# Patient Record
Sex: MALE | Race: WHITE | NOT HISPANIC OR LATINO | Employment: OTHER | URBAN - METROPOLITAN AREA
[De-identification: names, ages, dates, MRNs, and addresses within clinical notes are randomized per-mention and may not be internally consistent; named-entity substitution may affect disease eponyms.]

---

## 2017-02-07 ENCOUNTER — OFFICE VISIT (OUTPATIENT)
Dept: INTERNAL MEDICINE | Facility: CLINIC | Age: 82
End: 2017-02-07
Payer: MEDICARE

## 2017-02-07 VITALS
OXYGEN SATURATION: 99 % | BODY MASS INDEX: 20.94 KG/M2 | WEIGHT: 158 LBS | SYSTOLIC BLOOD PRESSURE: 126 MMHG | DIASTOLIC BLOOD PRESSURE: 78 MMHG | HEART RATE: 73 BPM | TEMPERATURE: 98 F | HEIGHT: 73 IN

## 2017-02-07 DIAGNOSIS — R07.89 CHEST TIGHTNESS: Primary | ICD-10-CM

## 2017-02-07 DIAGNOSIS — R10.13 EPIGASTRIC PAIN: ICD-10-CM

## 2017-02-07 DIAGNOSIS — I10 ESSENTIAL HYPERTENSION WITH GOAL BLOOD PRESSURE LESS THAN 140/90: ICD-10-CM

## 2017-02-07 DIAGNOSIS — I25.10 CORONARY ARTERY DISEASE INVOLVING NATIVE HEART, ANGINA PRESENCE UNSPECIFIED, UNSPECIFIED VESSEL OR LESION TYPE: ICD-10-CM

## 2017-02-07 PROCEDURE — 99214 OFFICE O/P EST MOD 30 MIN: CPT | Performed by: INTERNAL MEDICINE

## 2017-02-07 PROCEDURE — 93000 ELECTROCARDIOGRAM COMPLETE: CPT | Performed by: INTERNAL MEDICINE

## 2017-02-07 NOTE — PROGRESS NOTES
SUBJECTIVE:                                                    Deshawn Burrows is a 83 year old male who presents to clinic today for the following health issues:     Pt is a 83 year old male who is seen here to day with c/o upper abd pain since 2 days. Pt says pain initially started in  lt chest and then moved to upper abdomen. No radiation of pain to lt arm ,no diaphoresis, no nausea. Pt does have belching,no heartburn . No palpitations,no SOB. Pt does walk 2 miles 3 x wk and no chest or Sob.  Pt has h/o CAD and s/p 2 cardiac stents in 2004. Pt has not seen cardiologist sine 2004 and is not taking aspirin.      Hypertension Follow-up      Outpatient blood pressures are not being checked.    Low Salt Diet: low salt         Patient Active Problem List   Diagnosis     Vasculitis (H)     Essential hypertension with goal blood pressure less than 140/90     Hyperlipidemia LDL goal <100     Atherosclerosis of native coronary artery of native heart without angina pectoris     Past Surgical History   Procedure Laterality Date     Hernia repair  2007     right inguinal hernia repair     Stent, coronary, steve  2004     2 stents       Social History   Substance Use Topics     Smoking status: Former Smoker     Smokeless tobacco: Never Used      Comment: Quit 10 years ago     Alcohol Use: 0.0 oz/week     0 Standard drinks or equivalent per week      Comment: Moderate     Family History   Problem Relation Age of Onset     Family history unknown: Yes         Current Outpatient Prescriptions   Medication Sig Dispense Refill     multivitamin, therapeutic with minerals (THERA-VIT-M) TABS Take 1 tablet by mouth daily       METOPROLOL SUCCINATE ER PO Take 50 mg by mouth daily       ATORVASTATIN CALCIUM PO Take 40 mg by mouth At Bedtime          ROS:  C: NEGATIVE for fever, chills, change in weight  E/M: NEGATIVE for ear, mouth and throat problems  R: NEGATIVE for significant cough or SOB  CV: lt chest pain, no  palpitations or  "peripheral edema  GI: upper abdominal pain, no heartburn, or change in bowel habits  MUSCULOSKELETAL: NEGATIVE for significant arthralgias or myalgia    OBJECTIVE:                                                    /78 mmHg  Pulse 73  Temp(Src) 98  F (36.7  C) (Oral)  Ht 6' 1\" (1.854 m)  Wt 158 lb (71.668 kg)  BMI 20.85 kg/m2  SpO2 99%  Body mass index is 20.85 kg/(m^2).   GENERAL: healthy, alert, well nourished, well hydrated, no distress  EYES: Eyes grossly normal to inspection, extraocular movements - intact, and PERRL  NECK: no tenderness, no adenopathy, no asymmetry, no masses, no stiffness; thyroid- normal to palpation  RESP: lungs clear to auscultation - no rales, no rhonchi, no wheezes  CV: regular rates and rhythm, normal S1 S2, no S3 or S4 and no murmur, no click or rub -  ABDOMEN: soft, no tenderness, no  hepatosplenomegaly, no masses, normal bowel sounds  MS: extremities- no gross deformities noted, no edema  NEURO: strength and tone- normal, sensory exam- grossly normal, mentation- intact, speech- normal, reflexes- symmetric         ASSESSMENT/PLAN:                                                          (R07.89) Chest tightness    Comment: h/o CAD with 2 cardaic stents,  Plan: EKG  done- no acute changes compared to previous , will get Exercise Stress Echocardiogram.pt was told I will contact him after results and proceed accordingly.  Pt was to start aspirin.          (R10.13) Epigastric pain  Plan: ? cardiac related, advised to try OTC zantac as directed. Discussed avoiding chocolate, coffee, peppermint, fruit juices,NSAID's, tomatoes, greasy and spicy foods , will get stress test.      (I10) Essential hypertension with goal blood pressure less than 140/90  Plan: BP controlled, continue Metoprolol 50 mg daily.      (I25.10) Coronary artery disease involving native heart, angina presence unspecified, unspecified vessel or lesion type  Plan: has not seen cardiologist since 2004 . on " metoprolol and Lipitor, pt is not taking any aspirin. Not had lipids since 01/15, will check fasting lipids, Pt was advised start aspirin daily.       Tess Leigh MD  [facility]

## 2017-02-07 NOTE — NURSING NOTE
"Chief Complaint   Patient presents with     Pain     New Pt,  C/O stomach pain since yesterday       Initial /78 mmHg  Pulse 73  Temp(Src) 98  F (36.7  C) (Oral)  Ht 6' 1\" (1.854 m)  Wt 158 lb (71.668 kg)  BMI 20.85 kg/m2  SpO2 99% Estimated body mass index is 20.85 kg/(m^2) as calculated from the following:    Height as of this encounter: 6' 1\" (1.854 m).    Weight as of this encounter: 158 lb (71.668 kg).  Medication Reconciliation: complete   Kari Pastrana MA      "

## 2017-02-13 ENCOUNTER — TELEPHONE (OUTPATIENT)
Dept: INTERNAL MEDICINE | Facility: CLINIC | Age: 82
End: 2017-02-13

## 2017-02-13 ENCOUNTER — HOSPITAL ENCOUNTER (OUTPATIENT)
Dept: CARDIOLOGY | Facility: CLINIC | Age: 82
Discharge: HOME | End: 2017-02-13
Attending: INTERNAL MEDICINE | Admitting: INTERNAL MEDICINE
Payer: MEDICARE

## 2017-02-13 DIAGNOSIS — R07.89 CHEST TIGHTNESS: ICD-10-CM

## 2017-02-13 DIAGNOSIS — R10.13 EPIGASTRIC PAIN: ICD-10-CM

## 2017-02-13 PROCEDURE — 93016 CV STRESS TEST SUPVJ ONLY: CPT | Performed by: INTERNAL MEDICINE

## 2017-02-13 PROCEDURE — 93325 DOPPLER ECHO COLOR FLOW MAPG: CPT | Mod: TC

## 2017-02-13 PROCEDURE — 93350 STRESS TTE ONLY: CPT | Mod: 26 | Performed by: INTERNAL MEDICINE

## 2017-02-13 PROCEDURE — 93018 CV STRESS TEST I&R ONLY: CPT | Performed by: INTERNAL MEDICINE

## 2017-02-13 PROCEDURE — 93321 DOPPLER ECHO F-UP/LMTD STD: CPT | Mod: 26 | Performed by: INTERNAL MEDICINE

## 2017-02-13 PROCEDURE — 93325 DOPPLER ECHO COLOR FLOW MAPG: CPT | Mod: 26 | Performed by: INTERNAL MEDICINE

## 2017-02-13 NOTE — TELEPHONE ENCOUNTER
Patient calling as he is scheduled for stress test today and he has developed a big blister on his toe over night.  He is worried this may be heart related and also worried he may be able to walk so good for this test.

## 2017-02-13 NOTE — TELEPHONE ENCOUNTER
Pt calls reporting starting on Saturday night, he noticed a quarter sized blister on the top of his left foot. He rubbed it in bed and it popped watery discharge. It is now raw, red, and red and red on the left of the side. Foot is not swollen, not numb or cold.     He also mentioned he had some Numbness, discoloration of his  first finger of left hand Thursday and Saturday. This is resolved.     He was hospitalized in May for left purple toe, and blister on the toe, for possible vasculitis or underlying infection.      He is asking if should have stress test. He thinks he can walk fine.    Please advise.

## 2017-02-13 NOTE — TELEPHONE ENCOUNTER
Call to pt and advised. He agrees, scheduled for tomorrow. Advised to call back if sx worsen, swelling, redness, fevers. He agrees.

## 2017-02-13 NOTE — TELEPHONE ENCOUNTER
If he can walk on treadmill he can have the stress test, but needs to be evaluated for lt foot blister- if any cellulitis.

## 2017-02-14 ENCOUNTER — OFFICE VISIT (OUTPATIENT)
Dept: INTERNAL MEDICINE | Facility: CLINIC | Age: 82
End: 2017-02-14
Payer: MEDICARE

## 2017-02-14 VITALS
DIASTOLIC BLOOD PRESSURE: 74 MMHG | WEIGHT: 158.4 LBS | SYSTOLIC BLOOD PRESSURE: 126 MMHG | HEART RATE: 85 BPM | TEMPERATURE: 98.3 F | BODY MASS INDEX: 20.9 KG/M2 | OXYGEN SATURATION: 100 %

## 2017-02-14 DIAGNOSIS — L03.90 CELLULITIS, UNSPECIFIED CELLULITIS SITE: Primary | ICD-10-CM

## 2017-02-14 DIAGNOSIS — E78.5 HYPERLIPIDEMIA LDL GOAL <100: ICD-10-CM

## 2017-02-14 PROCEDURE — 36415 COLL VENOUS BLD VENIPUNCTURE: CPT | Performed by: INTERNAL MEDICINE

## 2017-02-14 PROCEDURE — 80076 HEPATIC FUNCTION PANEL: CPT | Performed by: INTERNAL MEDICINE

## 2017-02-14 PROCEDURE — 99214 OFFICE O/P EST MOD 30 MIN: CPT | Performed by: INTERNAL MEDICINE

## 2017-02-14 PROCEDURE — 80061 LIPID PANEL: CPT | Performed by: INTERNAL MEDICINE

## 2017-02-14 RX ORDER — CEPHALEXIN 500 MG/1
500 CAPSULE ORAL 2 TIMES DAILY
Qty: 14 CAPSULE | Refills: 0 | Status: SHIPPED | OUTPATIENT
Start: 2017-02-14 | End: 2017-02-22

## 2017-02-14 NOTE — MR AVS SNAPSHOT
"              After Visit Summary   2017    Deshawn Burrows    MRN: [id number]           Patient Information     Date Of Birth          3/8/1933        Visit Information        Provider Department      2017 1:00 PM Tess Leigh MD [facility]        Today's Diagnoses     Cellulitis, unspecified cellulitis site    -  1    Hyperlipidemia LDL goal <100           Follow-ups after your visit        Who to contact     If you have questions or need follow up information about today's clinic visit or your schedule please contact [facility] directly at [phone].  Normal or non-critical lab and imaging results will be communicated to you by OfferIQhart, letter or phone within 4 business days after the clinic has received the results. If you do not hear from us within 7 days, please contact the clinic through OfferIQhart or phone. If you have a critical or abnormal lab result, we will notify you by phone as soon as possible.  Submit refill requests through [employer] or call your pharmacy and they will forward the refill request to us. Please allow 3 business days for your refill to be completed.          Additional Information About Your Visit        MyChart Information     [employer] lets you send messages to your doctor, view your test results, renew your prescriptions, schedule appointments and more. To sign up, go to [email or web address] . Click on \"Log in\" on the left side of the screen, which will take you to the Welcome page. Then click on \"Sign up Now\" on the right side of the page.     You will be asked to enter the access code listed below, as well as some personal information. Please follow the directions to create your username and password.     Your access code is: [email or web address]  Expires: 2017  8:36 PM     Your access code will  in 90 days. If you need help or a new code, please call your [facility] or [phone].        Care EveryWhere ID     " This is your Care EveryWhere ID. This could be used by other organizations to access your [place] medical records  [id number]        Your Vitals Were     Pulse Temperature Pulse Oximetry BMI (Body Mass Index)          85 98.3  F (36.8  C) (Oral) 100% 20.9 kg/m2         Blood Pressure from Last 3 Encounters:   02/14/17 126/74   02/07/17 126/78   08/10/16 130/70    Weight from Last 3 Encounters:   02/14/17 158 lb 6.4 oz (71.8 kg)   02/07/17 158 lb (71.7 kg)   08/10/16 155 lb 8 oz (70.5 kg)              We Performed the Following     Hepatic panel     Lipid panel reflex to direct LDL          Today's Medication Changes          These changes are accurate as of: 2/14/17 11:59 PM.  If you have any questions, ask your nurse or doctor.               Start taking these medicines.        Dose/Directions    cephALEXin 500 MG capsule   Commonly known as:  KEFLEX   Used for:  Cellulitis, unspecified cellulitis site   Started by:  Tess Leigh MD        Dose:  500 mg   Take 1 capsule (500 mg) by mouth 2 times daily   Quantity:  14 capsule   Refills:  0            Where to get your medicines      These medications were sent to [id number] - [place], [place] - [address] [address]     Phone:  [phone]     cephALEXin 500 MG capsule                Primary Care Provider Office Phone # Fax #    Tess Leigh [phone]       [facility] [address] [place] [address]        Thank you!     Thank you for choosing [facility]  for your care. Our goal is always to provide you with excellent care. Hearing back from our patients is one way we can continue to improve our services. Please take a few minutes to complete the written survey that you may receive in the mail after your visit with us. Thank you!             Your Updated Medication List - Protect others around you: Learn how to safely use,  store and throw away your medicines at www.disposemymeds.org.          This list is accurate as of: 2/14/17 11:59 PM.  Always use your most recent med list.                   Brand Name Dispense Instructions for use    ATORVASTATIN CALCIUM PO      Take 40 mg by mouth At Bedtime       cephALEXin 500 MG capsule    KEFLEX    14 capsule    Take 1 capsule (500 mg) by mouth 2 times daily       METOPROLOL SUCCINATE ER PO      Take 50 mg by mouth daily       multivitamin, therapeutic with minerals Tabs tablet      Take 1 tablet by mouth daily

## 2017-02-14 NOTE — PROGRESS NOTES
SUBJECTIVE:                                                    Deshawn Burrows is a 83 year old male who presents to clinic today for the following health issues:    Pt is a 83 year old male who is seen here to day with c/o noting a blister on lt dorsal foot and pt broke the blister accidentally while asleep 3 days ago. Pt has noticed redness surrounding the blister and since yesterday, redness has increased and spreading to side of the foot, no pain, no drainage, no fever/chills.   Pt had similar blister on lt toe in 05/16 and had vasculitis w/u and was negative.he was also evaluated by vascular medicine.       Patient Active Problem List   Diagnosis     Vasculitis (H)     Essential hypertension with goal blood pressure less than 140/90     Hyperlipidemia LDL goal <100     Atherosclerosis of native coronary artery of native heart without angina pectoris     Myocardial infarction (H)     CAD (coronary artery disease)     Coronary artery disease involving native heart, angina presence unspecified, unspecified vessel or lesion type     Past Surgical History   Procedure Laterality Date     Hernia repair  2007     right inguinal hernia repair     Stent, coronary, steve  2004     2 stents       Social History   Substance Use Topics     Smoking status: Former Smoker     Smokeless tobacco: Never Used      Comment: Quit 10 years ago     Alcohol use 0.0 oz/week     0 Standard drinks or equivalent per week      Comment: Moderate     Family History   Problem Relation Age of Onset     Family history unknown: Yes         Current Outpatient Prescriptions   Medication Sig Dispense Refill     cephALEXin (KEFLEX) 500 MG capsule Take 1 capsule (500 mg) by mouth 2 times daily 14 capsule 0     multivitamin, therapeutic with minerals (THERA-VIT-M) TABS Take 1 tablet by mouth daily       METOPROLOL SUCCINATE ER PO Take 50 mg by mouth daily       ATORVASTATIN CALCIUM PO Take 40 mg by mouth At Bedtime          ROS:  C: NEGATIVE for  fever, chills, change in weight  R: NEGATIVE for significant cough or SOB  CV: NEGATIVE for chest pain, palpitations or peripheral edema  MUSCULOSKELETAL: NEGATIVE for significant arthralgias or myalgia  NEURO: NEGATIVE for weakness, dizziness or paresthesias  SKIN; Blister on top of lt foot.     OBJECTIVE:                                                    /74  Pulse 85  Temp 98.3  F (36.8  C) (Oral)  Wt 158 lb 6.4 oz (71.8 kg)  SpO2 100%  BMI 20.9 kg/m2  Body mass index is 20.9 kg/(m^2).   GENERAL: healthy, alert, well nourished, well hydrated, no distress  NECK: no tenderness, no adenopathy, no asymmetry, no masses, no stiffness; thyroid- normal to palpation  RESP: lungs clear to auscultation - no rales, no rhonchi, no wheezes  CV: regular rates and rhythm, normal S1 S2, no S3 or S4 and no murmur, no click or rub -  MS:   no LE edema, no calf tenderness,  SKIN:   Open blister noted on Lt dorsal foot, surrounding erythema noted along with erythema spreading to lt lateral foot. No drainage. Peripheral pulses felt.      ASSESSMENT/PLAN:                                                      1. Cellulitis, unspecified cellulitis site  --explained about the condition, blister probably sec to wearign tight shoe.   - started on cephALEXin (KEFLEX) 500 MG capsule; Take 1 capsule (500 mg) by mouth 2 times daily as directed.explained clearly about the medication,insructions and side effects. Call or return to clinic prn if these symtoms worsen, fail to improve as anticipated, or if new symptoms develop.      2. Hyperlipidemia LDL goal <100  - Lipid panel reflex to direct LDL  - Hepatic panel      Tess Leigh MD  [facility]

## 2017-02-14 NOTE — NURSING NOTE
"Chief Complaint   Patient presents with     Derm Problem     Left foot blister noticed Saturday- open blister       Initial /74  Pulse 85  Temp 98.3  F (36.8  C) (Oral)  Wt 158 lb 6.4 oz (71.8 kg)  SpO2 100%  BMI 20.9 kg/m2 Estimated body mass index is 20.9 kg/(m^2) as calculated from the following:    Height as of 2/7/17: 6' 1\" (1.854 m).    Weight as of this encounter: 158 lb 6.4 oz (71.8 kg).  Medication Reconciliation: complete      Tom Gallegos, TUNG      "

## 2017-02-15 LAB
ALBUMIN SERPL-MCNC: 4.3 G/DL (ref 3.4–5)
ALP SERPL-CCNC: 72 U/L (ref 40–150)
ALT SERPL W P-5'-P-CCNC: 24 U/L (ref 0–70)
AST SERPL W P-5'-P-CCNC: 22 U/L (ref 0–45)
BILIRUB DIRECT SERPL-MCNC: 0.1 MG/DL (ref 0–0.2)
BILIRUB SERPL-MCNC: 0.5 MG/DL (ref 0.2–1.3)
CHOLEST SERPL-MCNC: 104 MG/DL
HDLC SERPL-MCNC: 35 MG/DL
LDLC SERPL CALC-MCNC: 9 MG/DL
NONHDLC SERPL-MCNC: 69 MG/DL
PROT SERPL-MCNC: 7.5 G/DL (ref 6.8–8.8)
TRIGL SERPL-MCNC: 298 MG/DL

## 2017-02-22 ENCOUNTER — TELEPHONE (OUTPATIENT)
Dept: INTERNAL MEDICINE | Facility: CLINIC | Age: 82
End: 2017-02-22

## 2017-02-22 DIAGNOSIS — L03.90 CELLULITIS, UNSPECIFIED CELLULITIS SITE: ICD-10-CM

## 2017-02-22 RX ORDER — CEPHALEXIN 500 MG/1
500 CAPSULE ORAL 2 TIMES DAILY
Qty: 14 CAPSULE | Refills: 0 | Status: SHIPPED | OUTPATIENT
Start: 2017-02-22 | End: 2017-09-19

## 2017-03-14 DIAGNOSIS — I25.10 CORONARY ARTERY DISEASE INVOLVING NATIVE HEART, ANGINA PRESENCE UNSPECIFIED, UNSPECIFIED VESSEL OR LESION TYPE: Primary | ICD-10-CM

## 2017-03-14 RX ORDER — METOPROLOL SUCCINATE 50 MG/1
50 TABLET, EXTENDED RELEASE ORAL DAILY
Qty: 90 TABLET | Refills: 1 | Status: SHIPPED | OUTPATIENT
Start: 2017-03-14 | End: 2017-09-30

## 2017-03-14 RX ORDER — METOPROLOL SUCCINATE 25 MG/1
50 TABLET, EXTENDED RELEASE ORAL DAILY
Qty: 30 TABLET | Status: CANCELLED | OUTPATIENT
Start: 2017-03-14

## 2017-03-14 NOTE — TELEPHONE ENCOUNTER
Metoprolol 24 hour 50MG      Last Written Prescription Date: 02/04/17   Pharmacy Reported  Last Fill Quantity: 30, # refills: 0    Last Office Visit with G, P or [facility] prescribing provider:  02/04/17   Future Office Visit:        BP Readings from Last 3 Encounters:   02/14/17 126/74   02/07/17 126/78   08/10/16 130/70

## 2017-03-14 NOTE — TELEPHONE ENCOUNTER
Routing refill request to provider for review/approval because:  Medication is reported/historical    Please advise, thanks.

## 2017-04-03 DIAGNOSIS — I25.10 CORONARY ARTERY DISEASE INVOLVING NATIVE HEART, ANGINA PRESENCE UNSPECIFIED, UNSPECIFIED VESSEL OR LESION TYPE: Primary | ICD-10-CM

## 2017-04-03 RX ORDER — ATORVASTATIN CALCIUM 40 MG/1
20 TABLET, FILM COATED ORAL AT BEDTIME
Qty: 45 TABLET | Refills: 3 | Status: SHIPPED | OUTPATIENT
Start: 2017-04-03 | End: 2018-04-01

## 2017-04-03 NOTE — TELEPHONE ENCOUNTER
"Per requests lipitor 40 mg by mouth at bedtime.     Per 02/14 result note: \"all tests normal except slightly high triglycerides, total cholesterol and LDL with a good, advised patient to decrease Lipitor 40 mg to 1/2 tab daily( 20 mg)\"    Routing refill request to provider for review/approval because:  Medication is reported/historical    Pended rx with instructions per 02/14 result note.    Please advise, thanks.        "

## 2017-04-03 NOTE — TELEPHONE ENCOUNTER
Lipitor 40MG  Pharmacy Reported     Last Written Prescription Date: 02/12/17  Last Fill Quantity: 30, # refills: 0  [facility] with [facility] or [facility] prescribing provider: 02/14/17       Lab Results   Component Value Date    CHOL 104 02/14/2017     Lab Results   Component Value Date    HDL 35 02/14/2017     Lab Results   Component Value Date    LDL 9 02/14/2017     Lab Results   Component Value Date    TRIG 298 02/14/2017     No results found for: CHOLHDLRATIO    Labs showing if normal/abnormal  Lab Results   Component Value Date    CHOL 104 02/14/2017    TRIG 298 (H) 02/14/2017    HDL 35 (L) 02/14/2017    LDL 9 02/14/2017

## 2017-09-19 ENCOUNTER — RADIANT APPOINTMENT (OUTPATIENT)
Dept: GENERAL RADIOLOGY | Facility: CLINIC | Age: 82
End: 2017-09-19
Attending: NURSE PRACTITIONER
Payer: MEDICARE

## 2017-09-19 ENCOUNTER — OFFICE VISIT (OUTPATIENT)
Dept: INTERNAL MEDICINE | Facility: CLINIC | Age: 82
End: 2017-09-19
Payer: MEDICARE

## 2017-09-19 ENCOUNTER — TELEPHONE (OUTPATIENT)
Dept: INTERNAL MEDICINE | Facility: CLINIC | Age: 82
End: 2017-09-19

## 2017-09-19 VITALS
HEIGHT: 73 IN | TEMPERATURE: 98 F | BODY MASS INDEX: 20.85 KG/M2 | RESPIRATION RATE: 16 BRPM | DIASTOLIC BLOOD PRESSURE: 84 MMHG | WEIGHT: 157.3 LBS | HEART RATE: 74 BPM | OXYGEN SATURATION: 98 % | SYSTOLIC BLOOD PRESSURE: 132 MMHG

## 2017-09-19 DIAGNOSIS — M25.552 HIP PAIN, LEFT: Primary | ICD-10-CM

## 2017-09-19 DIAGNOSIS — M25.552 HIP PAIN, LEFT: ICD-10-CM

## 2017-09-19 DIAGNOSIS — L60.0 INGROWN NAIL: ICD-10-CM

## 2017-09-19 PROCEDURE — 72170 X-RAY EXAM OF PELVIS: CPT

## 2017-09-19 PROCEDURE — 99214 OFFICE O/P EST MOD 30 MIN: CPT | Performed by: NURSE PRACTITIONER

## 2017-09-19 NOTE — NURSING NOTE
"Chief Complaint   Patient presents with     Musculoskeletal Problem     bilateral hip pain for approx 1 month, patient did fall around the same time, landed on left knee.       Initial /84 (BP Location: Right arm, Patient Position: Sitting, Cuff Size: Adult Large)  Pulse 74  Temp 98  F (36.7  C) (Oral)  Resp 16  Ht 6' 1\" (1.854 m)  Wt 157 lb 4.8 oz (71.4 kg)  SpO2 98%  BMI 20.75 kg/m2 Estimated body mass index is 20.75 kg/(m^2) as calculated from the following:    Height as of this encounter: 6' 1\" (1.854 m).    Weight as of this encounter: 157 lb 4.8 oz (71.4 kg).  Medication Reconciliation: complete    "

## 2017-09-19 NOTE — TELEPHONE ENCOUNTER
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  NSAIDs prn, remain as active as tolerated, would recommend ortho consult if becomes to much discomfort.  Lluvia Blanca CNP

## 2017-09-19 NOTE — MR AVS SNAPSHOT
After Visit Summary   9/19/2017    Deshawn Burrows    MRN: [id number]           Patient Information     Date Of Birth          3/8/1933        Visit Information        Provider Department      9/19/2017 8:00 AM Lluvia Blanca NP [facility]        Today's Diagnoses     Hip pain, left    -  1    Ingrown nail           Follow-ups after your visit        Additional Services     ORTHO  REFERRAL       [facility] is referring you to the Orthopedic  Services at [facility].       The  Representative will assist you in the coordination of your Orthopedic and Musculoskeletal Care as prescribed by your physician.    The  Representative will call you within 1 business day to help schedule your appointment, or you may contact the  Representative at:    All areas ~ [phone]     Type of Referral : [facility]       Timeframe requested: Within 2 weeks    Coverage of these services is subject to the terms and limitations of your health insurance plan.  Please call member services at your health plan with any benefit or coverage questions.      If X-rays, CT or MRI's have been performed, please contact the facility where they were done to arrange for , prior to your scheduled appointment.  Please bring this referral request to your appointment and present it to your specialist.                  Future tests that were ordered for you today     Open Future Orders        Priority Expected Expires Ordered    XR Pelvis 1/2 Views Routine 9/19/2017 9/19/2018 9/19/2017            Who to contact     If you have questions or need follow up information about today's clinic visit or your schedule please contact [facility] directly at [phone].  Normal or non-critical lab and imaging results will be communicated to you by MyChart, letter or phone within 4  "business days after the clinic has received the results. If you do not hear from us within 7 days, please contact the clinic through [employer] or phone. If you have a critical or abnormal lab result, we will notify you by phone as soon as possible.  Submit refill requests through [employer] or call your pharmacy and they will forward the refill request to us. Please allow 3 business days for your refill to be completed.          Additional Information About Your Visit        [employer] Information     [employer] lets you send messages to your doctor, view your test results, renew your prescriptions, schedule appointments and more. To sign up, go to [email or web address] . Click on \"Log in\" on the left side of the screen, which will take you to the Welcome page. Then click on \"Sign up Now\" on the right side of the page.     You will be asked to enter the access code listed below, as well as some personal information. Please follow the directions to create your username and password.     Your access code is: [email or web address]  Expires: 2017  8:45 AM     Your access code will  in 90 days. If you need help or a new code, please call your [facility] clinic or [phone].        Care EveryWhere ID     This is your Care EveryWhere ID. This could be used by other organizations to access your [facility] medical records  [id number]        Your Vitals Were     Pulse Temperature Respirations Height Pulse Oximetry BMI (Body Mass Index)    74 98  F (36.7  C) (Oral) 16 6' 1\" (1.854 m) 98% 20.75 kg/m2       Blood Pressure from Last 3 Encounters:   17 132/84   17 126/74   17 126/78    Weight from Last 3 Encounters:   17 157 lb 4.8 oz (71.4 kg)   17 158 lb 6.4 oz (71.8 kg)   17 158 lb (71.7 kg)              We Performed the Following     ORTHO  REFERRAL        Primary Care Provider Office Phone # Fax #    Tess Leigh [phone]       [address] " [facility] [address]        Equal Access to Services     [facility] Center[facility]ON : Hadii juan mccloud syd Sonick, waaxda luqadaha, qaybta kajayromaria del carmen goins, denia watsonmaxineifeoma dotson. So [facility] [phone].    ATENCIÓN: Si habla español, tiene a christina disposición servicios gratuitos de asistencia lingüística. Auraame al [phone].    We comply with applicable federal civil rights laws and [place] laws. We do not discriminate on the basis of race, color, national origin, age, disability sex, sexual orientation or gender identity.            Thank you!     Thank you for choosing [facility]  for your care. Our goal is always to provide you with excellent care. Hearing back from our patients is one way we can continue to improve our services. Please take a few minutes to complete the written survey that you may receive in the mail after your visit with us. Thank you!             Your Updated Medication List - Protect others around you: Learn how to safely use, store and throw away your medicines at www.disposemymeds.org.          This list is accurate as of: 9/19/17  8:45 AM.  Always use your most recent med list.                   Brand Name Dispense Instructions for use Diagnosis    atorvastatin 40 MG tablet    LIPITOR    45 tablet    Take 0.5 tablets (20 mg) by mouth At Bedtime    Coronary artery disease involving native heart, angina presence unspecified, unspecified vessel or lesion type       * METOPROLOL SUCCINATE ER PO      Take 50 mg by mouth daily        * metoprolol 50 MG 24 hr tablet    TOPROL-XL    90 tablet    Take 1 tablet (50 mg) by mouth daily    Coronary artery disease involving native heart, angina presence unspecified, unspecified vessel or lesion type       multivitamin, therapeutic with minerals Tabs tablet      Take 1 tablet by mouth daily        * Notice:  This list has 2 medication(s) that are the same as other medications prescribed for you. Read the directions  carefully, and ask your doctor or other care provider to review them with you.

## 2017-09-19 NOTE — PROGRESS NOTES
SUBJECTIVE:   Deshawn Burrows is a 84 year old male who presents to clinic today for the following health issues:      Musculoskeletal problem/pain      Duration: 2 months    Description  Location: L hip/groin pain after fall on L knee    Intensity:  mild    Accompanying signs and symptoms: radiation of pain to L groin    History  Previous similar problem: no   Previous evaluation:  none    Precipitating or alleviating factors:  Trauma or overuse: no   Aggravating factors include: walking    Therapies tried and outcome: nothing        Musculoskeletal problem/pain      Duration: 1+ year    Description  Location: L great toe ingrown nail    Intensity:  mild    Accompanying signs and symptoms: none    History  Previous similar problem: no   Previous evaluation:  none    Precipitating or alleviating factors:  Trauma or overuse: no   Aggravating factors include: walking    Therapies tried and outcome: nothing        Patient Active Problem List   Diagnosis     Vasculitis (H)     Essential hypertension with goal blood pressure less than 140/90     Hyperlipidemia LDL goal <100     Atherosclerosis of native coronary artery of native heart without angina pectoris     Myocardial infarction (H)     CAD (coronary artery disease)     Coronary artery disease involving native heart, angina presence unspecified, unspecified vessel or lesion type     Past Surgical History:   Procedure Laterality Date     HERNIA REPAIR  2007    right inguinal hernia repair     STENT, CORONARY, ANTIONE  2004    2 stents       Social History   Substance Use Topics     Smoking status: Former Smoker     Smokeless tobacco: Never Used      Comment: Quit 10 years ago     Alcohol use 0.0 oz/week     0 Standard drinks or equivalent per week      Comment: Moderate     Family History   Problem Relation Age of Onset     Family history unknown: Yes         Current Outpatient Prescriptions   Medication Sig Dispense Refill     atorvastatin (LIPITOR) 40 MG tablet  "Take 0.5 tablets (20 mg) by mouth At Bedtime 45 tablet 3     metoprolol (TOPROL-XL) 50 MG 24 hr tablet Take 1 tablet (50 mg) by mouth daily 90 tablet 1     multivitamin, therapeutic with minerals (THERA-VIT-M) TABS Take 1 tablet by mouth daily       METOPROLOL SUCCINATE ER PO Take 50 mg by mouth daily       BP Readings from Last 3 Encounters:   09/19/17 132/84   02/14/17 126/74   02/07/17 126/78    Wt Readings from Last 3 Encounters:   09/19/17 157 lb 4.8 oz (71.4 kg)   02/14/17 158 lb 6.4 oz (71.8 kg)   02/07/17 158 lb (71.7 kg)                        Reviewed and updated as needed this visit by clinical staffTobacco  Allergies  Meds  Med Hx  Surg Hx  Fam Hx  Soc Hx      Reviewed and updated as needed this visit by Provider         ROS:  Constitutional, HEENT, cardiovascular, pulmonary, gi and gu systems are negative, except as otherwise noted.      OBJECTIVE:   /84 (BP Location: Right arm, Patient Position: Sitting, Cuff Size: Adult Large)  Pulse 74  Temp 98  F (36.7  C) (Oral)  Resp 16  Ht 6' 1\" (1.854 m)  Wt 157 lb 4.8 oz (71.4 kg)  SpO2 98%  BMI 20.75 kg/m2  Body mass index is 20.75 kg/(m^2).  GENERAL: healthy, alert and no distress  MS: L hip decreased ROM due to pain, L great toe ingrown nail w/o infection        ASSESSMENT/PLAN:               ICD-10-CM    1. Hip pain, left M25.552 XR Pelvis 1/2 Views   2. Ingrown nail L60.0 ORTHO  REFERRAL       F/u pending results, PT or ortho    Lluvia Blanca, ROBERT  [facility]    "

## 2017-09-26 ENCOUNTER — OFFICE VISIT (OUTPATIENT)
Dept: PODIATRY | Facility: CLINIC | Age: 82
End: 2017-09-26
Payer: MEDICARE

## 2017-09-26 VITALS
WEIGHT: 157 LBS | HEIGHT: 73 IN | SYSTOLIC BLOOD PRESSURE: 130 MMHG | DIASTOLIC BLOOD PRESSURE: 80 MMHG | BODY MASS INDEX: 20.81 KG/M2

## 2017-09-26 DIAGNOSIS — L60.3 DYSTROPHIC NAIL: Primary | ICD-10-CM

## 2017-09-26 DIAGNOSIS — L60.0 ONYCHOCRYPTOSIS: ICD-10-CM

## 2017-09-26 PROCEDURE — 11719 TRIM NAIL(S) ANY NUMBER: CPT | Performed by: PODIATRIST

## 2017-09-26 PROCEDURE — 99203 OFFICE O/P NEW LOW 30 MIN: CPT | Mod: 25 | Performed by: PODIATRIST

## 2017-09-26 NOTE — NURSING NOTE
"Chief Complaint   Patient presents with     Ingrown Toenail     L hallux medial edge x years       Initial /80  Ht 6' 1\" (1.854 m)  Wt 157 lb (71.2 kg)  BMI 20.71 kg/m2 Estimated body mass index is 20.71 kg/(m^2) as calculated from the following:    Height as of this encounter: 6' 1\" (1.854 m).    Weight as of this encounter: 157 lb (71.2 kg).  Medication Reconciliation: complete  "

## 2017-09-26 NOTE — PATIENT INSTRUCTIONS
DR. JON'S CLINIC LOCATIONS:   MONDAY - [place] TUESDAY - [place]   [address] [facility]  [address]   [place] [address] [place] [address]   [phone]       THURSDAY AM - [place] [facility]   [address]   [place] [address] [place], [place] [id number] [phone]       FRIDAY AM - [place] SET UP SURGERY: [phone] [address] APPOINTMENTS: [phone]   [place] [address] BILLING QUESTIONS: [phone] FAX NUMBER: [phone]     Follow Up: as needed    FOOT CARE NURSES  If you are interested in having a foot care nurse come out to your   home, please call one of these contacts for more information:  [facility]  [phone] [facility]  [phone]   [facility]  [phone]  [facility] [phone]  [place]   [facility]  [phone]  At [place] [facility] [phone]           Body Mass Index (BMI)  Many things can cause foot and ankle problems. Foot structure, activity level, foot mechanics and injuries are common causes of pain. One very important issue that often goes unmentioned, is body weight. Extra weight can cause increased stress on muscles, ligaments, bones and tendons. Sometimes just a few extra pounds is all it takes to put one over her/his threshold. Without reducing that stress, it can be difficult to alleviate pain. Some people are uncomfortable addressing this issue, but we feel it is important for you to think about it. As Foot &  Ankle specialists, our job is addressing the lower extremity problem and possible causes. Regarding extra body weight, we encourage patients to discuss diet and weight management plans with their primary care doctors. It is this team approach that gives you the best opportunity for pain relief and getting you back on your feet.

## 2017-09-26 NOTE — PROGRESS NOTES
"Foot & Ankle Surgery  September 26, 2017    CC: ingrown nail medial L hallux    I was asked to see Deshawn Burrows regarding the chief complaint by:  JSAON Blanca    HPI:  Pt is a 84 year old male who presents with above complaint.  Ingrown nail medial L hallux for \"years\".  No previous procedures.  Painful with direct pressure.    ROS:   Pos for CC.  The patient denies current nausea, vomiting, chills, fevers, belly pain, calf pain, chest pain or SOB.  Complete remainder of ROS is otherwise neg.    VITALS:    Vitals:    09/26/17 1306   BP: 130/80   Weight: 71.2 kg (157 lb)   Height: 1.854 m (6' 1\")       PMH:    Past Medical History:   Diagnosis Date     CAD (coronary artery disease) 2004    2 cardiac stents        SXHX:    Past Surgical History:   Procedure Laterality Date     HERNIA REPAIR  2007    right inguinal hernia repair     STENT, CORONARY, ANTIONE  2004    2 stents        MEDS:    Current Outpatient Prescriptions   Medication     atorvastatin (LIPITOR) 40 MG tablet     metoprolol (TOPROL-XL) 50 MG 24 hr tablet     multivitamin, therapeutic with minerals (THERA-VIT-M) TABS     METOPROLOL SUCCINATE ER PO     No current facility-administered medications for this visit.        ALL:     Allergies   Allergen Reactions     Penicillins        FMH:    Family History   Problem Relation Age of Onset     Family history unknown: Yes       SocHx:    Social History     Social History     Marital status:      Spouse name: N/A     Number of children: N/A     Years of education: N/A     Occupational History     Not on file.     Social History Main Topics     Smoking status: Former Smoker     Smokeless tobacco: Never Used      Comment: Quit 10 years ago     Alcohol use 0.0 oz/week     0 Standard drinks or equivalent per week      Comment: Moderate     Drug use: No     Sexual activity: Not Currently     Other Topics Concern     Not on file     Social History Narrative           EXAMINATION:  Gen:   No apparent " distress  Neuro:   A&Ox3, no deficits  Psych:    Answering questions appropriately for age and situation with normal affect  Head:    NCAT  Eye:    Visual scanning without deficit  Ear:    Response to auditory stimuli wnl  Lung:    Non-labored breathing on RA noted  Abd:    NTND per patient report  Lymph:    Neg for pitting/non-pitting edema BLE  Vasc:    Pulses palpable, CFT minimally delayed  Neuro:    Light touch sensation intact to all sensory nerve distributions without paresthesias  Derm:    Onychocryptosis medial L hallux without wound/paronychia.  The nail is dystrophick, thickened with subungual debris.  MSK:    ROM, strength wnl without limitation, no pain on palpation noted.  Calf:    Neg for redness, swelling or tenderness      Assessment:  84 year old male with onychocryptosis medial L hallux      Plan:  Discussed etiologies and options  1.  Onychocryptosis medial L hallux  -discussed procedure options, from slantback to partial temp and permanent avulsions.  -pain is at leading edge without paronychia  -slantback performed, leading edge removed with improvement in symptoms  -nail care handout dispensed for routine debridement of nails and medial edge  -consider avulsion should slantback provide insufficient relief    Follow up:  prn or sooner with acute issues      Patient's medical history was reviewed today    Body mass index is 20.71 kg/(m^2).            Fredrick Peralta DPM   Podiatric Foot & Ankle Surgeon  [facility]  [phone]

## 2017-09-26 NOTE — PROGRESS NOTES
"Foot & Ankle Surgery  September 26, 2017    CC: ***    I was asked to see Deshawn Burrows regarding the chief complaint by:  ***    HPI:  Pt is a 84 year old male who presents with above complaint.  ***    ROS:   Pos for CC.  The patient denies current nausea, vomiting, chills, fevers, belly pain, calf pain, chest pain or SOB.  Complete remainder of ROS is otherwise neg.    VITALS:    Vitals:    09/26/17 1306   BP: 130/80   Weight: 157 lb (71.2 kg)   Height: 6' 1\" (1.854 m)       PMH:    Past Medical History:   Diagnosis Date     CAD (coronary artery disease) 2004    2 cardiac stents        SXHX:    Past Surgical History:   Procedure Laterality Date     HERNIA REPAIR  2007    right inguinal hernia repair     STENT, CORONARY, ANTIONE  2004    2 stents        MEDS:    Current Outpatient Prescriptions   Medication     atorvastatin (LIPITOR) 40 MG tablet     metoprolol (TOPROL-XL) 50 MG 24 hr tablet     multivitamin, therapeutic with minerals (THERA-VIT-M) TABS     METOPROLOL SUCCINATE ER PO     No current facility-administered medications for this visit.        ALL:     Allergies   Allergen Reactions     Penicillins        FMH:    Family History   Problem Relation Age of Onset     Family history unknown: Yes       SocHx:    Social History     Social History     Marital status:      Spouse name: N/A     Number of children: N/A     Years of education: N/A     Occupational History     Not on file.     Social History Main Topics     Smoking status: Former Smoker     Smokeless tobacco: Never Used      Comment: Quit 10 years ago     Alcohol use 0.0 oz/week     0 Standard drinks or equivalent per week      Comment: Moderate     Drug use: No     Sexual activity: Not Currently     Other Topics Concern     Not on file     Social History Narrative           EXAMINATION:  Gen:   No apparent distress  Neuro:   A&Ox3, no deficits  Psych:    Answering questions appropriately for age and situation with normal affect  Head:    " NCAT  Eye:    Visual scanning without deficit  Ear:    Response to auditory stimuli wnl  Lung:    Non-labored breathing on RA noted  Abd:    NTND per patient report  Lymph:    Neg for pitting/non-pitting edema BLE  Vasc:    Pulses palpable, CFT minimally delayed  Neuro:    Light touch sensation intact to all sensory nerve distributions without paresthesias  Derm:    Neg for nodules, lesions or ulcerations  MSK:    ROM, strength wnl without limitation, no pain on palpation noted.  Calf:    Neg for redness, swelling or tenderness  ***    Imaging:  ***  Labs:  ***  Cultures:  ***    Assessment:  84 year old male with ***      Plan:  Discussed etiologies and options  1.  ***  -***    Follow up:  *** or sooner with acute issues      Patient's medical history was reviewed today    Body mass index is 20.71 kg/(m^2).  Weight management plan Patient was referred to their PCP to discuss a diet and exercise plan.        Fredrick Peralta DPM   Podiatric Foot & Ankle Surgeon  [facility]  [phone]

## 2017-09-26 NOTE — MR AVS SNAPSHOT
After Visit Summary   9/26/2017    Deshawn Burrows    MRN: [id number]           Patient Information     Date Of Birth          3/8/1933        Visit Information        Provider Department      9/26/2017 1:00 PM Fredrick Jon DPM [facility]        Care Instructions      DR. JON'S CLINIC LOCATIONS:   MONDAY - [place] TUESDAY - [place]   [address]   [place] [address] [place] [address]   [phone]       THURSDAY AM - [place] THURSDAY PM - [facility]   [address]   [place] [address] [place], [place] [address] [phone]       FRIDAY AM - [place] SET UP SURGERY: [phone] [address] APPOINTMENTS: [phone]   [place] [address] BILLING QUESTIONS: [phone] FAX NUMBER: [phone]     Follow Up: as needed    FOOT CARE NURSES  If you are interested in having a foot care nurse come out to your   home, please call one of these contacts for more information:  [facility]  [phone] [facility]  [phone]   [facility]  [phone]  [facility] [phone]  [place]   [facility]  [phone]  At [place] [facility] [phone]           Body Mass Index (BMI)  Many things can cause foot and ankle problems. Foot structure, activity level, foot mechanics and injuries are common causes of pain. One very important issue that often goes unmentioned, is body weight. Extra weight can cause increased stress on muscles, ligaments, bones and tendons. Sometimes just a few extra pounds is all it takes to put one over her/his threshold. Without reducing that stress, it can be difficult to alleviate pain. Some people are uncomfortable addressing this issue, but we feel it is important for you to think about it. As Foot &  Ankle specialists, our job is addressing the lower  "extremity problem and possible causes. Regarding extra body weight, we encourage patients to discuss diet and weight management plans with their primary care doctors. It is this team approach that gives you the best opportunity for pain relief and getting you back on your feet.            Follow-ups after your visit        Who to contact     If you have questions or need follow up information about today's clinic visit or your schedule please contact [facility] directly at [phone].  Normal or non-critical lab and imaging results will be communicated to you by [employer] Sentinel[employer], letter or phone within 4 business days after the clinic has received the results. If you do not hear from us within 7 days, please contact the clinic through Tri[employer] or phone. If you have a critical or abnormal lab result, we will notify you by phone as soon as possible.  Submit refill requests through [employer] or call your pharmacy and they will forward the refill request to us. Please allow 3 business days for your refill to be completed.          Additional Information About Your Visit        Ex[employer]ox Information     [employer]ox lets you send messages to your doctor, view your test results, renew your prescriptions, schedule appointments and more. To sign up, go to [email or web address] . Click on \"Log in\" on the left side of the screen, which will take you to the Welcome page. Then click on \"Sign up Now\" on the right side of the page.     You will be asked to enter the access code listed below, as well as some personal information. Please follow the directions to create your username and password.     Your access code is: [email or web address]  Expires: 2017  8:45 AM     Your access code will  in 90 days. If you need help or a new code, please call your [place] clinic or [phone].        Care EveryWhere ID     This is your Care EveryWhere ID. This could be used by other organizations to access your [place] medical " "records  [id number]        Your Vitals Were     Height BMI (Body Mass Index)                6' 1\" (1.854 m) 20.71 kg/m2           Blood Pressure from Last 3 Encounters:   09/26/17 130/80   09/19/17 132/84   02/14/17 126/74    Weight from Last 3 Encounters:   09/26/17 157 lb (71.2 kg)   09/19/17 157 lb 4.8 oz (71.4 kg)   02/14/17 158 lb 6.4 oz (71.8 kg)              Today, you had the following     No orders found for display       Primary Care Provider Office Phone # Fax #    Tess BERNARD MD [place] [phone]       [address]  [facility] [address]        Equal Access to Services     JOJO MENDEZ : Hadii aad ku hadasho Soomaali, waaxda luqadaha, qaybta kaalmada adeegyada, denia rosales . So [facility] [phone].    ATENCIÓN: Si habla español, tiene a christina disposición servicios gratuitos de asistencia lingüística. Llame al [phone].    We comply with applicable federal civil rights laws and [place] laws. We do not discriminate on the basis of race, color, national origin, age, disability sex, sexual orientation or gender identity.            Thank you!     Thank you for choosing [facility]  for your care. Our goal is always to provide you with excellent care. Hearing back from our patients is one way we can continue to improve our services. Please take a few minutes to complete the written survey that you may receive in the mail after your visit with us. Thank you!             Your Updated Medication List - Protect others around you: Learn how to safely use, store and throw away your medicines at www.disposemymeds.org.          This list is accurate as of: 9/26/17  1:07 PM.  Always use your most recent med list.                   Brand Name Dispense Instructions for use Diagnosis    atorvastatin 40 MG tablet    LIPITOR    45 tablet    Take 0.5 tablets (20 mg) by mouth At Bedtime    Coronary artery disease involving native heart, angina presence " unspecified, unspecified vessel or lesion type       * METOPROLOL SUCCINATE ER PO      Take 50 mg by mouth daily        * metoprolol 50 MG 24 hr tablet    TOPROL-XL    90 tablet    Take 1 tablet (50 mg) by mouth daily    Coronary artery disease involving native heart, angina presence unspecified, unspecified vessel or lesion type       multivitamin, therapeutic with minerals Tabs tablet      Take 1 tablet by mouth daily        * Notice:  This list has 2 medication(s) that are the same as other medications prescribed for you. Read the directions carefully, and ask your doctor or other care provider to review them with you.

## 2017-09-30 DIAGNOSIS — I25.10 CORONARY ARTERY DISEASE INVOLVING NATIVE HEART, ANGINA PRESENCE UNSPECIFIED, UNSPECIFIED VESSEL OR LESION TYPE: ICD-10-CM

## 2017-10-03 RX ORDER — METOPROLOL SUCCINATE 50 MG/1
TABLET, EXTENDED RELEASE ORAL
Qty: 90 TABLET | Refills: 1 | Status: SHIPPED | OUTPATIENT
Start: 2017-10-03 | End: 2018-04-07

## 2017-10-03 NOTE — TELEPHONE ENCOUNTER
Metoprolol       Last Written Prescription Date: 3/14/17  Last Fill Quantity: 90, # refills: 1    Last Office Visit with G, P or [facility] prescribing provider:  9/19/17   Future Office Visit:        BP Readings from Last 3 Encounters:   09/26/17 130/80   09/19/17 132/84   02/14/17 126/74

## 2018-04-01 DIAGNOSIS — I25.10 CORONARY ARTERY DISEASE INVOLVING NATIVE HEART, ANGINA PRESENCE UNSPECIFIED, UNSPECIFIED VESSEL OR LESION TYPE: ICD-10-CM

## 2018-04-01 NOTE — LETTER
[facility]  [address], [address]  [place], [place]  [address]                                            TEL:[phone]  FAX:[phone]      Deshawn Burrows  [address] [place] [address]      April 3, 2018      Dear Deshawn,    We have received a refill request from your pharmacy for Lipitor, however, we were only able to provide a one time fill because you are due for fasting cholesterol check.   Please call [phone] to schedule a LAB ONLY appointment before you are due for your next refill.      Sincerely,      Cuauhtemoc Leigh M.D.

## 2018-04-03 RX ORDER — ATORVASTATIN CALCIUM 40 MG/1
TABLET, FILM COATED ORAL
Qty: 15 TABLET | Refills: 2 | Status: SHIPPED | OUTPATIENT
Start: 2018-04-03 | End: 2018-07-14

## 2018-04-03 NOTE — TELEPHONE ENCOUNTER
"Requested Prescriptions   Pending Prescriptions Disp Refills     atorvastatin (LIPITOR) 40 MG tablet [Pharmacy Med Name: Atorvastatin Calcium Oral Tablet 40 MG] 45 tablet 2     Sig: TAKE 1/2 TABLET BY MOUTH AT BEDTIME    Statins Protocol Failed    4/1/2018  5:51 PM       Failed - LDL on file in past 12 months    Recent Labs   Lab Test  02/14/17   1316   LDL  9            Passed - No abnormal creatine kinase in past 12 months    No lab results found.            Passed - Recent (12 mo) or future (30 days) visit within the authorizing provider's specialty    Patient had office visit in the last 12 months or has a visit in the next 30 days with authorizing provider or within the authorizing provider's specialty.  See \"Patient Info\" tab in inbasket, or \"Choose Columns\" in Meds & Orders section of the refill encounter.           Passed - Patient is age 18 or older      Medication is being filled for 1 time refill only due to:  Patient needs labs LDL.   Mail reminder letter to patient's home.  Orders already in epic.        "

## 2018-04-07 ENCOUNTER — TELEPHONE (OUTPATIENT)
Dept: INTERNAL MEDICINE | Facility: CLINIC | Age: 83
End: 2018-04-07

## 2018-04-07 DIAGNOSIS — I25.10 CORONARY ARTERY DISEASE INVOLVING NATIVE HEART, ANGINA PRESENCE UNSPECIFIED, UNSPECIFIED VESSEL OR LESION TYPE: ICD-10-CM

## 2018-04-10 RX ORDER — METOPROLOL SUCCINATE 50 MG/1
TABLET, EXTENDED RELEASE ORAL
Qty: 30 TABLET | Refills: 0 | Status: SHIPPED | OUTPATIENT
Start: 2018-04-10 | End: 2018-06-07

## 2018-04-25 ENCOUNTER — DOCUMENTATION ONLY (OUTPATIENT)
Dept: LAB | Facility: CLINIC | Age: 83
End: 2018-04-25

## 2018-04-25 DIAGNOSIS — I25.10 ATHEROSCLEROSIS OF NATIVE CORONARY ARTERY OF NATIVE HEART WITHOUT ANGINA PECTORIS: Primary | ICD-10-CM

## 2018-04-26 NOTE — TELEPHONE ENCOUNTER
Pt called back gave message.  Pt scheduled Fasting lab appt for 4/27/18 and Med Check appt for May 1.

## 2018-04-27 DIAGNOSIS — I25.10 ATHEROSCLEROSIS OF NATIVE CORONARY ARTERY OF NATIVE HEART WITHOUT ANGINA PECTORIS: ICD-10-CM

## 2018-04-27 PROCEDURE — 36415 COLL VENOUS BLD VENIPUNCTURE: CPT | Performed by: INTERNAL MEDICINE

## 2018-04-27 PROCEDURE — 80053 COMPREHEN METABOLIC PANEL: CPT | Performed by: INTERNAL MEDICINE

## 2018-04-27 PROCEDURE — 80061 LIPID PANEL: CPT | Performed by: INTERNAL MEDICINE

## 2018-04-28 LAB
ALBUMIN SERPL-MCNC: 4.3 G/DL (ref 3.4–5)
ALP SERPL-CCNC: 71 U/L (ref 40–150)
ALT SERPL W P-5'-P-CCNC: 19 U/L (ref 0–70)
ANION GAP SERPL CALCULATED.3IONS-SCNC: 4 MMOL/L (ref 3–14)
AST SERPL W P-5'-P-CCNC: 18 U/L (ref 0–45)
BILIRUB SERPL-MCNC: 0.7 MG/DL (ref 0.2–1.3)
BUN SERPL-MCNC: 20 MG/DL (ref 7–30)
CALCIUM SERPL-MCNC: 9.7 MG/DL (ref 8.5–10.1)
CHLORIDE SERPL-SCNC: 108 MMOL/L (ref 94–109)
CHOLEST SERPL-MCNC: 97 MG/DL
CO2 SERPL-SCNC: 30 MMOL/L (ref 20–32)
CREAT SERPL-MCNC: 0.96 MG/DL (ref 0.66–1.25)
GFR SERPL CREATININE-BSD FRML MDRD: 74 ML/MIN/1.7M2
GLUCOSE SERPL-MCNC: 97 MG/DL (ref 70–99)
HDLC SERPL-MCNC: 39 MG/DL
LDLC SERPL CALC-MCNC: 40 MG/DL
NONHDLC SERPL-MCNC: 58 MG/DL
POTASSIUM SERPL-SCNC: 5.1 MMOL/L (ref 3.4–5.3)
PROT SERPL-MCNC: 7.8 G/DL (ref 6.8–8.8)
SODIUM SERPL-SCNC: 142 MMOL/L (ref 133–144)
TRIGL SERPL-MCNC: 89 MG/DL

## 2018-05-15 ENCOUNTER — OFFICE VISIT (OUTPATIENT)
Dept: INTERNAL MEDICINE | Facility: CLINIC | Age: 83
End: 2018-05-15
Payer: MEDICARE

## 2018-05-15 ENCOUNTER — TELEPHONE (OUTPATIENT)
Dept: INTERNAL MEDICINE | Facility: CLINIC | Age: 83
End: 2018-05-15

## 2018-05-15 VITALS
TEMPERATURE: 98.1 F | OXYGEN SATURATION: 87 % | BODY MASS INDEX: 20.28 KG/M2 | DIASTOLIC BLOOD PRESSURE: 80 MMHG | WEIGHT: 153 LBS | HEART RATE: 87 BPM | RESPIRATION RATE: 16 BRPM | HEIGHT: 73 IN | SYSTOLIC BLOOD PRESSURE: 162 MMHG

## 2018-05-15 DIAGNOSIS — I25.10 CORONARY ARTERY DISEASE INVOLVING NATIVE HEART, ANGINA PRESENCE UNSPECIFIED, UNSPECIFIED VESSEL OR LESION TYPE: ICD-10-CM

## 2018-05-15 DIAGNOSIS — R42 DIZZINESS: Primary | ICD-10-CM

## 2018-05-15 DIAGNOSIS — E78.5 HYPERLIPIDEMIA LDL GOAL <100: ICD-10-CM

## 2018-05-15 DIAGNOSIS — I10 ESSENTIAL HYPERTENSION WITH GOAL BLOOD PRESSURE LESS THAN 140/90: ICD-10-CM

## 2018-05-15 LAB
ERYTHROCYTE [DISTWIDTH] IN BLOOD BY AUTOMATED COUNT: 13.7 % (ref 10–15)
HCT VFR BLD AUTO: 42.2 % (ref 40–53)
HGB BLD-MCNC: 13.4 G/DL (ref 13.3–17.7)
MCH RBC QN AUTO: 31.2 PG (ref 26.5–33)
MCHC RBC AUTO-ENTMCNC: 31.8 G/DL (ref 31.5–36.5)
MCV RBC AUTO: 98 FL (ref 78–100)
PLATELET # BLD AUTO: 236 10E9/L (ref 150–450)
RBC # BLD AUTO: 4.29 10E12/L (ref 4.4–5.9)
WBC # BLD AUTO: 7.6 10E9/L (ref 4–11)

## 2018-05-15 PROCEDURE — 84443 ASSAY THYROID STIM HORMONE: CPT | Performed by: INTERNAL MEDICINE

## 2018-05-15 PROCEDURE — 85027 COMPLETE CBC AUTOMATED: CPT | Performed by: INTERNAL MEDICINE

## 2018-05-15 PROCEDURE — 99214 OFFICE O/P EST MOD 30 MIN: CPT | Performed by: INTERNAL MEDICINE

## 2018-05-15 PROCEDURE — 93000 ELECTROCARDIOGRAM COMPLETE: CPT | Performed by: INTERNAL MEDICINE

## 2018-05-15 PROCEDURE — 36415 COLL VENOUS BLD VENIPUNCTURE: CPT | Performed by: INTERNAL MEDICINE

## 2018-05-15 PROCEDURE — 84484 ASSAY OF TROPONIN QUANT: CPT | Performed by: INTERNAL MEDICINE

## 2018-05-15 NOTE — TELEPHONE ENCOUNTER
Patient complains of feeling lightheaded last evening and again this morning.  Tends to occur with position changes.  BP also elevated this morning 146/90, had not yet taken his Metoprolol.  Denies any recent URI or ear symptoms but has had a runny nose.  Denies HA, vision changes, numbness, tingling weakness of any limbs.  Patient is coming in at 3:20 p.m. To see Dr. Avalos.  Will go to ER if new or worsening symptoms.  MARTINA Franz R.N.

## 2018-05-15 NOTE — MR AVS SNAPSHOT
"              After Visit Summary   5/15/2018    Deshawn Burrows    MRN: [id number]           Patient Information     Date Of Birth          3/8/1933        Visit Information        Provider Department      5/15/2018 3:20 PM Eugene Avalos MD [facility]        Today's Diagnoses     Dizziness    -  1    Essential hypertension with goal blood pressure less than 140/90          Care Instructions    Check blood pressure daily for the next three days and if over 140/ 90 call clinic.           Follow-ups after your visit        Follow-up notes from your care team     Return in about 3 months (around 8/15/2018) for BP Recheck.      Who to contact     If you have questions or need follow up information about today's clinic visit or your schedule please contact [facility] directly at [phone].  Normal or non-critical lab and imaging results will be communicated to you by MyChart, letter or phone within 4 business days after the clinic has received the results. If you do not hear from us within 7 days, please contact the clinic through MyChart or phone. If you have a critical or abnormal lab result, we will notify you by phone as soon as possible.  Submit refill requests through [employer] or call your pharmacy and they will forward the refill request to us. Please allow 3 business days for your refill to be completed.          Additional Information About Your Visit        MyChart Information     [employer] lets you send messages to your doctor, view your test results, renew your prescriptions, schedule appointments and more. To sign up, go to [email or web address] . Click on \"Log in\" on the left side of the screen, which will take you to the Welcome page. Then click on \"Sign up Now\" on the right side of the page.     You will be asked to enter the access code listed below, as well as some personal information. Please follow the directions to create your username and password.   " "  Your access code is: [email or web address]  Expires: 2018  3:47 PM     Your access code will  in 90 days. If you need help or a new code, please call your [facility] clinic or [phone].        Care EveryWhere ID     This is your Care EveryWhere ID. This could be used by other organizations to access your [facility] medical records  [id number]        Your Vitals Were     Pulse Temperature Respirations Height Pulse Oximetry BMI (Body Mass Index)    87 98.1  F (36.7  C) (Oral) 16 6' 1\" (1.854 m) 87% 20.19 kg/m2       Blood Pressure from Last 3 Encounters:   05/15/18 162/80   17 130/80   17 132/84    Weight from Last 3 Encounters:   05/15/18 153 lb (69.4 kg)   17 157 lb (71.2 kg)   17 157 lb 4.8 oz (71.4 kg)              We Performed the Following     CBC with platelets     EKG 12-lead complete w/read - Clinics     Troponin I     TSH with free T4 reflex        Primary Care Provider Office Phone # Fax #    Krmaria rnakumari G MD [facility] [phone]       [address] [facility] [address]        Equal Access to Services     JOJO MENDEZ : Hadii aad ku hadasho Soomaali, waaxda luqadaha, qaybta kaalmada adeegyada, waxay idiin haythuyn carol rosales . So [facility] [phone].    ATENCIÓN: Si habla español, tiene a christina disposición servicios gratuitos de asistencia lingüística. ame al [phone].    We comply with applicable federal civil rights laws and [place] laws. We do not discriminate on the basis of race, color, national origin, age, disability, sex, sexual orientation, or gender identity.            Thank you!     Thank you for choosing [facility]  for your care. Our goal is always to provide you with excellent care. Hearing back from our patients is one way we can continue to improve our services. Please take a few minutes to complete the written survey that you may receive in the mail after your visit with us. Thank you!             Your Updated " Medication List - Protect others around you: Learn how to safely use, store and throw away your medicines at www.disposemymeds.org.          This list is accurate as of 5/15/18  3:47 PM.  Always use your most recent med list.                   Brand Name Dispense Instructions for use Diagnosis    atorvastatin 40 MG tablet    LIPITOR    15 tablet    TAKE 1/2 TABLET BY MOUTH AT BEDTIME    Coronary artery disease involving native heart, angina presence unspecified, unspecified vessel or lesion type       * METOPROLOL SUCCINATE ER PO      Take 50 mg by mouth daily        * metoprolol succinate 50 MG 24 hr tablet    TOPROL-XL    30 tablet    TAKE ONE TABLET BY MOUTH ONE TIME DAILY    Coronary artery disease involving native heart, angina presence unspecified, unspecified vessel or lesion type       multivitamin, therapeutic with minerals Tabs tablet      Take 1 tablet by mouth daily        * Notice:  This list has 2 medication(s) that are the same as other medications prescribed for you. Read the directions carefully, and ask your doctor or other care provider to review them with you.

## 2018-05-15 NOTE — PROGRESS NOTES
SUBJECTIVE:   Deshawn Burrows is a 85 year old male who presents to clinic today for the following health issues:      Patient is seen for a follow up visit.  Has h/o ischemic heart disease, symptomatic regarding chest pains,no SOB,palpitations. Has good compliance with treatment, diet and exercise.    Reports dizziness with standing, mild.   Hyperlipidemia Follow-Up  Has H/O hyperlipidemia. On medical treatment and diet. No side effects. No muscle weakness, myalgias or upset stomach.       Rate your low fat/cholesterol diet?: good    Taking statin?  Yes, no muscle aches from statin    Other lipid medications/supplements?:  none    Hypertension Follow-up  Has h/o HTN. on medical treatment. BP has been controlled. No side effects from medications. No CP, HA, dizziness. good compliance with medications and low salt diet.      Outpatient blood pressures are being checked at home.  Results are 140-142/79-92.    Low Salt Diet: not monitoring salt      Amount of exercise or physical activity: walks 2 miles in less than 30 minutes, 3 days per week    Problems taking medications regularly: No    Medication side effects: none    Diet: regular (no restrictions)          Problem list and histories reviewed & adjusted, as indicated.  Additional history: as documented    Patient Active Problem List   Diagnosis     Vasculitis (H)     Essential hypertension with goal blood pressure less than 140/90     Hyperlipidemia LDL goal <100     Atherosclerosis of native coronary artery of native heart without angina pectoris     Myocardial infarction     CAD (coronary artery disease)     Coronary artery disease involving native heart, angina presence unspecified, unspecified vessel or lesion type     Past Surgical History:   Procedure Laterality Date     HERNIA REPAIR  2007    right inguinal hernia repair     STENT, CORONARY, ANTIONE  2004    2 stents       Social History   Substance Use Topics     Smoking status: Former Smoker      "Smokeless tobacco: Never Used      Comment: Quit 10 years ago     Alcohol use 0.0 oz/week     0 Standard drinks or equivalent per week      Comment: Moderate     Family History   Problem Relation Age of Onset     Family history unknown: Yes         Current Outpatient Prescriptions   Medication Sig Dispense Refill     atorvastatin (LIPITOR) 40 MG tablet TAKE 1/2 TABLET BY MOUTH AT BEDTIME 15 tablet 2     metoprolol succinate (TOPROL-XL) 50 MG 24 hr tablet TAKE ONE TABLET BY MOUTH ONE TIME DAILY  30 tablet 0     METOPROLOL SUCCINATE ER PO Take 50 mg by mouth daily       multivitamin, therapeutic with minerals (THERA-VIT-M) TABS Take 1 tablet by mouth daily         Reviewed and updated as needed this visit by clinical staff  Tobacco  Allergies  Meds  Med Hx  Surg Hx  Fam Hx  Soc Hx      Reviewed and updated as needed this visit by Provider         ROS:  Constitutional, HEENT, cardiovascular, pulmonary, GI, , musculoskeletal, neuro, skin, endocrine and psych systems are negative, except as otherwise noted.    OBJECTIVE:     /80 (BP Location: Left arm, Patient Position: Sitting, Cuff Size: Adult Regular)  Pulse 87  Temp 98.1  F (36.7  C) (Oral)  Resp 16  Ht 6' 1\" (1.854 m)  Wt 153 lb (69.4 kg)  SpO2 (!) 87%  BMI 20.19 kg/m2  Body mass index is 20.19 kg/(m^2).   GENERAL: healthy, alert and no distress  EYES: Eyes grossly normal to inspection, PERRL and conjunctivae and sclerae normal  HENT: ear canals and TM's normal, nose and mouth without ulcers or lesions  NECK: no adenopathy, no asymmetry, masses, or scars and thyroid normal to palpation  RESP: lungs clear to auscultation - no rales, rhonchi or wheezes  CV: regular rate and rhythm, normal S1 S2, no S3 or S4, no murmur, click or rub, no peripheral edema and peripheral pulses strong  ABDOMEN: soft, nontender, no hepatosplenomegaly, no masses and bowel sounds normal  MS: no gross musculoskeletal defects noted, no edema    Diagnostic Test " Results:  Results for orders placed or performed in visit on 05/15/18   CBC with platelets   Result Value Ref Range    WBC 7.6 4.0 - 11.0 10e9/L    RBC Count 4.29 (L) 4.4 - 5.9 10e12/L    Hemoglobin 13.4 13.3 - 17.7 g/dL    Hematocrit 42.2 40.0 - 53.0 %    MCV 98 78 - 100 fl    MCH 31.2 26.5 - 33.0 pg    MCHC 31.8 31.5 - 36.5 g/dL    RDW 13.7 10.0 - 15.0 %    Platelet Count 236 150 - 450 10e9/L   TSH with free T4 reflex   Result Value Ref Range    TSH 2.67 0.40 - 4.00 mU/L   Troponin I   Result Value Ref Range    Troponin I ES 0.036 0.000 - 0.045 ug/L       ASSESSMENT/PLAN:     Problem List Items Addressed This Visit     Essential hypertension with goal blood pressure less than 140/90    Relevant Orders    CBC with platelets (Completed)    TSH with free T4 reflex (Completed)    Troponin I (Completed)    EKG 12-lead complete w/read - Clinics (Completed)    Hyperlipidemia LDL goal <100    CAD (coronary artery disease)      Other Visit Diagnoses     Dizziness    -  Primary    Relevant Orders    CBC with platelets (Completed)    TSH with free T4 reflex (Completed)    Troponin I (Completed)    EKG 12-lead complete w/read - Clinics (Completed)           Assess lab work.   Cont treatment   Consider stress test, cardiology follow up      Follow-Up:with results     Eugene Avalos MD  [facility]

## 2018-05-17 LAB
TROPONIN I SERPL-MCNC: 0.04 UG/L (ref 0–0.04)
TSH SERPL DL<=0.005 MIU/L-ACNC: 2.67 MU/L (ref 0.4–4)

## 2018-05-30 ENCOUNTER — TRANSFERRED RECORDS (OUTPATIENT)
Dept: HEALTH INFORMATION MANAGEMENT | Facility: CLINIC | Age: 83
End: 2018-05-30

## 2018-06-07 DIAGNOSIS — I25.10 CORONARY ARTERY DISEASE INVOLVING NATIVE HEART, ANGINA PRESENCE UNSPECIFIED, UNSPECIFIED VESSEL OR LESION TYPE: ICD-10-CM

## 2018-06-12 ENCOUNTER — TELEPHONE (OUTPATIENT)
Dept: INTERNAL MEDICINE | Facility: CLINIC | Age: 83
End: 2018-06-12

## 2018-06-12 RX ORDER — METOPROLOL SUCCINATE 50 MG/1
50 TABLET, EXTENDED RELEASE ORAL DAILY
Qty: 15 TABLET | Refills: 0 | Status: SHIPPED | OUTPATIENT
Start: 2018-06-12 | End: 2018-07-20

## 2018-06-12 NOTE — TELEPHONE ENCOUNTER
Med faxed for 2 wks. Patient's blood pressure was high and his last office visit with Dr. Adan . Please advise appointment to see me for medication adjustment

## 2018-06-12 NOTE — TELEPHONE ENCOUNTER
"Requested Prescriptions   Pending Prescriptions Disp Refills     metoprolol succinate (TOPROL-XL) 50 MG 24 hr tablet [Pharmacy Med Name: Metoprolol Succinate ER Oral Tablet Extended Release 24 Hour 50 MG] 30 tablet 0     Sig: TAKE ONE TABLET BY MOUTH ONE TIME DAILY. past due for appt/labs. need to call doctors office to schedule appointment.    Beta-Blockers Protocol Failed    6/7/2018  3:39 PM       Failed - Blood pressure under 140/90 in past 12 months    BP Readings from Last 3 Encounters:   05/15/18 162/80   09/26/17 130/80   09/19/17 132/84                Passed - Patient is age 6 or older       Passed - Recent (12 mo) or future (30 days) visit within the authorizing provider's specialty    Patient had office visit in the last 12 months or has a visit in the next 30 days with authorizing provider or within the authorizing provider's specialty.  See \"Patient Info\" tab in inbasket, or \"Choose Columns\" in Meds & Orders section of the refill encounter.            Last office visit 5/15/18.   Routing refill request to provider for review/approval because:  BP out of range.  Joseline Sneed RN          "

## 2018-06-12 NOTE — TELEPHONE ENCOUNTER
Panel Management Review      Patient has the following on his problem list:     Hypertension   Last three blood pressure readings:  BP Readings from Last 3 Encounters:   05/15/18 162/80   09/26/17 130/80   09/19/17 132/84     Blood pressure: MONITOR    HTN Guidelines:  Age 18-59 BP range:  Less than 140/90  Age 60-85 with Diabetes:  Less than 140/90  Age 60-85 without Diabetes:  less than 150/90      Composite cancer screening  Chart review shows that this patient is due/due soon for the following None  Summary:    Patient is due/failing the following:   BP CHECK    Action needed:   No action needed, Pt seen by MD on 05/18/18    Type of outreach:    None    Questions for provider review:    None                                                                                                                                    Kari Pastrana MA       Chart routed to none .

## 2018-07-14 DIAGNOSIS — I25.10 CORONARY ARTERY DISEASE INVOLVING NATIVE HEART, ANGINA PRESENCE UNSPECIFIED, UNSPECIFIED VESSEL OR LESION TYPE: ICD-10-CM

## 2018-07-16 NOTE — TELEPHONE ENCOUNTER
"Requested Prescriptions   Pending Prescriptions Disp Refills     atorvastatin (LIPITOR) 40 MG tablet [Pharmacy Med Name: Atorvastatin Calcium Oral Tablet 40 MG] 15 tablet 1    Last Written Prescription Date:  04/03/2018  Last Fill Quantity: 15 tablet,  # refills: 2   Last office visit: 5/15/2018 with prescribing provider:     Future Office Visit:   Next 5 appointments (look out 90 days)     Jul 17, 2018  3:00 PM CDT   [facility]    [address]  [facility] [address]   [phone]                Sig: TAKE 1/2 TABLET BY MOUTH AT BEDTIME    Statins Protocol Passed    7/14/2018  6:33 PM       Passed - LDL on file in past 12 months    Recent Labs   Lab Test  04/27/18   0931   LDL  40            Passed - No abnormal creatine kinase in past 12 months    No lab results found.            Passed - Recent (12 mo) or future (30 days) visit within the authorizing provider's specialty    Patient had office visit in the last 12 months or has a visit in the next 30 days with authorizing provider or within the authorizing provider's specialty.  See \"Patient Info\" tab in inbasket, or \"Choose Columns\" in Meds & Orders section of the refill encounter.           Passed - Patient is age 18 or older        "

## 2018-07-17 RX ORDER — ATORVASTATIN CALCIUM 40 MG/1
TABLET, FILM COATED ORAL
Qty: 45 TABLET | Refills: 1 | Status: SHIPPED | OUTPATIENT
Start: 2018-07-17 | End: 2019-01-15

## 2018-07-20 ENCOUNTER — OFFICE VISIT (OUTPATIENT)
Dept: INTERNAL MEDICINE | Facility: CLINIC | Age: 83
End: 2018-07-20
Payer: MEDICARE

## 2018-07-20 VITALS
SYSTOLIC BLOOD PRESSURE: 132 MMHG | OXYGEN SATURATION: 97 % | WEIGHT: 154.2 LBS | TEMPERATURE: 98.1 F | DIASTOLIC BLOOD PRESSURE: 84 MMHG | RESPIRATION RATE: 12 BRPM | HEART RATE: 77 BPM | BODY MASS INDEX: 20.34 KG/M2

## 2018-07-20 DIAGNOSIS — I10 ESSENTIAL HYPERTENSION WITH GOAL BLOOD PRESSURE LESS THAN 140/90: Primary | ICD-10-CM

## 2018-07-20 DIAGNOSIS — I25.10 CORONARY ARTERY DISEASE INVOLVING NATIVE HEART, ANGINA PRESENCE UNSPECIFIED, UNSPECIFIED VESSEL OR LESION TYPE: ICD-10-CM

## 2018-07-20 PROCEDURE — 99213 OFFICE O/P EST LOW 20 MIN: CPT | Performed by: INTERNAL MEDICINE

## 2018-07-20 RX ORDER — METOPROLOL SUCCINATE 50 MG/1
50 TABLET, EXTENDED RELEASE ORAL DAILY
Qty: 90 TABLET | Refills: 3 | Status: SHIPPED | OUTPATIENT
Start: 2018-07-20 | End: 2019-07-17

## 2018-07-20 NOTE — NURSING NOTE
/84  Pulse 77  Temp 98.1  F (36.7  C) (Oral)  Resp 12  Wt 154 lb 3.2 oz (69.9 kg)  SpO2 97%  BMI 20.34 kg/m2  Patient in for follow up HTN and lipid.  Mari Valdez, CMA

## 2018-07-20 NOTE — PROGRESS NOTES
SUBJECTIVE:   Deshawn Burrows is a 85 year old male who presents to clinic today for the following health issues:     Hypertension Follow-up      Outpatient blood pressures are not being checked. requesting refills on metoprolol.    Low Salt Diet: no added salt    Vascular Disease Follow-up:  Coronary Artery Disease (CAD)      Chest pain or pressure, left side neck or arm pain: No    Shortness of breath/increased sweats/nausea with exertion: No    Pain in calves walking 1-2 blocks: No    Worsened or new symptoms since last visit: No    Nitroglycerin use: no    Daily aspirin use: pt not taking aspirin,        Amount of exercise or physical activity: 2-3 days/week for an average of 30-45 minutes    Problems taking medications regularly: No    Medication side effects: none    Diet: low salt     Patient Active Problem List   Diagnosis     Vasculitis (H)     Essential hypertension with goal blood pressure less than 140/90     Hyperlipidemia LDL goal <100     Atherosclerosis of native coronary artery of native heart without angina pectoris     Myocardial infarction     CAD (coronary artery disease)     Coronary artery disease involving native heart, angina presence unspecified, unspecified vessel or lesion type     Past Surgical History:   Procedure Laterality Date     HERNIA REPAIR  2007    right inguinal hernia repair     STENT, CORONARY, ANTIONE  2004    2 stents       Social History   Substance Use Topics     Smoking status: Former Smoker     Smokeless tobacco: Never Used      Comment: Quit 10 years ago     Alcohol use 0.0 oz/week     0 Standard drinks or equivalent per week      Comment: Moderate     Family History   Problem Relation Age of Onset     Family history unknown: Yes         Current Outpatient Prescriptions   Medication Sig Dispense Refill     atorvastatin (LIPITOR) 40 MG tablet TAKE 1/2 TABLET BY MOUTH AT BEDTIME 45 tablet 1     metoprolol succinate (TOPROL-XL) 50 MG 24 hr tablet Take 1 tablet (50 mg) by  mouth daily 90 tablet 3     multivitamin, therapeutic with minerals (THERA-VIT-M) TABS Take 1 tablet by mouth daily         Reviewed and updated as needed this visit by clinical staff  Tobacco  Allergies  Meds  Med Hx  Surg Hx  Fam Hx  Soc Hx      Reviewed and updated as needed this visit by Provider         ROS:  CONSTITUTIONAL: NEGATIVE for fever, chills, change in weight  RESP: NEGATIVE for significant cough or SOB  CV: NEGATIVE for chest pain, palpitations or peripheral edema  MUSCULOSKELETAL: NEGATIVE for significant arthralgias or myalgia  NEURO: NEGATIVE for weakness, dizziness or paresthesias    OBJECTIVE:                                                    /84  Pulse 77  Temp 98.1  F (36.7  C) (Oral)  Resp 12  Wt 154 lb 3.2 oz (69.9 kg)  SpO2 97%  BMI 20.34 kg/m2  Body mass index is 20.34 kg/(m^2).   GENERAL: healthy, alert, well nourished, well hydrated, no distress  EYES: Eyes grossly normal to inspection, extraocular movements - intact, and PERRL  NECK: no tenderness, no adenopathy, no asymmetry, no masses, no stiffness; thyroid- normal to palpation  RESP: lungs clear to auscultation - no rales, no rhonchi, no wheezes  CV: regular rates and rhythm,   -  MS; no edema , no calf tenderness       ASSESSMENT/PLAN:                                                      (I10) Essential hypertension with goal blood pressure less than 140/90  (primary encounter diagnosis)  Comment: BP well controlled   Plan: metoprolol succinate (TOPROL-XL) 50 MG 24 hr tablet refilled.explained clearly about the medication,insructions and side effects.Advised to follow low salt diet and exercise and f/u in 6 mths.            (I25.10) Coronary artery disease involving native heart, angina presence unspecified, unspecified vessel or lesion type  Plan: s/p cardiac stents, pt was advised to use aspirin daily, refilled metoprolol succinate (TOPROL-XL) 50 MG 24 hr         Tablet.explained clearly about the  medication,insructions and side effects.               Tess Leigh MD  [facility]

## 2019-01-15 DIAGNOSIS — I25.10 CORONARY ARTERY DISEASE INVOLVING NATIVE HEART, ANGINA PRESENCE UNSPECIFIED, UNSPECIFIED VESSEL OR LESION TYPE: ICD-10-CM

## 2019-01-15 NOTE — TELEPHONE ENCOUNTER
"Requested Prescriptions   Pending Prescriptions Disp Refills     atorvastatin (LIPITOR) 40 MG tablet [Pharmacy Med Name: Atorvastatin Calcium Oral Tablet 40 MG]  Last Written Prescription Date:  7/17/2018  Last Fill Quantity: 45,  # refills: 1   Last office visit: 7/20/2018 with prescribing provider:     Future Office Visit:   45 tablet 0     Sig: TAKE 1/2 TABLET BY MOUTH AT BEDTIME    Statins Protocol Passed - 1/15/2019  7:01 AM       Passed - LDL on file in past 12 months    Recent Labs   Lab Test 04/27/18  0931   LDL 40            Passed - No abnormal creatine kinase in past 12 months    No lab results found.            Passed - Recent (12 mo) or future (30 days) visit within the authorizing provider's specialty    Patient had office visit in the last 12 months or has a visit in the next 30 days with authorizing provider or within the authorizing provider's specialty.  See \"Patient Info\" tab in inbasket, or \"Choose Columns\" in Meds & Orders section of the refill encounter.             Passed - Medication is active on med list       Passed - Patient is age 18 or older        "

## 2019-01-17 RX ORDER — ATORVASTATIN CALCIUM 40 MG/1
TABLET, FILM COATED ORAL
Qty: 45 TABLET | Refills: 0 | Status: SHIPPED | OUTPATIENT
Start: 2019-01-17 | End: 2019-03-27

## 2019-01-17 NOTE — TELEPHONE ENCOUNTER
Prescription approved per [facility] [place] [place] Refill Protocol.  Joseline Sneed RN

## 2019-01-22 DIAGNOSIS — I25.10 CORONARY ARTERY DISEASE INVOLVING NATIVE HEART, ANGINA PRESENCE UNSPECIFIED, UNSPECIFIED VESSEL OR LESION TYPE: ICD-10-CM

## 2019-01-22 NOTE — TELEPHONE ENCOUNTER
"Requested Prescriptions   Pending Prescriptions Disp Refills     atorvastatin (LIPITOR) 40 MG tablet [Pharmacy Med Name: Atorvastatin Calcium Oral Tablet 40 MG]  Last Written Prescription Date:  1/17/19  Last Fill Quantity: 45,  # refills: 0   Last office visit: 7/20/2018 with prescribing provider:  Lu   Future Office Visit:     45 tablet 0     Sig: TAKE 1/2 TABLET BY MOUTH AT BEDTIME    Statins Protocol Passed - 1/22/2019 10:32 AM       Passed - LDL on file in past 12 months    Recent Labs   Lab Test 04/27/18  0931   LDL 40            Passed - No abnormal creatine kinase in past 12 months    No lab results found.            Passed - Recent (12 mo) or future (30 days) visit within the authorizing provider's specialty    Patient had office visit in the last 12 months or has a visit in the next 30 days with authorizing provider or within the authorizing provider's specialty.  See \"Patient Info\" tab in inbasket, or \"Choose Columns\" in Meds & Orders section of the refill encounter.             Passed - Medication is active on med list       Passed - Patient is age 18 or older          "

## 2019-01-23 RX ORDER — ATORVASTATIN CALCIUM 40 MG/1
TABLET, FILM COATED ORAL
Qty: 45 TABLET | Refills: 0 | OUTPATIENT
Start: 2019-01-23

## 2019-03-27 DIAGNOSIS — I25.10 CORONARY ARTERY DISEASE INVOLVING NATIVE HEART, ANGINA PRESENCE UNSPECIFIED, UNSPECIFIED VESSEL OR LESION TYPE: ICD-10-CM

## 2019-03-28 RX ORDER — ATORVASTATIN CALCIUM 40 MG/1
TABLET, FILM COATED ORAL
Qty: 15 TABLET | Refills: 0 | Status: SHIPPED | OUTPATIENT
Start: 2019-03-28 | End: 2019-04-26

## 2019-03-28 NOTE — TELEPHONE ENCOUNTER
Medication is being filled for 1 time refill only due to:  Patient needs to be seen because due for f/u appt.     Per office visit not dated 7/20/19 patient is to follow-up in 6 months

## 2019-06-06 ENCOUNTER — NURSE TRIAGE (OUTPATIENT)
Dept: INTERNAL MEDICINE | Facility: CLINIC | Age: 84
End: 2019-06-06

## 2019-07-10 DIAGNOSIS — I25.10 CORONARY ARTERY DISEASE INVOLVING NATIVE HEART, ANGINA PRESENCE UNSPECIFIED, UNSPECIFIED VESSEL OR LESION TYPE: ICD-10-CM

## 2019-07-10 NOTE — TELEPHONE ENCOUNTER
"Requested Prescriptions   Pending Prescriptions Disp Refills     atorvastatin (LIPITOR) 40 MG tablet 7 tablet 0   Last Written Prescription Date:  04/29/2019  Last Fill Quantity: 7 tablet ,  # refills: 0   Last office visit: 7/20/2018 with prescribing provider:     Future Office Visit:   Next 5 appointments (look out 90 days)    Jul 17, 2019  1:40 PM CDT  [facility] [address]  [facility] [address]  [phone]           Statins Protocol Failed - 7/10/2019 11:16 AM        Failed - LDL on file in past 12 months     Recent Labs   Lab Test 04/27/18  0931   LDL 40             Passed - No abnormal creatine kinase in past 12 months     No lab results found.             Passed - Recent (12 mo) or future (30 days) visit within the authorizing provider's specialty     Patient had office visit in the last 12 months or has a visit in the next 30 days with authorizing provider or within the authorizing provider's specialty.  See \"Patient Info\" tab in inbasket, or \"Choose Columns\" in Meds & Orders section of the refill encounter.              Passed - Medication is active on med list        Passed - Patient is age 18 or older        "

## 2019-07-11 RX ORDER — ATORVASTATIN CALCIUM 40 MG/1
TABLET, FILM COATED ORAL
Qty: 7 TABLET | Refills: 0 | Status: SHIPPED | OUTPATIENT
Start: 2019-07-11 | End: 2019-07-25

## 2019-07-11 NOTE — TELEPHONE ENCOUNTER
Pt has appt next week.     Prescription approved per [facility] [place] [place] Refill Protocol.

## 2019-07-17 ENCOUNTER — OFFICE VISIT (OUTPATIENT)
Dept: INTERNAL MEDICINE | Facility: CLINIC | Age: 84
End: 2019-07-17
Payer: MEDICARE

## 2019-07-17 VITALS
TEMPERATURE: 98.5 F | DIASTOLIC BLOOD PRESSURE: 82 MMHG | WEIGHT: 158.7 LBS | HEIGHT: 73 IN | HEART RATE: 75 BPM | BODY MASS INDEX: 21.03 KG/M2 | SYSTOLIC BLOOD PRESSURE: 128 MMHG | RESPIRATION RATE: 17 BRPM | OXYGEN SATURATION: 97 %

## 2019-07-17 DIAGNOSIS — E78.5 HYPERLIPIDEMIA LDL GOAL <100: Primary | ICD-10-CM

## 2019-07-17 DIAGNOSIS — I25.10 CORONARY ARTERY DISEASE INVOLVING NATIVE HEART, ANGINA PRESENCE UNSPECIFIED, UNSPECIFIED VESSEL OR LESION TYPE: ICD-10-CM

## 2019-07-17 DIAGNOSIS — I10 ESSENTIAL HYPERTENSION WITH GOAL BLOOD PRESSURE LESS THAN 140/90: ICD-10-CM

## 2019-07-17 PROCEDURE — 99214 OFFICE O/P EST MOD 30 MIN: CPT | Performed by: INTERNAL MEDICINE

## 2019-07-17 PROCEDURE — 80061 LIPID PANEL: CPT | Performed by: INTERNAL MEDICINE

## 2019-07-17 PROCEDURE — 80053 COMPREHEN METABOLIC PANEL: CPT | Performed by: INTERNAL MEDICINE

## 2019-07-17 PROCEDURE — 36415 COLL VENOUS BLD VENIPUNCTURE: CPT | Performed by: INTERNAL MEDICINE

## 2019-07-17 RX ORDER — ASPIRIN 81 MG/1
81 TABLET ORAL DAILY
COMMUNITY

## 2019-07-17 RX ORDER — ATORVASTATIN CALCIUM 40 MG/1
TABLET, FILM COATED ORAL
Qty: 7 TABLET | Refills: 0 | Status: CANCELLED | OUTPATIENT
Start: 2019-07-17

## 2019-07-17 RX ORDER — METOPROLOL SUCCINATE 50 MG/1
50 TABLET, EXTENDED RELEASE ORAL DAILY
Qty: 90 TABLET | Refills: 3 | Status: SHIPPED | OUTPATIENT
Start: 2019-07-17 | End: 2020-01-13

## 2019-07-17 ASSESSMENT — MIFFLIN-ST. JEOR: SCORE: 1453.74

## 2019-07-17 NOTE — PROGRESS NOTES
Subjective     Deshawn Burrows is a 86 year old male who presents to clinic today for the following health issues:    HPI     Hyperlipidemia Follow-Up      Are you having any of the following symptoms? (Select all that apply)  No complaints of shortness of breath, chest pain or pressure.  No increased sweating or nausea with activity.  No left-sided neck or arm pain.  No complaints of pain in calves when walking 1-2 blocks.    Are you regularly taking any medication or supplement to lower your cholesterol?   Yes- statin    Are you having muscle aches or other side effects that you think could be caused by your cholesterol lowering medication?  No      Vascular Disease Follow-up      Are you having any of the following symptoms? (Select all that apply) No complaints of shortness of breath, chest pain or pressure.  No increased sweating or nausea with activity.  No left-sided neck or arm pain.  No complaints of pain in calves when walking 1-2 blocks.    How often do you take nitroglycerin? Never    Do you take an aspirin every day? Yes      Hypertension Follow-up      Do you check your blood pressure regularly outside of the clinic? No     Are you following a low salt diet? Yes    Are your blood pressures ever more than 140 on the top number (systolic) OR more   than 90 on the bottom number (diastolic), for example 140/90? No         Amount of exercise or physical activity: 4-5 days/week for an average of 30-45 minutes    Problems taking medications regularly: No    Medication side effects: none    Diet: low salt        Patient Active Problem List   Diagnosis     Vasculitis (H)     Essential hypertension with goal blood pressure less than 140/90     Hyperlipidemia LDL goal <100     Atherosclerosis of native coronary artery of native heart without angina pectoris     Myocardial infarction (H)     CAD (coronary artery disease)     Coronary artery disease involving native heart, angina presence unspecified,  "unspecified vessel or lesion type     Past Surgical History:   Procedure Laterality Date     HERNIA REPAIR  2007    right inguinal hernia repair     STENT, CORONARY, ANTIONE  2004    2 stents       Social History     Tobacco Use     Smoking status: Former Smoker     Smokeless tobacco: Never Used     Tobacco comment: Quit 10 years ago   Substance Use Topics     Alcohol use: Yes     Alcohol/week: 0.0 oz     Comment: Moderate     Family History   Family history unknown: Yes         Current Outpatient Medications   Medication Sig Dispense Refill     atorvastatin (LIPITOR) 40 MG tablet TAKE 1/2 TABLET BY MOUTH AT BEDTIME 7 tablet 0     metoprolol succinate (TOPROL-XL) 50 MG 24 hr tablet Take 1 tablet (50 mg) by mouth daily 90 tablet 3     multivitamin, therapeutic with minerals (THERA-VIT-M) TABS Take 1 tablet by mouth daily           Reviewed and updated as needed this visit by Provider         Review of Systems   ROS COMP:   CONSTITUTIONAL: NEGATIVE for fever, chills, change in weight  EYES: NEGATIVE for vision changes or irritation  ENT/MOUTH: NEGATIVE for ear, mouth and throat problems  RESP: NEGATIVE for significant cough or SOB  CV: NEGATIVE for chest pain, palpitations or peripheral edema  MUSCULOSKELETAL: NEGATIVE for significant arthralgias or myalgia  NEURO: NEGATIVE for weakness, dizziness or paresthesias  ROS otherwise negative      Objective    /82   Pulse 75   Temp 98.5  F (36.9  C) (Oral)   Resp 17   Ht 1.854 m (6' 1\")   Wt 72 kg (158 lb 11.2 oz)   SpO2 97%   BMI 20.94 kg/m    Body mass index is 20.94 kg/m .  Physical Exam   GENERAL: healthy, alert and no distress  EYES: Eyes grossly normal to inspection, PERRL and conjunctivae and sclerae normal  HENT: ear canals and TM's normal, nose and mouth without ulcers or lesions  NECK: no adenopathy, no asymmetry, masses, or scars and thyroid normal to palpation  RESP: lungs clear to auscultation - no rales, rhonchi or wheezes  CV: regular rate and " rhythm, normal S1 S2, no S3 or S4, no murmur, click or rub, no peripheral edema and peripheral pulses strong  MS: no gross musculoskeletal defects noted, no edema             Assessment & Plan     (E78.5) Hyperlipidemia LDL goal <100    Plan: on lipitor 40 mg - 1/2 tab daily ( 20 mg )  Check Lipid panel reflex to direct LDL Non-fasting.pt was told I will contact her after results and proceed accordingly.         (I10) Essential hypertension with goal blood pressure less than 140/90  Comment: BP well ocntrolled  Plan: metoprolol succinate ER (TOPROL-XL) 50 MG 24 hr        tablet, refilled.explained clearly about the medication,insructions and side effects. Check Comprehensive metabolic panel         (I25.10) Coronary artery disease involving native heart, angina presence unspecified, unspecified vessel or lesion type  Comment:  stable  Plan: continue metoprolol succinate ER (TOPROL-XL) 50 MG 24 hr        Tablet, and lipitor and aspirin , check  Comprehensive metabolic panel, Lipid         panel reflex to direct LDL Non-fasting                  Tess Leigh MD  [facility]

## 2019-07-17 NOTE — LETTER
[facility]  [address], [address]  [place] [address]  [phone]        August 8, 2019    Deshawn Burrows  [address]   [place] [address]            Dear Mr. Deshawn Burrows:      The results of your recent tests normal except slightly high Triglycerides, continue Lipitor, follow a low fat diet and regular exercise.  Repeat lipids in 1 year.  If you have any further questions or problems, please contact our office.    Sincerely,        Cuauhtemoc Leigh M.D.    Results for orders placed or performed in visit on 07/17/19   Comprehensive metabolic panel   Result Value Ref Range    Sodium 141 133 - 144 mmol/L    Potassium 4.6 3.4 - 5.3 mmol/L    Chloride 108 94 - 109 mmol/L    Carbon Dioxide 29 20 - 32 mmol/L    Anion Gap 4 3 - 14 mmol/L    Glucose 93 70 - 99 mg/dL    Urea Nitrogen 18 7 - 30 mg/dL    Creatinine 0.94 0.66 - 1.25 mg/dL    GFR Estimate 73 >60 mL/min/[1.73_m2]    GFR Estimate If Black 85 >60 mL/min/[1.73_m2]    Calcium 8.8 8.5 - 10.1 mg/dL    Bilirubin Total 0.4 0.2 - 1.3 mg/dL    Albumin 4.0 3.4 - 5.0 g/dL    Protein Total 7.1 6.8 - 8.8 g/dL    Alkaline Phosphatase 65 40 - 150 U/L    ALT 20 0 - 70 U/L    AST 18 0 - 45 U/L   Lipid panel reflex to direct LDL Non-fasting   Result Value Ref Range    Cholesterol 109 <200 mg/dL    Triglycerides 179 (H) <150 mg/dL    HDL Cholesterol 30 (L) >39 mg/dL    LDL Cholesterol Calculated 43 <100 mg/dL    Non HDL Cholesterol 79 <130 mg/dL

## 2019-07-17 NOTE — NURSING NOTE
"/82   Pulse 75   Temp 98.5  F (36.9  C) (Oral)   Resp 17   Ht 1.854 m (6' 1\")   Wt 72 kg (158 lb 11.2 oz)   SpO2 97%   BMI 20.94 kg/m    Patient in for follow up on HTN and lipids.  Mari Valdez CMA    "

## 2019-07-18 LAB
ALBUMIN SERPL-MCNC: 4 G/DL (ref 3.4–5)
ALP SERPL-CCNC: 65 U/L (ref 40–150)
ALT SERPL W P-5'-P-CCNC: 20 U/L (ref 0–70)
ANION GAP SERPL CALCULATED.3IONS-SCNC: 4 MMOL/L (ref 3–14)
AST SERPL W P-5'-P-CCNC: 18 U/L (ref 0–45)
BILIRUB SERPL-MCNC: 0.4 MG/DL (ref 0.2–1.3)
BUN SERPL-MCNC: 18 MG/DL (ref 7–30)
CALCIUM SERPL-MCNC: 8.8 MG/DL (ref 8.5–10.1)
CHLORIDE SERPL-SCNC: 108 MMOL/L (ref 94–109)
CHOLEST SERPL-MCNC: 109 MG/DL
CO2 SERPL-SCNC: 29 MMOL/L (ref 20–32)
CREAT SERPL-MCNC: 0.94 MG/DL (ref 0.66–1.25)
GFR SERPL CREATININE-BSD FRML MDRD: 73 ML/MIN/{1.73_M2}
GLUCOSE SERPL-MCNC: 93 MG/DL (ref 70–99)
HDLC SERPL-MCNC: 30 MG/DL
LDLC SERPL CALC-MCNC: 43 MG/DL
NONHDLC SERPL-MCNC: 79 MG/DL
POTASSIUM SERPL-SCNC: 4.6 MMOL/L (ref 3.4–5.3)
PROT SERPL-MCNC: 7.1 G/DL (ref 6.8–8.8)
SODIUM SERPL-SCNC: 141 MMOL/L (ref 133–144)
TRIGL SERPL-MCNC: 179 MG/DL

## 2019-11-04 ENCOUNTER — HEALTH MAINTENANCE LETTER (OUTPATIENT)
Age: 84
End: 2019-11-04

## 2020-01-13 ENCOUNTER — OFFICE VISIT (OUTPATIENT)
Dept: INTERNAL MEDICINE | Facility: CLINIC | Age: 85
End: 2020-01-13
Payer: MEDICARE

## 2020-01-13 VITALS
SYSTOLIC BLOOD PRESSURE: 132 MMHG | RESPIRATION RATE: 19 BRPM | BODY MASS INDEX: 19.69 KG/M2 | OXYGEN SATURATION: 95 % | WEIGHT: 148.6 LBS | HEIGHT: 73 IN | TEMPERATURE: 97.8 F | HEART RATE: 83 BPM | DIASTOLIC BLOOD PRESSURE: 80 MMHG

## 2020-01-13 DIAGNOSIS — I25.10 CORONARY ARTERY DISEASE INVOLVING NATIVE HEART, ANGINA PRESENCE UNSPECIFIED, UNSPECIFIED VESSEL OR LESION TYPE: Primary | ICD-10-CM

## 2020-01-13 DIAGNOSIS — I10 ESSENTIAL HYPERTENSION WITH GOAL BLOOD PRESSURE LESS THAN 140/90: ICD-10-CM

## 2020-01-13 DIAGNOSIS — E78.5 HYPERLIPIDEMIA LDL GOAL <100: ICD-10-CM

## 2020-01-13 DIAGNOSIS — I44.7 LBBB (LEFT BUNDLE BRANCH BLOCK): ICD-10-CM

## 2020-01-13 DIAGNOSIS — R07.89 OTHER CHEST PAIN: ICD-10-CM

## 2020-01-13 PROCEDURE — 93000 ELECTROCARDIOGRAM COMPLETE: CPT | Performed by: INTERNAL MEDICINE

## 2020-01-13 PROCEDURE — 99214 OFFICE O/P EST MOD 30 MIN: CPT | Performed by: INTERNAL MEDICINE

## 2020-01-13 RX ORDER — METOPROLOL SUCCINATE 50 MG/1
50 TABLET, EXTENDED RELEASE ORAL DAILY
Qty: 90 TABLET | Refills: 3 | Status: SHIPPED | OUTPATIENT
Start: 2020-01-13 | End: 2021-02-09

## 2020-01-13 ASSESSMENT — MIFFLIN-ST. JEOR: SCORE: 1407.93

## 2020-01-13 NOTE — NURSING NOTE
"/80   Pulse 83   Temp 97.8  F (36.6  C) (Oral)   Resp 19   Ht 1.854 m (6' 1\")   Wt 67.4 kg (148 lb 9.6 oz)   SpO2 95%   BMI 19.61 kg/m    Patient in for consultation on chest pain.  Mari Valdez CMA    "

## 2020-01-13 NOTE — PROGRESS NOTES
Subjective     Deshawn Burrows is a 86 year old male who presents to clinic today for the following health issues:    HPI     Pt is a 86 year old male who is seen here to day to f/u on chest pain . Pt says 3 days ago he had few guests at home and pt was sitting on chair  listening to his guest talking for 1 hr and pt suddenly felt lightheaded which lasted for about  6 secs and resolved . Pt went to bed and he had sudden onset of sharp chest pain at 2 am which lasted for 5-6 secs and resolved .  No radiation of pain to left arm or jaw , no diaphoresis.  Pt says he has been feeling fine since then and this morning he shovelled the snow in his driveway. No cp or SOB.  Pt has h/o CAD and cardiac stents and has not seen cardiologist for many yrs,  Pt has been off of metoprolol since 1 month , pt does not know why he did not fill the medication. Last filled in 09/19 for 3 months.      Hyperlipidemia Follow-Up      Are you regularly taking any medication or supplement to lower your cholesterol?   Yes- lipitor     Are you having muscle aches or other side effects that you think could be caused by your cholesterol lowering medication?  No    Hypertension Follow-up      Do you check your blood pressure regularly outside of the clinic? No     Are you following a low salt diet? Yes    Are your blood pressures ever more than 140 on the top number (systolic) OR more   than 90 on the bottom number (diastolic), for example 140/90? No       Patient Active Problem List   Diagnosis     Vasculitis (H)     Essential hypertension with goal blood pressure less than 140/90     Hyperlipidemia LDL goal <100     Atherosclerosis of native coronary artery of native heart without angina pectoris     Myocardial infarction (H)     CAD (coronary artery disease)     Coronary artery disease involving native heart, angina presence unspecified, unspecified vessel or lesion type     Past Surgical History:   Procedure Laterality Date     HERNIA REPAIR  " 2007    right inguinal hernia repair     STENT, CORONARY, ANTIONE  2004    2 stents       Social History     Tobacco Use     Smoking status: Former Smoker     Smokeless tobacco: Never Used     Tobacco comment: Quit 10 years ago   Substance Use Topics     Alcohol use: Yes     Alcohol/week: 0.0 standard drinks     Comment: Moderate     Family History   Family history unknown: Yes         Current Outpatient Medications   Medication Sig Dispense Refill     aspirin 81 MG EC tablet Take 81 mg by mouth daily       atorvastatin (LIPITOR) 40 MG tablet TAKE 1/2 (one-half) TABLET BY MOUTH daily AT BEDTIME 45 tablet 3     metoprolol succinate ER (TOPROL-XL) 50 MG 24 hr tablet Take 1 tablet (50 mg) by mouth daily 90 tablet 3     multivitamin, therapeutic with minerals (THERA-VIT-M) TABS Take 1 tablet by mouth daily            Reviewed and updated as needed this visit by Provider         Review of Systems   ROS COMP: CONSTITUTIONAL: NEGATIVE for fever, chills, change in weight  EYES: NEGATIVE for vision changes or irritation  ENT/MOUTH: NEGATIVE for ear, mouth and throat problems  RESP: NEGATIVE for significant cough or SOB  CV:one episode of chest pain 3 days ago  GI: NEGATIVE for nausea, abdominal pain, heartburn, or change in bowel habits  MS; negative  CNS: one episode of dizziness  Heme; no h/o anemia   Psych;negative          Objective    /80   Pulse 83   Temp 97.8  F (36.6  C) (Oral)   Resp 19   Ht 1.854 m (6' 1\")   Wt 67.4 kg (148 lb 9.6 oz)   SpO2 95%   BMI 19.61 kg/m    Body mass index is 19.61 kg/m .  Physical Exam   GENERAL: healthy, alert and no distress  EYES: Eyes grossly normal to inspection, PERRL and conjunctivae and sclerae normal  NECK: no adenopathy, no asymmetry, masses, or scars and thyroid normal to palpation  RESP: lungs clear to auscultation - no rales, rhonchi or wheezes  CV: regular rate and rhythm,    ABDOMEN: soft, nontender,  and bowel sounds normal  MS:  No chest wall tenderness  Ext:  no " edema  NEURO: Normal strength and tone, mentation intact and speech normal       Office EKG: LBBB.        Assessment & Plan     (R07.89) Other chest pain  Comment: with new LBBB,h/o CAD  Plan: will get Echo Stress Test with [facility].pt was told I will contact her after results and proceed accordingly.     (I44.7) LBBB (left bundle branch block  Plan: CARDIOLOGY EVAL ADULT REFERRAL, Echo Stress         Test with [facility]            (I25.10) Coronary artery disease involving native heart, angina presence unspecified, unspecified vessel or lesion type   Plan: CARDIOLOGY EVAL ADULT REFERRAL, Echo Stress Test with [facility], pt was started back on metoprolol succinate ER (TOPROL-XL) 50 MG 24 hr tablet daily as directed.explained clearly about the medication,insructions and side effects.           (I10) Essential hypertension with goal blood pressure less than 140/90  Plan: pt has stopped taking metoprolol, restarted on metoprolol succinate ER (TOPROL-XL) 50 MG 24 hr        tablet             (E78.5) Hyperlipidemia LDL goal <100  Plan: lipids stable, continue lipitor        Tess Leigh MD  [facility]

## 2020-01-21 ENCOUNTER — TELEPHONE (OUTPATIENT)
Dept: INTERNAL MEDICINE | Facility: CLINIC | Age: 85
End: 2020-01-21

## 2020-01-21 NOTE — TELEPHONE ENCOUNTER
Patient had written phone number down incorrectly.  Gave phone number for central scheduling and had him repeat it back to me.  MARTINA Franz R.N.

## 2020-01-21 NOTE — TELEPHONE ENCOUNTER
Patient called and stated he was having a hard time finding the right number to make appt with Cardiology. Patient stated numbers he had we disconnected. I looked up his referral and transferred patient after knowing he was at the right place to book appt. Patient calls back and is not confused where he should go and what number he should call to book this appt. Please advise. Ok to call and rima [phone]

## 2020-02-10 ENCOUNTER — HEALTH MAINTENANCE LETTER (OUTPATIENT)
Age: 85
End: 2020-02-10

## 2020-02-11 ENCOUNTER — TELEPHONE (OUTPATIENT)
Dept: INTERNAL MEDICINE | Facility: CLINIC | Age: 85
End: 2020-02-11

## 2020-02-11 ENCOUNTER — OFFICE VISIT (OUTPATIENT)
Dept: INTERNAL MEDICINE | Facility: CLINIC | Age: 85
End: 2020-02-11
Payer: MEDICARE

## 2020-02-11 VITALS
SYSTOLIC BLOOD PRESSURE: 132 MMHG | RESPIRATION RATE: 16 BRPM | HEIGHT: 73 IN | WEIGHT: 145.9 LBS | HEART RATE: 101 BPM | OXYGEN SATURATION: 99 % | DIASTOLIC BLOOD PRESSURE: 82 MMHG | BODY MASS INDEX: 19.34 KG/M2 | TEMPERATURE: 98.3 F

## 2020-02-11 DIAGNOSIS — I25.10 CORONARY ARTERY DISEASE INVOLVING NATIVE HEART, ANGINA PRESENCE UNSPECIFIED, UNSPECIFIED VESSEL OR LESION TYPE: ICD-10-CM

## 2020-02-11 DIAGNOSIS — R06.02 SOB (SHORTNESS OF BREATH): Primary | ICD-10-CM

## 2020-02-11 DIAGNOSIS — I10 ESSENTIAL HYPERTENSION WITH GOAL BLOOD PRESSURE LESS THAN 140/90: ICD-10-CM

## 2020-02-11 DIAGNOSIS — I44.7 LBBB (LEFT BUNDLE BRANCH BLOCK): Primary | ICD-10-CM

## 2020-02-11 DIAGNOSIS — R06.02 SOB (SHORTNESS OF BREATH): ICD-10-CM

## 2020-02-11 PROCEDURE — 99214 OFFICE O/P EST MOD 30 MIN: CPT | Performed by: INTERNAL MEDICINE

## 2020-02-11 ASSESSMENT — MIFFLIN-ST. JEOR: SCORE: 1395.68

## 2020-02-11 NOTE — TELEPHONE ENCOUNTER
[facility] cardioplumonary calling because they need an order for lexiscan nuclear stress test. They cant do the original test ordered because of his EKG results.Any questions call them at [phone].

## 2020-02-11 NOTE — NURSING NOTE
"/82   Pulse 101   Temp 98.3  F (36.8  C) (Oral)   Resp 16   Ht 1.854 m (6' 1\")   Wt 66.2 kg (145 lb 14.4 oz)   SpO2 99%   BMI 19.25 kg/m    Patient in for consultation on SOB, difficulty sleeping, \"uneasy feeling in stomach/butterflies\" x's 3 weeks.  Mari Valdez, CMA    "

## 2020-02-11 NOTE — PROGRESS NOTES
Subjective     Deshawn Burrows is a 86 year old male who presents to clinic today for the following health issues:    HPI      Pt is a 86 year old male who is seen here to day the complaints of having shortness of breath on and off for 3 weeks., Shortness of breath is mainly with activity.  no cough or wheezing, no orthopnea or PND.  No fever or chills.  No chest pain or pressure, feels anxious.  No abdominal pain, nausea or vomiting, no diaphoresis, no dizziness.  Patient was seen in clinic about 3 weeks ago and had a EKG which showed new onset of left bundle branch block and a stress test was ordered and also cardiology referral given.  Patient did not get a stress test done.  Patient was also started on metoprolol at last office visit which he had stopped taking it before.  Tolerating well.  Patient does have history of CAD and has not seen any cardiologist for a long time.        Patient Active Problem List   Diagnosis     Vasculitis (H)     Essential hypertension with goal blood pressure less than 140/90     Hyperlipidemia LDL goal <100     Atherosclerosis of native coronary artery of native heart without angina pectoris     Myocardial infarction (H)     CAD (coronary artery disease)     Coronary artery disease involving native heart, angina presence unspecified, unspecified vessel or lesion type     Past Surgical History:   Procedure Laterality Date     HERNIA REPAIR  2007    right inguinal hernia repair     STENT, CORONARY, ANTIONE  2004    2 stents       Social History     Tobacco Use     Smoking status: Former Smoker     Smokeless tobacco: Never Used     Tobacco comment: Quit 10 years ago   Substance Use Topics     Alcohol use: Yes     Alcohol/week: 0.0 standard drinks     Comment: Moderate     Family History   Family history unknown: Yes         Current Outpatient Medications   Medication Sig Dispense Refill     aspirin 81 MG EC tablet Take 81 mg by mouth daily       atorvastatin (LIPITOR) 40 MG tablet TAKE  "1/2 (one-half) TABLET BY MOUTH daily AT BEDTIME 45 tablet 3     metoprolol succinate ER (TOPROL-XL) 50 MG 24 hr tablet Take 1 tablet (50 mg) by mouth daily 90 tablet 3     multivitamin, therapeutic with minerals (THERA-VIT-M) TABS Take 1 tablet by mouth daily          Reviewed and updated as needed this visit by Provider         Review of Systems   ROS COMP: CONSTITUTIONAL: NEGATIVE for fever, chills, change in weight  EYES: NEGATIVE for vision changes or irritation  ENT/MOUTH: NEGATIVE for ear, mouth and throat problems  RESP:  SOB  CV: NEGATIVE for chest pain, palpitations or peripheral edema  MUSCULOSKELETAL: NEGATIVE for significant arthralgias or myalgia  NEURO: NEGATIVE for weakness, dizziness or paresthesias          Objective    /82   Pulse 101   Temp 98.3  F (36.8  C) (Oral)   Resp 16   Ht 1.854 m (6' 1\")   Wt 66.2 kg (145 lb 14.4 oz)   SpO2 99%   BMI 19.25 kg/m    Body mass index is 19.25 kg/m .  Physical Exam   GENERAL: healthy, alert and no distress  EYES: Eyes grossly normal to inspection, PERRL and conjunctivae and sclerae normal  NECK: no adenopathy, no asymmetry, masses, or scars and thyroid normal to palpation  RESP: lungs clear to auscultation - no rales, rhonchi or wheezes  CV: regular rate and rhythm,   ABDOMEN: soft, nontender, no hepatosplenomegaly, no masses and bowel sounds normal  MS: no gross musculoskeletal defects noted, no edema  NEURO: Normal strength and tone, mentation intact and speech normal             Assessment & Plan     (R06.02) SOB (shortness of breath)  (primary encounter diagnosis)  Plan: With a history of CAD, will obtain cardiac stress test, cardiopulmonary lab was called and appointment set up for patient have a stress test on 2/14/2020 and also patient has cardiology appointment.  Patient was advised to get evaluated in ER if symptoms worsen . call or return to clinic prn if these symtoms worsen, fail to improve as anticipated, or if new symptoms develop.   "     (I10) Essential hypertension with goal blood pressure less than 140/90  Plan: Blood pressure well controlled at this time on metoprolol 50 mg daily      (I25.10) Coronary artery disease involving native heart, angina presence unspecified, unspecified vessel or lesion type  Plan: On Lipitor, aspirin and beta-blocker.  Cardiology referral has been given and patient has appointment to see cardiologist      Tess Leigh MD  [facility]

## 2020-02-18 ENCOUNTER — HOSPITAL ENCOUNTER (OUTPATIENT)
Dept: CARDIOLOGY | Facility: CLINIC | Age: 85
Discharge: HOME | End: 2020-02-18
Attending: INTERNAL MEDICINE | Admitting: INTERNAL MEDICINE
Payer: MEDICARE

## 2020-02-18 ENCOUNTER — HOSPITAL ENCOUNTER (OUTPATIENT)
Dept: NUCLEAR MEDICINE | Facility: CLINIC | Age: 85
Setting detail: NUCLEAR MEDICINE
End: 2020-02-18
Attending: INTERNAL MEDICINE
Payer: MEDICARE

## 2020-02-18 DIAGNOSIS — I44.7 LBBB (LEFT BUNDLE BRANCH BLOCK): ICD-10-CM

## 2020-02-18 DIAGNOSIS — I25.10 CORONARY ARTERY DISEASE INVOLVING NATIVE HEART, ANGINA PRESENCE UNSPECIFIED, UNSPECIFIED VESSEL OR LESION TYPE: ICD-10-CM

## 2020-02-18 DIAGNOSIS — R06.02 SOB (SHORTNESS OF BREATH): ICD-10-CM

## 2020-02-18 LAB
NUC STRESS EJECTION FRACTION: 21 %
STRESS ECHO TARGET HR: 134

## 2020-02-18 PROCEDURE — 78452 HT MUSCLE IMAGE SPECT MULT: CPT

## 2020-02-18 PROCEDURE — 93017 CV STRESS TEST TRACING ONLY: CPT

## 2020-02-18 PROCEDURE — 93016 CV STRESS TEST SUPVJ ONLY: CPT | Performed by: INTERNAL MEDICINE

## 2020-02-18 PROCEDURE — 34300033 ZZH RX 343: Performed by: INTERNAL MEDICINE

## 2020-02-18 PROCEDURE — 78452 HT MUSCLE IMAGE SPECT MULT: CPT | Mod: 26 | Performed by: INTERNAL MEDICINE

## 2020-02-18 PROCEDURE — A9502 TC99M TETROFOSMIN: HCPCS | Performed by: INTERNAL MEDICINE

## 2020-02-18 PROCEDURE — 25000128 H RX IP 250 OP 636: Performed by: INTERNAL MEDICINE

## 2020-02-18 PROCEDURE — 93018 CV STRESS TEST I&R ONLY: CPT | Performed by: INTERNAL MEDICINE

## 2020-02-18 RX ORDER — REGADENOSON 0.08 MG/ML
0.4 INJECTION, SOLUTION INTRAVENOUS ONCE
Status: COMPLETED | OUTPATIENT
Start: 2020-02-18 | End: 2020-02-18

## 2020-02-18 RX ADMIN — REGADENOSON 0.4 MG: 0.08 INJECTION, SOLUTION INTRAVENOUS at 13:54

## 2020-02-18 RX ADMIN — TETROFOSMIN 31.5 MCI.: 1.38 INJECTION, POWDER, LYOPHILIZED, FOR SOLUTION INTRAVENOUS at 13:51

## 2020-02-18 RX ADMIN — TETROFOSMIN 11 MCI.: 1.38 INJECTION, POWDER, LYOPHILIZED, FOR SOLUTION INTRAVENOUS at 12:20

## 2020-02-23 ENCOUNTER — TELEPHONE (OUTPATIENT)
Dept: INTERNAL MEDICINE | Facility: CLINIC | Age: 85
End: 2020-02-23

## 2020-02-24 NOTE — TELEPHONE ENCOUNTER
The nuclear stress test is abnormal. Heart function at stress is 21%.     Severe left ventricular enlargement is noted.  Pt has appt with cardiology on 03/20/20 but he needs earliet appt than this. Please call cardiology to get an earliest appt.  Also pl inform pt

## 2020-02-24 NOTE — TELEPHONE ENCOUNTER
Call to pt and advised. He agrees with rescheduling sooner anytime. He would like Dr Leigh to call him directly to discuss the results though, before he sees Cardiology.     Please call, #[phone].     Call to Cardiology, they will squeeze him in on Thursday, 2/27 at 12:45.   Pt agrees.

## 2020-02-25 ENCOUNTER — APPOINTMENT (OUTPATIENT)
Dept: CARDIOLOGY | Facility: CLINIC | Age: 85
DRG: 246 | End: 2020-02-25
Attending: PHYSICIAN ASSISTANT
Payer: MEDICARE

## 2020-02-25 ENCOUNTER — HOSPITAL ENCOUNTER (INPATIENT)
Facility: CLINIC | Age: 85
LOS: 4 days | Discharge: HOME | DRG: 246 | End: 2020-02-29
Attending: EMERGENCY MEDICINE | Admitting: INTERNAL MEDICINE
Payer: MEDICARE

## 2020-02-25 ENCOUNTER — APPOINTMENT (OUTPATIENT)
Dept: GENERAL RADIOLOGY | Facility: CLINIC | Age: 85
DRG: 246 | End: 2020-02-25
Attending: EMERGENCY MEDICINE
Payer: MEDICARE

## 2020-02-25 DIAGNOSIS — R06.00 DYSPNEA, UNSPECIFIED TYPE: ICD-10-CM

## 2020-02-25 DIAGNOSIS — I25.5 ISCHEMIC CARDIOMYOPATHY: ICD-10-CM

## 2020-02-25 DIAGNOSIS — R94.39 ABNORMAL CARDIOVASCULAR STRESS TEST: Primary | ICD-10-CM

## 2020-02-25 DIAGNOSIS — I25.10 CORONARY ARTERY DISEASE INVOLVING NATIVE HEART, ANGINA PRESENCE UNSPECIFIED, UNSPECIFIED VESSEL OR LESION TYPE: ICD-10-CM

## 2020-02-25 DIAGNOSIS — I10 ESSENTIAL HYPERTENSION WITH GOAL BLOOD PRESSURE LESS THAN 140/90: ICD-10-CM

## 2020-02-25 DIAGNOSIS — R06.09 DYSPNEA ON EXERTION: ICD-10-CM

## 2020-02-25 DIAGNOSIS — R94.39 ABNORMAL CARDIOVASCULAR STRESS TEST: ICD-10-CM

## 2020-02-25 DIAGNOSIS — I50.21 ACUTE SYSTOLIC HEART FAILURE (H): ICD-10-CM

## 2020-02-25 DIAGNOSIS — I50.43 ACUTE ON CHRONIC COMBINED SYSTOLIC AND DIASTOLIC CONGESTIVE HEART FAILURE (H): ICD-10-CM

## 2020-02-25 DIAGNOSIS — I25.10 CORONARY ARTERY DISEASE INVOLVING NATIVE CORONARY ARTERY OF NATIVE HEART WITHOUT ANGINA PECTORIS: ICD-10-CM

## 2020-02-25 DIAGNOSIS — J18.9 PNEUMONIA OF RIGHT LUNG DUE TO INFECTIOUS ORGANISM, UNSPECIFIED PART OF LUNG: ICD-10-CM

## 2020-02-25 PROBLEM — I44.7 LBBB (LEFT BUNDLE BRANCH BLOCK): Status: ACTIVE | Noted: 2020-02-25

## 2020-02-25 LAB
ANION GAP SERPL CALCULATED.3IONS-SCNC: 4 MMOL/L (ref 3–14)
BASE EXCESS BLDV CALC-SCNC: 1.6 MMOL/L
BASOPHILS # BLD AUTO: 0 10E9/L (ref 0–0.2)
BASOPHILS NFR BLD AUTO: 0.4 %
BUN SERPL-MCNC: 32 MG/DL (ref 7–30)
CALCIUM SERPL-MCNC: 9.6 MG/DL (ref 8.5–10.1)
CHLORIDE SERPL-SCNC: 110 MMOL/L (ref 94–109)
CO2 SERPL-SCNC: 27 MMOL/L (ref 20–32)
CREAT SERPL-MCNC: 0.97 MG/DL (ref 0.66–1.25)
D DIMER PPP FEU-MCNC: 0.5 UG/ML FEU (ref 0–0.5)
DIFFERENTIAL METHOD BLD: NORMAL
EOSINOPHIL # BLD AUTO: 0.1 10E9/L (ref 0–0.7)
EOSINOPHIL NFR BLD AUTO: 1 %
ERYTHROCYTE [DISTWIDTH] IN BLOOD BY AUTOMATED COUNT: 12.8 % (ref 10–15)
GFR SERPL CREATININE-BSD FRML MDRD: 70 ML/MIN/{1.73_M2}
GLUCOSE SERPL-MCNC: 118 MG/DL (ref 70–99)
HCO3 BLDV-SCNC: 28 MMOL/L (ref 21–28)
HCT VFR BLD AUTO: 46.4 % (ref 40–53)
HGB BLD-MCNC: 14.8 G/DL (ref 13.3–17.7)
IMM GRANULOCYTES # BLD: 0 10E9/L (ref 0–0.4)
IMM GRANULOCYTES NFR BLD: 0.3 %
INR PPP: 1.01 (ref 0.86–1.14)
INTERPRETATION ECG - MUSE: NORMAL
INTERPRETATION ECG - MUSE: NORMAL
LACTATE BLD-SCNC: 1.2 MMOL/L (ref 0.7–2)
LYMPHOCYTES # BLD AUTO: 1.9 10E9/L (ref 0.8–5.3)
LYMPHOCYTES NFR BLD AUTO: 19.5 %
MAGNESIUM SERPL-MCNC: 2.3 MG/DL (ref 1.6–2.3)
MCH RBC QN AUTO: 31.2 PG (ref 26.5–33)
MCHC RBC AUTO-ENTMCNC: 31.9 G/DL (ref 31.5–36.5)
MCV RBC AUTO: 98 FL (ref 78–100)
MONOCYTES # BLD AUTO: 0.7 10E9/L (ref 0–1.3)
MONOCYTES NFR BLD AUTO: 7.7 %
NEUTROPHILS # BLD AUTO: 6.8 10E9/L (ref 1.6–8.3)
NEUTROPHILS NFR BLD AUTO: 71.1 %
NRBC # BLD AUTO: 0 10*3/UL
NRBC BLD AUTO-RTO: 0 /100
NT-PROBNP SERPL-MCNC: 5301 PG/ML (ref 0–1800)
O2/TOTAL GAS SETTING VFR VENT: NORMAL %
PCO2 BLDV: 47 MM HG (ref 40–50)
PH BLDV: 7.38 PH (ref 7.32–7.43)
PLATELET # BLD AUTO: 232 10E9/L (ref 150–450)
PO2 BLDV: 27 MM HG (ref 25–47)
POTASSIUM SERPL-SCNC: 4 MMOL/L (ref 3.4–5.3)
RBC # BLD AUTO: 4.75 10E12/L (ref 4.4–5.9)
SODIUM SERPL-SCNC: 141 MMOL/L (ref 133–144)
TROPONIN I BLD-MCNC: 0.01 UG/L (ref 0–0.08)
WBC # BLD AUTO: 9.6 10E9/L (ref 4–11)

## 2020-02-25 PROCEDURE — 25000132 ZZH RX MED GY IP 250 OP 250 PS 637: Mod: GY | Performed by: PHYSICIAN ASSISTANT

## 2020-02-25 PROCEDURE — 85379 FIBRIN DEGRADATION QUANT: CPT | Performed by: PHYSICIAN ASSISTANT

## 2020-02-25 PROCEDURE — 40000275 ZZH STATISTIC RCP TIME EA 10 MIN

## 2020-02-25 PROCEDURE — 93306 TTE W/DOPPLER COMPLETE: CPT | Mod: 26 | Performed by: INTERNAL MEDICINE

## 2020-02-25 PROCEDURE — 25000128 H RX IP 250 OP 636: Performed by: INTERNAL MEDICINE

## 2020-02-25 PROCEDURE — 83880 ASSAY OF NATRIURETIC PEPTIDE: CPT | Performed by: PHYSICIAN ASSISTANT

## 2020-02-25 PROCEDURE — 36415 COLL VENOUS BLD VENIPUNCTURE: CPT | Performed by: EMERGENCY MEDICINE

## 2020-02-25 PROCEDURE — 25500064 ZZH RX 255 OP 636: Performed by: INTERNAL MEDICINE

## 2020-02-25 PROCEDURE — 25000128 H RX IP 250 OP 636: Performed by: EMERGENCY MEDICINE

## 2020-02-25 PROCEDURE — 94640 AIRWAY INHALATION TREATMENT: CPT

## 2020-02-25 PROCEDURE — 25000125 ZZHC RX 250: Performed by: INTERNAL MEDICINE

## 2020-02-25 PROCEDURE — 85610 PROTHROMBIN TIME: CPT | Performed by: EMERGENCY MEDICINE

## 2020-02-25 PROCEDURE — 99207 ZZC APP CREDIT; MD BILLING SHARED VISIT: CPT | Performed by: PHYSICIAN ASSISTANT

## 2020-02-25 PROCEDURE — 25000128 H RX IP 250 OP 636: Performed by: PHYSICIAN ASSISTANT

## 2020-02-25 PROCEDURE — 12000000 ZZH R&B MED SURG/OB

## 2020-02-25 PROCEDURE — 93005 ELECTROCARDIOGRAM TRACING: CPT | Mod: 76

## 2020-02-25 PROCEDURE — 25800030 ZZH RX IP 258 OP 636: Performed by: INTERNAL MEDICINE

## 2020-02-25 PROCEDURE — 87040 BLOOD CULTURE FOR BACTERIA: CPT | Performed by: EMERGENCY MEDICINE

## 2020-02-25 PROCEDURE — 99223 1ST HOSP IP/OBS HIGH 75: CPT | Mod: AI | Performed by: INTERNAL MEDICINE

## 2020-02-25 PROCEDURE — 71046 X-RAY EXAM CHEST 2 VIEWS: CPT

## 2020-02-25 PROCEDURE — 85025 COMPLETE CBC W/AUTO DIFF WBC: CPT | Performed by: EMERGENCY MEDICINE

## 2020-02-25 PROCEDURE — 83735 ASSAY OF MAGNESIUM: CPT | Performed by: EMERGENCY MEDICINE

## 2020-02-25 PROCEDURE — 96374 THER/PROPH/DIAG INJ IV PUSH: CPT

## 2020-02-25 PROCEDURE — 36415 COLL VENOUS BLD VENIPUNCTURE: CPT | Performed by: PHYSICIAN ASSISTANT

## 2020-02-25 PROCEDURE — 80048 BASIC METABOLIC PNL TOTAL CA: CPT | Performed by: EMERGENCY MEDICINE

## 2020-02-25 PROCEDURE — 93005 ELECTROCARDIOGRAM TRACING: CPT

## 2020-02-25 PROCEDURE — 36415 COLL VENOUS BLD VENIPUNCTURE: CPT

## 2020-02-25 PROCEDURE — 83605 ASSAY OF LACTIC ACID: CPT | Performed by: EMERGENCY MEDICINE

## 2020-02-25 PROCEDURE — 25000132 ZZH RX MED GY IP 250 OP 250 PS 637: Mod: GY | Performed by: EMERGENCY MEDICINE

## 2020-02-25 PROCEDURE — 25000132 ZZH RX MED GY IP 250 OP 250 PS 637: Mod: GY | Performed by: INTERNAL MEDICINE

## 2020-02-25 PROCEDURE — 82803 BLOOD GASES ANY COMBINATION: CPT | Performed by: EMERGENCY MEDICINE

## 2020-02-25 PROCEDURE — 84484 ASSAY OF TROPONIN QUANT: CPT

## 2020-02-25 PROCEDURE — 93306 TTE W/DOPPLER COMPLETE: CPT

## 2020-02-25 PROCEDURE — 99285 EMERGENCY DEPT VISIT HI MDM: CPT | Mod: 25

## 2020-02-25 RX ORDER — ACETAMINOPHEN 325 MG/1
650 TABLET ORAL EVERY 4 HOURS PRN
Status: DISCONTINUED | OUTPATIENT
Start: 2020-02-25 | End: 2020-02-28

## 2020-02-25 RX ORDER — FUROSEMIDE 10 MG/ML
20 INJECTION INTRAMUSCULAR; INTRAVENOUS ONCE
Status: COMPLETED | OUTPATIENT
Start: 2020-02-25 | End: 2020-02-25

## 2020-02-25 RX ORDER — FUROSEMIDE 10 MG/ML
20 INJECTION INTRAMUSCULAR; INTRAVENOUS EVERY 12 HOURS
Status: DISCONTINUED | OUTPATIENT
Start: 2020-02-25 | End: 2020-02-28

## 2020-02-25 RX ORDER — ALBUTEROL SULFATE 0.83 MG/ML
2.5 SOLUTION RESPIRATORY (INHALATION)
Status: DISCONTINUED | OUTPATIENT
Start: 2020-02-25 | End: 2020-02-29 | Stop reason: HOSPADM

## 2020-02-25 RX ORDER — ASPIRIN 81 MG/1
81 TABLET ORAL DAILY
Status: DISCONTINUED | OUTPATIENT
Start: 2020-02-25 | End: 2020-02-28

## 2020-02-25 RX ORDER — ONDANSETRON 2 MG/ML
4 INJECTION INTRAMUSCULAR; INTRAVENOUS EVERY 6 HOURS PRN
Status: DISCONTINUED | OUTPATIENT
Start: 2020-02-25 | End: 2020-02-29 | Stop reason: HOSPADM

## 2020-02-25 RX ORDER — CEFTRIAXONE 2 G/1
2 INJECTION, POWDER, FOR SOLUTION INTRAMUSCULAR; INTRAVENOUS EVERY 24 HOURS
Status: DISCONTINUED | OUTPATIENT
Start: 2020-02-26 | End: 2020-02-27

## 2020-02-25 RX ORDER — CEFTRIAXONE 2 G/1
2 INJECTION, POWDER, FOR SOLUTION INTRAMUSCULAR; INTRAVENOUS ONCE
Status: COMPLETED | OUTPATIENT
Start: 2020-02-25 | End: 2020-02-25

## 2020-02-25 RX ORDER — ONDANSETRON 4 MG/1
4 TABLET, ORALLY DISINTEGRATING ORAL EVERY 6 HOURS PRN
Status: DISCONTINUED | OUTPATIENT
Start: 2020-02-25 | End: 2020-02-29 | Stop reason: HOSPADM

## 2020-02-25 RX ORDER — LISINOPRIL 5 MG/1
5 TABLET ORAL DAILY
Status: DISCONTINUED | OUTPATIENT
Start: 2020-02-25 | End: 2020-02-29 | Stop reason: HOSPADM

## 2020-02-25 RX ORDER — NALOXONE HYDROCHLORIDE 0.4 MG/ML
.1-.4 INJECTION, SOLUTION INTRAMUSCULAR; INTRAVENOUS; SUBCUTANEOUS
Status: DISCONTINUED | OUTPATIENT
Start: 2020-02-25 | End: 2020-02-29

## 2020-02-25 RX ORDER — AMOXICILLIN 250 MG
2 CAPSULE ORAL 2 TIMES DAILY PRN
Status: DISCONTINUED | OUTPATIENT
Start: 2020-02-25 | End: 2020-02-29 | Stop reason: HOSPADM

## 2020-02-25 RX ORDER — AZITHROMYCIN 250 MG/1
250 TABLET, FILM COATED ORAL DAILY
Status: COMPLETED | OUTPATIENT
Start: 2020-02-26 | End: 2020-02-29

## 2020-02-25 RX ORDER — AMOXICILLIN 250 MG
1 CAPSULE ORAL 2 TIMES DAILY PRN
Status: DISCONTINUED | OUTPATIENT
Start: 2020-02-25 | End: 2020-02-29 | Stop reason: HOSPADM

## 2020-02-25 RX ORDER — ATORVASTATIN CALCIUM 20 MG/1
20 TABLET, FILM COATED ORAL AT BEDTIME
Status: DISCONTINUED | OUTPATIENT
Start: 2020-02-25 | End: 2020-02-29 | Stop reason: HOSPADM

## 2020-02-25 RX ORDER — ATORVASTATIN CALCIUM 40 MG/1
20 TABLET, FILM COATED ORAL AT BEDTIME
COMMUNITY
End: 2020-09-23 | Stop reason: DRUGHIGH

## 2020-02-25 RX ORDER — ASPIRIN 81 MG/1
324 TABLET, CHEWABLE ORAL ONCE
Status: COMPLETED | OUTPATIENT
Start: 2020-02-25 | End: 2020-02-25

## 2020-02-25 RX ORDER — LIDOCAINE 40 MG/G
CREAM TOPICAL
Status: DISCONTINUED | OUTPATIENT
Start: 2020-02-25 | End: 2020-02-29 | Stop reason: HOSPADM

## 2020-02-25 RX ORDER — METOPROLOL SUCCINATE 50 MG/1
50 TABLET, EXTENDED RELEASE ORAL DAILY
Status: DISCONTINUED | OUTPATIENT
Start: 2020-02-25 | End: 2020-02-29 | Stop reason: HOSPADM

## 2020-02-25 RX ADMIN — ALBUTEROL SULFATE 2.5 MG: 2.5 SOLUTION RESPIRATORY (INHALATION) at 17:54

## 2020-02-25 RX ADMIN — ASPIRIN 81 MG 324 MG: 81 TABLET ORAL at 07:17

## 2020-02-25 RX ADMIN — HUMAN ALBUMIN MICROSPHERES AND PERFLUTREN 5 ML: 10; .22 INJECTION, SOLUTION INTRAVENOUS at 14:45

## 2020-02-25 RX ADMIN — ASPIRIN 81 MG: 81 TABLET, COATED ORAL at 12:57

## 2020-02-25 RX ADMIN — FUROSEMIDE 20 MG: 10 INJECTION, SOLUTION INTRAMUSCULAR; INTRAVENOUS at 18:32

## 2020-02-25 RX ADMIN — METOPROLOL SUCCINATE 50 MG: 50 TABLET, EXTENDED RELEASE ORAL at 12:57

## 2020-02-25 RX ADMIN — ATORVASTATIN CALCIUM 20 MG: 20 TABLET, FILM COATED ORAL at 22:55

## 2020-02-25 RX ADMIN — FUROSEMIDE 20 MG: 10 INJECTION, SOLUTION INTRAMUSCULAR; INTRAVENOUS at 12:57

## 2020-02-25 RX ADMIN — LISINOPRIL 5 MG: 5 TABLET ORAL at 16:16

## 2020-02-25 RX ADMIN — AZITHROMYCIN MONOHYDRATE 500 MG: 500 INJECTION, POWDER, LYOPHILIZED, FOR SOLUTION INTRAVENOUS at 10:56

## 2020-02-25 RX ADMIN — CEFTRIAXONE 2 G: 2 INJECTION, POWDER, FOR SOLUTION INTRAMUSCULAR; INTRAVENOUS at 09:43

## 2020-02-25 ASSESSMENT — ENCOUNTER SYMPTOMS
DIARRHEA: 0
SHORTNESS OF BREATH: 1
ABDOMINAL PAIN: 0
DIFFICULTY URINATING: 0
SORE THROAT: 0
DYSURIA: 0
CONSTIPATION: 0
BACK PAIN: 0
COUGH: 1
FEVER: 0

## 2020-02-25 ASSESSMENT — MIFFLIN-ST. JEOR
SCORE: 1414.28
SCORE: 1381.16

## 2020-02-25 ASSESSMENT — ACTIVITIES OF DAILY LIVING (ADL)
ADLS_ACUITY_SCORE: 17

## 2020-02-25 NOTE — PHARMACY-ADMISSION MEDICATION HISTORY
Admission medication history interview status for this patient is complete. See [facility] admission navigator for allergy information, prior to admission medications and immunization status.     Medication history interview source(s):Patient  Medication history resources (including written lists, pill bottles, clinic record): [employer] dispense records      Changes made to PTA medication list:  Added: none  Deleted: none  Changed: none    Actions taken by pharmacist (provider contacted, etc):None     Additional medication history information:None    Medication reconciliation/reorder completed by provider prior to medication history?  Yes     Do you take OTC medications (eg tylenol, ibuprofen, fish oil, eye/ear drops, etc)? No     For patients on insulin therapy: No        Prior to Admission medications    Medication Sig Last Dose Taking? Auth Provider   aspirin 81 MG EC tablet Take 81 mg by mouth daily 2/24/2020 at Unknown time Yes Reported, Patient   atorvastatin (LIPITOR) 40 MG tablet Take 20 mg by mouth At Bedtime 2/24/2020 at Unknown time Yes Unknown, Entered By History   metoprolol succinate ER (TOPROL-XL) 50 MG 24 hr tablet Take 1 tablet (50 mg) by mouth daily 2/24/2020 at Unknown time Yes Tess Leigh MD   multivitamin, therapeutic with minerals (THERA-VIT-M) TABS Take 1 tablet by mouth daily 2/24/2020 at Unknown time Yes Unknown, Entered By History

## 2020-02-25 NOTE — PLAN OF CARE
Pt admitted at 1000 today, VSS pt weaned from 2 LPM NC to RA sating at 97%, A/OX4, denies pain, LS dim, SBA, reg diet, SR w/ BBB on tele, lactic-1.2, BNP-5,301, IV lasix given once, echo done today, pt c/o increasing SOB at 1300 today, sating still at 97%, MD notified, discharge possible in 2-3 days.

## 2020-02-25 NOTE — LETTER
Transition Communication Hand-off for Care Transitions to Next Level of Care Provider    Name: Deshawn COBURN Markos  : 3/8/1933  MRN #: [id number]  Primary Care Provider: Tess Leigh  Primary Care MD Name: Dr Leigh  Primary Clinic: [address] [facility] [address]  Primary Care Clinic [facility]: [facility]  Reason for Hospitalization:  Coronary artery disease involving native coronary artery of native heart without angina pectoris [I25.10]  Pneumonia of right lung due to infectious organism, unspecified part of lung [J18.9]  Dyspnea, unspecified type [R06.00]  Admit Date/Time: 2020  6:37 AM  Discharge Date: 2020  Payor Source: Payor: MEDICARE / Plan: MEDICARE / Product Type: Medicare /     Readmission Assessment Measure (JANE) Risk Score/category: Average       Reason for Communication Hand-off Referral: Admission diagnoses: PN    Discharge Plan: Home with follow up.      Concern for non-adherence with plan of care: No     Discharge Needs Assessment:  Needs      Most Recent Value   Equipment Currently Used at Home  none        Follow-up specialty is recommended: Yes. Follow up with Cardiology as scheduled.     Follow-up plan:    Future Appointments   Date Time Provider Department Center   3/6/2020  1:30 PM [facility]   3/6/2020  2:30 PM Matt Alexander NP [facility]     Any outstanding tests or procedures:  No. Recommend a repeat BMP & CBC in 1 week.       Referrals     Future Labs/Procedures    Discharge Order: F/U with Cardiac  [facility]     Discharge Order: F/U with Cardiac  [facility]     Follow-Up with Cardiologist     CARDIAC REHAB REFERRAL     Process Instructions:    Advance to Wellness and Exercise for Life (WEL) Program or to another maintenance exercise program upon completion of phase 2 cardiac rehab.    Weight mgt program is only offered at [facility].    *This therapy referral will be filtered to a centralized scheduling office at [facility]  and the patient will receive a call to schedule an appointment at a [place] location most convenient for them. *        Comments:    Patient may choose their preference of the site for Cardiac Rehab.            Key Recommendations:  Pt was admitted with PNA and BNP 5301. His EF is 25%. He is being treated with IV lasix, antibiotics and cardiac Med management. He is being followed by Cardiology. He is currently deciding on angiogram vs med management. He is a funny, joking janes and not currently taking cardiac conversation seriously. He states he will make his own follow up appt. Anticipate he will discharge back home with his wife. He has a supportive family. He will need continued education on low Na diet and CHF plan of care.    Discharge recommendations: Follow up with Cardiology as recommended.     Dee England RN    Sent by Eva Grier RN, BSN, CPHN, CM    AVS/Discharge Summary is the source of truth; this is a helpful guide for improved communication of patient story

## 2020-02-25 NOTE — ED NOTES
Bed: ED09  Expected date: 2/25/20  Expected time:   Means of arrival: Ambulance  Comments:  86M- SOB

## 2020-02-25 NOTE — PLAN OF CARE
"/81 (BP Location: Left arm)   Pulse 87   Temp 95.6  F (35.3  C) (Oral)   Resp 20   Ht 1.854 m (6' 1\")   Wt 64.7 kg (142 lb 11.2 oz)   SpO2 98%   BMI 18.83 kg/m      -VSS  -Peeing a lot (had lasix at 1pm)  -Pleasant and funny  -Family in room  -Lung sounds diminished  -No edema  -Alert and oriented  "

## 2020-02-25 NOTE — ED PROVIDER NOTES
History     Chief Complaint:  Shortness of Breath    HPI   Deshawn Burrows is a 86 year old male with a history of coronary artery disease s/p two coronary artery stents placed who presents via EMS for evaluation of shortness of breath. One month ago, the patient woke up in the middle of the night experiencing sharp chest pains. At that time, he saw his primary physician ordered a stress test. The patient had this stress test done one week ago and when it resulted, he scheduled an appointment with his cardiologist in two days - but was told to present to the ED if his chest pain or shortness of breath returns and worsens. Over the last 4-5 days, the patient reports gradually worsening dyspnea. About two hours prior to arrival, he reports the shortness of breath worsened and he was unable to fall back asleep, thus prompting the call to EMS. En route, the paramedics found him to have oxygen saturations at 90% on room air with a blood sugar of 166. Currently, he reports the dyspnea, but he denies any changes with position, as well as a cough. He also denies any chest pain, abdominal pain, back pain, lower extremity swelling, or recent fevers/cold symptoms. He denies any recent medication changes and notes compliance with his baby aspirin dosage; he denies any abnormal bleeding.     From 2/18/2020: NM Lexiscan Stress Test:     The nuclear stress test is abnormal.     There is a large area of nontransmural infarction in the inferior and inferoseptal segment(s) of the left ventricle.     There is nontransmural infarction in the entire apical, anterior and anteroseptal segment(s) of the left ventricle associated with a mild degree of agnieszka-infarct ischemia.     Left ventricular function is severely reduced.     The left ventricular ejection fraction at stress is 21%.     Severe left ventricular enlargement is noted.     There is no prior study for comparison.    Allergies:  Penicillins     Medications:    Baby aspirin  "  Atorvastatin   Metoprolol     Past Medical History:    CAD  Vasculitis  Hyperlipidemia  Hypertension  MI     Past Surgical History:    Two coronary stent placement   Right inguinal hernia repair     Family History:    No past pertinent family history.    Social History:  Marital Status:   [2]  Presents via EMS  Former smoker.   Positive for alcohol use. Comment: Moderate.   Negative for drug use.      Review of Systems   Constitutional: Negative for fever.   HENT: Negative for congestion and sore throat.    Respiratory: Positive for cough and shortness of breath.    Cardiovascular: Negative for chest pain and leg swelling.   Gastrointestinal: Negative for abdominal pain, constipation and diarrhea.   Genitourinary: Negative for difficulty urinating and dysuria.   Musculoskeletal: Negative for back pain.   All other systems reviewed and are negative.        Physical Exam     Patient Vitals for the past 24 hrs:   BP Temp Temp src Pulse Heart Rate Resp SpO2 Height Weight   02/25/20 0830 (!) 139/93 -- -- 89 89 -- 99 % -- --   02/25/20 0715 (!) 145/83 -- -- 91 84 -- 97 % -- --   02/25/20 0643 (!) 155/95 97.1  F (36.2  C) Temporal 94 94 22 92 % 1.854 m (6' 1\") 68 kg (150 lb)      Physical Exam  General: The patient is alert, in no respiratory distress.    HENT: Mucous membranes moist.    Cardiovascular: Regular rate and rhythm. Good pulses in all four extremities. Normal capillary refill and skin turgor.     Respiratory: No nasal flaring. No retractions. No wheezing, no crackles. Frequent cough with some rales. Has nasal cannula in place.     Gastrointestinal: Abdomen soft. No guarding, no rebound. No palpable hernias.     Musculoskeletal: No gross deformity.     Skin: No rashes or petechiae. Pallor.     Neurologic: The patient is alert and oriented x3. GCS 15. No testable cranial nerve deficit. Follows commands with clear and appropriate speech. Gives appropriate answers. Good strength in all extremities. No " gross neurologic deficit. Gross sensation intact. Pupils are round and reactive. No meningismus. Generalized weakness.     Lymphatic: No cervical adenopathy. No lower extremity swelling.    Psychiatric: The patient is non-tearful.    Emergency Department Course   ECG:  Indication: Shortness of Breath   Time: 0644  Vent. Rate 93 bpm. NY interval 182. QRS duration 156. QT/QTc 424/527. P-R-T axis 67 38 176.    Sinus rhythm with frequent PVCs.  Left bundle branch block.  Abnormal ECG. Read time: 0652.    Indication: Repeat   Time: 0802  Vent. Rate 86 bpm. NY interval 182. QRS duration 154. QT/QTc 438/524. P-R-T axis 68 29 179.    Sinus rhythm with occasional PVCs  Left bundle branch block.  Abnormal ECG. Read time: 0804.    Imaging:  Radiographic findings were communicated with the patient who voiced understanding of the findings.  XR Chest 2 views:   New patchy consolidation and interstitial prominence  throughout the right lung. This could represent pneumonia versus an  infectious or inflammatory cause. A few calcified granulomas again  noted bilaterally. Trace pleural fluid bilaterally. Mild left base  atelectasis versus airspace disease, as per radiology.     Laboratory:  CBC: WBC: 9.6, HGB: 14.8, PLT: 232  BMP: Glucose 118 (H), Cl: 110 (H), BUN: 32 (H), o/w WNL (Creatinine: 0.97)  Blood gas venous: All WNL   Magnesium: 2.3   INR: 1.01  0705 Troponin POCT: 0.01  0830 Lactic acid: 1.2   Blood cultures x2: Pending     Procedures:  None    Interventions:  0717 Aspirin, 324 mg, PO  0943 Rocephin, 2 g, IV     Infusing on admission:   Azithromycin, 500 mg, IV infusion     Emergency Department Course:  Nursing notes and vitals reviewed.   0646: I performed an exam of the patient as documented above.      IV was inserted and blood was drawn for laboratory testing, results above. Medicine administered as documented above.   The patient was sent for a chest x-ray while in the emergency department, results above.      0817: I  rechecked the patient and discussed the results of his workup thus far.     0832: I consulted with Dr. Palmer, cardiology, regarding the patient's history and presentation here in the emergency department.     0848: I consulted with Carmel Green PA-C of the hospitalist services. She is in agreement to accept the patient for admission on behalf of Dr. Rico.     Findings and plan explained to the Patient who consents to admission. Discussed the patient with Carmel Green PA-C, who will admit the patient to a medical bed with telemetry for further monitoring, evaluation, and treatment.    I personally reviewed the laboratory and imaging results with the Patient and answered all related questions prior to admission.    Impression & Plan      Medical Decision Making:  Deshawn Burrows is a 86 year old male who presents with worsening shortness of breath and hypoxia which improved with oxygen.  It is concerning with the patient's cardiac history and complaints I considered ACS PE pneumothorax CHF amongst other causes.  Patie.  The originally original EKG had showed some fusion beats which did improve there was nothing to indicate a STEMI by scar by size criteria and he was admitted to hospital for treatment of pneumonia on antibiotics and oxygen.  Nt did report a slight cough and on his chest x-ray there is signs of a right-sided pneumonia.  Think this is likely the cause of his symptoms.  He was started on antibiotics for this.  The patient had had a recent episode of chest pain worked up by his primary doctor which led to a nuclear medicine scan that showed multiple areas of nontransmural infarction there was only a small area of ischemia which I discussed with the on-call cardiologist who did not feel he needed any immediate interventions for.  The patient's screening troponin and EKG were reassuring    Diagnosis:    ICD-10-CM    1. Pneumonia of right lung due to infectious organism, unspecified part of lung  J18.9    2. Coronary artery disease involving native coronary artery of native heart without angina pectoris I25.10    3. Dyspnea, unspecified type R06.00        Disposition:  Admitted to [facility] under the care of Dr. Trisha Pinon Disclosure:  I, Gabby Sanchez, am serving as a scribe on 2/25/2020 at 6:46 AM to personally document services performed by Kendall Bates MD based on my observations and the provider's statements to me.      2/25/2020   [facility] EMERGENCY DEPARTMENT       Kendall Bates MD  02/25/20 2261

## 2020-02-25 NOTE — LETTER
Key Recommendations:  Pt was admitted with PNA and BNP 5301. His EF is 25%. He is being treated with IV lasix, antibiotics and cardiac Med management. He is being followed by Cardiology. He is currently deciding on angiogram vs med management. He is a funny, joking janes and not currently taking cardiac conversation seriously. He states he will make his own follow up appt. Anticipate he will discharge back home with his wife. He has a supportive family. He will need continued education on low Na diet and CHF plan of care.    discharge recommendations:    Dee England RN    AVS/Discharge Summary is the source of truth; this is a helpful guide for improved communication of patient story

## 2020-02-25 NOTE — ED NOTES
"[facility]  ED Nurse Handoff Report    Deshawn Burrows is a 86 year old male   ED Chief complaint: Shortness of Breath  . ED Diagnosis:   Final diagnoses:   Pneumonia of right lung due to infectious organism, unspecified part of lung   Coronary artery disease involving native coronary artery of native heart without angina pectoris   Dyspnea, unspecified type     Allergies:   Allergies   Allergen Reactions     Penicillins        Code Status: Not discussed in the ED  Activity level - Baseline/Home:  Independent. Activity Level - Current:   Has not gotten out of bed in ED. Lift room needed: No. Bariatric: No   Needed: No   Isolation: No. Infection: Not Applicable.     Vital Signs:   Vitals:    02/25/20 0643 02/25/20 0715   BP: (!) 155/95 (!) 145/83   Pulse: 94 91   Resp: 22    Temp: 97.1  F (36.2  C)    TempSrc: Temporal    SpO2: 92% 97%   Weight: 68 kg (150 lb)    Height: 1.854 m (6' 1\")        Cardiac Rhythm:  ,   Cardiac  Cardiac Rhythm: Other (Comment)(Old left bundle branch block)  Pain level: 0-10 Pain Scale: 0  Patient confused: No. Patient Falls Risk: Yes.   Elimination Status: Has not voided in the ED   Patient Report - Initial Complaint:Here for sob started 3-4 days ago, worse 2 hours ago. Denies any chest pain. Ems O2 90% on room air. Ems blood sugar 166. ABCs intact.  . Focused Assessment:  Respiratory - Respiratory WDL:  (c/o sob; oxygen saturation at 92% on room air upon arrival to ED) Breath Sounds: All Fields   Head To Toe Assessment - All Lung Fields Breath Sounds: diminished Cardiac - Capillary Refill, General: less than/equal to 3 secs (skin cool to touch)  Review of Systems (Cardiac) - Cardiovascular Symptoms/Conditions:  (denies any chest pain)  Cardiac Monitoring - EKG Monitoring: Yes  Cardiac Regularity: Regular  Cardiac Rhythm: Other (Comment) (Old left bundle branch block)  Tests Performed: lab, xray  . Abnormal Results:   Labs Ordered and Resulted from Time of ED " Arrival Up to the Time of Departure from the ED   BASIC METABOLIC PANEL - Abnormal; Notable for the following components:       Result Value    Chloride 110 (*)     Glucose 118 (*)     Urea Nitrogen 32 (*)     All other components within normal limits   CBC WITH PLATELETS DIFFERENTIAL   INR   BLOOD GAS VENOUS   MAGNESIUM   LACTIC ACID WHOLE BLOOD   PULSE OXIMETRY NURSING   CARDIAC CONTINUOUS MONITORING   PERIPHERAL IV CATHETER   ISTAT TROPONIN NURSING POCT   PATIENT CARE ORDER   TROPONIN POCT   BLOOD CULTURE   BLOOD CULTURE     XR Chest 2 Views   Preliminary Result   IMPRESSION: New patchy consolidation and interstitial prominence   throughout the right lung. This could represent pneumonia versus an   infectious or inflammatory cause. A few calcified granulomas again   noted bilaterally. Trace pleural fluid bilaterally. Mild left base   atelectasis versus airspace disease.        Treatments provided:   Family Comments: Family at bedside  OBS brochure/video discussed/provided to patient:  N/A  ED Medications:   Medications   cefTRIAXone (ROCEPHIN) 2 g vial to attach to  ml bag for ADULTS or NS 50 ml bag for PEDS (has no administration in time range)   azithromycin (ZITHROMAX) 500 mg in sodium chloride 0.9 % 250 mL intermittent infusion (has no administration in time range)   aspirin (ASA) chewable tablet 324 mg (324 mg Oral Given 2/25/20 0717)     Drips infusing:  No  For the majority of the shift, the patient's behavior Green. Interventions performed were NA.    Sepsis treatment initiated: No       ED Nurse Name/Phone Number: Omega Hopkins RN,   8:38 AM    RECEIVING UNIT ED HANDOFF REVIEW    Above ED Nurse Handoff Report was reviewed: Yes  Reviewed by: Jefe Richard RN on February 25, 2020 at 9:19 AM

## 2020-02-25 NOTE — ED TRIAGE NOTES
Here for sob started 3-4 days ago, worse 2 hours ago. Denies any chest pain. Ems O2 90% on room air. Ems blood sugar 166. ABCs intact.

## 2020-02-25 NOTE — Clinical Note
The first balloon was inserted into the left anterior descending and middle left anterior descending.Max pressure = 14 marshall. Total duration = 30 seconds.

## 2020-02-25 NOTE — Clinical Note
Stent deployed in the middle left anterior descending. Max pressure = 18 marshall. Total duration = 30 seconds.

## 2020-02-26 LAB
ANION GAP SERPL CALCULATED.3IONS-SCNC: 4 MMOL/L (ref 3–14)
BASOPHILS # BLD AUTO: 0 10E9/L (ref 0–0.2)
BASOPHILS NFR BLD AUTO: 0.4 %
BUN SERPL-MCNC: 37 MG/DL (ref 7–30)
CALCIUM SERPL-MCNC: 9.2 MG/DL (ref 8.5–10.1)
CHLORIDE SERPL-SCNC: 108 MMOL/L (ref 94–109)
CO2 SERPL-SCNC: 27 MMOL/L (ref 20–32)
CREAT SERPL-MCNC: 1.11 MG/DL (ref 0.66–1.25)
DIFFERENTIAL METHOD BLD: ABNORMAL
EOSINOPHIL # BLD AUTO: 0.1 10E9/L (ref 0–0.7)
EOSINOPHIL NFR BLD AUTO: 0.6 %
ERYTHROCYTE [DISTWIDTH] IN BLOOD BY AUTOMATED COUNT: 12.9 % (ref 10–15)
GFR SERPL CREATININE-BSD FRML MDRD: 59 ML/MIN/{1.73_M2}
GLUCOSE SERPL-MCNC: 93 MG/DL (ref 70–99)
HCT VFR BLD AUTO: 41.7 % (ref 40–53)
HGB BLD-MCNC: 13.5 G/DL (ref 13.3–17.7)
IMM GRANULOCYTES # BLD: 0 10E9/L (ref 0–0.4)
IMM GRANULOCYTES NFR BLD: 0.4 %
LYMPHOCYTES # BLD AUTO: 1.8 10E9/L (ref 0.8–5.3)
LYMPHOCYTES NFR BLD AUTO: 22.4 %
MCH RBC QN AUTO: 31.6 PG (ref 26.5–33)
MCHC RBC AUTO-ENTMCNC: 32.4 G/DL (ref 31.5–36.5)
MCV RBC AUTO: 98 FL (ref 78–100)
MONOCYTES # BLD AUTO: 0.8 10E9/L (ref 0–1.3)
MONOCYTES NFR BLD AUTO: 9.9 %
NEUTROPHILS # BLD AUTO: 5.3 10E9/L (ref 1.6–8.3)
NEUTROPHILS NFR BLD AUTO: 66.3 %
NRBC # BLD AUTO: 0 10*3/UL
NRBC BLD AUTO-RTO: 0 /100
PLATELET # BLD AUTO: 230 10E9/L (ref 150–450)
POTASSIUM SERPL-SCNC: 3.8 MMOL/L (ref 3.4–5.3)
RBC # BLD AUTO: 4.27 10E12/L (ref 4.4–5.9)
SODIUM SERPL-SCNC: 139 MMOL/L (ref 133–144)
WBC # BLD AUTO: 7.9 10E9/L (ref 4–11)

## 2020-02-26 PROCEDURE — 25000132 ZZH RX MED GY IP 250 OP 250 PS 637: Mod: GY | Performed by: PHYSICIAN ASSISTANT

## 2020-02-26 PROCEDURE — 25000132 ZZH RX MED GY IP 250 OP 250 PS 637: Mod: GY | Performed by: INTERNAL MEDICINE

## 2020-02-26 PROCEDURE — 99233 SBSQ HOSP IP/OBS HIGH 50: CPT | Performed by: INTERNAL MEDICINE

## 2020-02-26 PROCEDURE — 80048 BASIC METABOLIC PNL TOTAL CA: CPT | Performed by: PHYSICIAN ASSISTANT

## 2020-02-26 PROCEDURE — 36415 COLL VENOUS BLD VENIPUNCTURE: CPT | Performed by: PHYSICIAN ASSISTANT

## 2020-02-26 PROCEDURE — 25000128 H RX IP 250 OP 636: Performed by: PHYSICIAN ASSISTANT

## 2020-02-26 PROCEDURE — 85025 COMPLETE CBC W/AUTO DIFF WBC: CPT | Performed by: PHYSICIAN ASSISTANT

## 2020-02-26 PROCEDURE — 12000000 ZZH R&B MED SURG/OB

## 2020-02-26 PROCEDURE — 99222 1ST HOSP IP/OBS MODERATE 55: CPT | Performed by: INTERNAL MEDICINE

## 2020-02-26 PROCEDURE — 25000128 H RX IP 250 OP 636: Performed by: INTERNAL MEDICINE

## 2020-02-26 RX ADMIN — FUROSEMIDE 20 MG: 10 INJECTION, SOLUTION INTRAMUSCULAR; INTRAVENOUS at 19:50

## 2020-02-26 RX ADMIN — AZITHROMYCIN MONOHYDRATE 250 MG: 250 TABLET ORAL at 09:07

## 2020-02-26 RX ADMIN — ATORVASTATIN CALCIUM 20 MG: 20 TABLET, FILM COATED ORAL at 21:57

## 2020-02-26 RX ADMIN — LISINOPRIL 5 MG: 5 TABLET ORAL at 09:07

## 2020-02-26 RX ADMIN — ASPIRIN 81 MG: 81 TABLET, COATED ORAL at 09:06

## 2020-02-26 RX ADMIN — METOPROLOL SUCCINATE 50 MG: 50 TABLET, EXTENDED RELEASE ORAL at 09:07

## 2020-02-26 RX ADMIN — FUROSEMIDE 20 MG: 10 INJECTION, SOLUTION INTRAMUSCULAR; INTRAVENOUS at 09:06

## 2020-02-26 RX ADMIN — CEFTRIAXONE 2 G: 2 INJECTION, POWDER, FOR SOLUTION INTRAMUSCULAR; INTRAVENOUS at 09:06

## 2020-02-26 ASSESSMENT — ACTIVITIES OF DAILY LIVING (ADL)
ADLS_ACUITY_SCORE: 17
ADLS_ACUITY_SCORE: 15
ADLS_ACUITY_SCORE: 17

## 2020-02-26 NOTE — CONSULTS
Care Transition Initial Assessment - RN        Met with: Patient and Family.  DATA   Active Problems:    Dyspnea       Cognitive Status: awake, alert and oriented.  Primary Care Clinic Name: [facility]  Primary Care MD Name: Dr eLigh  Contact information and PCP information verified: Yes  Lives With: spouse      Quality of Family Relationships: helpful, involved, supportive      Insurance concerns: No Insurance issues identified  ASSESSMENT  Patient currently receives the following services:  none        Identified issues/concerns regarding health management: compliance with CHF    RN CC following for elevated BNP and discharge plan. Met with pt, wife and daughter at bedside to discuss plan of care. Pt lives at home with his wife. He does not have any home services. He is independent with activity and cares. We briefly discussed cardiac plan of care and possible PNA. Pt denies having PNA and was joking about not needing anything with his heart. He is deciding on angiogram procedure vs med management and has additional questions for the Cardiologist at this time. He does not want additional education at this time. He states he will make his own follow up appts post discharge. He has multiple family members that are supportive to him and his wife.    PLAN  Patient/family is agreeable to the plan?  Yes  Patient anticipates discharging to home with wife .        Patient anticipates needs for home equipment: No  Transportation/person available to transport on day of discharge  is family.    Plan/Disposition: Home   Appointments: pt will make his own follow up appts      Care  ([facility]) will continue to follow as needed. Handoff will be sent to [facility] for PCP on discharge. Will follow for discharge needs.    Dee England RN BSN CM  Inpatient Care Coordination  [facility]  [phone]

## 2020-02-26 NOTE — PROGRESS NOTES
Brief Cardiology Progress Note:   Received a page that the patient had additional questions regarding possible coronary angiography. Spoke with the patient and his family. All questions were answered. He remains unsure if he would like to go through with the procedure. Instructed him to inform the nurse if he comes to a decision and he should be NPO after midnight if he decides that he would like to proceed. BLUE Aldridge, CNP

## 2020-02-26 NOTE — CONSULTS
[facility]    Cardiology Consultation     Date of Admission: 02/25/2020  Service date: 02/26/2020    Summary:   Mr. Deshawn Burrows is a very pleasant 86 year old male with a history of hypertension, coronary artery disease with previous stent placement , left bundle branch block, and dyslipidemia who was admitted on 2/25/2020 for worsening shortness of breath over the past month. I was asked to see the patient for a new cardiomyopathy.    Assessment & Plan   1. New cardiomyopathy, acute heart failure with reduced ejection fraction    LVEF reduced at 25% on echocardiogram yesterday, previously noted at 45-50% on stress echocardiogram in 02/2017. Suspect ischemic etiology.    NT pro BNP elevated at 5,301 upon admission. CXR showed new patchy consolidation and interstitial prominence throughout the right lung, trace pleural fluid bilaterally.     Diuresing with IV lasix 20 mg BID with improvement in his symptoms. Does not appear significantly fluid overloaded on exam.   2. Coronary artery disease    History of 2 stents placed remotely in the 1990s in [place], [place] per the patient. Details regarding the location of the stent placement are unknown.    Recent episode of transient chest discomfort about 1 month ago with a new LBBB noted on an EKG several days later, concerning for a possible MI.    Lexiscan stress test on 2/18/2020 showing inferior/inferolateral nontransmural infarct and apical/anterior/anteroseptal nontransmural infarct with a small area of agnieszka-infarct ischemia.    Continues on aspirin 81 mg daily, atorvastatin 20 mg once daily, metoprolol succinate 50 mg once daily, and lisinopril 5 mg once daily.  3. Hypertension    Well-controlled on current regimen.    Plan:   1. Agree with continued diuresis with low dose IV Lasix 20 mg twice daily.   2. Likely can transition to oral diuretics tomorrow.   3. Discussed the option of coronary angiography and possible intervention with the  patient and his family and he was willing to proceed.   4. Continue aspirin, statin, beta blocker, and ACE inhibitor as noted above.    Thank you for the opportunity to participate in this pleasant patient's care.     Matt Alexander NP  Text Page  (8am - 5pm, M-F)    Code Status    DNR/DNI    Reason for Consult   Reason for consult: I was asked by Dr. Wright to evaluate this patient for a new cardiomyopathy, acute HFrEF.    Primary Care Physician   Dr. Tess Leigh    Chief Complaint   Shortness of breath    History is obtained from the patient    History of Present Illness   Mr. Deshawn Burrows is a very pleasant 86 year old male with a history of hypertension, dyslipidemia, chronic left bundle branch block, and coronary artery disease with 2 prior stents placed remotely in the 1990s in [place].  He presents to the emergency department at [facility] on 2/25/2020 for acute shortness of breath. He tells me that he is typically quite active and walks 2 miles 3 times a week at the [facility]. He noticed that over the past couple of months he has gone slower than his usual times and has been a bit more winded than usual with his typical walks.     He tells me that he had an episode of sharp, central sternal chest discomfort that woke him from sleep about 2 weeks ago in the middle of the night. The episode lasted a few seconds before resolving spontaneously.  He was seen by his primary care provider, Dr. Leigh,in follow-up several days later on 1/13/2020. An EKG was completed at the visit that showed a new left bundle branch block. A stress echocardiogram was recommended, as well as a referral to cardiology as he had not seen a cardiologist for many years.     A Lexiscan nuclear stress test was completed on 2/18/2020 showing a large area of nontransmural infarction in the inferior and  inferoseptal segments of the left ventricle.  A nontransmural infarction was also noted in the  entire apical, anterior, and anteroseptal segments of the LV associated with a mild degree of agnieszka-infarct ischemia.  The LV function was noted to be severely reduced with an ejection fraction of 21% and severe LV enlargement.    Beyond the single episode of sharp chest discomfort in mid January, he otherwise completely denies any episodes of chest pain or exertional chest discomfort with his usual activities. He has not had palpitations, dizziness, presyncope, or syncope. He has not noticed weight gain, and in fact he noted that he has lost around 15 pounds over the past 6 months to a year. He denies orthopnea, PND, or lower extremity edema. He has not had fever, chills, cough, nausea, or vomiting.    Upon presentation, a troponin level was negative. An NT proBNP was elevated at 5,301. CBC and basic metabolic panel were unremarkable. Chest x-ray showed new patchy consolidation and interstitial prominence throughout the right lung with only trace pleural fluid bilaterally. EKG showed a normal sinus rhythm with left bundle branch block, occasional PVCs, and nonspecific T wave changes. An echocardiogram yesterday showed left ventricular dysfunction with an ejection fraction of 24%. Moderate mitral regurgitation and mild tricuspid regurgitation were noted. The right ventricular systolic pressure was elevated consistent with mild to moderate pulmonary pretension. His ejection fraction had previously been noted at 45 to 50% on a stress echocardiogram in 02/2017.    He has diuresed several doses of IV lasix 20 mg with significant improvement in his symptoms. I discussed with the patient and his family that his presentation is most consistent with acute systolic heart failure, likely ischemic nature. We reviewed his echocardiogram findings. We discussed the option of invasive evaluation and possible intervention with coronary angiography versus continued medical management. Risks and benefits of coronary angiogram  discussed today including, bleeding, bruising, infection, allergic reaction, kidney damage (including need for dialysis), stroke, heart attack, vascular damage, emergency open heart surgery, up to and including death. Patient indicates understanding and is agreeable to proceed. He does not have a history of an allergy to contrast dye, any recent issues with bleeding, or any clear contraindication to dual antiplatelet therapy if this would be indicated.    Past Medical History   I have reviewed this patient's medical history and updated it with pertinent information if needed.   Past Medical History:   Diagnosis Date     CAD (coronary artery disease) 2004    2 cardiac stents      LBBB (left bundle branch block) 2/25/2020     Past Surgical History   I have reviewed this patient's surgical history and updated it with pertinent information if needed.  Past Surgical History:   Procedure Laterality Date     HERNIA REPAIR  2007    right inguinal hernia repair     STENT, CORONARY, ANTIONE  2004    2 stents     Prior to Admission Medications   Prior to Admission Medications   Prescriptions Last Dose Informant Patient Reported? Taking?   aspirin 81 MG EC tablet 2/24/2020 at Unknown time  Yes Yes   Sig: Take 81 mg by mouth daily   atorvastatin (LIPITOR) 40 MG tablet 2/24/2020 at Unknown time  Yes Yes   Sig: Take 20 mg by mouth At Bedtime   metoprolol succinate ER (TOPROL-XL) 50 MG 24 hr tablet 2/24/2020 at Unknown time  No Yes   Sig: Take 1 tablet (50 mg) by mouth daily   multivitamin, therapeutic with minerals (THERA-VIT-M) TABS 2/24/2020 at Unknown time Self Yes Yes   Sig: Take 1 tablet by mouth daily      Facility-Administered Medications: None     Allergies   Allergies   Allergen Reactions     Penicillins      Social History   He is . He lives in a home with his wife in [place]. They have 3 adult daughters, two of whom live in the area. He is active and independent. He walks regularly at the [place] [employer] going for  2 miles 3 times a week. He previously smoked tobacco out of a pipe and quit this over 40 years ago. He drinks alcohol rarely. No other drug use.    Family History   I have reviewed this patient's family history and updated it with pertinent information if needed.   Family History   Family history unknown: Yes     Review of Systems   The 10 point Review of Systems is negative other than noted in the HPI or here.     Physical Exam   Temp: 96.8  F (36  C) Temp src: Oral BP: 129/70   Heart Rate: 89 Resp: 20 SpO2: 96 % O2 Device: None (Room air)    Vital Signs with Ranges  Temp:  [95.4  F (35.2  C)-96.8  F (36  C)] 96.8  F (36  C)  Heart Rate:  [75-89] 89  Resp:  [18-20] 20  BP: (109-133)/(62-81) 129/70  SpO2:  [92 %-98 %] 96 %  142 lbs 11.2 oz    Constitutional: Pleasant, elderly gentleman, well nourished, and in no acute distress.  Family is at the bedside.  Eyes: Pupils equal, round. Sclerae anicteric.   HEENT: Normocephalic, atraumatic.   Neck: Supple. Carotid pulses full and equal. No carotid bruit. JVP appears normal.  Respiratory: Breathing non-labored. Lungs mildly diminished in the bases, but clear to auscultation bilaterally with no crackles or wheezes appreciated.  Cardiovascular: Regular rate and rhythm, normal S1 and S2. No murmur, rub, or gallop.  Radial and dorsalis pedis pulses are 2+ bilaterally.  GI: Soft, non-distended, non-tender, bowel sounds present in all four quadrants.  Skin: Warm, dry.   Musculoskeletal/Extremities: Moves all extremities well and symmetrically.  Lower extremity edema bilaterally.  Neurologic: No gross focal deficits. Alert, and oriented to person, place and time.  Psychiatric: Affect appropriate, bright. Mentation normal.    Data   Results for orders placed or performed during the hospital encounter of 02/25/20 (from the past 24 hour(s))   NT proBNP inpatient and ED   Result Value Ref Range    N-Terminal Pro BNP Inpatient 5,301 (H) 0 - 1,800 pg/mL   D dimer quantitative   Result  Value Ref Range    D Dimer 0.5 0.0 - 0.50 ug/ml FEU   Echocardiogram Complete    Narrative    [id number]  [id number]  [id number]  [id number]^CARLOS^JENNIFER^ARTHUR           [facility]  Echocardiography Laboratory  [address] [place] [address]        Name: JAYDE RAMOS  MRN: [id number]  : 1933  Study Date: 2020 01:53 PM  Age: 86 yrs  Gender: Male  Patient Location: [facility]  Reason For Study: Dyspnea  Ordering Physician: JENNIFER GONZALEZ  Referring Physician: UZIEL VALDEZ  Performed By: Wyatt Lindsey RDCS     BSA: 1.9 m2  Height: 73 in  Weight: 142 lb  HR: 94  BP: 137/67 mmHg  _____________________________________________________________________________  __        Procedure  Complete Portable Echo Adult. Optison (NDC #[id number]) given intravenously.  _____________________________________________________________________________  __        Interpretation Summary     LVEF 24% based on biplane 2D tracing.  There is moderate (2+) mitral regurgitation.  There is mild (1+) tricuspid regurgitation.  Right ventricular systolic pressure is elevated, consistent with mild to  moderate pulmonary hypertension.  EF decerased significnatly compared to prior study from 2017. The study was  technically adequate.  _____________________________________________________________________________  __        Left Ventricle  The left ventricle is moderately dilated. There is moderate eccentric left  ventricular hypertrophy. Left ventricular systolic function is severely  reduced. LVEF 24% based on biplane 2D tracing. Grade II or moderate diastolic  dysfunction. Diastolic Doppler findings (E/E' ratio and/or other parameters)  suggest left ventricular filling pressures are indeterminate. There is severe  global hypokinesia of the left ventricle.     Right Ventricle  The right ventricle is normal size. The right ventricular systolic function is  borderline reduced.     Atria  Normal left atrial size. Right  atrial size is normal. Left to right atrial  shunt, small.     Mitral Valve  The mitral valve leaflets are mildly thickened. There is moderate (2+) mitral  regurgitation.        Tricuspid Valve  There is mild (1+) tricuspid regurgitation. The right ventricular systolic  pressure is approximated at 42.8 mmHg plus the right atrial pressure. Right  ventricular systolic pressure is elevated, consistent with mild to moderate  pulmonary hypertension. IVC diameter <2.1 cm collapsing >50% with sniff  suggests a normal RA pressure of 3 mmHg.     Aortic Valve  There is moderate trileaflet aortic sclerosis. There is mild to moderate (1-  2+) aortic regurgitation. No aortic stenosis is present.     Pulmonic Valve  The pulmonic valve is not well visualized. There is trace pulmonic valvular  regurgitation.     Vessels  The aortic root is normal size. The ascending aorta is Borderline dilated.     Pericardium  There is no pericardial effusion. Small left pleural effusion.        Rhythm  The rhythm was sinus with wide QRS.  _____________________________________________________________________________  __  MMode/2D Measurements & Calculations  IVSd: 1.0 cm     LVIDd: 6.3 cm  LVIDs: 5.9 cm  LVPWd: 0.95 cm  FS: 7.5 %  LV mass(C)d: 264.6 grams  LV mass(C)dI: 142.3 grams/m2  Ao root diam: 3.5 cm  LA dimension: 3.6 cm  asc Aorta Diam: 3.8 cm  LA/Ao: 1.0     EF(MOD-bp): 24.4 %  LA Volume (BP): 55.0 ml  LA Volume Index (BP): 29.6 ml/m2  RWT: 0.30           Doppler Measurements & Calculations  MV E max anthony: 85.1 cm/sec  MV A max anthony: 90.1 cm/sec  MV E/A: 0.95  MV dec time: 0.18 sec  Ao V2 max: 93.8 cm/sec  Ao max P.0 mmHg  AI P1/2t: 348.1 msec  MR ERO: 0.07 cm2  MR volume: 11.8 ml  PA acc time: 0.10 sec  TR max anthony: 326.9 cm/sec  TR max P.8 mmHg  E/E' av.3  Lateral E/e': 9.9  Medial E/e': 32.7              _____________________________________________________________________________  __        Report approved by: Reji Wong,  Nam 02/25/2020 03:09 PM      Basic metabolic panel   Result Value Ref Range    Sodium 139 133 - 144 mmol/L    Potassium 3.8 3.4 - 5.3 mmol/L    Chloride 108 94 - 109 mmol/L    Carbon Dioxide 27 20 - 32 mmol/L    Anion Gap 4 3 - 14 mmol/L    Glucose 93 70 - 99 mg/dL    Urea Nitrogen 37 (H) 7 - 30 mg/dL    Creatinine 1.11 0.66 - 1.25 mg/dL    GFR Estimate 59 (L) >60 mL/min/[1.73_m2]    GFR Estimate If Black 69 >60 mL/min/[1.73_m2]    Calcium 9.2 8.5 - 10.1 mg/dL   CBC with platelets differential   Result Value Ref Range    WBC 7.9 4.0 - 11.0 10e9/L    RBC Count 4.27 (L) 4.4 - 5.9 10e12/L    Hemoglobin 13.5 13.3 - 17.7 g/dL    Hematocrit 41.7 40.0 - 53.0 %    MCV 98 78 - 100 fl    MCH 31.6 26.5 - 33.0 pg    MCHC 32.4 31.5 - 36.5 g/dL    RDW 12.9 10.0 - 15.0 %    Platelet Count 230 150 - 450 10e9/L    Diff Method Automated Method     % Neutrophils 66.3 %    % Lymphocytes 22.4 %    % Monocytes 9.9 %    % Eosinophils 0.6 %    % Basophils 0.4 %    % Immature Granulocytes 0.4 %    Nucleated RBCs 0 0 /100    Absolute Neutrophil 5.3 1.6 - 8.3 10e9/L    Absolute Lymphocytes 1.8 0.8 - 5.3 10e9/L    Absolute Monocytes 0.8 0.0 - 1.3 10e9/L    Absolute Eosinophils 0.1 0.0 - 0.7 10e9/L    Absolute Basophils 0.0 0.0 - 0.2 10e9/L    Abs Immature Granulocytes 0.0 0 - 0.4 10e9/L    Absolute Nucleated RBC 0.0      This note was completed in part using Dragon voice recognition software. Although reviewed after completion, some word and grammatical errors may occur.

## 2020-02-26 NOTE — PROGRESS NOTES
[facility]    Medicine Progress Note - Hospitalist Service       Date of Admission:  2/25/2020  Assessment & Plan   Deshawn Burrows is an 86 year old male with past medical history of hypertension, coronary artery disease, with previous stent placement, left bundle branch block, dyslipidemia, who presented to the emergency room with a complaint of worsening shortness of breath, chest x-ray showed patchy consolidation, placed on supplemental O2 in the ED, started on IV antibiotics, given IV Lasix and admitted to inpatient for further evaluation management.  Overnight patient showed significant improvement, respiratory status also improved.    1.  Acute on chronic systolic congestive heart failure  2.  Possible pneumonia.  3.  Coronary artery disease.  5.  Hypertension  -Patient presented with shortness of breath at rest and with slight activity for weeks.  -There was no fever, cough or wheezing.  No leukocytosis.  -Chest x-ray showed some patchy consolidation versus infiltrate.  -Continue IV diuretics Lasix 20 mg twice a day.  -Monitor daily input and output, weight.  -Echocardiogram showed ejection fraction of 24%.  -Patient also had nuclear stress test on 2/18/2020 which is abnormal.  -Cardiology consulted, patient had an appointment as an outpatient with cardiologist tomorrow.  -BNP is elevated continue  -Continue Rocephin and Zithromax, treat for short course of about 5 days total.  -Follow-up cultures  -Encourage incentive spirometry  -Patient has no current chest pain or anginal symptoms.  -Further recommendation will be per cardiologist.  -Continue metoprolol 50 mg daily, aspirin and Lipitor.     4. Cognitive Impairment  -Patient seem to be at baseline, with slight underlying dementia and forgetfulness.  -No agitation, cooperative, asks if he could go home today.    Diet: Regular Diet Adult    DVT Prophylaxis: Pneumatic Compression Devices  Faustin Catheter: not present  Code Status: DNR/DNI       Disposition Plan   Expected discharge: 2 - 3 days, recommended to prior living arrangement once, if continues to improve.  Entered: Rl Wright MD 02/26/2020, 11:56 AM     I discussed with patient, his wife and 2 daughters at bedside all their question concerns addressed.    Rl Wright MD  Hospitalist Service  [facility]  ____________________________________________________________________    Interval History   Patient seen and examined, assumed care today, H&P reviewed, overall patient is feeling better, asked if he could be discharged.     Data reviewed today: I reviewed all medications, new labs and imaging results over the last 24 hours. I personally reviewed.    Physical Exam   Vital Signs: Temp: 96.8  F (36  C) Temp src: Oral BP: 129/70   Heart Rate: 89 Resp: 20 SpO2: 96 % O2 Device: None (Room air)    Weight: 142 lbs 11.2 oz  General Appearance: Alert and oriented, not in distress, sitting on a chair.  Respiratory: Good air entry bilateral, basal decreased breath sound.  Cardiovascular: S1 and S2 regular, no gallop, positive murmur.  GI: Soft, nontender, nondistended, positive bowel sounds.  Skin: No rash, exanthems or petechia.  Psych: Normal mood and affect, keeps eye contact, responds to question appropriately.     Data   Recent Labs   Lab 02/26/20  0625 02/25/20  0705 02/25/20  0654   WBC 7.9  --  9.6   HGB 13.5  --  14.8   MCV 98  --  98     --  232   INR  --   --  1.01     --  141   POTASSIUM 3.8  --  4.0   CHLORIDE 108  --  110*   CO2 27  --  27   BUN 37*  --  32*   CR 1.11  --  0.97   ANIONGAP 4  --  4   FLORES 9.2  --  9.6   GLC 93  --  118*   TROPONIN  --  0.01  --      Recent Results (from the past 24 hour(s))   Echocardiogram Complete    Narrative    [id number]  [id number]  [id number]  [id number]^CARLOS^JENNIFER^ARTHUR           [facility]  Echocardiography Laboratory  [address] [place] [address]        Name: JAYDE RAMOS  M  MRN: [id number]  : 1933  Study Date: 2020 01:53 PM  Age: 86 yrs  Gender: Male  Patient [facility]: [facility]  Reason For Study: Dyspnea  Ordering Physician: JENNIFER GONZALEZ  Referring Physician: UZIEL VALDEZ  Performed By: Wyatt Lindsey RDCS     BSA: 1.9 m2  Height: 73 in  Weight: 142 lb  HR: 94  BP: 137/67 mmHg  _____________________________________________________________________________  __        Procedure  Complete Portable Echo Adult. Optison (NDC #[id number]) given intravenously.  _____________________________________________________________________________  __        Interpretation Summary     LVEF 24% based on biplane 2D tracing.  There is moderate (2+) mitral regurgitation.  There is mild (1+) tricuspid regurgitation.  Right ventricular systolic pressure is elevated, consistent with mild to  moderate pulmonary hypertension.  EF decerased significnatly compared to prior study from 2017. The study was  technically adequate.  _____________________________________________________________________________  __        Left Ventricle  The left ventricle is moderately dilated. There is moderate eccentric left  ventricular hypertrophy. Left ventricular systolic function is severely  reduced. LVEF 24% based on biplane 2D tracing. Grade II or moderate diastolic  dysfunction. Diastolic Doppler findings (E/E' ratio and/or other parameters)  suggest left ventricular filling pressures are indeterminate. There is severe  global hypokinesia of the left ventricle.     Right Ventricle  The right ventricle is normal size. The right ventricular systolic function is  borderline reduced.     Atria  Normal left atrial size. Right atrial size is normal. Left to right atrial  shunt, small.     Mitral Valve  The mitral valve leaflets are mildly thickened. There is moderate (2+) mitral  regurgitation.        Tricuspid Valve  There is mild (1+) tricuspid regurgitation. The right ventricular systolic  pressure is  approximated at 42.8 mmHg plus the right atrial pressure. Right  ventricular systolic pressure is elevated, consistent with mild to moderate  pulmonary hypertension. IVC diameter <2.1 cm collapsing >50% with sniff  suggests a normal RA pressure of 3 mmHg.     Aortic Valve  There is moderate trileaflet aortic sclerosis. There is mild to moderate (1-  2+) aortic regurgitation. No aortic stenosis is present.     Pulmonic Valve  The pulmonic valve is not well visualized. There is trace pulmonic valvular  regurgitation.     Vessels  The aortic root is normal size. The ascending aorta is Borderline dilated.     Pericardium  There is no pericardial effusion. Small left pleural effusion.        Rhythm  The rhythm was sinus with wide QRS.  _____________________________________________________________________________  __  MMode/2D Measurements & Calculations  IVSd: 1.0 cm     LVIDd: 6.3 cm  LVIDs: 5.9 cm  LVPWd: 0.95 cm  FS: 7.5 %  LV mass(C)d: 264.6 grams  LV mass(C)dI: 142.3 grams/m2  Ao root diam: 3.5 cm  LA dimension: 3.6 cm  asc Aorta Diam: 3.8 cm  LA/Ao: 1.0     EF(MOD-bp): 24.4 %  LA Volume (BP): 55.0 ml  LA Volume Index (BP): 29.6 ml/m2  RWT: 0.30           Doppler Measurements & Calculations  MV E max anthony: 85.1 cm/sec  MV A max anthony: 90.1 cm/sec  MV E/A: 0.95  MV dec time: 0.18 sec  Ao V2 max: 93.8 cm/sec  Ao max P.0 mmHg  AI P1/2t: 348.1 msec  MR ERO: 0.07 cm2  MR volume: 11.8 ml  PA acc time: 0.10 sec  TR max anthony: 326.9 cm/sec  TR max P.8 mmHg  E/E' av.3  Lateral E/e': 9.9  Medial E/e': 32.7              _____________________________________________________________________________  __        Report approved by: Nam Cervantes 2020 03:09 PM        Medications       aspirin  81 mg Oral Daily     atorvastatin  20 mg Oral At Bedtime     azithromycin  250 mg Oral Daily     cefTRIAXone  2 g Intravenous Q24H     furosemide  20 mg Intravenous Q12H     lisinopril  5 mg Oral Daily     metoprolol  succinate ER  50 mg Oral Daily     sodium chloride (PF)  3 mL Intracatheter Q8H

## 2020-02-26 NOTE — PLAN OF CARE
[id number]: Pt resting comfortably. VSS. A&O. denies pain. Refuses bed alarms and assistance despite education about falls, safety, calling for help. AUO. Tele reads SR with BBB. Will continue to monitor.

## 2020-02-27 PROBLEM — I50.21 ACUTE SYSTOLIC HEART FAILURE (H): Status: ACTIVE | Noted: 2020-02-25

## 2020-02-27 PROBLEM — R94.39 ABNORMAL CARDIOVASCULAR STRESS TEST: Status: ACTIVE | Noted: 2020-02-25

## 2020-02-27 PROBLEM — R06.09 DYSPNEA ON EXERTION: Status: ACTIVE | Noted: 2020-02-25

## 2020-02-27 PROBLEM — I25.5 ISCHEMIC CARDIOMYOPATHY: Status: ACTIVE | Noted: 2020-02-25

## 2020-02-27 PROBLEM — I25.10 CORONARY ARTERY DISEASE INVOLVING NATIVE CORONARY ARTERY OF NATIVE HEART WITHOUT ANGINA PECTORIS: Status: ACTIVE | Noted: 2020-02-25

## 2020-02-27 LAB
ANION GAP SERPL CALCULATED.3IONS-SCNC: 5 MMOL/L (ref 3–14)
ANION GAP SERPL CALCULATED.3IONS-SCNC: 5 MMOL/L (ref 3–14)
BUN SERPL-MCNC: 43 MG/DL (ref 7–30)
BUN SERPL-MCNC: 44 MG/DL (ref 7–30)
CA-I BLD-MCNC: 4.6 MG/DL (ref 4.4–5.2)
CALCIUM SERPL-MCNC: 9 MG/DL (ref 8.5–10.1)
CALCIUM SERPL-MCNC: 9.3 MG/DL (ref 8.5–10.1)
CHLORIDE SERPL-SCNC: 103 MMOL/L (ref 94–109)
CHLORIDE SERPL-SCNC: 105 MMOL/L (ref 94–109)
CO2 SERPL-SCNC: 28 MMOL/L (ref 20–32)
CO2 SERPL-SCNC: 28 MMOL/L (ref 20–32)
CREAT SERPL-MCNC: 1.16 MG/DL (ref 0.66–1.25)
CREAT SERPL-MCNC: 1.32 MG/DL (ref 0.66–1.25)
GFR SERPL CREATININE-BSD FRML MDRD: 48 ML/MIN/{1.73_M2}
GFR SERPL CREATININE-BSD FRML MDRD: 56 ML/MIN/{1.73_M2}
GLUCOSE SERPL-MCNC: 83 MG/DL (ref 70–99)
GLUCOSE SERPL-MCNC: 98 MG/DL (ref 70–99)
MAGNESIUM SERPL-MCNC: 2.4 MG/DL (ref 1.6–2.3)
POTASSIUM SERPL-SCNC: 3.9 MMOL/L (ref 3.4–5.3)
POTASSIUM SERPL-SCNC: 4 MMOL/L (ref 3.4–5.3)
SODIUM SERPL-SCNC: 136 MMOL/L (ref 133–144)
SODIUM SERPL-SCNC: 138 MMOL/L (ref 133–144)

## 2020-02-27 PROCEDURE — 36415 COLL VENOUS BLD VENIPUNCTURE: CPT | Performed by: INTERNAL MEDICINE

## 2020-02-27 PROCEDURE — 25000125 ZZHC RX 250: Performed by: INTERNAL MEDICINE

## 2020-02-27 PROCEDURE — 80048 BASIC METABOLIC PNL TOTAL CA: CPT | Performed by: INTERNAL MEDICINE

## 2020-02-27 PROCEDURE — 99232 SBSQ HOSP IP/OBS MODERATE 35: CPT | Performed by: INTERNAL MEDICINE

## 2020-02-27 PROCEDURE — 25000132 ZZH RX MED GY IP 250 OP 250 PS 637: Mod: GY | Performed by: PHYSICIAN ASSISTANT

## 2020-02-27 PROCEDURE — 99233 SBSQ HOSP IP/OBS HIGH 50: CPT | Performed by: INTERNAL MEDICINE

## 2020-02-27 PROCEDURE — 83735 ASSAY OF MAGNESIUM: CPT | Performed by: INTERNAL MEDICINE

## 2020-02-27 PROCEDURE — 82330 ASSAY OF CALCIUM: CPT | Performed by: INTERNAL MEDICINE

## 2020-02-27 PROCEDURE — 99207 ZZC CDG-MDM COMPONENT: MEETS MODERATE - UP CODED: CPT | Performed by: INTERNAL MEDICINE

## 2020-02-27 PROCEDURE — 12000000 ZZH R&B MED SURG/OB

## 2020-02-27 PROCEDURE — 25000132 ZZH RX MED GY IP 250 OP 250 PS 637: Mod: GY | Performed by: INTERNAL MEDICINE

## 2020-02-27 PROCEDURE — 25000128 H RX IP 250 OP 636: Performed by: PHYSICIAN ASSISTANT

## 2020-02-27 RX ORDER — LIDOCAINE 40 MG/G
CREAM TOPICAL
Status: DISCONTINUED | OUTPATIENT
Start: 2020-02-27 | End: 2020-02-28 | Stop reason: HOSPADM

## 2020-02-27 RX ORDER — LORAZEPAM 0.5 MG/1
0.5 TABLET ORAL
Status: CANCELLED | OUTPATIENT
Start: 2020-02-27

## 2020-02-27 RX ORDER — LIDOCAINE HYDROCHLORIDE 20 MG/ML
JELLY TOPICAL ONCE
Status: COMPLETED | OUTPATIENT
Start: 2020-02-27 | End: 2020-02-27

## 2020-02-27 RX ORDER — POTASSIUM CHLORIDE 1500 MG/1
20 TABLET, EXTENDED RELEASE ORAL
Status: COMPLETED | OUTPATIENT
Start: 2020-02-27 | End: 2020-02-28

## 2020-02-27 RX ORDER — LORAZEPAM 2 MG/ML
0.5 INJECTION INTRAMUSCULAR
Status: CANCELLED | OUTPATIENT
Start: 2020-02-27

## 2020-02-27 RX ORDER — SODIUM CHLORIDE 9 MG/ML
INJECTION, SOLUTION INTRAVENOUS CONTINUOUS
Status: DISCONTINUED | OUTPATIENT
Start: 2020-02-27 | End: 2020-02-28 | Stop reason: HOSPADM

## 2020-02-27 RX ADMIN — AZITHROMYCIN MONOHYDRATE 250 MG: 250 TABLET ORAL at 09:56

## 2020-02-27 RX ADMIN — METOPROLOL SUCCINATE 50 MG: 50 TABLET, EXTENDED RELEASE ORAL at 09:56

## 2020-02-27 RX ADMIN — CEFTRIAXONE 2 G: 2 INJECTION, POWDER, FOR SOLUTION INTRAMUSCULAR; INTRAVENOUS at 09:55

## 2020-02-27 RX ADMIN — SENNOSIDES AND DOCUSATE SODIUM 1 TABLET: 8.6; 5 TABLET ORAL at 01:14

## 2020-02-27 RX ADMIN — LIDOCAINE HYDROCHLORIDE: 20 JELLY TOPICAL at 03:21

## 2020-02-27 RX ADMIN — LISINOPRIL 5 MG: 5 TABLET ORAL at 09:56

## 2020-02-27 RX ADMIN — LIDOCAINE HYDROCHLORIDE: 20 JELLY TOPICAL at 01:55

## 2020-02-27 RX ADMIN — ASPIRIN 81 MG: 81 TABLET, COATED ORAL at 09:55

## 2020-02-27 RX ADMIN — ATORVASTATIN CALCIUM 20 MG: 20 TABLET, FILM COATED ORAL at 22:05

## 2020-02-27 ASSESSMENT — ACTIVITIES OF DAILY LIVING (ADL)
ADLS_ACUITY_SCORE: 15
ADLS_ACUITY_SCORE: 15
ADLS_ACUITY_SCORE: 14

## 2020-02-27 NOTE — PLAN OF CARE
"/60 (BP Location: Left arm)   Pulse 90   Temp 95.5  F (35.3  C) (Oral)   Resp 18   Ht 1.854 m (6' 1\")   Wt 64.7 kg (142 lb 11.2 oz)   SpO2 96%   BMI 18.83 kg/m    Situation/Status:CHF Exacerbation/poss PNA- on abx and lasix-held lasix today d/t angio and creatine levels. Plan to do angio tomorrow. NPO at 0000.  Neuro: A/O x 4  Pain: Denied  Activity: Indep  Diet: Cardiac diet, NPO at 0000 for angio.  Tele/Cardiac: NSR, BBB  Lungs: [id number]  GI/: No nausea/vomiting, + Flautus, BS x4. Faustin for retention.  Lines/drains: IV-SL, was on IV Lasix- on hold this am, may transition to oral, and Rocephin IV, Zithromax is po.  Plan: Cardiac following. Pt has angio tomorrow. Continue to monitor per POC.  "

## 2020-02-27 NOTE — PROGRESS NOTES
Per primary RN patient was unable to urinate. Pt was complaining of bladder fullness and discomfort. 14 fr Coude catheter inserted after 2 attempts by writer and 1 attempt by primary RN. Lots of resistance with dif watson insertion. Urology consult couldbe helpful.

## 2020-02-27 NOTE — PROGRESS NOTES
[facility]    Medicine Progress Note - Hospitalist Service       Date of Admission:  2/25/2020  Assessment & Plan   Deshawn Burrows is an 86 year old male with past medical history of hypertension, coronary artery disease, with previous stent placement, left bundle branch block, dyslipidemia, who presented to the emergency room with a complaint of worsening shortness of breath, chest x-ray showed patchy consolidation, placed on supplemental O2 in the ED, started on IV antibiotics, given IV Lasix and admitted to inpatient for further evaluation management.  Overnight patient showed significant improvement, respiratory status also improved.    1.  Acute on chronic systolic congestive heart failure  2.  Cardiomyopathy suspected ischemic  3.  Coronary artery disease  4.   Hypertension  5.   Suspected pneumonia  -Patient presented with shortness of breath at rest and with slight activity for weeks.  -There was no fever, cough or wheezing.  No leukocytosis.  -Chest x-ray showed some patchy consolidation versus infiltrate.  -Patient treated with Rocephin and Zithromax.  We will stop his Rocephin, and continue Zithromax to complete 5 days.  -Cultures remain negative  -Continue IV diuretics Lasix 20 mg twice a day.  -Monitor daily input and output, weight.  -Echocardiogram showed ejection fraction of 24%.  -Patient also had nuclear stress test on 2/18/2020 which is abnormal.  -Cardiology consulted, input appreciated, plan is for coronary angiogram today.  -BNP is elevated continue  -Encourage incentive spirometry  -Patient has no current chest pain or anginal symptoms.  -Continue metoprolol 50 mg daily, aspirin and Lipitor, and diuretics.     4. Cognitive Impairment  -Patient seem to be at baseline, with slight underlying dementia and forgetfulness.  -No agitation, cooperative, asks if he could go home today.    Diet: Regular Diet Adult  NPO for Medical/Clinical Reasons Except for: Meds    DVT Prophylaxis:  Pneumatic Compression Devices  Faustin Catheter: in place, indication: Retention  Code Status: DNR/DNI      Disposition Plan   Expected discharge: 2 - 3 days, recommended to prior living arrangement once, if continues to improve.  Entered: Rl Wright MD 02/27/2020, 12:21 PM     I discussed with patient, his wife and his daughter at bedside, all their question concerns addressed.  Patient stated that he agreed with coronary angiogram and awaiting and n.p.o. for the procedure.     Rl Wright MD  Hospitalist Service  [facility]  ____________________________________________________________________    Interval History   Patient seen and examined, no new overnight issues, no chest pain, no palpitation, shortness of breath significantly improved.     Data reviewed today: I reviewed all medications, new labs and imaging results over the last 24 hours. I personally reviewed.    Physical Exam   Vital Signs: Temp: 95.5  F (35.3  C) Temp src: Oral BP: 123/60 Pulse: 90 Heart Rate: 82 Resp: 18 SpO2: 96 % O2 Device: None (Room air)    Weight: 142 lbs 11.2 oz  General Appearance: Alert and oriented, not in distress, sitting on a chair.  Respiratory: Good air entry bilateral, basal decreased breath sound.  Cardiovascular: S1 and S2 regular, no gallop, positive murmur.  GI: Soft, nontender, nondistended, positive bowel sounds.  Skin: No rash, exanthems or petechia.  Psych: Normal mood and affect, keeps eye contact, responds to question appropriately.     Data   Recent Labs   Lab 02/27/20  0711 02/26/20  0625 02/25/20  0705 02/25/20  0654   WBC  --  7.9  --  9.6   HGB  --  13.5  --  14.8   MCV  --  98  --  98   PLT  --  230  --  232   INR  --   --   --  1.01    139  --  141   POTASSIUM 4.0 3.8  --  4.0   CHLORIDE 103 108  --  110*   CO2 28 27  --  27   BUN 43* 37*  --  32*   CR 1.32* 1.11  --  0.97   ANIONGAP 5 4  --  4   FLORES 9.3 9.2  --  9.6   GLC 83 93  --  118*   TROPONIN  --   --  0.01  --       No results found for this or any previous visit (from the past 24 hour(s)).  Medications     - MEDICATION INSTRUCTIONS -       sodium chloride         aspirin  81 mg Oral Daily     atorvastatin  20 mg Oral At Bedtime     azithromycin  250 mg Oral Daily     cefTRIAXone  2 g Intravenous Q24H     [Held by provider] furosemide  20 mg Intravenous Q12H     lisinopril  5 mg Oral Daily     metoprolol succinate ER  50 mg Oral Daily     sodium chloride (PF)  3 mL Intracatheter Q8H     sodium chloride (PF)  3 mL Intracatheter Q8H

## 2020-02-27 NOTE — PROVIDER NOTIFICATION
Provider text page: Pt. on Lasix- unable to urinate- - may I have a straight cath order PRN? thank you!    Awaiting Response. Continue POC.

## 2020-02-27 NOTE — PLAN OF CARE
"Vitals:BP (!) 145/70 (BP Location: Left arm)   Pulse 90   Temp 98.7  F (37.1  C) (Oral)   Resp 20   Ht 1.854 m (6' 1\")   Wt 64.7 kg (142 lb 11.2 oz)   SpO2 99%   BMI 18.83 kg/m    Situation/Status:CHF Exacerbation/poss PNA- on abx and lasix.  Neuro: A/O x 4  Pain: Denied  Activity: Indep  Diet: Cardiac diet, NPO at 0000 for possible angio.  Tele/Cardiac: NSR, BBB  Lungs: PD-RYD-Snavy  GI/: No nausea/vomiting, + Flautus, BS x4. Pt family reported at 1850 that he is freq voiding but no going much. PM nurse will BS.  Lines/drains: IV-SL, on IV Lasix and Rocephin IV, Zithromax is po.  Plan: Cardiac following. Pt deciding if want to do angio tomorrow. Gave pt education and video. Cardiac MD will be back in am [phone] to answer questions. Will be NPO in case would like to proceed. Continue to monitor per POC.    " Nursing

## 2020-02-27 NOTE — PROGRESS NOTES
[facility]    Cardiology Progress Note    Primary Cardiologist: Dr. Palmer    Date of Admission: 02/25/2020  Service date: 02/27/2020    Summary:  Mr. Deshawn Burrows is a very nice 86 year old male with a history of hypertension, coronary artery disease with previous stent placement, left bundle branch block, and dyslipidemia who was admitted on 2/25/2020 for worsening shortness of breath over the past month. I was asked to see the patient for a new cardiomyopathy.    Assessment & Plan   1. New cardiomyopathy, acute heart failure with reduced ejection fraction    LVEF reduced at 25% on echocardiogram yesterday, previously noted at 45-50% on stress echocardiogram in 02/2017. Suspect ischemic etiology.    Symptoms have resolved following diuresis with IV lasix 20 mg BID. Net -1.6 L out since admission. Appears euvolemic on exam.     His creatinine has increased slightly today to 1.32.  2. Coronary artery disease    History of 2 stents placed remotely in the 1990s in [place], [place] per the patient. Details regarding the location of the stent placement are unknown.    Recent episode of transient chest discomfort about 1 month ago with a new LBBB noted on an EKG several days later, concerning for a possible MI.  He has not had any further episodes of chest pain, but continues to have dyspnea on exertion.    Lexiscan stress test on 2/18/2020 showing inferior/inferolateral nontransmural infarct and apical/anterior/anteroseptal nontransmural infarct with a small area of agnieszka-infarct ischemia.    Continues on aspirin 81 mg daily, atorvastatin 20 mg once daily, metoprolol succinate 50 mg once daily, and lisinopril 5 mg once daily.  3. Hypertension    Well-controlled on current regimen    Plan:   1. Hold Lasix today due to rising creatinine and plan for coronary angiography. Likely transition to PO lasix 40 mg once daily tomorrow.   2. Plan for coronary angiography and possible PCI today.  3. Continue  aspirin, statin, beta blocker, and ACE inhibitor as noted above.  4. Likely discharge in 1-2 days pending his coronary angiogram findings.  5. Will arrange follow up with Cardiology in 1-2 weeks post discharge.      Disposition Plan   Expected discharge in 1-2 days to prior living arrangement pending coronary angiography.        Entered: Matt Powermir 02/27/2020, 8:27 AM     Interval History   Patient reports feeling back to his baseline following diuresis with IV Lasix.  He denies any issues with chest pain, shortness of breath, palpitations, dizziness or lightheadedness overnight.  He did apparently have difficulty voiding and was straight cathed early this morning so he did not get much sleep last night.      We again discussed the optionof coronary angiography. He stated that he thought about it more and is agreeable to proceeding. He has been n.p.o. since midnight. We discussed periprocedural code status and he was agreeable to switching to status to a full code during the procedure and switching his back to DNR/DNI after the procedure is completed. Risks and benefits of the procedure were discussed in detail yesterday with the patient and his family. A consent form was signed.    Telemetry:   NSR with LBBB and occasional PVCs.    Thank you for the opportunity to participate in this pleasant patient's care.     Matt Alexander, NP  Text Page  (8am - 5pm, M-F)    Patient Active Problem List   Diagnosis     Vasculitis (H)     Essential hypertension with goal blood pressure less than 140/90     Hyperlipidemia LDL goal <100     Myocardial infarction (H)     Coronary artery disease involving native heart, angina presence unspecified, unspecified vessel or lesion type     Dyspnea     LBBB (left bundle branch block)     Physical Exam   Temp: 95.5  F (35.3  C) Temp src: Oral BP: 123/60 Pulse: 90 Heart Rate: 82 Resp: 18 SpO2: 96 % O2 Device: None (Room air)    Vitals:    02/25/20 0643 02/25/20 1003   Weight: 68 kg  (150 lb) 64.7 kg (142 lb 11.2 oz)     Vital Signs with Ranges  Temp:  [95  F (35  C)-98.7  F (37.1  C)] 95.5  F (35.3  C)  Pulse:  [90] 90  Heart Rate:  [82-91] 82  Resp:  [18-20] 18  BP: (104-156)/(51-70) 123/60  SpO2:  [96 %-100 %] 96 %  I/O last 3 completed shifts:  In: 240 [P.O.:240]  Out: 1450 [Urine:1450]    Constitutional: Pleasant, elderly gentleman, well nourished, and in no acute distress.  Family is at the bedside.  Eyes: Pupils equal, round. Sclerae anicteric.   HEENT: Normocephalic, atraumatic.   Neck: Supple. Carotid pulses full and equal. No carotid bruit. JVP appears normal.  Respiratory: Breathing non-labored. Lungs mildly diminished in the bases, but clear to auscultation bilaterally with no crackles or wheezes appreciated.  Cardiovascular: Regular rate and rhythm, normal S1 and S2. No murmur, rub, or gallop.  Radial and dorsalis pedis pulses are 2+ bilaterally.  GI: Soft, non-distended, non-tender, bowel sounds present in all four quadrants.  Skin: Warm, dry.   Musculoskeletal/Extremities: Moves all extremities well and symmetrically.  Lower extremity edema bilaterally.  Neurologic: No gross focal deficits. Alert, and oriented to person, place and time.  Psychiatric: Affect appropriate, bright. Mentation normal.     Medications       aspirin  81 mg Oral Daily     atorvastatin  20 mg Oral At Bedtime     azithromycin  250 mg Oral Daily     cefTRIAXone  2 g Intravenous Q24H     furosemide  20 mg Intravenous Q12H     lisinopril  5 mg Oral Daily     metoprolol succinate ER  50 mg Oral Daily     sodium chloride (PF)  3 mL Intracatheter Q8H     Data    Last Comprehensive Metabolic Panel:  Sodium   Date Value Ref Range Status   02/27/2020 136 133 - 144 mmol/L Final     Potassium   Date Value Ref Range Status   02/27/2020 4.0 3.4 - 5.3 mmol/L Final     Chloride   Date Value Ref Range Status   02/27/2020 103 94 - 109 mmol/L Final     Carbon Dioxide   Date Value Ref Range Status   02/27/2020 28 20 - 32  mmol/L Final     Anion Gap   Date Value Ref Range Status   02/27/2020 5 3 - 14 mmol/L Final     Glucose   Date Value Ref Range Status   02/27/2020 83 70 - 99 mg/dL Final     Urea Nitrogen   Date Value Ref Range Status   02/27/2020 43 (H) 7 - 30 mg/dL Final     Creatinine   Date Value Ref Range Status   02/27/2020 1.32 (H) 0.66 - 1.25 mg/dL Final     GFR Estimate   Date Value Ref Range Status   02/27/2020 48 (L) >60 mL/min/[1.73_m2] Final     Comment:     Non  GFR Calc  Starting 12/18/2018, serum creatinine based estimated GFR (eGFR) will be   calculated using the Chronic Kidney Disease Epidemiology Collaboration   (CKD-EPI) equation.       Calcium   Date Value Ref Range Status   02/27/2020 9.3 8.5 - 10.1 mg/dL Final       This note was completed in part using Dragon voice recognition software. Although reviewed after completion, some word and grammatical errors may occur.

## 2020-02-27 NOTE — PLAN OF CARE
"VS /51 (BP Location: Left arm)   Pulse 90   Temp 97.3  F (36.3  C) (Oral)   Resp 20   Ht 1.854 m (6' 1\")   Wt 64.7 kg (142 lb 11.2 oz)   SpO2 96%   BMI 18.83 kg/m    Lung sounds Clear  O2 Room Air  Tele SR w/ BBB  Bowel sounds Active and audible in all quadrants- does complain of the feeling of constipation- 1 senna given.  Tolerating NPO diet  IVF Saline Locked  Dressings None  Tests Bladder scan x2 for 500+mL  CMS Intact  Drains Watson catheter patent and draining.  Activity up with SBA d/t new placement of watson catheter  Pain None  Patient/family centered care Support given and questions answered.  Discharge plan   TBD- Patient has agreed to Angiogram today, 02/27/2020. Patient is forgetful otherwise a&ox4. Patient experiencing retention and inability to urinate- Watson catheter placed- urine returned. Patient slept for the remainder of the night. Plan and discharge tbd after angio today. Continue POC.  "

## 2020-02-28 ENCOUNTER — SURGERY (OUTPATIENT)
Age: 85
End: 2020-02-28
Payer: MEDICARE

## 2020-02-28 LAB
CHOLEST SERPL-MCNC: 89 MG/DL
HDLC SERPL-MCNC: 34 MG/DL
KCT BLD-ACNC: 251 SEC (ref 75–150)
LDLC SERPL CALC-MCNC: 48 MG/DL
NONHDLC SERPL-MCNC: 55 MG/DL
TRIGL SERPL-MCNC: 36 MG/DL

## 2020-02-28 PROCEDURE — 25000132 ZZH RX MED GY IP 250 OP 250 PS 637: Mod: GY | Performed by: PHYSICIAN ASSISTANT

## 2020-02-28 PROCEDURE — B2151ZZ FLUOROSCOPY OF LEFT HEART USING LOW OSMOLAR CONTRAST: ICD-10-PCS | Performed by: INTERNAL MEDICINE

## 2020-02-28 PROCEDURE — 99152 MOD SED SAME PHYS/QHP 5/>YRS: CPT | Performed by: INTERNAL MEDICINE

## 2020-02-28 PROCEDURE — 27210794 ZZH OR GENERAL SUPPLY STERILE: Performed by: INTERNAL MEDICINE

## 2020-02-28 PROCEDURE — 25000132 ZZH RX MED GY IP 250 OP 250 PS 637: Mod: GY | Performed by: INTERNAL MEDICINE

## 2020-02-28 PROCEDURE — 99232 SBSQ HOSP IP/OBS MODERATE 35: CPT | Performed by: INTERNAL MEDICINE

## 2020-02-28 PROCEDURE — 93010 ELECTROCARDIOGRAM REPORT: CPT | Performed by: INTERNAL MEDICINE

## 2020-02-28 PROCEDURE — 93458 L HRT ARTERY/VENTRICLE ANGIO: CPT | Performed by: INTERNAL MEDICINE

## 2020-02-28 PROCEDURE — 36415 COLL VENOUS BLD VENIPUNCTURE: CPT | Performed by: INTERNAL MEDICINE

## 2020-02-28 PROCEDURE — 25000125 ZZHC RX 250: Performed by: INTERNAL MEDICINE

## 2020-02-28 PROCEDURE — B2111ZZ FLUOROSCOPY OF MULTIPLE CORONARY ARTERIES USING LOW OSMOLAR CONTRAST: ICD-10-PCS | Performed by: INTERNAL MEDICINE

## 2020-02-28 PROCEDURE — C1769 GUIDE WIRE: HCPCS | Performed by: INTERNAL MEDICINE

## 2020-02-28 PROCEDURE — 25000125 ZZHC RX 250: Performed by: NURSE PRACTITIONER

## 2020-02-28 PROCEDURE — 93458 L HRT ARTERY/VENTRICLE ANGIO: CPT | Mod: 26 | Performed by: INTERNAL MEDICINE

## 2020-02-28 PROCEDURE — 85347 COAGULATION TIME ACTIVATED: CPT

## 2020-02-28 PROCEDURE — 12000000 ZZH R&B MED SURG/OB

## 2020-02-28 PROCEDURE — 25800030 ZZH RX IP 258 OP 636: Performed by: NURSE PRACTITIONER

## 2020-02-28 PROCEDURE — C1894 INTRO/SHEATH, NON-LASER: HCPCS | Performed by: INTERNAL MEDICINE

## 2020-02-28 PROCEDURE — C9600 PERC DRUG-EL COR STENT SING: HCPCS | Performed by: INTERNAL MEDICINE

## 2020-02-28 PROCEDURE — 25000132 ZZH RX MED GY IP 250 OP 250 PS 637: Mod: GY | Performed by: NURSE PRACTITIONER

## 2020-02-28 PROCEDURE — 92928 PRQ TCAT PLMT NTRAC ST 1 LES: CPT | Mod: LD | Performed by: INTERNAL MEDICINE

## 2020-02-28 PROCEDURE — C1887 CATHETER, GUIDING: HCPCS | Performed by: INTERNAL MEDICINE

## 2020-02-28 PROCEDURE — 25000128 H RX IP 250 OP 636: Performed by: INTERNAL MEDICINE

## 2020-02-28 PROCEDURE — 027034Z DILATION OF CORONARY ARTERY, ONE ARTERY WITH DRUG-ELUTING INTRALUMINAL DEVICE, PERCUTANEOUS APPROACH: ICD-10-PCS | Performed by: INTERNAL MEDICINE

## 2020-02-28 PROCEDURE — C1725 CATH, TRANSLUMIN NON-LASER: HCPCS | Performed by: INTERNAL MEDICINE

## 2020-02-28 PROCEDURE — C1874 STENT, COATED/COV W/DEL SYS: HCPCS | Performed by: INTERNAL MEDICINE

## 2020-02-28 PROCEDURE — 80061 LIPID PANEL: CPT | Performed by: INTERNAL MEDICINE

## 2020-02-28 PROCEDURE — 99153 MOD SED SAME PHYS/QHP EA: CPT | Performed by: INTERNAL MEDICINE

## 2020-02-28 DEVICE — STENT RESOLUTE ONYX DE 2.7FR 2.50X15MM RONYX DE25015UX: Type: IMPLANTABLE DEVICE | Status: FUNCTIONAL

## 2020-02-28 RX ORDER — FENTANYL CITRATE 50 UG/ML
25-50 INJECTION, SOLUTION INTRAMUSCULAR; INTRAVENOUS
Status: ACTIVE | OUTPATIENT
Start: 2020-02-28 | End: 2020-02-29

## 2020-02-28 RX ORDER — HEPARIN SODIUM 1000 [USP'U]/ML
INJECTION, SOLUTION INTRAVENOUS; SUBCUTANEOUS
Status: DISCONTINUED | OUTPATIENT
Start: 2020-02-28 | End: 2020-02-28 | Stop reason: HOSPADM

## 2020-02-28 RX ORDER — NALOXONE HYDROCHLORIDE 0.4 MG/ML
.1-.4 INJECTION, SOLUTION INTRAMUSCULAR; INTRAVENOUS; SUBCUTANEOUS
Status: DISCONTINUED | OUTPATIENT
Start: 2020-02-28 | End: 2020-02-29 | Stop reason: HOSPADM

## 2020-02-28 RX ORDER — NITROGLYCERIN 5 MG/ML
VIAL (ML) INTRAVENOUS
Status: DISCONTINUED
Start: 2020-02-28 | End: 2020-02-28 | Stop reason: HOSPADM

## 2020-02-28 RX ORDER — IOPAMIDOL 755 MG/ML
INJECTION, SOLUTION INTRAVASCULAR
Status: DISCONTINUED | OUTPATIENT
Start: 2020-02-28 | End: 2020-02-28 | Stop reason: HOSPADM

## 2020-02-28 RX ORDER — NITROGLYCERIN 0.4 MG/1
0.4 TABLET SUBLINGUAL EVERY 5 MIN PRN
Status: DISCONTINUED | OUTPATIENT
Start: 2020-02-28 | End: 2020-02-29 | Stop reason: HOSPADM

## 2020-02-28 RX ORDER — SPIRONOLACTONE 25 MG
12.5 TABLET ORAL DAILY
Status: DISCONTINUED | OUTPATIENT
Start: 2020-02-28 | End: 2020-02-29 | Stop reason: HOSPADM

## 2020-02-28 RX ORDER — VERAPAMIL HYDROCHLORIDE 2.5 MG/ML
INJECTION, SOLUTION INTRAVENOUS
Status: DISCONTINUED | OUTPATIENT
Start: 2020-02-28 | End: 2020-02-28 | Stop reason: HOSPADM

## 2020-02-28 RX ORDER — CLOPIDOGREL BISULFATE 75 MG/1
TABLET ORAL
Status: DISCONTINUED | OUTPATIENT
Start: 2020-02-28 | End: 2020-02-28 | Stop reason: HOSPADM

## 2020-02-28 RX ORDER — ACETAMINOPHEN 325 MG/1
650 TABLET ORAL EVERY 4 HOURS PRN
Status: DISCONTINUED | OUTPATIENT
Start: 2020-02-28 | End: 2020-02-29 | Stop reason: HOSPADM

## 2020-02-28 RX ORDER — HEPARIN SODIUM 1000 [USP'U]/ML
INJECTION, SOLUTION INTRAVENOUS; SUBCUTANEOUS
Status: DISCONTINUED
Start: 2020-02-28 | End: 2020-02-28 | Stop reason: HOSPADM

## 2020-02-28 RX ORDER — CLOPIDOGREL BISULFATE 75 MG/1
75 TABLET ORAL DAILY
Status: DISCONTINUED | OUTPATIENT
Start: 2020-02-29 | End: 2020-02-29 | Stop reason: HOSPADM

## 2020-02-28 RX ORDER — NITROGLYCERIN 5 MG/ML
VIAL (ML) INTRAVENOUS
Status: DISCONTINUED | OUTPATIENT
Start: 2020-02-28 | End: 2020-02-28 | Stop reason: HOSPADM

## 2020-02-28 RX ORDER — VERAPAMIL HYDROCHLORIDE 2.5 MG/ML
INJECTION, SOLUTION INTRAVENOUS
Status: DISCONTINUED
Start: 2020-02-28 | End: 2020-02-28 | Stop reason: HOSPADM

## 2020-02-28 RX ORDER — NALOXONE HYDROCHLORIDE 0.4 MG/ML
.2-.4 INJECTION, SOLUTION INTRAMUSCULAR; INTRAVENOUS; SUBCUTANEOUS
Status: ACTIVE | OUTPATIENT
Start: 2020-02-28 | End: 2020-02-29

## 2020-02-28 RX ORDER — FLUMAZENIL 0.1 MG/ML
0.2 INJECTION, SOLUTION INTRAVENOUS
Status: ACTIVE | OUTPATIENT
Start: 2020-02-28 | End: 2020-02-29

## 2020-02-28 RX ORDER — CLOPIDOGREL 300 MG/1
TABLET, FILM COATED ORAL
Status: DISCONTINUED
Start: 2020-02-28 | End: 2020-02-28 | Stop reason: HOSPADM

## 2020-02-28 RX ORDER — FENTANYL CITRATE 50 UG/ML
INJECTION, SOLUTION INTRAMUSCULAR; INTRAVENOUS
Status: DISCONTINUED
Start: 2020-02-28 | End: 2020-02-28 | Stop reason: HOSPADM

## 2020-02-28 RX ORDER — ASPIRIN 81 MG/1
81 TABLET ORAL DAILY
Status: DISCONTINUED | OUTPATIENT
Start: 2020-02-29 | End: 2020-02-29 | Stop reason: HOSPADM

## 2020-02-28 RX ORDER — LIDOCAINE HYDROCHLORIDE 10 MG/ML
INJECTION, SOLUTION EPIDURAL; INFILTRATION; INTRACAUDAL; PERINEURAL
Status: DISCONTINUED
Start: 2020-02-28 | End: 2020-02-28 | Stop reason: HOSPADM

## 2020-02-28 RX ORDER — ATROPINE SULFATE 0.1 MG/ML
0.5 INJECTION INTRAVENOUS EVERY 5 MIN PRN
Status: ACTIVE | OUTPATIENT
Start: 2020-02-28 | End: 2020-02-29

## 2020-02-28 RX ORDER — FENTANYL CITRATE 50 UG/ML
INJECTION, SOLUTION INTRAMUSCULAR; INTRAVENOUS
Status: DISCONTINUED | OUTPATIENT
Start: 2020-02-28 | End: 2020-02-28 | Stop reason: HOSPADM

## 2020-02-28 RX ORDER — SODIUM CHLORIDE 9 MG/ML
INJECTION, SOLUTION INTRAVENOUS CONTINUOUS
Status: ACTIVE | OUTPATIENT
Start: 2020-02-28 | End: 2020-02-28

## 2020-02-28 RX ADMIN — NITROGLYCERIN 200 MCG: 5 INJECTION, SOLUTION INTRAVENOUS at 15:28

## 2020-02-28 RX ADMIN — IOPAMIDOL 145 ML: 755 INJECTION, SOLUTION INTRAVENOUS at 15:43

## 2020-02-28 RX ADMIN — MIDAZOLAM 0.5 MG: 1 INJECTION INTRAMUSCULAR; INTRAVENOUS at 15:07

## 2020-02-28 RX ADMIN — VERAPAMIL HYDROCHLORIDE 2.5 MG: 2.5 INJECTION, SOLUTION INTRAVENOUS at 15:08

## 2020-02-28 RX ADMIN — Medication 12.5 MG: at 14:14

## 2020-02-28 RX ADMIN — HEPARIN SODIUM 1000 UNITS: 1000 INJECTION INTRAVENOUS; SUBCUTANEOUS at 15:20

## 2020-02-28 RX ADMIN — LIDOCAINE HYDROCHLORIDE 1.5 ML: 10 INJECTION, SOLUTION INFILTRATION; PERINEURAL at 15:06

## 2020-02-28 RX ADMIN — MIDAZOLAM 0.5 MG: 1 INJECTION INTRAMUSCULAR; INTRAVENOUS at 15:09

## 2020-02-28 RX ADMIN — SODIUM CHLORIDE: 9 INJECTION, SOLUTION INTRAVENOUS at 06:45

## 2020-02-28 RX ADMIN — METOPROLOL SUCCINATE 50 MG: 50 TABLET, EXTENDED RELEASE ORAL at 09:40

## 2020-02-28 RX ADMIN — AZITHROMYCIN MONOHYDRATE 250 MG: 250 TABLET ORAL at 09:40

## 2020-02-28 RX ADMIN — CLOPIDOGREL BISULFATE 600 MG: 75 TABLET ORAL at 15:21

## 2020-02-28 RX ADMIN — ASPIRIN 81 MG: 81 TABLET, COATED ORAL at 09:39

## 2020-02-28 RX ADMIN — POTASSIUM CHLORIDE 20 MEQ: 1500 TABLET, EXTENDED RELEASE ORAL at 14:14

## 2020-02-28 RX ADMIN — NITROGLYCERIN 200 MCG: 5 INJECTION, SOLUTION INTRAVENOUS at 15:08

## 2020-02-28 RX ADMIN — ATORVASTATIN CALCIUM 20 MG: 20 TABLET, FILM COATED ORAL at 21:25

## 2020-02-28 RX ADMIN — FENTANYL CITRATE 25 MCG: 50 INJECTION, SOLUTION INTRAMUSCULAR; INTRAVENOUS at 15:07

## 2020-02-28 RX ADMIN — FENTANYL CITRATE 25 MCG: 50 INJECTION, SOLUTION INTRAMUSCULAR; INTRAVENOUS at 15:11

## 2020-02-28 RX ADMIN — LISINOPRIL 5 MG: 5 TABLET ORAL at 09:39

## 2020-02-28 RX ADMIN — HEPARIN SODIUM 5000 UNITS: 1000 INJECTION INTRAVENOUS; SUBCUTANEOUS at 15:12

## 2020-02-28 RX ADMIN — SODIUM CHLORIDE: 9 INJECTION, SOLUTION INTRAVENOUS at 13:19

## 2020-02-28 ASSESSMENT — ACTIVITIES OF DAILY LIVING (ADL)
ADLS_ACUITY_SCORE: 14
ADLS_ACUITY_SCORE: 14
BATHING: 0-->INDEPENDENT
TRANSFERRING: 0-->INDEPENDENT
WHICH_OF_THE_ABOVE_FUNCTIONAL_RISKS_HAD_A_RECENT_ONSET_OR_CHANGE?: AMBULATION
FALL_HISTORY_WITHIN_LAST_SIX_MONTHS: NO
SWALLOWING: 0-->SWALLOWS FOODS/LIQUIDS WITHOUT DIFFICULTY
ADLS_ACUITY_SCORE: 13
ADLS_ACUITY_SCORE: 14
TOILETING: 0-->INDEPENDENT
DRESS: 0-->INDEPENDENT
COGNITION: 0 - NO COGNITION ISSUES REPORTED
ADLS_ACUITY_SCORE: 14
AMBULATION: 0-->INDEPENDENT
RETIRED_COMMUNICATION: 0-->UNDERSTANDS/COMMUNICATES WITHOUT DIFFICULTY
ADLS_ACUITY_SCORE: 13
RETIRED_EATING: 0-->INDEPENDENT

## 2020-02-28 NOTE — PROGRESS NOTES
X-cover    Called for 6 beat run of vtach.  Chart reviewed-new systolic HF with EF 25 % and abnormal stress test suspicious for ischemic CM. tele reviewed - VTach noted on a few occasions during brief hospitalizations.     Mag 2.3 yesterday am, K 4.0 this am. Also with SARAY, so no diuretics today.  Plan for angio in am.     Check BMP ICa and mag, replace if low.    Labs ok

## 2020-02-28 NOTE — PROGRESS NOTES
Notified MD Dr. Angeles  at 2259 PM regarding changes seen on Tele .      Spoke with: Web paged Dr. Angeles Patient had 6 beats run V-tach noted on tele.Patient was sleeping and is asymptomatic. Vitals sign stabble.     Orders Awaiting for new orders/acknowlegment. .    Keisha Huertas RN-BSN on 2/27/2020 at 11:02 PM      2304: Obtained new orders for Labs  from Dr. Angeles, plan is to update Dr. Angeles with labs result. Beside report giving to on coming RN, informed of the new orders and to up date MD.     Keisha Huertas RN_BSN on 2/27/2020 at 11:30 PM

## 2020-02-28 NOTE — PLAN OF CARE
Problem: Infection (Pneumonia)  Goal: Resolution of Infection Signs/Symptoms  2/27/2020 2217 by Keisha Huertas RN  Outcome: Improving  Pleasant 87 y/o, who was admitted on 2/25. CXR showed pachy consolidation concerning for pneumonia. Patient was treated with Rocephin and Zithromax. Rocephin d/c'd, plan is to continue with Zithromax, recommended 5 day course. Vitals sign stable, afebrile, on room air.        Problem: Adjustment to Illness (Acute Coronary Syndrome)  Goal: Optimal Adaptation to Illness  Outcome: No Change  IV lasix on hold, monitor I&O, daily wt. ECHO showed EF 24%. Plan for angio in the morning.     Problem: Adult Inpatient Plan of Care  Goal: Plan of Care Review  2/27/2020 2217 by Keisha Huertas RN  Outcome: Improving  Family at bedside, plan of care reviewed with patient and family, all questions answered.     Keisha Huertas RN-BSNon 2/27/2020 at 10:27 PM

## 2020-02-28 NOTE — PROGRESS NOTES
[facility]    Cardiology Progress Note    Primary Cardiologist: Dr. Palmer    Date of Admission: 02/25/2020  Service date: 02/28/2020    Summary:  Mr. Deshawn Burrows is a very nice 86 year old male with a history of hypertension, coronary artery disease with previous stent placement, left bundle branch block, and dyslipidemia who was admitted on 2/25/2020 for worsening shortness of breath over the past month. I was asked to see the patient for a new cardiomyopathy.    Assessment & Plan   1. New cardiomyopathy, acute heart failure with reduced ejection fraction    LVEF reduced at 25% on echocardiogram yesterday, previously noted at 45-50% on stress echocardiogram in 02/2017. Suspect ischemic etiology.    Symptoms have resolved following diuresis with IV lasix 20 mg BID. Net -1.8 L out since admission. Now appears euvolemic on exam.     His creatinine bumped slightly to 1.32 yesterday and improved to 1.16 last night after stopping lasix yesterday.  2. Coronary artery disease    History of 2 stents placed remotely in the 1990s in [place], [place] per the patient. Details regarding the location of the stent placement are unknown.    Recent episode of transient chest discomfort about 1 month ago with a new LBBB noted on an EKG several days later, concerning for a possible MI.      Lexiscan stress test on 2/18/2020 showing inferior/inferolateral nontransmural infarct and apical/anterior/anteroseptal nontransmural infarct with a small area of agnieszka-infarct ischemia.    Remains free of chest pain. Continues on aspirin 81 mg daily, atorvastatin 20 mg once daily, metoprolol succinate 50 mg once daily, and lisinopril 5 mg once daily.  3. Hypertension    Well-controlled on current regimen    Plan:   1. Plan for coronary angiography with possible revascularization today.  2. Continue aspirin, statin, beta blocker, and ACE inhibitor as noted above.  3. Likely discharge in tomorrow pending his coronary angiogram  findings.  4. Will arrange follow up with Cardiology in 1-2 weeks post discharge.      Disposition Plan   Expected discharge in tomorrow to prior living arrangement pending coronary angiography.        Entered: Matt Powermir 02/28/2020, 8:08 AM     Interval History   Patient reports feeling back to his baseline following diuresis with IV Lasix. He denies any issues with chest pain, shortness of breath, palpitations, dizziness or lightheadedness overnight. He did have a couple episodes of NSVT overnight which were asymptomatic.  Labs were drawn showing improvement in his kidney function with a creatinine of 1.16 and stable electrolytes. Reviewed the plan for coronary angiography. He is agreeable to proceed.    Telemetry:   NSR with LBBB and occasional PVCs. Several runs of NSVT overnight with the longest lasting 19 beats.     Thank you for the opportunity to participate in this pleasant patient's care.     Matt Alexander NP  Text Page  (8am - 5pm, M-F)    Patient Active Problem List   Diagnosis     Vasculitis (H)     Essential hypertension with goal blood pressure less than 140/90     Hyperlipidemia LDL goal <100     Myocardial infarction (H)     Coronary artery disease involving native heart, angina presence unspecified, unspecified vessel or lesion type     Dyspnea     LBBB (left bundle branch block)     Physical Exam   Temp: 96.6  F (35.9  C) Temp src: Oral BP: 120/66 Pulse: 86 Heart Rate: 66 Resp: 20 SpO2: 95 % O2 Device: None (Room air)    Vitals:    02/25/20 0643 02/25/20 1003   Weight: 68 kg (150 lb) 64.7 kg (142 lb 11.2 oz)     Vital Signs with Ranges  Temp:  [96.2  F (35.7  C)-97  F (36.1  C)] 96.6  F (35.9  C)  Pulse:  [86] 86  Heart Rate:  [66-76] 66  Resp:  [20] 20  BP: (110-120)/(60-66) 120/66  SpO2:  [95 %-96 %] 95 %  I/O last 3 completed shifts:  In: 600 [P.O.:600]  Out: 825 [Urine:825]    Constitutional: Pleasant elderly gentleman, well nourished, resting comfortably.  Eyes: Pupils equal, round.  Sclerae anicteric.   HEENT: Normocephalic, atraumatic.   Neck: Supple. JVP appears normal.  Respiratory: Breathing non-labored. Lungs mildly diminished in the bases, but clear to auscultation bilaterally with no crackles or wheezes appreciated.  Cardiovascular: Regular rate and rhythm, normal S1 and S2. No murmur, rub, or gallop. Radial and dorsalis pedis pulses are 2+ bilaterally.  Skin: Warm, dry.   Extremities: No lower extremity edema bilaterally.  Neurologic: No gross focal deficits. Alert, cooperative, forgetful.  Psychiatric: Affect appropriate, bright. Mentation normal.     Medications     - MEDICATION INSTRUCTIONS -       sodium chloride 150 mL/hr at 02/28/20 0645       aspirin  81 mg Oral Daily     atorvastatin  20 mg Oral At Bedtime     azithromycin  250 mg Oral Daily     [Held by provider] furosemide  20 mg Intravenous Q12H     lisinopril  5 mg Oral Daily     metoprolol succinate ER  50 mg Oral Daily     sodium chloride (PF)  3 mL Intracatheter Q8H     sodium chloride (PF)  3 mL Intracatheter Q8H     Data    Last Comprehensive Metabolic Panel:  Sodium   Date Value Ref Range Status   02/27/2020 138 133 - 144 mmol/L Final     Potassium   Date Value Ref Range Status   02/27/2020 3.9 3.4 - 5.3 mmol/L Final     Chloride   Date Value Ref Range Status   02/27/2020 105 94 - 109 mmol/L Final     Carbon Dioxide   Date Value Ref Range Status   02/27/2020 28 20 - 32 mmol/L Final     Anion Gap   Date Value Ref Range Status   02/27/2020 5 3 - 14 mmol/L Final     Glucose   Date Value Ref Range Status   02/27/2020 98 70 - 99 mg/dL Final     Urea Nitrogen   Date Value Ref Range Status   02/27/2020 44 (H) 7 - 30 mg/dL Final     Creatinine   Date Value Ref Range Status   02/27/2020 1.16 0.66 - 1.25 mg/dL Final     GFR Estimate   Date Value Ref Range Status   02/27/2020 56 (L) >60 mL/min/[1.73_m2] Final     Comment:     Non  GFR Calc  Starting 12/18/2018, serum creatinine based estimated GFR (eGFR) will be    calculated using the Chronic Kidney Disease Epidemiology Collaboration   (CKD-EPI) equation.       Calcium   Date Value Ref Range Status   02/27/2020 9.0 8.5 - 10.1 mg/dL Final       This note was completed in part using Dragon voice recognition software. Although reviewed after completion, some word and grammatical errors may occur.

## 2020-02-28 NOTE — PLAN OF CARE
Presentation/Diagnosis: Worsening shortness of breath; Acute on chronic systolic CHF; probable pneumonia.  History: HTN, CAD with previous stent placement, left bundle branch block, dyslipidemia  Labs/Protocols: K: 3.9 M.4  Vitals: WNL, on RA. Pt denies any pain.  Cardiac: irregular rhythm, no edema noted.  Telemetry: sinus rhythm with inverted Ts  Respiratory: Lung sounds clear, dim.  Neuro: WNL  GI/: bowel sounds active x4. Faustin catheter in place.  Skin: LLE and RLE luis/pale.   LDAs: Faustin catheter draining appropriately.  Diet: NPO prior to angiogram  Activity: SBA, ambulated in [place].  Teaching: Educated on plan of care and coronary angiogram.   Plan: Continue with antibiotic, spironolactone.

## 2020-02-28 NOTE — PROGRESS NOTES
[facility]    Medicine Progress Note - Hospitalist Service       Date of Admission:  2/25/2020  Assessment & Plan   Deshawn Burrows is an 86 year old male with past medical history of hypertension, coronary artery disease, with previous stent placement, left bundle branch block, dyslipidemia, who presented to the emergency room with a complaint of worsening shortness of breath, chest x-ray showed patchy consolidation, placed on supplemental O2 in the ED, started on IV antibiotics, given IV Lasix and admitted to inpatient for further evaluation management.  Overnight patient showed significant improvement, respiratory status also improved.    1.  Acute on chronic systolic congestive heart failure  2.  Cardiomyopathy suspected ischemic  3.  Coronary artery disease  4.   Hypertension  5.   Suspected pneumonia  -Patient presented with shortness of breath at rest and with slight activity for weeks.  -There was no fever, cough or wheezing.  No leukocytosis.  -Chest x-ray showed some patchy consolidation versus infiltrate.  -Patient treated with Rocephin and Zithromax.  Start Rocephin, and continue Zithromax to complete 5 days.  -Cultures remain negative  -Patient has been on diuretics, now on hold for coronary angiogram, will resume when is okay with cardiology.  -Monitor daily input and output, weight.  -Echocardiogram showed ejection fraction of 24%.  -Patient also had nuclear stress test on 2/18/2020 which is abnormal.  -Cardiology consulted, input appreciated, plan is for coronary angiogram today.  Due to elevated creatinine the plan was for yesterday was canceled.  -Encourage incentive spirometry  -Patient has no chest pain or anginal symptoms.  -Continue metoprolol 50 mg daily, aspirin and Lipitor.  -We will resume diuretics likely in 1 to 2 days post injury if creatinine remains stable    4. Cognitive Impairment  -Patient seem to be at baseline, with slight underlying dementia and  forgetfulness.  -No agitation, cooperative, asks if he could go home today.    Diet: Regular Diet Adult  NPO for Medical/Clinical Reasons Except for: Meds    DVT Prophylaxis: Pneumatic Compression Devices  Faustin Catheter: in place, indication: Retention  Code Status: DNR/DNI      Disposition Plan   Expected discharge: 2 - 3 days, recommended to prior living arrangement once, if continues to improve.  Entered: Rl Wright MD 02/28/2020, 12:45 PM     I discussed with patient, his wife and son at bedside all their question concerns addressed.       Rl Wright MD  Hospitalist Service  [facility]  ____________________________________________________________________    Interval History   Patient seen and examined, no new overnight issues, no chest pain, no palpitation, she is more sluggish to respond to questions this morning, denied any shortness of breath, cough or runny nose.     Data reviewed today: I reviewed all medications, new labs and imaging results over the last 24 hours. I personally reviewed.    Physical Exam   Vital Signs: Temp: 96.6  F (35.9  C) Temp src: Oral BP: (P) 118/65 Pulse: 86 Heart Rate: (P) 73 Resp: 20 SpO2: (P) 99 % O2 Device: (P) None (Room air)    Weight: 142 lbs 11.2 oz  General Appearance: Awake and alert, not in distress, slow to respond to questions today, he is in bed.  Respiratory: Good air entry bilateral, basal decreased breath sound.  Cardiovascular: S1 and S2 regular, no gallop, positive murmur.  GI: Soft, nontender, nondistended, positive bowel sounds.  Skin: No rash, exanthems or petechia.  Psych: Normal mood and affect, keeps eye contact, responds to question appropriately.     Data   Recent Labs   Lab 02/27/20  2323 02/27/20  0711 02/26/20  0625 02/25/20  0705 02/25/20  0654   WBC  --   --  7.9  --  9.6   HGB  --   --  13.5  --  14.8   MCV  --   --  98  --  98   PLT  --   --  230  --  232   INR  --   --   --   --  1.01    136 139  --  141    POTASSIUM 3.9 4.0 3.8  --  4.0   CHLORIDE 105 103 108  --  110*   CO2 28 28 27  --  27   BUN 44* 43* 37*  --  32*   CR 1.16 1.32* 1.11  --  0.97   ANIONGAP 5 5 4  --  4   FLORES 9.0 9.3 9.2  --  9.6   GLC 98 83 93  --  118*   TROPONIN  --   --   --  0.01  --      No results found for this or any previous visit (from the past 24 hour(s)).  Medications     - MEDICATION INSTRUCTIONS -       sodium chloride 150 mL/hr at 02/28/20 0645       aspirin  81 mg Oral Daily     atorvastatin  20 mg Oral At Bedtime     azithromycin  250 mg Oral Daily     [Held by provider] furosemide  20 mg Intravenous Q12H     lisinopril  5 mg Oral Daily     metoprolol succinate ER  50 mg Oral Daily     sodium chloride (PF)  3 mL Intracatheter Q8H     sodium chloride (PF)  3 mL Intracatheter Q8H

## 2020-02-28 NOTE — PRE-PROCEDURE
GENERAL PRE-PROCEDURE:   Procedure:  Left heart cath, left ventriculogram, coronary angiogram possible intervention  Date/Time:  2/28/2020 12:13 PM    Written consent obtained?: Yes    Risks and benefits: Risks, benefits and alternatives were discussed    Consent given by:  Patient  Patient states understanding of procedure being performed: Yes    Patient's understanding of procedure matches consent: Yes    Procedure consent matches procedure scheduled: Yes    Expected level of sedation:  Moderate  Appropriately NPO:  Yes  ASA Class:  Class 3- Severe systemic disease, definite functional limitations  Mallampati  :  Grade 1- soft palate, uvula, tonsillar pillars, and posterior pharyngeal wall visible  Lungs:  Lungs clear with good breath sounds bilaterally  Heart:  Normal heart sounds and rate  History & Physical reviewed:  History and physical reviewed and no updates needed  Statement of review:  I have reviewed the lab findings, diagnostic data, medications, and the plan for sedation

## 2020-02-29 ENCOUNTER — APPOINTMENT (OUTPATIENT)
Dept: PHYSICAL THERAPY | Facility: CLINIC | Age: 85
DRG: 246 | End: 2020-02-29
Attending: INTERNAL MEDICINE
Payer: MEDICARE

## 2020-02-29 VITALS
HEART RATE: 76 BPM | WEIGHT: 142.1 LBS | HEIGHT: 73 IN | SYSTOLIC BLOOD PRESSURE: 116 MMHG | OXYGEN SATURATION: 97 % | TEMPERATURE: 97 F | RESPIRATION RATE: 17 BRPM | BODY MASS INDEX: 18.83 KG/M2 | DIASTOLIC BLOOD PRESSURE: 61 MMHG

## 2020-02-29 LAB
ANION GAP SERPL CALCULATED.3IONS-SCNC: 3 MMOL/L (ref 3–14)
BUN SERPL-MCNC: 29 MG/DL (ref 7–30)
CALCIUM SERPL-MCNC: 8.6 MG/DL (ref 8.5–10.1)
CHLORIDE SERPL-SCNC: 111 MMOL/L (ref 94–109)
CO2 SERPL-SCNC: 26 MMOL/L (ref 20–32)
CREAT SERPL-MCNC: 0.91 MG/DL (ref 0.66–1.25)
ERYTHROCYTE [DISTWIDTH] IN BLOOD BY AUTOMATED COUNT: 12.9 % (ref 10–15)
GFR SERPL CREATININE-BSD FRML MDRD: 76 ML/MIN/{1.73_M2}
GLUCOSE SERPL-MCNC: 84 MG/DL (ref 70–99)
HCT VFR BLD AUTO: 37 % (ref 40–53)
HGB BLD-MCNC: 11.9 G/DL (ref 13.3–17.7)
MCH RBC QN AUTO: 31.7 PG (ref 26.5–33)
MCHC RBC AUTO-ENTMCNC: 32.2 G/DL (ref 31.5–36.5)
MCV RBC AUTO: 99 FL (ref 78–100)
PLATELET # BLD AUTO: 190 10E9/L (ref 150–450)
POTASSIUM SERPL-SCNC: 4.3 MMOL/L (ref 3.4–5.3)
RBC # BLD AUTO: 3.75 10E12/L (ref 4.4–5.9)
SODIUM SERPL-SCNC: 140 MMOL/L (ref 133–144)
WBC # BLD AUTO: 8.3 10E9/L (ref 4–11)

## 2020-02-29 PROCEDURE — 99232 SBSQ HOSP IP/OBS MODERATE 35: CPT | Performed by: INTERNAL MEDICINE

## 2020-02-29 PROCEDURE — 99239 HOSP IP/OBS DSCHRG MGMT >30: CPT | Performed by: INTERNAL MEDICINE

## 2020-02-29 PROCEDURE — 25000132 ZZH RX MED GY IP 250 OP 250 PS 637: Mod: GY | Performed by: INTERNAL MEDICINE

## 2020-02-29 PROCEDURE — 97530 THERAPEUTIC ACTIVITIES: CPT | Mod: GP | Performed by: PHYSICAL THERAPIST

## 2020-02-29 PROCEDURE — 97110 THERAPEUTIC EXERCISES: CPT | Mod: GP | Performed by: PHYSICAL THERAPIST

## 2020-02-29 PROCEDURE — 25000132 ZZH RX MED GY IP 250 OP 250 PS 637: Mod: GY | Performed by: PHYSICIAN ASSISTANT

## 2020-02-29 PROCEDURE — 36415 COLL VENOUS BLD VENIPUNCTURE: CPT | Performed by: INTERNAL MEDICINE

## 2020-02-29 PROCEDURE — 80048 BASIC METABOLIC PNL TOTAL CA: CPT | Performed by: INTERNAL MEDICINE

## 2020-02-29 PROCEDURE — 85027 COMPLETE CBC AUTOMATED: CPT | Performed by: INTERNAL MEDICINE

## 2020-02-29 PROCEDURE — 97161 PT EVAL LOW COMPLEX 20 MIN: CPT | Mod: GP | Performed by: PHYSICAL THERAPIST

## 2020-02-29 RX ORDER — CLOPIDOGREL BISULFATE 75 MG/1
75 TABLET ORAL DAILY
Qty: 30 TABLET | Refills: 11 | Status: SHIPPED | OUTPATIENT
Start: 2020-03-01 | End: 2021-03-02

## 2020-02-29 RX ORDER — SPIRONOLACTONE 25 MG/1
12.5 TABLET ORAL DAILY
Qty: 30 TABLET | Refills: 1 | Status: SHIPPED | OUTPATIENT
Start: 2020-03-01 | End: 2020-03-27

## 2020-02-29 RX ORDER — NITROGLYCERIN 0.4 MG/1
0.4 TABLET SUBLINGUAL EVERY 5 MIN PRN
Qty: 30 TABLET | Refills: 0 | Status: SHIPPED | OUTPATIENT
Start: 2020-02-29 | End: 2022-01-01

## 2020-02-29 RX ORDER — LISINOPRIL 5 MG/1
5 TABLET ORAL DAILY
Qty: 30 TABLET | Refills: 1 | Status: SHIPPED | OUTPATIENT
Start: 2020-03-01 | End: 2020-03-27

## 2020-02-29 RX ADMIN — AZITHROMYCIN MONOHYDRATE 250 MG: 250 TABLET ORAL at 08:37

## 2020-02-29 RX ADMIN — CLOPIDOGREL BISULFATE 75 MG: 75 TABLET ORAL at 08:39

## 2020-02-29 RX ADMIN — ASPIRIN 81 MG: 81 TABLET, COATED ORAL at 08:37

## 2020-02-29 RX ADMIN — METOPROLOL SUCCINATE 50 MG: 50 TABLET, EXTENDED RELEASE ORAL at 08:37

## 2020-02-29 RX ADMIN — LISINOPRIL 5 MG: 5 TABLET ORAL at 08:37

## 2020-02-29 RX ADMIN — Medication 12.5 MG: at 08:37

## 2020-02-29 ASSESSMENT — ACTIVITIES OF DAILY LIVING (ADL)
ADLS_ACUITY_SCORE: 13
ADLS_ACUITY_SCORE: 15

## 2020-02-29 ASSESSMENT — MIFFLIN-ST. JEOR: SCORE: 1378.44

## 2020-02-29 NOTE — CONSULTS
"CLINICAL NUTRITION SERVICES  -  ASSESSMENT NOTE    Recommendations Ordered by Registered Dietitian (RD):   Diet per MD  Ordered Boost Plus (chocolate) TID    Malnutrition:   % Weight Loss: not indicated as pt was trying to loose weight intentionally   % Intake:  </= 50% for >/= 1 month (severe malnutrition)  Subcutaneous Fat Loss:  Orbital region moderate depletion, Upper arm region moderate depletion and Thoracic region moderate depletion  Muscle Loss:  Temporal region moderate depletion, Clavicle bone region severe depletion, Acromion bone region moderate-severe depletion, Scapular bone region moderate depletion, Anterior thigh region moderate-severe depletion and Posterior calf region moderate-severe depletion --> suspect some related to aging?   Fluid Retention:  None noted    Malnutrition Diagnosis: Severe malnutrition  In Context of:  Acute illness or injury  Chronic illness or disease      REASON FOR ASSESSMENT  Deshawn Burrows is a 86 year old male seen by Registered Dietitian for Provider Order - Nutrition Education - Dietitian to see for Heart Healthy Diet education    NUTRITION HISTORY  - Information obtained from patient and chart   - Pt admitted for dyspnea   - History of hypertension and coronary artery disease  - Prior to admission pt states that his appetite is \"not the greatest\", never really has much of an appetite at baseline. Typically consumes 3 \"meals\" per day.   - Breakfast: breakfast bar or pastry. Lunch: potato chips and milk. Dinner: fast food  - Pt mentions that his wife has been sick, therefore they have been eating out more frequently lately.   - Does not endorse consuming oral nutritional supplements at home.   - Allergies: NKFA    CURRENT NUTRITION ORDERS  Diet Order:     Regular     Current Intake/Tolerance:  Per the flowsheets, pt is consuming 100% of meals. Ordering 1-2 meals per day per the meal ordering system.     NUTRITION FOCUSED PHYSICAL ASSESSMENT FOR DIAGNOSING " "MALNUTRITION)  Yes              Observed:    Muscle wasting (refer to documentation in Malnutrition section) and Subcutaneous fat loss (refer to documentation in Malnutrition section)    Obtained from Chart/Interdisciplinary Team:  - Last BM not recorded in the I/O     ANTHROPOMETRICS  Height: 6' 1\"  Weight: 64.5 kg ( 142 lbs 1.6 oz)   Body mass index is 18.75 kg/m .  Weight Status:  Normal BMI - low end of normal range   Weight History: weight is down 7.5 kg (17 lbs) in the past ~7 months. This equates to a weight loss of 10%. Pt states that he has been trying to loose weight, previously at 160 lbs and is currently at 141 lbs per his report. To do this the pt is resorting to not eating as much, he has been doing this for about 6 months.   Wt Readings from Last 10 Encounters:   02/29/20 64.5 kg (142 lb 1.6 oz)   02/11/20 66.2 kg (145 lb 14.4 oz)   01/13/20 67.4 kg (148 lb 9.6 oz)   07/17/19 72 kg (158 lb 11.2 oz)   07/20/18 69.9 kg (154 lb 3.2 oz)   05/15/18 69.4 kg (153 lb)   09/26/17 71.2 kg (157 lb)   09/19/17 71.4 kg (157 lb 4.8 oz)   02/14/17 71.8 kg (158 lb 6.4 oz)   02/07/17 71.7 kg (158 lb)     LABS  Labs reviewed      Electrolytes  Potassium (mmol/L)   Date Value   02/29/2020 4.3   02/27/2020 3.9   02/27/2020 4.0    Blood Glucose  Glucose (mg/dL)   Date Value   02/29/2020 84   02/27/2020 98   02/27/2020 83   02/26/2020 93   02/25/2020 118 (H)    Inflammatory Markers  CRP Inflammation (mg/L)   Date Value   05/14/2016 3.5     WBC (10e9/L)   Date Value   02/29/2020 8.3   02/26/2020 7.9   02/25/2020 9.6     Albumin (g/dL)   Date Value   07/17/2019 4.0   04/27/2018 4.3   02/14/2017 4.3      Magnesium (mg/dL)   Date Value   02/27/2020 2.4 (H)   02/25/2020 2.3     Sodium (mmol/L)   Date Value   02/29/2020 140   02/27/2020 138   02/27/2020 136    Renal  Urea Nitrogen (mg/dL)   Date Value   02/29/2020 29   02/27/2020 44 (H)   02/27/2020 43 (H)     Creatinine (mg/dL)   Date Value   02/29/2020 0.91   02/27/2020 1.16 "   02/27/2020 1.32 (H)     Additional  Triglycerides (mg/dL)   Date Value   02/28/2020 36   07/17/2019 179 (H)   04/27/2018 89     Ketones Urine (mg/dL)   Date Value   05/16/2016 Negative        MEDICATIONS  Medications reviewed      aspirin  81 mg Oral Daily     atorvastatin  20 mg Oral At Bedtime     clopidogrel  75 mg Oral Daily     lisinopril  5 mg Oral Daily     metoprolol succinate ER  50 mg Oral Daily     sodium chloride (PF)  3 mL Intracatheter Q8H     spironolactone  12.5 mg Oral Daily        Continuing ACE inhibitor/ARB/ARNI from home medication list OR ACE inhibitor/ARB order already placed during this visit       - MEDICATION INSTRUCTIONS -       - MEDICATION INSTRUCTIONS -       Percutaneous Coronary Intervention orders placed (this is information for BPA alerting)        acetaminophen, albuterol, Continuing ACE inhibitor/ARB/ARNI from home medication list OR ACE inhibitor/ARB order already placed during this visit, - MEDICATION INSTRUCTIONS -, - MEDICATION INSTRUCTIONS -, lidocaine 4%, lidocaine (buffered or not buffered), magnesium hydroxide, melatonin, naloxone, naloxone, nitroGLYcerin, ondansetron **OR** ondansetron, Percutaneous Coronary Intervention orders placed (this is information for BPA alerting), senna-docusate **OR** senna-docusate, sodium chloride (PF)     ASSESSED NUTRITION NEEDS PER APPROVED PRACTICE GUIDELINES:    Dosing Weight 64.5 kg  Estimated Energy Needs: 8179-3681 kcals (30-35 Kcal/Kg)  Justification: borderline underweight  Estimated Protein Needs: 77-97+ grams protein (1.2-1.5+ g pro/Kg)  Justification: preservation of lean body mass, advanced age and borderline underweight   Estimated Fluid Needs: per MD     MALNUTRITION:  % Weight Loss: not indicated as pt was trying to loose weight intentionally   % Intake:  </= 50% for >/= 1 month (severe malnutrition)  Subcutaneous Fat Loss:  Orbital region moderate depletion, Upper arm region moderate depletion and Thoracic region moderate  depletion  Muscle Loss:  Temporal region moderate depletion, Clavicle bone region severe depletion, Acromion bone region moderate-severe depletion, Scapular bone region moderate depletion, Anterior thigh region moderate-severe depletion and Posterior calf region moderate-severe depletion --> suspect some related to aging?   Fluid Retention:  None noted    Malnutrition Diagnosis: Severe malnutrition  In Context of:  Acute illness or injury  Chronic illness or disease    NUTRITION DIAGNOSIS:  Inadequate oral intake related to poor appetite at baseline as evidenced by pt likely consuming <50% of nutritional needs, coding for malnutrition based on fat/muscle loss.     NUTRITION INTERVENTIONS  Recommendations / Nutrition Prescription  Diet per MD  Ordered Boost Plus (chocolate) TID     Implementation  Nutrition education:     Assessed learning needs, learning preferences, and willingness to learn    Nutrition Education (Content):  a) Provided handout on heart healthy diet per the nutrition care manual   b) Discussed briefly label reading, eating more fruits and vegetables but spent the majority of the time on including more protein in the diet and reducing sodium content of foods. Mentioned meals on wheels as an option for meal delivery.     Nutrition Education (Application):  a) Discussed eating habits and recommended alternative food choices    Patient verbalizes understanding of diet by engaging in conversation and asking questions     Anticipate good compliance    Diet Education - refer to Education Flowsheet    Medical Food Supplement: as above     Nutrition Goals  Pt to consume >/=75% of meals ordered TID     MONITORING AND EVALUATION:  Progress towards goals will be monitored and evaluated per protocol and Practice Guidelines    Pat Moser RD, LD  Clinical Dietitian

## 2020-02-29 NOTE — PROGRESS NOTES
[facility]    Cardiology Progress Note    Date of Service (when I saw the patient): 02/29/2020            Assessment and Plan:   1. New cardiomyopathy, acute heart failure with reduced ejection fraction    LVEF reduced at 25% on echocardiogram yesterday, previously noted at 45-50% on stress echocardiogram in 02/2017.     Symptoms have resolved following diuresis with IV lasix 20 mg BID. Net -1.8 L out since admission. Now appears euvolemic on exam.     His creatinine bumped slightly; improved with holding diuresis.  Filling pressures normal on cath yesterday    S/P PCI of LAD yesterday (see below).  Degree of cardiomyopathy out of proportion to degree of CAD  2. Coronary artery disease    History of 2 stents placed remotely in the 1990s in [place], [place] per the patient. Details regarding the location of the stent placement are unknown.    Coronary angiogram yesterday demonstrated 75% mid LAD in stenosis just beyond prior LAD stent, PCI with ROSENDO x1.  Nonobstructive disease elsewhere.    Remains free of chest pain. Continues on aspirin 81 mg daily, atorvastatin 20 mg once daily, metoprolol succinate 50 mg once daily, and lisinopril 5 mg once daily.  3. Hypertension    Well-controlled on current regimen     Plan:   1. Continue ASA indefinitely, plavix one year.   2. Continue statin, lisinopril, toprol XL.  Spironlactone 12.5 mg daily.  LVEDP normal on cath and SARAY with diuresis this admission-will not discharge on additional diuretic.    3. Okay to discharge from a cardiac standpoint.  Follow up with cardiology in 1-2 weeks for medication titration.  Consider cardiac MRI in the outpatient setting to further assess LV dysfunction out of proportion to degree of CAD.      Kiki Cruz MD [facility]  Cardiology            Interval History:     No new issues overnight.  Feels well.  Denies chest pain, shortness of breath.  Anxious to go home today.           Medications:       aspirin  81 mg Oral Daily      "atorvastatin  20 mg Oral At Bedtime     clopidogrel  75 mg Oral Daily     lisinopril  5 mg Oral Daily     metoprolol succinate ER  50 mg Oral Daily     sodium chloride (PF)  3 mL Intracatheter Q8H     spironolactone  12.5 mg Oral Daily            Physical Exam:   Blood pressure 116/61, pulse 76, temperature 97  F (36.1  C), temperature source Oral, resp. rate 17, height 1.854 m (6' 1\"), weight 64.5 kg (142 lb 1.6 oz), SpO2 97 %.  Vitals:    20 0643 20 1003 20 0635   Weight: 68 kg (150 lb) 64.7 kg (142 lb 11.2 oz) 64.5 kg (142 lb 1.6 oz)       Intake/Output Summary (Last 24 hours) at 2020 1115  Last data filed at 2020 1034  Gross per 24 hour   Intake 1191 ml   Output 1475 ml   Net -284 ml           Vital Sign Ranges  Temperature Temp  Av.4  F (35.8  C)  Min: 95.4  F (35.2  C)  Max: 97  F (36.1  C)   Blood pressure Systolic (24hrs), Av , Min:101 , Max:129        Diastolic (24hrs), Av, Min:52, Max:65      Pulse Pulse  Av  Min: 75  Max: 85   Respirations Resp  Av.8  Min: 17  Max: 18   Pulse oximetry SpO2  Av.8 %  Min: 95 %  Max: 98 %         EXAM:    Constitutional:    in no apparent distress    Skin:    normal    Head:    Normocephalic, atramatic    Eyes:    pupils equal, round and reactive to light, extra ocular muscles intact, sclera clear, conjunctiva normal    Neck:    No JVD   Lungs:    CTAB   Cardiovascular:    S1, S2 RRR no m/r/g, no JVD   Abdomen:    normal bowel sounds    Extremities and Back:    no cyanosis or clubbing and No edema bilaterally   Neurological:    No gross or focal neurologic abnormalities               Data:     Recent Labs   Lab Test 20  0739 20  0625 20  0654   WBC 8.3 7.9 9.6   HGB 11.9* 13.5 14.8   MCV 99 98 98    230 232   INR  --   --  1.01      Recent Labs   Lab Test 20  0739 20  2323    138   POTASSIUM 4.3 3.9   CHLORIDE 111* 105   BUN 29 44*   CR 0.91 1.16     Recent Labs   Lab " 02/29/20  0739 02/27/20  2323 02/27/20  0711 02/26/20  0625   GLC 84 98 83 93     Recent Labs   Lab Test 07/17/19  1412 04/27/18  0931   ALT 20 19   AST 18 18     Troponin I ES   Date Value Ref Range Status   05/15/2018 0.036 0.000 - 0.045 ug/L Final     Comment:     The 99th percentile for upper reference range is 0.045 ug/L.  Troponin values   in the range of 0.045 - 0.120 ug/L may be associated with risks of adverse   clinical events.           Recent Labs   Lab Test 02/27/20  2323 02/25/20  0654   MAG 2.4* 2.3       Lab Results   Component Value Date    CHOL 89 02/28/2020     Lab Results   Component Value Date    HDL 34 02/28/2020     Lab Results   Component Value Date    LDL 48 02/28/2020     Lab Results   Component Value Date    TRIG 36 02/28/2020     No results found for: CHOLHDLRATIO       TSH   Date Value Ref Range Status   05/15/2018 2.67 0.40 - 4.00 mU/L Final         Kiki Cruz MD, FACC  Cardiology

## 2020-02-29 NOTE — PLAN OF CARE
A&O x4. VSS on RA. Tele. SR w/ BBB, ST depression and inverted T waves. SBA. PIV SL. PO zithromax. LS clear. L arm site; WDL. CMS intact. Denies CP, SOB and N/V. Ramin patent. Nutrition and Cardiology following. Plan to discharge in 1-2 days. Continue to monitor.

## 2020-02-29 NOTE — PLAN OF CARE
A/O x 4, flirtatious (boundaries set by writer). VSS on RA. Denied pain. Tele SR w/ BBB / invert T's / ST depression, denied CP . LS clear, no SOB reported. PIV to right intact, SL . Up SBA GB, ambulated halls w/ writer w/o complications. Faustin removed per orders w/o complications. CMS to LUE intact, band-aid in place over incision site. Adequate for discharge today. Will continue POC.

## 2020-02-29 NOTE — DISCHARGE SUMMARY
AVS reviewed with pt and wife. All questions answered. Pt denies any further questions or concerns. PIV removed, no complications. Telemetry monitor removed. All belongings returned including four RX medications in pt personal belongings bag. Pt escorted to front door via WC by [place] staff to be transported home via family .

## 2020-02-29 NOTE — PLAN OF CARE
PT: Cardiac Rehab Eval completed. Pt admitted due to worsening shortness of breath. Pt with history of HTN, CAD, dyslipidemia, L bundle branch block, and previous stent placement.  Pt underwent angiogram with stent placement 2/28. At baseline pt is indep with all ADLs and IADLs. Pt ambulates 3 miles 3x/week.         Discharge Planner PT   Patient plan for discharge: home  Current status: Pt was educated on Cardiac Rehab, exercises precautions, signs and symptoms of exercise intolerance, modifiable risk factors, and Omni Scale. Pt was educated on how to slowly return to exercise, with goal of 30 mins of ambulation without sxs before increasing intensity.  Pt was able to ambulate for 5 minutes with no signs of exercise intolerance. BP stable. PT recommends to discuss with Cardiologist re: OP Cardiac Rehab to progress exercise.  Barriers to return to prior living situation: none  Recommendations for discharge: home with possible need for OP Cardiac rehab pending Cardiologist  Rationale for recommendations: Pt very active prior to admit and likely able to progress back to exericise well, met IP PT/cardiac goals.     Physical Therapy Discharge Summary     Reason for therapy discharge:    All goals and outcomes met, no further needs identified.     Progress towards therapy goal(s). See goals on Care Plan in [facility] electronic health record for goal details.  Goals met     Therapy recommendation(s):    Continued therapy is recommended.  Rationale/Recommendations:  see above.

## 2020-02-29 NOTE — PLAN OF CARE
Patient had angio with stent placement today, TR band off, site- intact. Patient denied chest pain or discomfort.Up to BR with SBA, sit in chair, tolerated activities well. Continue to monitor.

## 2020-03-01 ENCOUNTER — ANESTHESIA (OUTPATIENT)
Dept: SURGERY | Facility: CLINIC | Age: 85
DRG: 291 | End: 2020-03-01
Payer: MEDICARE

## 2020-03-01 ENCOUNTER — ANESTHESIA EVENT (OUTPATIENT)
Dept: SURGERY | Facility: CLINIC | Age: 85
DRG: 291 | End: 2020-03-01
Payer: MEDICARE

## 2020-03-01 ENCOUNTER — HOSPITAL ENCOUNTER (INPATIENT)
Facility: CLINIC | Age: 85
LOS: 3 days | Discharge: HOME HEALTH | DRG: 291 | End: 2020-03-04
Attending: EMERGENCY MEDICINE | Admitting: INTERNAL MEDICINE
Payer: MEDICARE

## 2020-03-01 ENCOUNTER — APPOINTMENT (OUTPATIENT)
Dept: CT IMAGING | Facility: CLINIC | Age: 85
DRG: 291 | End: 2020-03-01
Attending: EMERGENCY MEDICINE
Payer: MEDICARE

## 2020-03-01 DIAGNOSIS — R33.9 URINARY RETENTION: ICD-10-CM

## 2020-03-01 DIAGNOSIS — I50.21 ACUTE SYSTOLIC CONGESTIVE HEART FAILURE (H): Primary | ICD-10-CM

## 2020-03-01 DIAGNOSIS — I50.9 CONGESTIVE HEART FAILURE, UNSPECIFIED HF CHRONICITY, UNSPECIFIED HEART FAILURE TYPE (H): ICD-10-CM

## 2020-03-01 DIAGNOSIS — R06.00 DYSPNEA, UNSPECIFIED TYPE: ICD-10-CM

## 2020-03-01 DIAGNOSIS — R79.89 ELEVATED TROPONIN: ICD-10-CM

## 2020-03-01 DIAGNOSIS — J43.9 PULMONARY EMPHYSEMA, UNSPECIFIED EMPHYSEMA TYPE (H): ICD-10-CM

## 2020-03-01 LAB
ALBUMIN SERPL-MCNC: 4.1 G/DL (ref 3.4–5)
ALP SERPL-CCNC: 76 U/L (ref 40–150)
ALT SERPL W P-5'-P-CCNC: 49 U/L (ref 0–70)
ANION GAP SERPL CALCULATED.3IONS-SCNC: 4 MMOL/L (ref 3–14)
AST SERPL W P-5'-P-CCNC: 39 U/L (ref 0–45)
BASOPHILS # BLD AUTO: 0 10E9/L (ref 0–0.2)
BASOPHILS NFR BLD AUTO: 0.5 %
BILIRUB SERPL-MCNC: 0.6 MG/DL (ref 0.2–1.3)
BUN SERPL-MCNC: 24 MG/DL (ref 7–30)
CALCIUM SERPL-MCNC: 9.5 MG/DL (ref 8.5–10.1)
CHLORIDE SERPL-SCNC: 108 MMOL/L (ref 94–109)
CO2 SERPL-SCNC: 27 MMOL/L (ref 20–32)
CREAT SERPL-MCNC: 0.95 MG/DL (ref 0.66–1.25)
D DIMER PPP FEU-MCNC: 0.6 UG/ML FEU (ref 0–0.5)
DIFFERENTIAL METHOD BLD: ABNORMAL
EOSINOPHIL # BLD AUTO: 0.1 10E9/L (ref 0–0.7)
EOSINOPHIL NFR BLD AUTO: 1.4 %
ERYTHROCYTE [DISTWIDTH] IN BLOOD BY AUTOMATED COUNT: 13.1 % (ref 10–15)
GFR SERPL CREATININE-BSD FRML MDRD: 72 ML/MIN/{1.73_M2}
GLUCOSE SERPL-MCNC: 113 MG/DL (ref 70–99)
HCT VFR BLD AUTO: 40.9 % (ref 40–53)
HGB BLD-MCNC: 13 G/DL (ref 13.3–17.7)
IMM GRANULOCYTES # BLD: 0 10E9/L (ref 0–0.4)
IMM GRANULOCYTES NFR BLD: 0.2 %
LYMPHOCYTES # BLD AUTO: 1.5 10E9/L (ref 0.8–5.3)
LYMPHOCYTES NFR BLD AUTO: 16.9 %
MCH RBC QN AUTO: 31 PG (ref 26.5–33)
MCHC RBC AUTO-ENTMCNC: 31.8 G/DL (ref 31.5–36.5)
MCV RBC AUTO: 98 FL (ref 78–100)
MONOCYTES # BLD AUTO: 0.9 10E9/L (ref 0–1.3)
MONOCYTES NFR BLD AUTO: 9.8 %
NEUTROPHILS # BLD AUTO: 6.2 10E9/L (ref 1.6–8.3)
NEUTROPHILS NFR BLD AUTO: 71.2 %
NRBC # BLD AUTO: 0 10*3/UL
NRBC BLD AUTO-RTO: 0 /100
NT-PROBNP SERPL-MCNC: 6111 PG/ML (ref 0–1800)
PLATELET # BLD AUTO: 252 10E9/L (ref 150–450)
POTASSIUM SERPL-SCNC: 4.2 MMOL/L (ref 3.4–5.3)
PROT SERPL-MCNC: 7.6 G/DL (ref 6.8–8.8)
RBC # BLD AUTO: 4.19 10E12/L (ref 4.4–5.9)
SODIUM SERPL-SCNC: 139 MMOL/L (ref 133–144)
TROPONIN I BLD-MCNC: 0.09 UG/L (ref 0–0.08)
TROPONIN I SERPL-MCNC: 0.13 UG/L (ref 0–0.04)
WBC # BLD AUTO: 8.7 10E9/L (ref 4–11)

## 2020-03-01 PROCEDURE — 96374 THER/PROPH/DIAG INJ IV PUSH: CPT

## 2020-03-01 PROCEDURE — 40000306 ZZH STATISTIC PRE PROC ASSESS II: Performed by: UROLOGY

## 2020-03-01 PROCEDURE — 85025 COMPLETE CBC W/AUTO DIFF WBC: CPT | Performed by: EMERGENCY MEDICINE

## 2020-03-01 PROCEDURE — 0T9B80Z DRAINAGE OF BLADDER WITH DRAINAGE DEVICE, VIA NATURAL OR ARTIFICIAL OPENING ENDOSCOPIC: ICD-10-PCS | Performed by: UROLOGY

## 2020-03-01 PROCEDURE — 25000125 ZZHC RX 250: Performed by: EMERGENCY MEDICINE

## 2020-03-01 PROCEDURE — 25800030 ZZH RX IP 258 OP 636: Performed by: ANESTHESIOLOGY

## 2020-03-01 PROCEDURE — 99221 1ST HOSP IP/OBS SF/LOW 40: CPT | Mod: 25 | Performed by: UROLOGY

## 2020-03-01 PROCEDURE — 71275 CT ANGIOGRAPHY CHEST: CPT

## 2020-03-01 PROCEDURE — 37000008 ZZH ANESTHESIA TECHNICAL FEE, 1ST 30 MIN: Performed by: UROLOGY

## 2020-03-01 PROCEDURE — 80053 COMPREHEN METABOLIC PANEL: CPT | Performed by: EMERGENCY MEDICINE

## 2020-03-01 PROCEDURE — 93005 ELECTROCARDIOGRAM TRACING: CPT

## 2020-03-01 PROCEDURE — 25000128 H RX IP 250 OP 636: Performed by: EMERGENCY MEDICINE

## 2020-03-01 PROCEDURE — 36000050 ZZH SURGERY LEVEL 2 1ST 30 MIN: Performed by: UROLOGY

## 2020-03-01 PROCEDURE — 52001 CYSTO W/IRRG&EVAC MLT CLOTS: CPT | Performed by: UROLOGY

## 2020-03-01 PROCEDURE — C1769 GUIDE WIRE: HCPCS | Performed by: UROLOGY

## 2020-03-01 PROCEDURE — 37000009 ZZH ANESTHESIA TECHNICAL FEE, EACH ADDTL 15 MIN: Performed by: UROLOGY

## 2020-03-01 PROCEDURE — 99223 1ST HOSP IP/OBS HIGH 75: CPT | Mod: AI | Performed by: INTERNAL MEDICINE

## 2020-03-01 PROCEDURE — 93005 ELECTROCARDIOGRAM TRACING: CPT | Mod: 76

## 2020-03-01 PROCEDURE — 25000128 H RX IP 250 OP 636: Performed by: NURSE ANESTHETIST, CERTIFIED REGISTERED

## 2020-03-01 PROCEDURE — 27210794 ZZH OR GENERAL SUPPLY STERILE: Performed by: UROLOGY

## 2020-03-01 PROCEDURE — 99285 EMERGENCY DEPT VISIT HI MDM: CPT | Mod: 25

## 2020-03-01 PROCEDURE — 12000000 ZZH R&B MED SURG/OB

## 2020-03-01 PROCEDURE — 96360 HYDRATION IV INFUSION INIT: CPT

## 2020-03-01 PROCEDURE — 85379 FIBRIN DEGRADATION QUANT: CPT | Performed by: EMERGENCY MEDICINE

## 2020-03-01 PROCEDURE — 36000052 ZZH SURGERY LEVEL 2 EA 15 ADDTL MIN: Performed by: UROLOGY

## 2020-03-01 PROCEDURE — 25000128 H RX IP 250 OP 636: Performed by: INTERNAL MEDICINE

## 2020-03-01 PROCEDURE — 25000131 ZZH RX MED GY IP 250 OP 636 PS 637: Mod: GY | Performed by: EMERGENCY MEDICINE

## 2020-03-01 PROCEDURE — 25000125 ZZHC RX 250: Performed by: UROLOGY

## 2020-03-01 PROCEDURE — 84484 ASSAY OF TROPONIN QUANT: CPT

## 2020-03-01 PROCEDURE — 0TCB8ZZ EXTIRPATION OF MATTER FROM BLADDER, VIA NATURAL OR ARTIFICIAL OPENING ENDOSCOPIC: ICD-10-PCS | Performed by: UROLOGY

## 2020-03-01 PROCEDURE — 71000012 ZZH RECOVERY PHASE 1 LEVEL 1 FIRST HR: Performed by: UROLOGY

## 2020-03-01 PROCEDURE — 84484 ASSAY OF TROPONIN QUANT: CPT | Performed by: EMERGENCY MEDICINE

## 2020-03-01 PROCEDURE — 83880 ASSAY OF NATRIURETIC PEPTIDE: CPT | Performed by: EMERGENCY MEDICINE

## 2020-03-01 RX ORDER — NALOXONE HYDROCHLORIDE 0.4 MG/ML
.1-.4 INJECTION, SOLUTION INTRAMUSCULAR; INTRAVENOUS; SUBCUTANEOUS
Status: DISCONTINUED | OUTPATIENT
Start: 2020-03-01 | End: 2020-03-04 | Stop reason: HOSPADM

## 2020-03-01 RX ORDER — SODIUM CHLORIDE, SODIUM LACTATE, POTASSIUM CHLORIDE, CALCIUM CHLORIDE 600; 310; 30; 20 MG/100ML; MG/100ML; MG/100ML; MG/100ML
INJECTION, SOLUTION INTRAVENOUS CONTINUOUS
Status: DISCONTINUED | OUTPATIENT
Start: 2020-03-01 | End: 2020-03-02 | Stop reason: HOSPADM

## 2020-03-01 RX ORDER — ALBUTEROL SULFATE 0.83 MG/ML
2.5 SOLUTION RESPIRATORY (INHALATION) ONCE
Status: COMPLETED | OUTPATIENT
Start: 2020-03-01 | End: 2020-03-01

## 2020-03-01 RX ORDER — IOPAMIDOL 755 MG/ML
500 INJECTION, SOLUTION INTRAVASCULAR ONCE
Status: COMPLETED | OUTPATIENT
Start: 2020-03-01 | End: 2020-03-01

## 2020-03-01 RX ORDER — LIDOCAINE HYDROCHLORIDE 20 MG/ML
6 JELLY TOPICAL ONCE
Status: DISCONTINUED | OUTPATIENT
Start: 2020-03-01 | End: 2020-03-04 | Stop reason: HOSPADM

## 2020-03-01 RX ORDER — METOPROLOL SUCCINATE 50 MG/1
50 TABLET, EXTENDED RELEASE ORAL DAILY
Status: DISCONTINUED | OUTPATIENT
Start: 2020-03-02 | End: 2020-03-04 | Stop reason: HOSPADM

## 2020-03-01 RX ORDER — ALBUTEROL SULFATE 0.83 MG/ML
2.5 SOLUTION RESPIRATORY (INHALATION)
Status: DISCONTINUED | OUTPATIENT
Start: 2020-03-01 | End: 2020-03-02

## 2020-03-01 RX ORDER — DIMENHYDRINATE 50 MG/ML
25 INJECTION, SOLUTION INTRAMUSCULAR; INTRAVENOUS
Status: DISCONTINUED | OUTPATIENT
Start: 2020-03-01 | End: 2020-03-01 | Stop reason: HOSPADM

## 2020-03-01 RX ORDER — ONDANSETRON 4 MG/1
4 TABLET, ORALLY DISINTEGRATING ORAL EVERY 30 MIN PRN
Status: DISCONTINUED | OUTPATIENT
Start: 2020-03-01 | End: 2020-03-02 | Stop reason: HOSPADM

## 2020-03-01 RX ORDER — ONDANSETRON 2 MG/ML
4 INJECTION INTRAMUSCULAR; INTRAVENOUS EVERY 30 MIN PRN
Status: DISCONTINUED | OUTPATIENT
Start: 2020-03-01 | End: 2020-03-02 | Stop reason: HOSPADM

## 2020-03-01 RX ORDER — POLYETHYLENE GLYCOL 3350 17 G/17G
17 POWDER, FOR SOLUTION ORAL DAILY PRN
Status: DISCONTINUED | OUTPATIENT
Start: 2020-03-01 | End: 2020-03-04 | Stop reason: HOSPADM

## 2020-03-01 RX ORDER — HYDROMORPHONE HYDROCHLORIDE 1 MG/ML
.3-.5 INJECTION, SOLUTION INTRAMUSCULAR; INTRAVENOUS; SUBCUTANEOUS EVERY 5 MIN PRN
Status: DISCONTINUED | OUTPATIENT
Start: 2020-03-01 | End: 2020-03-02 | Stop reason: HOSPADM

## 2020-03-01 RX ORDER — ONDANSETRON 4 MG/1
4 TABLET, ORALLY DISINTEGRATING ORAL EVERY 30 MIN PRN
Status: DISCONTINUED | OUTPATIENT
Start: 2020-03-01 | End: 2020-03-01 | Stop reason: HOSPADM

## 2020-03-01 RX ORDER — FENTANYL CITRATE 50 UG/ML
25-50 INJECTION, SOLUTION INTRAMUSCULAR; INTRAVENOUS
Status: DISCONTINUED | OUTPATIENT
Start: 2020-03-01 | End: 2020-03-01 | Stop reason: HOSPADM

## 2020-03-01 RX ORDER — ATORVASTATIN CALCIUM 20 MG/1
20 TABLET, FILM COATED ORAL AT BEDTIME
Status: DISCONTINUED | OUTPATIENT
Start: 2020-03-02 | End: 2020-03-04 | Stop reason: HOSPADM

## 2020-03-01 RX ORDER — LIDOCAINE 40 MG/G
CREAM TOPICAL
Status: DISCONTINUED | OUTPATIENT
Start: 2020-03-01 | End: 2020-03-04 | Stop reason: HOSPADM

## 2020-03-01 RX ORDER — ASPIRIN 81 MG/1
81 TABLET ORAL DAILY
Status: DISCONTINUED | OUTPATIENT
Start: 2020-03-02 | End: 2020-03-04 | Stop reason: HOSPADM

## 2020-03-01 RX ORDER — BISACODYL 10 MG
10 SUPPOSITORY, RECTAL RECTAL DAILY PRN
Status: DISCONTINUED | OUTPATIENT
Start: 2020-03-01 | End: 2020-03-04 | Stop reason: HOSPADM

## 2020-03-01 RX ORDER — NITROGLYCERIN 0.4 MG/1
0.4 TABLET SUBLINGUAL EVERY 5 MIN PRN
Status: DISCONTINUED | OUTPATIENT
Start: 2020-03-01 | End: 2020-03-02

## 2020-03-01 RX ORDER — LABETALOL 20 MG/4 ML (5 MG/ML) INTRAVENOUS SYRINGE
10
Status: DISCONTINUED | OUTPATIENT
Start: 2020-03-01 | End: 2020-03-01 | Stop reason: HOSPADM

## 2020-03-01 RX ORDER — CEFAZOLIN SODIUM 1 G/3ML
INJECTION, POWDER, FOR SOLUTION INTRAMUSCULAR; INTRAVENOUS PRN
Status: DISCONTINUED | OUTPATIENT
Start: 2020-03-01 | End: 2020-03-01

## 2020-03-01 RX ORDER — AMOXICILLIN 250 MG
2 CAPSULE ORAL 2 TIMES DAILY PRN
Status: DISCONTINUED | OUTPATIENT
Start: 2020-03-01 | End: 2020-03-04 | Stop reason: HOSPADM

## 2020-03-01 RX ORDER — FENTANYL CITRATE 50 UG/ML
25-50 INJECTION, SOLUTION INTRAMUSCULAR; INTRAVENOUS
Status: DISCONTINUED | OUTPATIENT
Start: 2020-03-01 | End: 2020-03-02 | Stop reason: HOSPADM

## 2020-03-01 RX ORDER — AMOXICILLIN 250 MG
1 CAPSULE ORAL 2 TIMES DAILY PRN
Status: DISCONTINUED | OUTPATIENT
Start: 2020-03-01 | End: 2020-03-04 | Stop reason: HOSPADM

## 2020-03-01 RX ORDER — SODIUM CHLORIDE, SODIUM LACTATE, POTASSIUM CHLORIDE, CALCIUM CHLORIDE 600; 310; 30; 20 MG/100ML; MG/100ML; MG/100ML; MG/100ML
INJECTION, SOLUTION INTRAVENOUS CONTINUOUS
Status: DISCONTINUED | OUTPATIENT
Start: 2020-03-01 | End: 2020-03-01 | Stop reason: HOSPADM

## 2020-03-01 RX ORDER — FUROSEMIDE 10 MG/ML
20 INJECTION INTRAMUSCULAR; INTRAVENOUS ONCE
Status: COMPLETED | OUTPATIENT
Start: 2020-03-01 | End: 2020-03-01

## 2020-03-01 RX ORDER — SPIRONOLACTONE 25 MG
12.5 TABLET ORAL DAILY
Status: DISCONTINUED | OUTPATIENT
Start: 2020-03-02 | End: 2020-03-04 | Stop reason: HOSPADM

## 2020-03-01 RX ORDER — PROPOFOL 10 MG/ML
INJECTION, EMULSION INTRAVENOUS PRN
Status: DISCONTINUED | OUTPATIENT
Start: 2020-03-01 | End: 2020-03-01

## 2020-03-01 RX ORDER — HYDRALAZINE HYDROCHLORIDE 20 MG/ML
2.5-5 INJECTION INTRAMUSCULAR; INTRAVENOUS EVERY 10 MIN PRN
Status: DISCONTINUED | OUTPATIENT
Start: 2020-03-01 | End: 2020-03-02 | Stop reason: HOSPADM

## 2020-03-01 RX ORDER — FENTANYL CITRATE 50 UG/ML
50 INJECTION, SOLUTION INTRAMUSCULAR; INTRAVENOUS ONCE
Status: COMPLETED | OUTPATIENT
Start: 2020-03-01 | End: 2020-03-01

## 2020-03-01 RX ORDER — TAMSULOSIN HYDROCHLORIDE 0.4 MG/1
0.4 CAPSULE ORAL DAILY
Status: DISCONTINUED | OUTPATIENT
Start: 2020-03-02 | End: 2020-03-04 | Stop reason: HOSPADM

## 2020-03-01 RX ORDER — ACETAMINOPHEN 325 MG/1
650 TABLET ORAL EVERY 4 HOURS PRN
Status: DISCONTINUED | OUTPATIENT
Start: 2020-03-01 | End: 2020-03-04 | Stop reason: HOSPADM

## 2020-03-01 RX ORDER — HYDRALAZINE HYDROCHLORIDE 20 MG/ML
2.5-5 INJECTION INTRAMUSCULAR; INTRAVENOUS EVERY 10 MIN PRN
Status: DISCONTINUED | OUTPATIENT
Start: 2020-03-01 | End: 2020-03-01 | Stop reason: HOSPADM

## 2020-03-01 RX ORDER — ACETAMINOPHEN 650 MG/1
650 SUPPOSITORY RECTAL EVERY 4 HOURS PRN
Status: DISCONTINUED | OUTPATIENT
Start: 2020-03-01 | End: 2020-03-04 | Stop reason: HOSPADM

## 2020-03-01 RX ORDER — LIDOCAINE 40 MG/G
CREAM TOPICAL
Status: DISCONTINUED | OUTPATIENT
Start: 2020-03-01 | End: 2020-03-02

## 2020-03-01 RX ORDER — CLOPIDOGREL BISULFATE 75 MG/1
75 TABLET ORAL DAILY
Status: DISCONTINUED | OUTPATIENT
Start: 2020-03-02 | End: 2020-03-04 | Stop reason: HOSPADM

## 2020-03-01 RX ORDER — ONDANSETRON 2 MG/ML
4 INJECTION INTRAMUSCULAR; INTRAVENOUS EVERY 30 MIN PRN
Status: DISCONTINUED | OUTPATIENT
Start: 2020-03-01 | End: 2020-03-01 | Stop reason: HOSPADM

## 2020-03-01 RX ORDER — HYDROMORPHONE HYDROCHLORIDE 1 MG/ML
.3-.5 INJECTION, SOLUTION INTRAMUSCULAR; INTRAVENOUS; SUBCUTANEOUS EVERY 5 MIN PRN
Status: DISCONTINUED | OUTPATIENT
Start: 2020-03-01 | End: 2020-03-01 | Stop reason: HOSPADM

## 2020-03-01 RX ORDER — DIMENHYDRINATE 50 MG/ML
25 INJECTION, SOLUTION INTRAMUSCULAR; INTRAVENOUS
Status: DISCONTINUED | OUTPATIENT
Start: 2020-03-01 | End: 2020-03-02 | Stop reason: HOSPADM

## 2020-03-01 RX ORDER — PREDNISONE 20 MG/1
40 TABLET ORAL ONCE
Status: COMPLETED | OUTPATIENT
Start: 2020-03-01 | End: 2020-03-01

## 2020-03-01 RX ORDER — LIDOCAINE 40 MG/G
CREAM TOPICAL
Status: DISCONTINUED | OUTPATIENT
Start: 2020-03-01 | End: 2020-03-01 | Stop reason: HOSPADM

## 2020-03-01 RX ORDER — IPRATROPIUM BROMIDE AND ALBUTEROL SULFATE 2.5; .5 MG/3ML; MG/3ML
3 SOLUTION RESPIRATORY (INHALATION)
Status: DISCONTINUED | OUTPATIENT
Start: 2020-03-02 | End: 2020-03-02

## 2020-03-01 RX ADMIN — CEFAZOLIN 1 G: 1 INJECTION, POWDER, FOR SOLUTION INTRAMUSCULAR; INTRAVENOUS at 22:53

## 2020-03-01 RX ADMIN — PREDNISONE 40 MG: 20 TABLET ORAL at 21:11

## 2020-03-01 RX ADMIN — PROPOFOL 20 MG: 10 INJECTION, EMULSION INTRAVENOUS at 22:43

## 2020-03-01 RX ADMIN — PROPOFOL 20 MG: 10 INJECTION, EMULSION INTRAVENOUS at 22:55

## 2020-03-01 RX ADMIN — PROPOFOL 20 MG: 10 INJECTION, EMULSION INTRAVENOUS at 22:48

## 2020-03-01 RX ADMIN — FUROSEMIDE 10 MG: 10 INJECTION, SOLUTION INTRAVENOUS at 23:05

## 2020-03-01 RX ADMIN — ALBUTEROL SULFATE 2.5 MG: 2.5 SOLUTION RESPIRATORY (INHALATION) at 20:16

## 2020-03-01 RX ADMIN — SODIUM CHLORIDE, POTASSIUM CHLORIDE, SODIUM LACTATE AND CALCIUM CHLORIDE: 600; 310; 30; 20 INJECTION, SOLUTION INTRAVENOUS at 22:40

## 2020-03-01 RX ADMIN — PROPOFOL 20 MG: 10 INJECTION, EMULSION INTRAVENOUS at 22:51

## 2020-03-01 RX ADMIN — PROPOFOL 20 MG: 10 INJECTION, EMULSION INTRAVENOUS at 22:57

## 2020-03-01 RX ADMIN — IOPAMIDOL 54 ML: 755 INJECTION, SOLUTION INTRAVENOUS at 19:05

## 2020-03-01 RX ADMIN — PROPOFOL 20 MG: 10 INJECTION, EMULSION INTRAVENOUS at 22:53

## 2020-03-01 RX ADMIN — PROPOFOL 20 MG: 10 INJECTION, EMULSION INTRAVENOUS at 23:00

## 2020-03-01 RX ADMIN — SODIUM CHLORIDE 75 ML: 9 INJECTION, SOLUTION INTRAVENOUS at 19:05

## 2020-03-01 RX ADMIN — PROPOFOL 20 MG: 10 INJECTION, EMULSION INTRAVENOUS at 22:45

## 2020-03-01 RX ADMIN — FENTANYL CITRATE 50 MCG: 50 INJECTION, SOLUTION INTRAMUSCULAR; INTRAVENOUS at 21:22

## 2020-03-01 RX ADMIN — ALBUTEROL SULFATE 2.5 MG: 2.5 SOLUTION RESPIRATORY (INHALATION) at 21:12

## 2020-03-01 ASSESSMENT — ENCOUNTER SYMPTOMS
STRIDOR: 0
DIFFICULTY URINATING: 1
SEIZURES: 0
SHORTNESS OF BREATH: 1
ACTIVITY CHANGE: 1
FEVER: 0
COUGH: 0

## 2020-03-01 ASSESSMENT — COPD QUESTIONNAIRES
COPD: 1
CAT_SEVERITY: SEVERE

## 2020-03-01 ASSESSMENT — LIFESTYLE VARIABLES: TOBACCO_USE: 1

## 2020-03-01 NOTE — LETTER
Key Recommendations:  CTS following for readmission, CHF diagnosis and discharge planning. Pt was admitted from 2/25-2/29. He discharged home with his wife and was readmitted on same day for shortness of breath and urine retention. His BNP on admit was 6111 which was up from prior admission 5301. He received IV lasix in ER and is now on PO lasix. He also had urine retention in the ER and has a watson which was placed by Urology in OR. Met with pt, wife and family members in room to discuss hospitalization and plan of care for discharge. They verified that pt discharged home with his wife on Sat and was readmitted with SOB and urine retention. Per Urology, plan will be for pt to discharge home with watson catheter and follow up with Urology for trial void. We discussed plan with watson at home and use of leg bag. We also discussed possibility of Home Care nursing to follow for needs at home post discharge. Pt has not used home care in the past and is agreeable to have HC RN follow. Pt follows with Dr Leigh at [facility] so he would like to use [facility] if needed.      Pt's family member expressed concerns about pt's SOB prior to admission. We had a discussion about CHF and recommendation for low Na diet. Pt does not like the low Na diet but family and wife are working on getting him to follow it at home. We reviewed Low Na recommendations and salt substitute options. They said they received low Na diet education from Dietician last week and don't feel they need further education at this time. We also discussed how this may help prevent further readmissions. Pt has follow up scheduled with Cardiology on 3/6, and Urology on 3/9.     He will need continued support regarding CHF recommendations, low Na diet and being home with watson.    Dee England RN    AVS/Discharge Summary is the source of truth; this is a helpful guide for improved communication of patient story

## 2020-03-01 NOTE — LETTER
Transition Communication Hand-off for Care Transitions to Next Level of Care Provider    Name: Deshawn Burrows  : 3/8/1933  MRN #: [id number]  Primary Care Provider: Tess Leigh  Primary Care MD Name: Dr Leigh  Primary Clinic: [address] [facility] [address]  Primary Care Clinic Name: [facility]  Reason for Hospitalization:  Urinary retention [R33.9]  Elevated troponin [R79.89]  Pulmonary emphysema, unspecified emphysema type (H) [J43.9]  Dyspnea, unspecified type [R06.00]  Congestive heart failure, unspecified HF chronicity, unspecified heart failure type (H) [I50.9]  Admit Date/Time: 3/1/2020  5:40 PM  Discharge Date: 3/4/20  Payor Source: Payor: MEDICARE / Plan: MEDICARE / Product Type: Medicare /     Readmission Assessment Measure (JANE) Risk Score/category: Avg         Reason for Communication Hand-off Referral: Admission diagnoses: CHF    Discharge Plan:       Concern for non-adherence with plan of care:   no  Discharge Needs Assessment:  Needs      Most Recent Value   # of Referrals Placed by CTS  Communication hand-offs to next level of Care Providers          Already enrolled in Tele-monitoring program and name of program:  NA  Follow-up specialty is recommended: No    Follow-up plan:    Future Appointments   Date Time Provider Department Center   3/6/2020  1:30 PM [facility]   3/6/2020  2:30 PM Matt Alexander NP [facility]   3/9/2020  1:30 PM [facility]   3/17/2020  8:15 AM 1, [facility]       Any outstanding tests or procedures:        Referrals     Future Labs/Procedures    Home care nursing referral     Comments:    RN skilled nursing visit. RN to assess vital signs and weight, respiratory and cardiac status, patients ability to take and record daily blood pressure, temp and weight, pain level and activity tolerance and home safety.  RN to provide watson care and safety assessment.    Your provider has  ordered home care nursing services. If you have not been contacted within 2 days of your discharge please call the inpatient department phone number at [phone] .            Key Recommendations:  CTS following for readmission, CHF diagnosis and discharge planning. Pt was admitted from 2/25-2/29. He discharged home with his wife and was readmitted on same day for shortness of breath and urine retention. His BNP on admit was 6111 which was up from prior admission 5301. He received IV lasix in ER and is now on PO lasix. He also had urine retention in the ER and has a watson which was placed by Urology in OR. Met with pt, wife and family members in room to discuss hospitalization and plan of care for discharge. They verified that pt discharged home with his wife on Sat and was readmitted with SOB and urine retention. Per Urology, plan will be for pt to discharge home with watson catheter and follow up with Urology for trial void. We discussed plan with watson at home and use of leg bag. HE discharged home with support of [facility].      Pt's family member expressed concerns about pt's SOB prior to admission. We had a discussion about CHF and recommendation for low Na diet. Pt does not like the low Na diet but family and wife are working on getting him to follow it at home. We reviewed Low Na recommendations and salt substitute options. They said they received low Na diet education from Dietician last week and don't feel they need further education at this time. We also discussed how this may help prevent further readmissions. Pt has follow up scheduled with Cardiology on 3/6, and Urology on 3/9.     He will need continued support regarding CHF recommendations, low Na diet and being home with watson.    Dee England RN    AVS/Discharge Summary is the source of truth; this is a helpful guide for improved communication of patient story

## 2020-03-01 NOTE — ED TRIAGE NOTES
"Pt having SOB that started this past Monday. Pt admitted to the hospital and had cardiac stent placed on Friday and started on spironolactone for \"fluid on lungs\". Pt was discharge home yesterday. Last night pt started having SOB again that is getting worse today. Denies any CP. Breathing unlabored in triage.   "

## 2020-03-01 NOTE — ED PROVIDER NOTES
History     Chief Complaint:  Shortness of Breath    HPI   Deshawn Burrows is a 86 year old male with a history of coronary artery disease with two stents placed, ischemic cardiomyopathy, MI, hypertension, and currently anticoagulated with Plavix and a baby aspirin who presents to the emergency department with his grandson and wife for evaluation of shortness of breath. Patient was recently admitted here from 2/25/2020-2/29/2020 for shortness of breath with low oxygen levels. Patient had an angiogram and echocardiogram performed at that time, results described below. Last night patient began feeling short of breath again while trying to sleep and states that changing position did not improve or exacerbate this. Son states that the patient felt improved earlier today but at 6pm he received a call from his grandmother saying that the patient had difficulty breathing. Patient denies chest pain, cough, difficulty walking, leg swelling, and family history of clots.     Echocardiogram (2/25/2020):     Interpretation Summary     LVEF 24% based on biplane 2D tracing.  There is moderate (2+) mitral regurgitation.  There is mild (1+) tricuspid regurgitation.  Right ventricular systolic pressure is elevated, consistent with mild to  moderate pulmonary hypertension.  EF decerased significnatly compared to prior study from 2017. The study was  technically adequate.    Coronary Angiogram (2/28/2020):    Left ventricular filling pressures are normal .    The ejection fraction is 20-30% by visual estimate.    Mid LAD-1 lesion is 20% stenosed.    Mid LAD-2 lesion is 75% stenosed.     1.  In segment restenosis just beyond the mid LAD stent.  Stenosis of 75% reduced to less than 0% with ALEXANDER grade III flow by placing a 2.5 x 15 mm Medtronic resolute Humberto drug-eluting stent.  2.  Cardiomyopathy disproportionate to coronary disease with global hypokinesia and ejection fraction of 25%.  No mitral regurgitation.  No aortic stenosis.   Left ventricular end-diastolic pressure of 11 mmHg.       Allergies:  Penicillins    Medications:    Aspirin 81 mg  Lipitor  Plavix  Lisinopril  Metoprolol succinate  Nitroglycerin  Spironolactone    Past Medical History:    CAD  Vasculitis  Hyperlipidemia  Hypertension  MI   GARCIA  Ischemic cardiomyopathy  Acute systolic heart failure  LBBB    Past Surgical History:    Hernia repair  Two coronary stent placement    Family History:    History reviewed. No pertinent family history.     Social History:  Smoking status: former smoker  Alcohol use: yes  Drug use: no  The patient presents to the emergency department with his grandson and wife.  PCP: Tess Leigh  Marital Status:      Review of Systems   Constitutional: Positive for activity change. Negative for fever.   Respiratory: Positive for shortness of breath. Negative for cough.    Cardiovascular: Negative for chest pain and leg swelling.   Genitourinary: Positive for difficulty urinating.   Musculoskeletal: Negative for gait problem.   All other systems reviewed and are negative.      Physical Exam     Patient Vitals for the past 24 hrs:   BP Temp Temp src Pulse Heart Rate Resp SpO2   03/01/20 2211 (!) 140/81 97.7  F (36.5  C) Temporal -- -- 20 95 %   03/01/20 2200 134/79 -- -- 104 102 23 95 %   03/01/20 2145 137/77 -- -- 101 100 20 95 %   03/01/20 2130 (!) 146/79 -- -- 103 98 28 93 %   03/01/20 2115 (!) 159/90 -- -- 101 103 30 93 %   03/01/20 2100 (!) 154/107 -- -- 103 105 -- 95 %   03/01/20 2045 (!) 171/106 -- -- 116 112 -- (!) 85 %   03/01/20 2030 (!) 161/92 -- -- 104 105 29 90 %   03/01/20 2018 -- -- -- -- -- -- 96 %   03/01/20 2015 (!) 154/94 -- -- 93 96 26 (!) 89 %   03/01/20 2000 (!) 151/86 -- -- 89 92 30 92 %   03/01/20 1945 (!) 153/81 -- -- 90 94 -- 96 %   03/01/20 1930 (!) 152/87 -- -- 93 93 26 95 %   03/01/20 1830 126/76 -- -- 85 89 24 98 %   03/01/20 1815 130/74 -- -- 88 88 17 96 %   03/01/20 1800 137/84 -- -- 91 88 -- 99 %    03/01/20 1752 (!) 141/95 97.6  F (36.4  C) Oral 88 -- 20 98 %   03/01/20 1745 (!) 141/95 -- -- -- -- -- --     Physical Exam  General: Elderly male sitting upright  Eyes: PERRL, Conjunctive within normal limits  ENT: Moist mucous membranes, oropharynx clear.   CV: Normal S1S2, no murmur, rub or gallop. Regular rate and rhythm.   Resp: Clear to auscultation bilaterally, no wheezes, rales or rhonchi. Normal respiratory effort.  GI: Abdomen is soft, nontender and nondistended. No palpable masses. No rebound or guarding.  MSK: No significant edema. Nontender. Normal active range of motion.  Skin: Warm and dry. No rashes or lesions or ecchymoses on visible skin.  Neuro: Alert and oriented. Responds appropriately to all questions and commands. No focal findings appreciated. Normal muscle tone.  Psych: Normal mood and affect. Pleasant.    Emergency Department Course   ECG (17:55:46):  Rate 93 bpm. NE interval 188. QRS duration 156. QT/QTc 426/529. P-R-T axes 73 47 190. Normal sinus rhythm. Left bundle branch block. Abnormal ECG. Interpreted at 1759 by Kayla Gonzalez MD.    ECG (19:37:43):  Rate 98 bpm. NE interval 176. QRS duration 160. QT/QTc 416/531. P-R-T axes 71 66 187. Normal sinus rhythm. Left bundle branch block. Abnormal ECG. Interpreted at 1939 by Kayla Gonzalez MD.      Imaging:  Radiographic findings were communicated with the patient who voiced understanding of the findings.  CT Chest Pulmonary embolism w/ contrast   IMPRESSION:  1.  No PE.    2.  Severe emphysema. There is interstitial edema and symmetric simple small to moderate bilateral pleural effusions are noted, which may be related to CHF, although heart size is normal. There is moderate coronary artery calcification noted.    3.  Evidence of previous granulomatous disease. as per radiology.     Laboratory:  CBC: WBC: 8.7, HGB: 13.0 (L), PLT: 252  CMP: Glucose 113 (H), o/w WNL (Creatinine: 0.95)  1803 Troponin: 0.126  ()  1805 Troponin POCT: 0.09 (H)  BNP: 6111 (H)  D-dimer: 0.6 (H)    Interventions:  2016 albuterol 2.5 mg  2111 prednisone 40 mg Oral  2112 albuterol 2.5 mg   2122 fentanyl 50 mcg IV    Emergency Department Course:  Nursing notes and vitals reviewed. (1752) I performed an exam of the patient as documented above.     IV inserted. Medicine administered as documented above. Blood drawn. This was sent to the lab for further testing, results above.     The patient was sent for a CT while in the emergency department, findings above.     An EKG was recorded. Results as noted above.     2030 I rechecked the patient and discussed the results of his workup thus far.  He notes aside from shortness of breath and inability to urinate he is feeling fine.     2033  I consulted with Unique Ewing of the hospitalist services. She is in agreement to accept the patient for admission on behalf of Dr. Murguia.    Multiple attempts at urinary catheterization by technicians has failed.     2115 I consulted Dr. Gomez of Urology and discussed the patient    I discussed the plan with the patient for Faustin placement by Dr. Gomez.    Findings and plan explained to the Patient who consents to admission. Discussed the patient with Unique Ewing, who will admit the patient to a cardiac telemetry bed for further monitoring, evaluation, and treatment.     Impression & Plan    Medical Decision Making:  Deshawn Burrows is a 86 year old male with a history of CHF and recent admission for dyspnea of unclear ultimate cause status post coronary angiogram and echocardiogram who presents to the emergency department after discharge yesterday with increasing shortness of breath. He has no extremity edema, rales, evidence on CT scan for pleural effusion or pulmonary edema. Suspicion for CHF exacerbation is low. He did not have wheezes and was not hypoxic on initial presentation. Emphysema is present on CT scan although he did not frankly seems to  be having a COPD exacerbation on my initial evaluation. He did have occasional episodes of desaturation. He was given nebulizer therapy and seemed to stabilize. Steroid therapy was initiated as well. In addition the patient was unable to urinate on his own here. By records it appears as though he has had similar issue on admission recently. Attempts were made here in the emergency department initially to straight cath and then with coude catheter by technicians. Unfortunately they were unable to pass catheter and did result in some blood after the attempts. Given failure I discussed his care with Dr. Gomez of Urology who agreed to take the patient to the OR to place a catheter. I discussed the plan and findings with the patient. He verbalized understanding and agreed with the plan. For multiple reasons the patient will be admitted but serial trending of troponins which were slightly elevated would be recommended as well. Suspicion for acute coronary syndrome is low and perhaps the troponins are elevated secondary to recent cardiac procedure but should be trended out. All questions were answered prior to admission.     Diagnosis:    ICD-10-CM    1. Dyspnea, unspecified type R06.00    2. Elevated troponin R79.89    3. Congestive heart failure, unspecified HF chronicity, unspecified heart failure type (H) I50.9    4. Pulmonary emphysema, unspecified emphysema type (H) J43.9    5. Urinary retention R33.9        Disposition:  Admitted to the care of Dr. Murguia in stable condition.  Vasquez Curran  3/1/2020   [facility] EMERGENCY DEPARTMENT  Scribe Disclosure:  I, Vasquez Curran, am serving as a scribe at 5:52 PM on 3/1/2020 to document services personally performed by Kayla Gonzalez MD based on my observations and the provider's statements to me.        Kayla Gonzalez MD  03/01/20 [id number]

## 2020-03-02 ENCOUNTER — TELEPHONE (OUTPATIENT)
Dept: INTERNAL MEDICINE | Facility: CLINIC | Age: 85
End: 2020-03-02

## 2020-03-02 LAB
ALBUMIN UR-MCNC: 70 MG/DL
AMORPH CRY #/AREA URNS HPF: ABNORMAL /HPF
ANION GAP SERPL CALCULATED.3IONS-SCNC: 6 MMOL/L (ref 3–14)
ANION GAP SERPL CALCULATED.3IONS-SCNC: 6 MMOL/L (ref 3–14)
APPEARANCE UR: ABNORMAL
BACTERIA SPEC CULT: NO GROWTH
BACTERIA SPEC CULT: NO GROWTH
BILIRUB UR QL STRIP: NEGATIVE
BUN SERPL-MCNC: 24 MG/DL (ref 7–30)
BUN SERPL-MCNC: 31 MG/DL (ref 7–30)
CALCIUM SERPL-MCNC: 8.9 MG/DL (ref 8.5–10.1)
CALCIUM SERPL-MCNC: 9 MG/DL (ref 8.5–10.1)
CHLORIDE SERPL-SCNC: 105 MMOL/L (ref 94–109)
CHLORIDE SERPL-SCNC: 108 MMOL/L (ref 94–109)
CO2 SERPL-SCNC: 25 MMOL/L (ref 20–32)
CO2 SERPL-SCNC: 26 MMOL/L (ref 20–32)
COLOR UR AUTO: ABNORMAL
CREAT SERPL-MCNC: 0.95 MG/DL (ref 0.66–1.25)
CREAT SERPL-MCNC: 1.07 MG/DL (ref 0.66–1.25)
GFR SERPL CREATININE-BSD FRML MDRD: 62 ML/MIN/{1.73_M2}
GFR SERPL CREATININE-BSD FRML MDRD: 71 ML/MIN/{1.73_M2}
GLUCOSE SERPL-MCNC: 102 MG/DL (ref 70–99)
GLUCOSE SERPL-MCNC: 142 MG/DL (ref 70–99)
GLUCOSE UR STRIP-MCNC: NEGATIVE MG/DL
HGB BLD-MCNC: 13.3 G/DL (ref 13.3–17.7)
HGB UR QL STRIP: ABNORMAL
INTERPRETATION ECG - MUSE: NORMAL
INTERPRETATION ECG - MUSE: NORMAL
KETONES UR STRIP-MCNC: NEGATIVE MG/DL
LEUKOCYTE ESTERASE UR QL STRIP: NEGATIVE
NITRATE UR QL: NEGATIVE
PH UR STRIP: 5.5 PH (ref 5–7)
POTASSIUM SERPL-SCNC: 4 MMOL/L (ref 3.4–5.3)
POTASSIUM SERPL-SCNC: 4.5 MMOL/L (ref 3.4–5.3)
RBC #/AREA URNS AUTO: >182 /HPF (ref 0–2)
SODIUM SERPL-SCNC: 137 MMOL/L (ref 133–144)
SODIUM SERPL-SCNC: 139 MMOL/L (ref 133–144)
SOURCE: ABNORMAL
SP GR UR STRIP: 1.02 (ref 1–1.03)
SPECIMEN SOURCE: NORMAL
SPECIMEN SOURCE: NORMAL
TROPONIN I SERPL-MCNC: 0.09 UG/L (ref 0–0.04)
TROPONIN I SERPL-MCNC: 0.12 UG/L (ref 0–0.04)
UROBILINOGEN UR STRIP-MCNC: NORMAL MG/DL (ref 0–2)
WBC #/AREA URNS AUTO: 0 /HPF (ref 0–5)

## 2020-03-02 PROCEDURE — 36415 COLL VENOUS BLD VENIPUNCTURE: CPT | Performed by: INTERNAL MEDICINE

## 2020-03-02 PROCEDURE — 40000275 ZZH STATISTIC RCP TIME EA 10 MIN

## 2020-03-02 PROCEDURE — 84484 ASSAY OF TROPONIN QUANT: CPT | Performed by: INTERNAL MEDICINE

## 2020-03-02 PROCEDURE — 80048 BASIC METABOLIC PNL TOTAL CA: CPT | Performed by: INTERNAL MEDICINE

## 2020-03-02 PROCEDURE — 81001 URINALYSIS AUTO W/SCOPE: CPT | Performed by: EMERGENCY MEDICINE

## 2020-03-02 PROCEDURE — 85018 HEMOGLOBIN: CPT | Performed by: INTERNAL MEDICINE

## 2020-03-02 PROCEDURE — 25000132 ZZH RX MED GY IP 250 OP 250 PS 637: Mod: GY | Performed by: UROLOGY

## 2020-03-02 PROCEDURE — 99231 SBSQ HOSP IP/OBS SF/LOW 25: CPT | Performed by: UROLOGY

## 2020-03-02 PROCEDURE — 25000125 ZZHC RX 250: Performed by: INTERNAL MEDICINE

## 2020-03-02 PROCEDURE — 99232 SBSQ HOSP IP/OBS MODERATE 35: CPT | Performed by: INTERNAL MEDICINE

## 2020-03-02 PROCEDURE — 40000274 ZZH STATISTIC RCP CONSULT EA 30 MIN

## 2020-03-02 PROCEDURE — 94640 AIRWAY INHALATION TREATMENT: CPT

## 2020-03-02 PROCEDURE — 25000132 ZZH RX MED GY IP 250 OP 250 PS 637: Mod: GY | Performed by: INTERNAL MEDICINE

## 2020-03-02 PROCEDURE — 12000000 ZZH R&B MED SURG/OB

## 2020-03-02 RX ORDER — ALBUTEROL SULFATE 0.83 MG/ML
2.5 SOLUTION RESPIRATORY (INHALATION) EVERY 6 HOURS PRN
Status: DISCONTINUED | OUTPATIENT
Start: 2020-03-02 | End: 2020-03-04 | Stop reason: HOSPADM

## 2020-03-02 RX ORDER — LISINOPRIL 5 MG/1
5 TABLET ORAL DAILY
Status: DISCONTINUED | OUTPATIENT
Start: 2020-03-03 | End: 2020-03-04 | Stop reason: HOSPADM

## 2020-03-02 RX ORDER — LISINOPRIL 5 MG/1
5 TABLET ORAL DAILY
Status: DISCONTINUED | OUTPATIENT
Start: 2020-03-03 | End: 2020-03-02

## 2020-03-02 RX ORDER — NITROGLYCERIN 0.4 MG/1
0.4 TABLET SUBLINGUAL EVERY 5 MIN PRN
Status: DISCONTINUED | OUTPATIENT
Start: 2020-03-02 | End: 2020-03-04 | Stop reason: HOSPADM

## 2020-03-02 RX ORDER — FUROSEMIDE 20 MG
20 TABLET ORAL ONCE
Status: COMPLETED | OUTPATIENT
Start: 2020-03-02 | End: 2020-03-02

## 2020-03-02 RX ORDER — IPRATROPIUM BROMIDE AND ALBUTEROL SULFATE 2.5; .5 MG/3ML; MG/3ML
3 SOLUTION RESPIRATORY (INHALATION) EVERY 4 HOURS PRN
Status: DISCONTINUED | OUTPATIENT
Start: 2020-03-02 | End: 2020-03-04 | Stop reason: HOSPADM

## 2020-03-02 RX ORDER — FUROSEMIDE 20 MG/1
10 TABLET ORAL DAILY
Status: DISCONTINUED | OUTPATIENT
Start: 2020-03-03 | End: 2020-03-04 | Stop reason: HOSPADM

## 2020-03-02 RX ADMIN — TAMSULOSIN HYDROCHLORIDE 0.4 MG: 0.4 CAPSULE ORAL at 08:58

## 2020-03-02 RX ADMIN — Medication 12.5 MG: at 08:58

## 2020-03-02 RX ADMIN — ATORVASTATIN CALCIUM 20 MG: 20 TABLET, FILM COATED ORAL at 21:43

## 2020-03-02 RX ADMIN — IPRATROPIUM BROMIDE AND ALBUTEROL SULFATE 3 ML: .5; 3 SOLUTION RESPIRATORY (INHALATION) at 08:18

## 2020-03-02 RX ADMIN — FUROSEMIDE 20 MG: 20 TABLET ORAL at 16:31

## 2020-03-02 ASSESSMENT — ACTIVITIES OF DAILY LIVING (ADL)
TRANSFERRING: 0-->INDEPENDENT
RETIRED_COMMUNICATION: 0-->UNDERSTANDS/COMMUNICATES WITHOUT DIFFICULTY
ADLS_ACUITY_SCORE: 13
ADLS_ACUITY_SCORE: 14
RETIRED_EATING: 0-->INDEPENDENT
COGNITION: 0 - NO COGNITION ISSUES REPORTED
ADLS_ACUITY_SCORE: 13
WHICH_OF_THE_ABOVE_FUNCTIONAL_RISKS_HAD_A_RECENT_ONSET_OR_CHANGE?: AMBULATION
BATHING: 0-->INDEPENDENT
FALL_HISTORY_WITHIN_LAST_SIX_MONTHS: YES
ADLS_ACUITY_SCORE: 13
TOILETING: 0-->INDEPENDENT
DRESS: 0-->INDEPENDENT
SWALLOWING: 0-->SWALLOWS FOODS/LIQUIDS WITHOUT DIFFICULTY
ADLS_ACUITY_SCORE: 13
AMBULATION: 0-->INDEPENDENT
NUMBER_OF_TIMES_PATIENT_HAS_FALLEN_WITHIN_LAST_SIX_MONTHS: 1

## 2020-03-02 ASSESSMENT — MIFFLIN-ST. JEOR: SCORE: 1309.95

## 2020-03-02 NOTE — ED NOTES
"[facility]  ED Nurse Handoff Report    Deshawn Burrows is a 86 year old male   ED Chief complaint: Shortness of Breath  . ED Diagnosis:   Final diagnoses:   Dyspnea, unspecified type   Elevated troponin   Congestive heart failure, unspecified HF chronicity, unspecified heart failure type (H)   Pulmonary emphysema, unspecified emphysema type (H)   Urinary retention     Allergies:   Allergies   Allergen Reactions     Penicillins        Code Status: Full Code  Activity level - Baseline/Home:  Independent. Activity Level - Current:   Assist X 1. Lift room needed: No. Bariatric: No   Needed: No   Isolation: No. Infection: Not Applicable.     Vital Signs:   Vitals:    03/01/20 1945 03/01/20 2000 03/01/20 2015 03/01/20 2018   BP: (!) 153/81 (!) 151/86 (!) 154/94    Pulse: 90 89 93    Resp:   26    Temp:       TempSrc:       SpO2: 96% 92% (!) 89% 96%       Cardiac Rhythm:  ,   Cardiac  Cardiac Rhythm: Normal sinus rhythm(BBB)  Pain level:    Patient confused: No. Patient Falls Risk: Yes.   Elimination Status: Unable to void. Pt to go to OR for watson placement  Patient Report - Initial Complaint:   Pt having SOB that started this past Monday. Pt admitted to the hospital and had cardiac stent placed on Friday and started on spironolactone for \"fluid on lungs\". Pt was discharge home yesterday. Last night pt started having SOB again that is getting worse today. Denies any CP. Breathing unlabored in triage. . Focused Assessment:   17:57 Cardiac AS       Details:   Cardiac - Cardiac WDL: WDL; all   Cardiac Monitoring - EKG Monitoring: Yes  Cardiac Rhythm: NSR (BBB)  Manage Acute Chest Pain - Chest Pain Intervention: cardiac biomarkers drawn; 12-lead ECG obtained        17:56 Respiratory AS      Details:   Respiratory - Respiratory WDL: -WDL except; breath sounds; rhythm/pattern  Rhythm/Pattern, Respiratory: shortness of breath  Breath Sounds: LLL; RLL  LLL Breath Sounds: rhonchi  RLL Breath Sounds: " rhonchi            Tests Performed: labs, xray. Abnormal Results:   CT Chest Pulmonary Embolism w Contrast   Final Result   IMPRESSION:   1.  No PE.      2.  Severe emphysema. There is interstitial edema and symmetric simple small to moderate bilateral pleural effusions are noted, which may be related to CHF, although heart size is normal. There is moderate coronary artery calcification noted.      3.  Evidence of previous granulomatous disease.        Labs Ordered and Resulted from Time of ED Arrival Up to the Time of Departure from the ED   CBC WITH PLATELETS DIFFERENTIAL - Abnormal; Notable for the following components:       Result Value    RBC Count 4.19 (*)     Hemoglobin 13.0 (*)     All other components within normal limits   COMPREHENSIVE METABOLIC PANEL - Abnormal; Notable for the following components:    Glucose 113 (*)     All other components within normal limits   TROPONIN I - Abnormal; Notable for the following components:    Troponin I ES 0.126 (*)     All other components within normal limits   NT PROBNP INPATIENT - Abnormal; Notable for the following components:    N-Terminal Pro BNP Inpatient 6,111 (*)     All other components within normal limits   D DIMER QUANTITATIVE - Abnormal; Notable for the following components:    D Dimer 0.6 (*)     All other components within normal limits   TROPONIN POCT - Abnormal; Notable for the following components:    Troponin I 0.09 (*)     All other components within normal limits   ROUTINE UA WITH MICROSCOPIC   PERIPHERAL IV CATHETER   ISTAT TROPONIN NURSING POCT   CARDIAC CONTINUOUS MONITORING   BLADDER SCAN   STRAIGHT CATH FOR URINE     .   Treatments provided: See Mar.   Family Comments: wife, daughter, grandson at bedside.  OBS brochure/video discussed/provided to patient:  N/A  ED Medications:   Medications   lidocaine (XYLOCAINE) 2 % external gel 6 mL (has no administration in time range)   Saline CT scan flush (75 mLs Intravenous Given 3/1/20 1905)    iopamidol (ISOVUE-370) solution 500 mL (54 mLs Intravenous Given 3/1/20 1905)   albuterol (PROVENTIL) neb solution 2.5 mg (2.5 mg Nebulization Given 3/1/20 2016)   albuterol (PROVENTIL) neb solution 2.5 mg (2.5 mg Nebulization Given 3/1/20 2112)   predniSONE (DELTASONE) tablet 40 mg (40 mg Oral Given 3/1/20 2111)   fentaNYL (PF) (SUBLIMAZE) injection 50 mcg (50 mcg Intravenous Given 3/1/20 2122)     Drips infusing:  No  For the majority of the shift, the patient's behavior Green. Interventions performed were NA.    Sepsis treatment initiated: No       ED Nurse Name/Phone Number: Marj Carmona RN,   9:41 PM    RECEIVING UNIT ED HANDOFF REVIEW    Above ED Nurse Handoff Report was reviewed: Yes  Reviewed by: Leah Albarran RN on March 1, 2020 at 9:54 PM

## 2020-03-02 NOTE — ANESTHESIA PREPROCEDURE EVALUATION
Anesthesia Pre-Procedure Evaluation    Patient: Deshawn Burrows   MRN: [id number] : 3/8/1933          Preoperative Diagnosis: * No pre-op diagnosis entered *    Procedure(s):  CYSTOSCOPY    Past Medical History:   Diagnosis Date     CAD (coronary artery disease)     2 cardiac stents      LBBB (left bundle branch block) 2020     Past Surgical History:   Procedure Laterality Date     HERNIA REPAIR  2007    right inguinal hernia repair     STENT, CORONARY, ANTIONE      2 stents     Anesthesia Evaluation     . Pt has had prior anesthetic. Type: General    No history of anesthetic complications          ROS/MED HX    ENT/Pulmonary:     (+)AICHA risk factors hypertension, tobacco use, Past use severe COPD, , . .   (-) asthma and recent URI   Neurologic:  - neg neurologic ROS    (-) seizures   Cardiovascular:     (+) Dyslipidemia, hypertension--CAD, -past MI,-stent,2 . Taking blood thinners : . CHF Last EF: 24% date:  . GARCIA, . :. valvular problems/murmurs type: MR TR:. pulmonary hypertension, Previous cardiac testing Echodate:results:LVEF 24% based on biplane 2D tracing.  There is moderate (2+) mitral regurgitation.  There is mild (1+) tricuspid regurgitation.  Right ventricular systolic pressure is elevated, consistent with mild to  moderate pulmonary hypertension.  EF decerased significnatly compared to prior study from 2017. The study was  technically adequate.date: results:ECG reviewed date:3/20 results:NSR.  LBBB. date: results:          METS/Exercise Tolerance:     Hematologic: Comments: Lab Test        20                       1803          0739          0625          0654          WBC          8.7          8.3          7.9          9.6           HGB          13.0*        11.9*        13.5         14.8          MCV          98           99           98           98            PLT          252          190          230          232           INR            --           --           --          1.01           Lab Test        03/01/20 02/29/20 02/27/20                       1803          0739          2323          NA           139          140          138           POTASSIUM    4.2          4.3          3.9           CHLORIDE     108          111*         105           CO2          27           26           28            BUN          24           29           44*           CR           0.95         0.91         1.16          ANIONGAP     4            3            5             FLORES          9.5          8.6          9.0           GLC          113*         84           98           - neg hematologic  ROS       Musculoskeletal:  - neg musculoskeletal ROS       GI/Hepatic:  - neg GI/hepatic ROS      (-) GERD   Renal/Genitourinary:  - ROS Renal section negative       Endo:  - neg endo ROS    (-) Type I DM, Type II DM, thyroid disease, chronic steroid usage and obesity   Psychiatric:  - neg psychiatric ROS       Infectious Disease:  - neg infectious disease ROS       Malignancy:      - no malignancy   Other:    - neg other ROS                      Physical Exam  Normal systems: cardiovascular, pulmonary and dental    Airway   Mallampati: II  TM distance: >3 FB  Neck ROM: full    Dental     Cardiovascular   Rhythm and rate: regular and normal  (-) no friction rub, no systolic click and no murmur    Pulmonary    breath sounds clear to auscultation(-) no rhonchi, no decreased breath sounds, no wheezes, no rales and no stridor            Lab Results   Component Value Date    WBC 8.7 03/01/2020    HGB 13.0 (L) 03/01/2020    HCT 40.9 03/01/2020     03/01/2020    CRP 3.5 05/14/2016    SED 7 05/13/2016     03/01/2020    POTASSIUM 4.2 03/01/2020    CHLORIDE 108 03/01/2020    CO2 27 03/01/2020    BUN 24 03/01/2020    CR 0.95 03/01/2020     (H) 03/01/2020    FLORES 9.5 03/01/2020    MAG 2.4 (H) 02/27/2020    ALBUMIN 4.1 03/01/2020    PROTTOTAL 7.6  "03/01/2020    ALT 49 03/01/2020    AST 39 03/01/2020    ALKPHOS 76 03/01/2020    BILITOTAL 0.6 03/01/2020    PTT 31 09/24/2007    INR 1.01 02/25/2020    TSH 2.67 05/15/2018       Preop Vitals  BP Readings from Last 3 Encounters:   03/01/20 (!) 154/94   02/29/20 116/61   02/11/20 132/82    Pulse Readings from Last 3 Encounters:   03/01/20 93   02/28/20 76   02/11/20 101      Resp Readings from Last 3 Encounters:   03/01/20 26   02/29/20 17   02/11/20 16    SpO2 Readings from Last 3 Encounters:   03/01/20 96%   02/29/20 97%   02/11/20 99%      Temp Readings from Last 1 Encounters:   03/01/20 97.6  F (36.4  C) (Oral)    Ht Readings from Last 1 Encounters:   02/25/20 1.854 m (6' 1\")      Wt Readings from Last 1 Encounters:   02/29/20 64.5 kg (142 lb 1.6 oz)    Estimated body mass index is 18.75 kg/m  as calculated from the following:    Height as of 2/25/20: 1.854 m (6' 1\").    Weight as of 2/29/20: 64.5 kg (142 lb 1.6 oz).       Anesthesia Plan      History & Physical Review  History and physical reviewed and following examination; no interval change.    ASA Status:  3 .    NPO Status:  > 8 hours    Plan for MAC Reason for MAC:  Chronic cardiopulmonary disease (G9)         Postoperative Care  Postoperative pain management:  IV analgesics.      Consents  Anesthetic plan, risks, benefits and alternatives discussed with:  Patient and Daughter/Son..                 Kamaljit Parisi MD                    .  "

## 2020-03-02 NOTE — ED NOTES
Pt unable to void. Catheter attempted x3 with no success. Some clots noted on the end. Urologist conacted and stated he is with a case and pt will be next. Family and pt updated.

## 2020-03-02 NOTE — ED NOTES
1st attempt with straight cath was not successful. Talked to Doc and doc wanted to try watson cath. 2nd attempt on inserting cath and still not successful. Pt states that when he was upstairs, nurses also had trouble getting cath in.

## 2020-03-02 NOTE — OP NOTE
Procedure Date: 03/01/2020      SURGEON:  Yany Yancey MD      ANESTHESIA:  Sedation.      PREOPERATIVE DIAGNOSIS:  Contusion and trauma to urethra.      POSTOPERATIVE DIAGNOSIS:  Contusion and trauma to urethra.      PROCEDURE:  Cystoscopy with clot evacuation and complex catheter insertion.      INDICATIONS:  Please see my preoperative consultation.      DESCRIPTION OF PROCEDURE:  The patient was brought to the operative suite and, after being given intravenous sedation, was placed in the dorsal lithotomy position with the genitalia prepped and draped in customary fashion.      A timeout was then called.      The 21-Zimbabwean cystoscope was then passed into the penile urethra.  The penile urethra was carefully examined.  The distal urethra was perfectly normal.  In the region of the sphincter in the proximal urethra, there was a significant amount of blood clot and anterior evidence of trauma to the mucosa.  I was able to pass the sphincter, where the contusion was seen, and once I saw the verumontanum, there was a fairly high median bar.  I was able to pass the instrument over the median bar where there was also some trauma and then into the bladder, where there were some clots seen and some stained blood.  Using a Jd syringe, I then evacuated some small clots from the bladder and was able wash it out carefully.  I then passed a guidewire through into the bladder and withdrew the cystoscope and then passed an 18-Zimbabwean Councill catheter over the wire until it was in the bladder, inflated the balloon with about 10 mL of fluid, withdrew the guidewire, and then I washed the catheter out with a catheter tip syringe until it was relatively clear and then connected it to drainage.  Plan will be to leave the catheter in place probably during this hospitalization.         YANY YANCEY MD             D: 03/02/2020   T: 03/02/2020   MT: KEMI      Name:     JAYDE RAMOS   MRN:      [id number]        Account:         [id number]   :      1933           Procedure Date: 2020      Document: [id number]

## 2020-03-02 NOTE — PROGRESS NOTES
"                                                            [facility]    Date: 3/2/20    Admission Dx: CHF exaberation    Pulmonary History: COPD, former smoker    Home Nebulizer/MDI Use:  Unknown no pharmacy note    Home Oxygen: none    Acuity Level (RCAT flow sheet): 3    Aerosol Therapy initiated: Duoneb QID, Albuterol Q6 prn    Pulmonary Hygiene initiated: coughing techniques    Volume Expansion initiated: incentive spirometry    Current Oxygen Requirements: 2L NC     Current SpO2: 94%    Re-evaluation date: 3/5/20    Patient Education: Will educate pt on the indications and benefits of nebulizer's as well as deep breathing and coughing techniques.    Ashley Cintron, RT on 3/2/2020 at 4:27 AM      See \"RT Assessments\" flow sheet for patient assessment scoring and Acuity Level Details.             "

## 2020-03-02 NOTE — PROGRESS NOTES
Transition Communication Hand-off for Care Transitions to Next Level of Care Provider    Name: Deshawn COBURN Markos  : 3/8/1933  MRN #: [id number]  Primary Care Provider: Tess Leigh  Primary Care MD Name: Dr Leigh  Primary Clinic: [address] [facility] [address]  Primary Care Clinic Name: [facility]  Reason for Hospitalization:  Coronary artery disease involving native coronary artery of native heart without angina pectoris [I25.10]  Pneumonia of right lung due to infectious organism, unspecified part of lung [J18.9]  Dyspnea, unspecified type [R06.00]  Admit Date/Time: 2020  6:37 AM  Discharge Date: 2020  Payor Source: Payor: MEDICARE / Plan: MEDICARE / Product Type: Medicare /     Readmission Assessment Measure (JANE) Risk Score/category: Average       Reason for Communication Hand-off Referral: Admission diagnoses: PN    Discharge Plan: Home with follow up.      Concern for non-adherence with plan of care: No     Discharge Needs Assessment:  Needs      Most Recent Value   Equipment Currently Used at Home  none        Follow-up specialty is recommended: Yes. Follow up with Cardiology as scheduled.     Follow-up plan:    Future Appointments   Date Time Provider Department Center   3/6/2020  1:30 PM [facility]   3/6/2020  2:30 PM Matt Alexander NP [facility]     Any outstanding tests or procedures:  No. Recommend a repeat BMP & CBC in 1 week.       Referrals     Future Labs/Procedures    Discharge Order: F/U with Cardiac  OSCAR     Discharge Order: F/U with Cardiac  OSCAR     Follow-Up with Cardiologist     CARDIAC REHAB REFERRAL     Process Instructions:    Advance to Wellness and Exercise for Life (WEL) Program or to another maintenance exercise program upon completion of phase 2 cardiac rehab.    Weight mgt program is only offered at [facility].    *This therapy referral will be filtered to a centralized scheduling office at [facility]  and the patient will receive a call to schedule an appointment at a [place] location most convenient for them. *        Comments:    Patient may choose their preference of the site for Cardiac Rehab.            Key Recommendations:  Pt was admitted with PNA and BNP 5301. His EF is 25%. He is being treated with IV lasix, antibiotics and cardiac Med management. He is being followed by Cardiology. He is currently deciding on angiogram vs med management. He is a funny, joking janes and not currently taking cardiac conversation seriously. He states he will make his own follow up appt. Anticipate he will discharge back home with his wife. He has a supportive family. He will need continued education on low Na diet and CHF plan of care.    Discharge recommendations: Follow up with Cardiology as recommended.     Dee England RN    Sent by Eva Grier RN, BSN, CPHN, CM    AVS/Discharge Summary is the source of truth; this is a helpful guide for improved communication of patient story

## 2020-03-02 NOTE — CONSULTS
History: Pleasure to see this pleasant 36-year-old gentleman in initial consultation today.  I have asked to see him because of difficulty inserting a catheter.  He has been seeing him because of dyspnea with a significant history of ischemic cardiomyopathy.  Patient is currently on Plavix.  He has 2 stents placed in the coronary arteries.  No discernible previous urologic history.    Past Medical History:   Diagnosis Date     CAD (coronary artery disease) 2004    2 cardiac stents      LBBB (left bundle branch block) 2/25/2020       Past Surgical History:   Procedure Laterality Date     HERNIA REPAIR  2007    right inguinal hernia repair     STENT, CORONARY, ANTIONE  2004    2 stents       Family History   Family history unknown: Yes       Social History     Socioeconomic History     Marital status:      Spouse name: Not on file     Number of children: Not on file     Years of education: Not on file     Highest education level: Not on file   Occupational History     Not on file   Social Needs     Financial resource strain: Not on file     Food insecurity:     Worry: Not on file     Inability: Not on file     Transportation needs:     Medical: Not on file     Non-medical: Not on file   Tobacco Use     Smoking status: Former Smoker     Smokeless tobacco: Never Used     Tobacco comment: Quit 10 years ago   Substance and Sexual Activity     Alcohol use: Yes     Alcohol/week: 0.0 standard drinks     Comment: Moderate     Drug use: No     Sexual activity: Not Currently   Lifestyle     Physical activity:     Days per week: Not on file     Minutes per session: Not on file     Stress: Not on file   Relationships     Social connections:     Talks on phone: Not on file     Gets together: Not on file     Attends Taoism service: Not on file     Active member of club or organization: Not on file     Attends meetings of clubs or organizations: Not on file     Relationship status: Not on file     Intimate partner violence:      Fear of current or ex partner: Not on file     Emotionally abused: Not on file     Physically abused: Not on file     Forced sexual activity: Not on file   Other Topics Concern     Parent/sibling w/ CABG, MI or angioplasty before 65F 55M? Not Asked   Social History Narrative     Not on file       No current outpatient medications on file.       Review Of Systems:  Skin: negative  Eyes: negative  Ears/Nose/Throat: negative  Respiratory: Dyspnea on exertion-   Cardiovascular: Ischemic heart disease  Gastrointestinal: negative  Genitourinary: negative  Musculoskeletal: negative  Neurologic: negative  Psychiatric: negative  Hematologic/Lymphatic/Immunologic: negative  Endocrine: negative    Exam:  BP (!) 140/81   Pulse 104   Temp 97.7  F (36.5  C) (Temporal)   Resp 20   SpO2 95%     General Impression: Pleasant gentleman who appears in moderate distress.  But conversational and otherwise well oriented.    Mental status a little anxious    HEENT: There is no clinical evidence of jaundice on examination of conjunctiva.  Extraocular eye movements normal.  Mucous membranes unremarkable    Skin: Skin otherwise normal to examination    Lymph Nodes: Not examined    Respiratory System: Respiratory cycle normal    Cardiovascular: No significant pitting peripheral edema    Abdominal: The abdomen is not obese there is some significant suprapubic tenderness at this time    Extremities: Extremities otherwise unremarkable    Back and Flank: Not examined    Genital: There is blood from the meatus but no other remarkable features    Rectal: Not examined    Neurologic: There are no focal abnormal clinical neurological signs in the central, peripheral nervous systems    Impression: The patient has had difficulties initiating urinary retention with well over 500 cc in the bladder and in considerable discomfort.  Because of the difficulties passing the catheter and the possibility of major contusion to the urethra I am going to do a  cystoscopy with sedation.  This will I help reduce the possibility of significant bleeding since he is on Plavix.  I would anticipate placing a catheter and leaving it in place for the duration of his hospitalization.  I carefully discussed the situation with the patient and his grandson in detail prior to the procedure.  I answered all pertinent questions.    Plan: Cystoscopy with proceed to determine what ever is taking place in the urethra and dealing with the situation either by dilatation or otherwise    Consultation also involve discussion with emergency room staff anesthesia staff other professional staff review of records pertinent labs and other studies and decision making

## 2020-03-02 NOTE — PHARMACY-ADMISSION MEDICATION HISTORY
Admission medication history interview status for this patient is complete. See [facility] admission navigator for allergy information, prior to admission medications and immunization status.     Medication history interview source(s):Patient  Medication history resources (including written lists, pill bottles, clinic record):None  Primary pharmacy:      Changes made to PTA medication list:  Added:   Deleted:   Changed:     Patient was just discharged 2 days ago- per patient no changes to meds since discharge so list is up to date    Actions taken by pharmacist (provider contacted, etc):none     Additional medication history information:None    Medication reconciliation/reorder completed by provider prior to medication history?  Yes     Do you take OTC medications (eg tylenol, ibuprofen, fish oil, eye/ear drops, etc)? Yes     For patients on insulin therapy: N (Y/N)      Prior to Admission medications    Medication Sig Last Dose Taking? Auth Provider   aspirin 81 MG EC tablet Take 81 mg by mouth daily 3/1/2020 at Unknown time Yes Reported, Patient   atorvastatin (LIPITOR) 40 MG tablet Take 20 mg by mouth At Bedtime 3/1/2020 at Unknown time Yes Unknown, Entered By History   clopidogrel (PLAVIX) 75 MG tablet Take 1 tablet (75 mg) by mouth daily 3/1/2020 at Unknown time Yes Rl Wright MD   lisinopril (ZESTRIL) 5 MG tablet Take 1 tablet (5 mg) by mouth daily 3/1/2020 at Unknown time Yes Rl Wright MD   metoprolol succinate ER (TOPROL-XL) 50 MG 24 hr tablet Take 1 tablet (50 mg) by mouth daily 3/1/2020 at 800 Yes Tess Leigh MD   multivitamin, therapeutic with minerals (THERA-VIT-M) TABS Take 1 tablet by mouth daily 3/1/2020 at Unknown time Yes Unknown, Entered By History   spironolactone (ALDACTONE) 25 MG tablet Take 0.5 tablets (12.5 mg) by mouth daily 3/1/2020 at Unknown time Yes Rl Wright MD   nitroGLYcerin (NITROSTAT) 0.4 MG sublingual tablet Place 1 tablet (0.4 mg)  under the tongue every 5 minutes as needed for chest pain Unknown at Unknown time  Rl Wright MD

## 2020-03-02 NOTE — H&P
[facility]    History and Physical - Hospitalist Service       Date of Admission:  3/1/2020    Assessment & Plan   Deshawn Burrows is a 86 year old male admitted on 3/1/2020. He has a past medical history of hypertension, coronary artery disease with previous stent placement, congestive heart failure, COPD, left bundle branch block, and dyslipidemia.  He returned to the emergency room tonight due to shortness of breath and urinary retention.    1.  Urinary retention.  Faustin catheter unable to be placed in emergency room.  Urology is taking the patient to operating room right now for further evaluation and likely Faustin catheter placement in operating room.    2.  Acute exacerbation of heart failure with reduced ejection fraction.  Had just left the hospital yesterday.  Give 1 dose IV furosemide.  Weigh daily.  Strict intake and output monitoring.  Restart spironolactone and metoprolol.  Hold lisinopril initially.  Likely able to restart lisinopril soon.    3.  Chronic kidney disease.  Avoid nephrotoxins as able.  Recheck metabolic panel tomorrow.    4.  Coronary artery disease.  With need for stent placement a few days ago.  Continue aspirin, clopidogrel, atorvastatin, and metoprolol.  Hold lisinopril initially.    5.  COPD.  No wheeze present when I listen to his lungs at this time.  Apparently did have wheezing present in the emergency room.  Was given a dose of prednisone.  Start scheduled duo nebs.  Have additional albuterol available if needed.  Reevaluate need for continued steroids tomorrow.    6.  Hyperlipidemia.  Continue atorvastatin.     Diet: Combination Diet Low Saturated Fat Na <2400mg Diet, No Caffeine Diet    DVT Prophylaxis: Pneumatic Compression Devices  Faustin Catheter: not present  Code Status: DNR/DNI      Disposition Plan   Expected discharge: 2 - 3 days, recommended to prior living arrangement     Godwin Murguia,   [facility]  Hospital    ______________________________________________________________________    Chief Complaint   Shortness of breath and difficulty urinating.    History is obtained from the patient    History of Present Illness   Deshawn Burrows is a 86 year old male who has a past medical history of hypertension, coronary artery disease with previous stent placement, congestive heart failure, COPD, left bundle branch block, and dyslipidemia.  He had just left the hospital yesterday after presenting with shortness of breath.  During his recent hospital stay, he was found to have new cardiomyopathy, acute congestive heart failure exacerbation, and coronary artery stenosis requiring PCI and stent placement.  He has been feeling better yesterday at time of discharge.  He began feeling short of breath again last night.  Continued to feel short of breath all night long.  Actually felt a little bit better with his breathing when he got up this morning.  Shortness of breath returned approximately 5 PM.  Also noted that he had not urinated much at all during the day.  Did not urinate much yesterday after a Faustin catheter had been removed.  Is not having any chest pain.  Does have an occasional cough.  Has not noted fevers or chills.  No other acute complaints.    Review of Systems    The 10 point Review of Systems is negative other than noted in the HPI     Past Medical History    I have reviewed this patient's medical history and updated it with pertinent information if needed.   Past Medical History:   Diagnosis Date     CAD (coronary artery disease) 2004    2 cardiac stents      LBBB (left bundle branch block) 2/25/2020       Past Surgical History   I have reviewed this patient's surgical history and updated it with pertinent information if needed.  Past Surgical History:   Procedure Laterality Date     HERNIA REPAIR  2007    right inguinal hernia repair     STENT, CORONARY, ANTIONE  2004    2 stents       Social History   I have  reviewed this patient's social history and updated it with pertinent information if needed.  Social History     Tobacco Use     Smoking status: Former Smoker     Smokeless tobacco: Never Used     Tobacco comment: Quit 10 years ago   Substance Use Topics     Alcohol use: Yes     Alcohol/week: 0.0 standard drinks     Comment: Moderate     Drug use: No       Family History   I have reviewed this patient's family history and updated it with pertinent information if needed.     Prior to Admission Medications   Prior to Admission Medications   Prescriptions Last Dose Informant Patient Reported? Taking?   aspirin 81 MG EC tablet 3/1/2020 at Unknown time  Yes Yes   Sig: Take 81 mg by mouth daily   atorvastatin (LIPITOR) 40 MG tablet Unknown at Unknown time  Yes No   Sig: Take 20 mg by mouth At Bedtime   clopidogrel (PLAVIX) 75 MG tablet 3/1/2020 at Unknown time  No Yes   Sig: Take 1 tablet (75 mg) by mouth daily   lisinopril (ZESTRIL) 5 MG tablet 3/1/2020 at Unknown time  No Yes   Sig: Take 1 tablet (5 mg) by mouth daily   metoprolol succinate ER (TOPROL-XL) 50 MG 24 hr tablet 3/1/2020 at 800  No Yes   Sig: Take 1 tablet (50 mg) by mouth daily   multivitamin, therapeutic with minerals (THERA-VIT-M) TABS 3/1/2020 at Unknown time Self Yes Yes   Sig: Take 1 tablet by mouth daily   nitroGLYcerin (NITROSTAT) 0.4 MG sublingual tablet Unknown at Unknown time  No No   Sig: Place 1 tablet (0.4 mg) under the tongue every 5 minutes as needed for chest pain   spironolactone (ALDACTONE) 25 MG tablet 3/1/2020 at Unknown time  No Yes   Sig: Take 0.5 tablets (12.5 mg) by mouth daily      Facility-Administered Medications: None     Allergies   Allergies   Allergen Reactions     Penicillins        Physical Exam   Vital Signs: Temp: 97.7  F (36.5  C) Temp src: Temporal BP: (!) 140/81 Pulse: 104 Heart Rate: 102 Resp: 20 SpO2: 95 % O2 Device: None (Room air)    Weight: 0 lbs 0 oz    Gen:  NAD, A&Ox3.  Eyes:  PERRL, sclera anicteric.  OP:  MMM,  no lesions.  Neck:  Supple.  CV:  Regular, no murmurs.  Lung:  Crackles in lower fields b/l, normal effort.  Ab:  +BS, soft.  Skin:  Warm, dry to touch.  No rash.  Ext:  1+ pitting edema LE b/l.      Data   Data reviewed today: I reviewed all medications, new labs and imaging results over the last 24 hours. I personally reviewed the EKG tracing showing Sinus rhythm with left bundle branch block.  Left bundle branch block is unchanged from previous EKGs.    Recent Labs   Lab 03/01/20  1805 03/01/20  1803 02/29/20  0739 02/27/20  2323  02/26/20  0625 02/25/20  0705 02/25/20  0654   WBC  --  8.7 8.3  --   --  7.9  --  9.6   HGB  --  13.0* 11.9*  --   --  13.5  --  14.8   MCV  --  98 99  --   --  98  --  98   PLT  --  252 190  --   --  230  --  232   INR  --   --   --   --   --   --   --  1.01   NA  --  139 140 138   < > 139  --  141   POTASSIUM  --  4.2 4.3 3.9   < > 3.8  --  4.0   CHLORIDE  --  108 111* 105   < > 108  --  110*   CO2  --  27 26 28   < > 27  --  27   BUN  --  24 29 44*   < > 37*  --  32*   CR  --  0.95 0.91 1.16   < > 1.11  --  0.97   ANIONGAP  --  4 3 5   < > 4  --  4   FLORES  --  9.5 8.6 9.0   < > 9.2  --  9.6   GLC  --  113* 84 98   < > 93  --  118*   ALBUMIN  --  4.1  --   --   --   --   --   --    PROTTOTAL  --  7.6  --   --   --   --   --   --    BILITOTAL  --  0.6  --   --   --   --   --   --    ALKPHOS  --  76  --   --   --   --   --   --    ALT  --  49  --   --   --   --   --   --    AST  --  39  --   --   --   --   --   --    TROPI  --  0.126*  --   --   --   --   --   --    TROPONIN 0.09*  --   --   --   --   --  0.01  --     < > = values in this interval not displayed.

## 2020-03-02 NOTE — PLAN OF CARE
Patient has no complaints today.  On tele, SR with BBB, inverted T waves and some ST depression.  There was a 6 beat run of V tach reported per tele tech, under parameters for notification.  Pt has low appetite, taking in 250cc last eight hours.  Urine output is bloody/raison colored, total output was 150cc last eight hours.  BP is low-normal, will continue to monitor.  Pt is steady with SBA in hallways.  His heart rate increased to 120s when ambulating.

## 2020-03-02 NOTE — PLAN OF CARE
Patient denies pain  Faustin in, draining red-clear output  VSS  Maintains saturation on 2LPM  Tele: SR with a BBB and inverted T waves

## 2020-03-02 NOTE — ANESTHESIA POSTPROCEDURE EVALUATION
Patient: Deshawn Burrows    Procedure(s):  CYSTOSCOPY    Diagnosis:* No pre-op diagnosis entered *  Diagnosis Additional Information: No value filed.    Anesthesia Type:  MAC    Note:  Anesthesia Post Evaluation    Patient location during evaluation: Bedside  Patient participation: Able to fully participate in evaluation  Level of consciousness: awake  Pain management: adequate  Airway patency: patent  Cardiovascular status: acceptable  Respiratory status: acceptable  Hydration status: acceptable  PONV: controlled     Anesthetic complications: None          Last vitals:  Vitals:    03/01/20 2211 03/01/20 2315 03/01/20 2320   BP: (!) 140/81 111/72 116/64   Pulse:  107 101   Resp: 20 19 19   Temp: 97.7  F (36.5  C)  97  F (36.1  C)   SpO2: 95% 96% 98%         Electronically Signed By: Kamaljit Parisi MD  March 1, 2020  11:29 PM

## 2020-03-02 NOTE — ED NOTES
Patient oxygen saturation at 82 % RA. Placed on oxy mask on 15L oxygen saturation at 95%. Weaned to 10L per Mask. MD Gonzalez notifed

## 2020-03-02 NOTE — PROGRESS NOTES
[facility] Progress Note          Assessment and Plan:   Active Problems:    CHF (congestive heart failure) (H)    Assessment: POD 1 cysto, complex Faustin placement    Plan: Faustin to remain for 7 days. Continue Flomax. Will arrange outpt follow up for trial of void with our nurse clinic.     Urine should clear in the next 1-2 days given trauma and need for complex cath placement. Consider resumption of ASA and Plavix once urine is more clear (hold another 2-3 days, if able).       Will sign off. Please call if questions.       Dana Kendall PA-C  [facility]  [phone]               Interval History:   Faustin draining and urine merlot, no clots.               Review of Systems:   The 5 point Review of Systems is negative other than noted in the HPI             Medications:     Current Facility-Administered Medications Ordered in Epic   Medication Dose Route Frequency Last Rate Last Dose     acetaminophen (TYLENOL) Suppository 650 mg  650 mg Rectal Q4H PRN         acetaminophen (TYLENOL) tablet 650 mg  650 mg Oral Q4H PRN         albuterol (PROVENTIL) neb solution 2.5 mg  2.5 mg Nebulization Q6H PRN         aspirin EC tablet 81 mg  81 mg Oral Daily         atorvastatin (LIPITOR) tablet 20 mg  20 mg Oral At Bedtime         bisacodyl (DULCOLAX) Suppository 10 mg  10 mg Rectal Daily PRN         clopidogrel (PLAVIX) tablet 75 mg  75 mg Oral Daily         ipratropium - albuterol 0.5 mg/2.5 mg/3 mL (DUONEB) neb solution 3 mL  3 mL Nebulization 4x daily   3 mL at 03/02/20 0818     lidocaine (LMX4) cream   Topical Q1H PRN         lidocaine (LMX4) cream   Topical Q1H PRN         lidocaine (XYLOCAINE) 2 % external gel 6 mL  6 mL Urethral Once         lidocaine 1 % 0.1-1 mL  0.1-1 mL Other Q1H PRN         lidocaine 1 % 0.1-1 mL  0.1-1 mL Other Q1H PRN         melatonin tablet 1 mg  1 mg Oral At Bedtime PRN         metoprolol succinate ER (TOPROL-XL) 24 hr tablet 50 mg  50 mg Oral Daily   Stopped  at 03/02/20 0847     naloxone (NARCAN) injection 0.1-0.4 mg  0.1-0.4 mg Intravenous Q2 Min PRN         nitroGLYcerin (NITROSTAT) sublingual tablet 0.4 mg  0.4 mg Sublingual Q5 Min PRN         polyethylene glycol (MIRALAX) Packet 17 g  17 g Oral Daily PRN         senna-docusate (SENOKOT-S/PERICOLACE) 8.6-50 MG per tablet 1 tablet  1 tablet Oral BID PRN        Or     senna-docusate (SENOKOT-S/PERICOLACE) 8.6-50 MG per tablet 2 tablet  2 tablet Oral BID PRN         sodium chloride (PF) 0.9% PF flush 3 mL  3 mL Intracatheter q1 min prn         sodium chloride (PF) 0.9% PF flush 3 mL  3 mL Intracatheter Q8H         sodium chloride (PF) 0.9% PF flush 3 mL  3 mL Intracatheter q1 min prn         sodium chloride (PF) 0.9% PF flush 3 mL  3 mL Intracatheter Q8H   3 mL at 03/02/20 0541     spironolactone (ALDACTONE) half-tab 12.5 mg  12.5 mg Oral Daily         tamsulosin (FLOMAX) capsule 0.4 mg  0.4 mg Oral Daily         No current [facility]-ordered outpatient medications on file.                  Physical Exam:   Vitals were reviewed  Patient Vitals for the past 8 hrs:   BP Temp Temp src Pulse Heart Rate Resp SpO2   03/02/20 0818 -- -- -- -- 75 16 99 %   03/02/20 0721 98/50 96.8  F (36  C) Oral 78 -- 15 97 %   03/02/20 0334 107/55 -- -- -- 85 18 --     GEN: NAD, in bed  EYES: EOMI  MOUTH: MMM  NECK: Supple  RESP: Unlabored breathing  CARDIAC: No LE edema  SKIN: Warm  ABD: soft  NEURO: AAO  : Merlot, no clots.            Data:     Lab Results   Component Value Date    NTBNPI 6,111 (H) 03/01/2020    NTBNPI 5,301 (H) 02/25/2020    NTBNPI 168 09/24/2007     Lab Results   Component Value Date    WBC 8.7 03/01/2020    WBC 8.3 02/29/2020    WBC 7.9 02/26/2020    HGB 13.0 (L) 03/01/2020    HGB 11.9 (L) 02/29/2020    HGB 13.5 02/26/2020    HCT 40.9 03/01/2020    HCT 37.0 (L) 02/29/2020    HCT 41.7 02/26/2020    MCV 98 03/01/2020    MCV 99 02/29/2020    MCV 98 02/26/2020     03/01/2020     02/29/2020     02/26/2020      Lab Results   Component Value Date    INR 1.01 02/25/2020    INR 1.28 (H) 09/24/2007    INR 1.15 (H) 09/16/2006

## 2020-03-02 NOTE — ANESTHESIA CARE TRANSFER NOTE
Patient: Deshawn Burrows    Procedure(s):  CYSTOSCOPY    Diagnosis: * No pre-op diagnosis entered *  Diagnosis Additional Information: No value filed.    Anesthesia Type:   MAC     Note:  Airway :Face Mask  Patient transferred to:PACU  Handoff Report: Identifed the Patient, Identified the Reponsible Provider, Reviewed the pertinent medical history, Discussed the surgical course, Reviewed Intra-OP anesthesia mangement and issues during anesthesia, Set expectations for post-procedure period and Allowed opportunity for questions and acknowledgement of understandingpost-procedure handoff checklist not completed for medical reasons      Vitals: (Last set prior to Anesthesia Care Transfer)    CRNA VITALS  3/1/2020 2240 - 3/1/2020 2314      3/1/2020             NIBP:  112/72    Pulse:  107    NIBP Mean:  84    SpO2:  96 %                Electronically Signed By: Dean Dennis Severson, APRN CRNA  March 1, 2020  11:14 PM

## 2020-03-03 ENCOUNTER — TELEPHONE (OUTPATIENT)
Dept: CARDIOLOGY | Facility: CLINIC | Age: 85
End: 2020-03-03

## 2020-03-03 LAB
ANION GAP SERPL CALCULATED.3IONS-SCNC: 5 MMOL/L (ref 3–14)
BUN SERPL-MCNC: 29 MG/DL (ref 7–30)
CALCIUM SERPL-MCNC: 9.1 MG/DL (ref 8.5–10.1)
CHLORIDE SERPL-SCNC: 108 MMOL/L (ref 94–109)
CO2 SERPL-SCNC: 28 MMOL/L (ref 20–32)
CREAT SERPL-MCNC: 1.07 MG/DL (ref 0.66–1.25)
ERYTHROCYTE [DISTWIDTH] IN BLOOD BY AUTOMATED COUNT: 13 % (ref 10–15)
GFR SERPL CREATININE-BSD FRML MDRD: 62 ML/MIN/{1.73_M2}
GLUCOSE SERPL-MCNC: 101 MG/DL (ref 70–99)
HCT VFR BLD AUTO: 38.2 % (ref 40–53)
HGB BLD-MCNC: 12.4 G/DL (ref 13.3–17.7)
INTERPRETATION ECG - MUSE: NORMAL
MCH RBC QN AUTO: 31.1 PG (ref 26.5–33)
MCHC RBC AUTO-ENTMCNC: 32.5 G/DL (ref 31.5–36.5)
MCV RBC AUTO: 96 FL (ref 78–100)
PLATELET # BLD AUTO: 233 10E9/L (ref 150–450)
POTASSIUM SERPL-SCNC: 3.7 MMOL/L (ref 3.4–5.3)
RBC # BLD AUTO: 3.99 10E12/L (ref 4.4–5.9)
SODIUM SERPL-SCNC: 141 MMOL/L (ref 133–144)
TROPONIN I SERPL-MCNC: 0.08 UG/L (ref 0–0.04)
TROPONIN I SERPL-MCNC: 0.1 UG/L (ref 0–0.04)
WBC # BLD AUTO: 7.9 10E9/L (ref 4–11)

## 2020-03-03 PROCEDURE — 85027 COMPLETE CBC AUTOMATED: CPT | Performed by: INTERNAL MEDICINE

## 2020-03-03 PROCEDURE — 84484 ASSAY OF TROPONIN QUANT: CPT | Performed by: INTERNAL MEDICINE

## 2020-03-03 PROCEDURE — 99232 SBSQ HOSP IP/OBS MODERATE 35: CPT | Performed by: INTERNAL MEDICINE

## 2020-03-03 PROCEDURE — 25000132 ZZH RX MED GY IP 250 OP 250 PS 637: Mod: GY | Performed by: INTERNAL MEDICINE

## 2020-03-03 PROCEDURE — 36415 COLL VENOUS BLD VENIPUNCTURE: CPT | Performed by: INTERNAL MEDICINE

## 2020-03-03 PROCEDURE — 99233 SBSQ HOSP IP/OBS HIGH 50: CPT | Performed by: INTERNAL MEDICINE

## 2020-03-03 PROCEDURE — 25000132 ZZH RX MED GY IP 250 OP 250 PS 637: Mod: GY | Performed by: UROLOGY

## 2020-03-03 PROCEDURE — 80048 BASIC METABOLIC PNL TOTAL CA: CPT | Performed by: INTERNAL MEDICINE

## 2020-03-03 PROCEDURE — 12000000 ZZH R&B MED SURG/OB

## 2020-03-03 RX ORDER — NALOXONE HYDROCHLORIDE 0.4 MG/ML
.1-.4 INJECTION, SOLUTION INTRAMUSCULAR; INTRAVENOUS; SUBCUTANEOUS
Status: DISCONTINUED | OUTPATIENT
Start: 2020-03-03 | End: 2020-03-03

## 2020-03-03 RX ADMIN — METOPROLOL SUCCINATE 50 MG: 50 TABLET, EXTENDED RELEASE ORAL at 08:36

## 2020-03-03 RX ADMIN — LISINOPRIL 5 MG: 5 TABLET ORAL at 08:36

## 2020-03-03 RX ADMIN — Medication 12.5 MG: at 08:36

## 2020-03-03 RX ADMIN — CLOPIDOGREL BISULFATE 75 MG: 75 TABLET ORAL at 08:36

## 2020-03-03 RX ADMIN — ATORVASTATIN CALCIUM 20 MG: 20 TABLET, FILM COATED ORAL at 21:37

## 2020-03-03 RX ADMIN — Medication 10 MG: at 08:36

## 2020-03-03 RX ADMIN — TAMSULOSIN HYDROCHLORIDE 0.4 MG: 0.4 CAPSULE ORAL at 08:36

## 2020-03-03 RX ADMIN — ASPIRIN 81 MG: 81 TABLET, COATED ORAL at 08:37

## 2020-03-03 ASSESSMENT — ACTIVITIES OF DAILY LIVING (ADL)
ADLS_ACUITY_SCORE: 13

## 2020-03-03 NOTE — PLAN OF CARE
Pt is alert and oriented x4 and making needs known, VSS, afebrile, denies pain, denies shortness of breaths this shift. Faustin catheter patent and intact, urine  Color is dark bloody color. Pt is SBA assist, steady gait. Plan is to continue flomax, daily lasix, and follow up with outpatient urology.Bed alarm in place and call light is within reach.

## 2020-03-03 NOTE — PROGRESS NOTES
[facility]             Hospitalist Progress Note    Name: Deshawn Burrows    MRN: [id number]  YOB: 1933    Age: 86 year old  Date of admission: 3/1/2020  Primary care provider: Tess Leigh      Reason for Stay (Diagnosis): Shortness of breath/urinary retention         Assessment and Plan:      Summary of Stay: Patient is a pleasant 86-year-old male with supportive family who was admitted on 3/1/2020 with a past medical history significant for ASCVD status post very recent PCI and stenting of his LAD on 2/28/2020 on aspirin and Plavix appropriately and other conventional medications, hypertension, CHF, COPD, chronic left bundle branch block, and dyslipidemia who presented with shortness of breath with exertion.  Notably, patient was just discharged the day prior to admission.  On presentation, he was given Lasix and then develop significant urinary retention.  In the emergency room, it was difficult to place a Faustin catheter by the nursing staff and urology was consulted to place a coudé catheter without complications.  He did have notable gross hematuria without clots however.    Problem List/Plan:    Shortness of breath: Suspect secondary to mild exacerbation of his chronic systolic congestive heart failure with reduced ejection fraction plus or minus COPD exacerbation although no wheezing clinically.  Shortly after initial dose of IV Lasix, patient is on room air, ambulating in the hallways with standby assist and breathing feels near baseline.  Wonder if patient may have been a bit volume overloaded during the last admission.  No clinical signs to suggest an ACS.  - We will give Lasix 20 mg p.o. x1 and then 10 mg p.o. daily starting tomorrow.  May just require short-term diuretics to make patient euvolemic.  - If shortness of breath with exertion persist or chest pain develops, consider working up for an anginal equivalent.  Despite duration of symptoms,  initial troponin was negative.  -May have duo nebs PRN    Urinary retention/gross hematuria: This is the secondary problem and not the first.  This developed while the patient was in the emergency room after getting diuretics.  Apparently, he has not had problems in the past with voiding according to patient and family.  -Urology consultation and assistance greatly appreciated.  Will plan on discharge with Faustin and voiding trial in the clinic after a week.  Do note that they would ideally like to hold aspirin and Plavix until urine clears up but given recent PCI, at high risk for in-stent restenosis.  Will try to restart aspirin Plavix tomorrow.  -Continue Flomax  -Outpatient urology follow-up will be arranged by urology.  They have signed off.  Consider cystoscopy down the road if patient has problems with persistent gross hematuria.    ASCVD, notably recent PCI and stenting of the LAD just 3 days ago on 2/28/2020: Chest pain-free and does not have his usual angina that brought him in during last admission.  -Continue conventional medications which includes Lipitor 20 mg p.o. daily, lisinopril 5 mg p.o. daily, Toprol-XL 50 mg p.o. daily, and Aldactone 12.5 mg p.o. daily  - Currently holding baby aspirin and Plavix today due to gross hematuria but will plan on restarting tomorrow if urine clears up given recent PCI/stenting of his LAD chest just distal to his previous LAD stent    History of COPD: No evidence of acute exacerbation  -Continue duo nebs on a as needed basis  -Do note that patient was given prednisone 40 mg x 1 in the ED    Congestive heart failure with reduced ejection fraction: New diagnosis last admission.  LVEF reduced at 25% on echocardiogram and previously had been 45 to 50% on stress echocardiogram back in February 2017.  -Plan for diuresis as above.  Otherwise, continue medical management as above  -May or may not need chronic diuretics    Dyslipidemia: Continue statin      DVT Prophylaxis:  "Pneumatic Compression Devices  Code Status: Full Code  Discharge Dispo: Back to usual prior living arrangement home with wife but after talking with grandson and daughter, we all agree that patient would benefit from at least home care nursing to do a home safety assessment at discharge as patient's wife may need some assistance also.  Estimated Disch Date / # of Days until Disch: Possibly tomorrow if patient is more euvolemic and asymptomatic with ambulation.        Interval History (Subjective):      Denies any shortness of breath at rest.  Actually went and walked around quite a bit in the hallways without shortness of breath or chest pain.         Physical Exam:      Vital signs:  Temp: 98.2  F (36.8  C) Temp src: Oral BP: 114/54 Pulse: 78 Heart Rate: 93 Resp: 20 SpO2: 98 % O2 Device: None (Room air) Oxygen Delivery: 2 LPM Height: 185.4 cm (6' 1\") Weight: 57.6 kg (127 lb)  Estimated body mass index is 16.76 kg/m  as calculated from the following:    Height as of this encounter: 1.854 m (6' 1\").    Weight as of this encounter: 57.6 kg (127 lb).    I/O last 3 completed shifts:  In: 516.67 [P.O.:250; I.V.:266.67]  Out: 1150 [Urine:1150]  Vitals:    03/02/20 0849   Weight: 57.6 kg (127 lb)       Constitutional: Awake, alert, cooperative, no apparent distress     Respiratory: Nl work of breathing. Clear to auscultation bilaterally, no crackles or wheezing   Cardiovascular: Regular rate and rhythm, normal S1 and S2, and no murmur noted   Genitourinary  coudé catheter in place with no leakage.  Faustin draining gross hematuria which is grapelike in color   Skin: No rashes, no cyanosis, dry to touch   Neuro: CN 2-12 intact, no localizing weakness   Extremities: No edema, normal range of motion   HEENT Normocephalic, atraumatic, normal nasal turbinates; oropharynx clear   Neck Supple; nl inspection; trachea midline; no thryomegaly   Psychiatric: A+O x3. Normal affect          Medications:      All current medications were " reviewed with changes reflected in problem list.         Data:      All new lab and imaging data was reviewed.   Labs:  Recent Labs   Lab 03/02/20 1839 03/01/20 1803 02/29/20  0739 02/26/20  0625   WBC  --  8.7 8.3 7.9   HGB 13.3 13.0* 11.9* 13.5   HCT  --  40.9 37.0* 41.7   MCV  --  98 99 98   PLT  --  252 190 230     Recent Labs   Lab 03/02/20 1839 03/02/20  0705 03/01/20 1803 02/27/20  2323  02/25/20  0654    139 139   < > 138   < > 141   POTASSIUM 4.0 4.5 4.2   < > 3.9   < > 4.0   CHLORIDE 105 108 108   < > 105   < > 110*   CO2 26 25 27   < > 28   < > 27   ANIONGAP 6 6 4   < > 5   < > 4   * 142* 113*   < > 98   < > 118*   BUN 31* 24 24   < > 44*   < > 32*   CR 1.07 0.95 0.95   < > 1.16   < > 0.97   GFRESTIMATED 62 71 72   < > 56*   < > 70   GFRESTBLACK 72 83 83   < > 65   < > 81   FLORES 9.0 8.9 9.5   < > 9.0   < > 9.6   MAG  --   --   --   --  2.4*  --  2.3   PROTTOTAL  --   --  7.6  --   --   --   --    ALBUMIN  --   --  4.1  --   --   --   --    BILITOTAL  --   --  0.6  --   --   --   --    ALKPHOS  --   --  76  --   --   --   --    AST  --   --  39  --   --   --   --    ALT  --   --  49  --   --   --   --     < > = values in this interval not displayed.      Imaging:   Recent Results (from the past 24 hour(s))   CT Chest Pulmonary Embolism w Contrast    Narrative    EXAM: CT CHEST PULMONARY EMBOLISM W CONTRAST  LOCATION: [facility]  DATE/TIME: 3/1/2020 6:59 PM    INDICATION: Shortness of breath  COMPARISON: 05/14/2016  TECHNIQUE: CT angiogram chest during arterial phase injection IV contrast. 2D and 3D MIP reconstructions were performed by the CT technologist. Dose reduction techniques were used.   CONTRAST:  54mL Isovue-370    FINDINGS:  ANGIOGRAM CHEST: Pulmonary arteries are normal caliber and negative for pulmonary emboli. Thoracic aorta is negative for dissection in the ascending segment or proximal descending, contrast timing limits assessment of distal descending thoracic  aorta. No   CT evidence of right heart strain.    LUNGS AND PLEURA: Small to moderate bilateral simple pleural effusions. Severe emphysema. Scattered calcified granulomas. There is some smooth septal thickening in the bases, likely due to interstitial edema. Mild dependent atelectasis in the lung bases.   No significant lung consolidation. Small linear scar like nodule measuring 6 x 3 mm right upper lung image #47 series 8, is adjacent to a calcified granuloma and not significantly changed, considered benign. Apical pleural-parenchymal scarring   bilaterally.    MEDIASTINUM/AXILLAE: Normal heart size. Moderate coronary artery calcification. No pericardial effusion. Normal esophagus. No adenopathy. Calcified lymph nodes noted bilaterally at the lower hilum and in the mediastinum.    UPPER ABDOMEN: Calcified granulomas in the liver and spleen. Unchanged simple appearing upper pole renal cysts. No follow-up needed.    MUSCULOSKELETAL: Degenerative changes in the spine. No fracture or significant osseous lesion.      Impression    IMPRESSION:  1.  No PE.    2.  Severe emphysema. There is interstitial edema and symmetric simple small to moderate bilateral pleural effusions are noted, which may be related to CHF, although heart size is normal. There is moderate coronary artery calcification noted.    3.  Evidence of previous granulomatous disease.       Ranjith Giordano -[phone]

## 2020-03-03 NOTE — CONSULTS
Care Transition Initial Assessment - RN        Met with: Patient and Family.  DATA   Active Problems:    CHF (congestive heart failure) (H)       Cognitive Status: awake, alert and oriented.  Primary Care Clinic Name: [facility]  Primary Care MD Name: Dr Leigh  Contact information and PCP information verified: Yes  Lives With: spouse   Living Arrangements: house  Quality of Family Relationships: helpful, involved, supportive  Description of Support System: Supportive, Involved   Who is your support system?: Wife, Children   Support Assessment: Adequate family and caregiver support   Insurance concerns: No Insurance issues identified  ASSESSMENT  Patient currently receives the following services:  Pt lives at home with his wife. They have no home services.        Identified issues/concerns regarding health management: CHF compliance at home  CTS following for readmission, CHF diagnosis and discharge planning. Pt was admitted from 2/25-2/29. He discharged home with his wife and was readmitted on same day for shortness of breath and urine retention. His BNP on admit was 6111 which was up from prior admission 5301. He received IV lasix in ER and is now on PO lasix. He also had urine retention in the ER and has a watson which was placed by Urology in OR. Met with pt, wife and family members in room to discuss hospitalization and plan of care for discharge. They verified that pt discharged home with his wife on Sat and was readmitted with SOB and urine retention. Per Urology, plan will be for pt to discharge home with watson catheter and follow up with Urology for trial void. We discussed plan with watson at home and use of leg bag. We also discussed possibility of Home Care nursing to follow for needs at home post discharge. Pt has not used home care in the past and is agreeable to have HC RN follow. Pt follows with Dr Leigh at [facility] so he would like to use [facility] if needed. Pt/family was not given the  Medicare Compare list for Home Care, with associated star ratings to assist with choice for referrals/discharge planning because he wanted to use [facility]. Education was given to pt/family that star ratings are updated/maintained by Medicare and can be reviewed by visiting www.medicare.gov Yes    Pt's family member expressed concerns about pt's SOB prior to admission. We had a discussion about CHF and recommendation for low Na diet. Pt does not like the low Na diet but family and wife are working on getting him to follow it at home. We reviewed Low Na recommendations and salt substitute options. They said they received low Na diet education from Dietician last week and don't feel they need further education at this time. We also discussed how this may help prevent further readmissions. Pt has follow up scheduled with Cardiology on 3/6.       PLAN  Patient/family is agreeable to the plan?  Yes  Patient anticipates discharging to home with wife with possible home care RN .        Patient anticipates needs for home equipment: Yes, pt will d/c home with watson  Transportation/person available to transport on day of discharge  is family.     Plan/Disposition: Home   Appointments:   1. 3/6 at 2:30 with Aric Alexander NP at [facility]    2. 3/9 1:30 with Urology Nurse  3. PCP-- pt states he will make his own follow up with Dr Leigh    Care  (CTS) will continue to follow as needed.    Dee England RN BSN CM  Inpatient Care Coordination  [facility]  [phone]

## 2020-03-03 NOTE — PROGRESS NOTES
[facility]    Hospitalist Progress Note  Name: Deshawn Burrows    MRN: [id number]  Provider: Elda Yang MD  Date of Service: 03/03/2020    Assessment & Plan   Summary of Stay: Deshawn Burrows is a 86 year old male who was admitted on 3/1/2020 with dyspnea on exertion.  Patient with known history of coronary artery disease past medical history significant for atherosclerotic cardiovascular disease status post recent PCI stenting of his LAD on 2/28/2020 on aspirin and Plavix, hypertension, CHF, COPD, chronic left bundle branch block and dyslipidemia.  He presented with dyspnea on exertion and continues to have intermittent episodes of dyspnea on exertion since his recent discharge from hospital.  In the emergency room patient also had episode of urinary retention for which Faustin catheter was placed.      Dyspnea on exertion and shortness of breath  -Likely secondary to mild exacerbation of chronic heart failure which improved with IV Lasix initially  -Continues to have dyspnea on exertion even after treatment with diuretics  -There is slight troponin leak which is understandable from recent angioplasty  -Last echo showed poor EF  -Family is concerned about these episodes of dyspnea on exertion periodically.  Will have cardiology review to see if there is any anginal equivalency and if it would benefit from optimization of medications  -Overnight telemetry did have runs of PVCs.  Symptoms could be related to arrhythmias.  We will continue to monitor on telemetry  -Continue Lasix 10 mg daily  -Continue all other cardiac medication  -Monitor with daily weights I's and O's  -Clinically does not have any bronchospasm.  Doubt COPD  -Continue Lipitor 20 mg daily, lisinopril 5 mg daily, Toprol XL 50 mg daily, Aldactone 12.5 mg daily.      Urinary retention and gross hematuria  -Now has indwelling Faustin catheter  -Patient will have void trial in 1 week in urology clinic after discharge.  -Started on  Flomax  -Outpatient urology follow-up    History of COPD: No evidence of acute exacerbation  -Continue duo nebs on a as needed basis  -Do note that patient was given prednisone 40 mg x 1 in the ED     Congestive heart failure with reduced ejection fraction: New diagnosis last admission.  LVEF reduced at 25% on echocardiogram and previously had been 45 to 50% on stress echocardiogram back in February 2017.  -Plan for diuresis as above.  Otherwise, continue medical management as above  -May or may not need chronic diuretics     Dyslipidemia: Continue statin      DVT Prophylaxis: Pneumatic Compression Devices  Code Status: DNR/DNI    Disposition: Expected discharge in 1-2 days to home if asymptomatic      Interval History   Assumed care reviewed chart.  Patient denies any pain at this time however earlier when he walked he did develop some trouble breathing.  He has been having dyspnea on exertion even at rest as noted by family and are concerned about his symptoms.  He has been having the symptoms since recent discharge from hospital for coronary artery disease.  Review of all the other symptoms are negative.    -Data reviewed today: I reviewed all new labs and imaging reports over the last 24 hours. I personally reviewed no images or EKG's today.    Physical Exam   Temp: 96.5  F (35.8  C) Temp src: Oral BP: 120/69 Pulse: 87 Heart Rate: 82 Resp: 18 SpO2: 97 % O2 Device: None (Room air)    Vitals:    03/02/20 0849   Weight: 57.6 kg (127 lb)     Vital Signs with Ranges  Temp:  [95.8  F (35.4  C)-98.2  F (36.8  C)] 96.5  F (35.8  C)  Pulse:  [87] 87  Heart Rate:  [65-95] 82  Resp:  [18-20] 18  BP: (101-143)/(51-72) 120/69  SpO2:  [95 %-98 %] 97 %  I/O last 3 completed shifts:  In: 250 [P.O.:250]  Out: 1350 [Urine:1350]      GEN:  Alert, oriented x 3, appears comfortable, NAD.  HEENT:  Normocephalic/atraumatic, no scleral icterus, no nasal discharge, mouth dry, hard of hearing.  CV:  Regular rate and rhythm, soft systolic  murmur S1 + S2 noted, no S3 or S4.  LUNGS:  Clear to auscultation bilaterally without rales/rhonchi/wheezing/retractions.  Symmetric chest rise on inhalation noted.  ABD:  Active bowel sounds, soft, non-tender/non-distended.  No rebound/guarding/rigidity.  EXT:  No edema.    SKIN:  Dry to touch,    Medications       aspirin  81 mg Oral Daily     atorvastatin  20 mg Oral At Bedtime     clopidogrel  75 mg Oral Daily     furosemide  10 mg Oral Daily     lidocaine  6 mL Urethral Once     lisinopril  5 mg Oral Daily     metoprolol succinate ER  50 mg Oral Daily     sodium chloride (PF)  3 mL Intracatheter Q8H     spironolactone  12.5 mg Oral Daily     tamsulosin  0.4 mg Oral Daily     Data     Recent Labs   Lab 03/03/20  0632 03/02/20 1839 03/01/20 1803 02/29/20  0739   WBC 7.9  --  8.7 8.3   HGB 12.4* 13.3 13.0* 11.9*   HCT 38.2*  --  40.9 37.0*   MCV 96  --  98 99     --  252 190     Recent Labs   Lab 03/03/20  0632 03/02/20 1839 03/02/20  0705    137 139   POTASSIUM 3.7 4.0 4.5   CHLORIDE 108 105 108   CO2 28 26 25   ANIONGAP 5 6 6   * 102* 142*   BUN 29 31* 24   CR 1.07 1.07 0.95   GFRESTIMATED 62 62 71   GFRESTBLACK 72 72 83   FLORES 9.1 9.0 8.9     No results for input(s): CULT in the last 168 hours.  Recent Labs   Lab 03/01/20 1803 02/25/20  1252   NTBNPI 6,111* 5,301*     Recent Labs   Lab 03/01/20  1803   AST 39   ALT 49   ALKPHOS 76   BILITOTAL 0.6     No results for input(s): INR in the last 168 hours.  No results for input(s): LACT in the last 168 hours.  Recent Labs   Lab 02/28/20  1641   CHOL 89   HDL 34*   LDL 48   TRIG 36     No results for input(s): TSH in the last 168 hours.  Recent Labs   Lab 03/03/20  0632 03/02/20 1839 03/02/20  0016 03/01/20  1805   TROPONIN  --   --   --  0.09*   TROPI 0.104* 0.093* 0.121*  --      Recent Labs   Lab 03/02/20  0530   COLOR Red   APPEARANCE Cloudy   URINEGLC Negative   URINEBILI Negative   URINEKETONE Negative   SG 1.016   UBLD Large*   URINEPH  5.5   PROTEIN 70*   NITRITE Negative   LEUKEST Negative   RBCU >182*   WBCU 0       No results found for this or any previous visit (from the past 24 hour(s)).

## 2020-03-03 NOTE — TELEPHONE ENCOUNTER
Patient was evaluated by cardiology while inpatient for increased SOB and worsening CM with EF 25% (45% in 2017), acute heart failure, new LBBB with reports of chest pain one month ago. PMH of CAD with ROSENDO placed in the 1990's. 2/28/20: Coronary angiogram via LRA showed 75% mid LAD in stenosis just beyond prior LAD stent, PCI with ROSENDO x1 placed. Nonobstructive CAD elsewhere. IV Lasix diuresed 1.8 L and started on po Lasix. Consider OP cMRI to further evaluate LV dysfunction that is out of proportion to degree of CAD. Writer attempted to call patient to discuss any post hospital d/c questions, review medication changes, and confirm f/u appts, but no answer. VM left to return my phone call. RN will confirm with patient that he was d/c with the antiplatelet Plavix. Pt has an Rx for PRN SL Nitroglycerin. RN will confirm with patient that he has an apt scheduled on 3/6/20 with [facility] Aric Alexander with labs prior at our [place] office. Cardiac rehab is scheduled on 3/17/20. NATTY De Souza RN.

## 2020-03-03 NOTE — PROGRESS NOTES
[facility] Cardiology Progress Note       Assessment and Plan:   In summary I think this gentleman's or shortness of breath on exertion when he walks for more than half an hour this morning is entirely compatible with his history of severe left ventricular systolic dysfunction.  By physical examination he has absolutely no evidence at all of congestive heart failure.  He is in sinus rhythm with heart rate between  which is entirely appropriate for his overall condition.  His medications appear appropriate and I have not made any changes.  I reassured him that he can exercise and it would be fine to feel little breathless as long as it resolves within 10 to 15 minutes of exercise cessation.  I also reassured him that he will be closely followed up in our clinic and should his LVEF not improve we will likely to proceed with further evaluation such as cardiac MR.            Interval History:   Patient's history noted.  Gentleman with systolic heart failure, with recent onset with angiography demonstrating stenosis in his LAD which is been adequately revascularized.  However it is felt that his left ventricular systolic dysfunction is out of proportion with the extent of CAD present.  He is on beta-blockers, lisinopril and Aldactone as well as dual antiplatelet therapy.  When he went for a walk this morning, he felt out of breath.  He did not have dizzy spells chest pains or syncopal episodes.  Dr. Castanon he felt that his shortness of breath may be due to an anginal equivalent.  However he is adequately revascularized I think this is quite unlikely.  This gentleman used to walk 2 miles in 27 minutes and he is disappointed at this time that he is not back to baseline.  Also reviewed patient's telemetry and there are some short runs of NSVT.  We should every continue with beta-blockers.                  Medications:     Current Facility-Administered Medications   Medication     acetaminophen (TYLENOL)  "Suppository 650 mg     acetaminophen (TYLENOL) tablet 650 mg     albuterol (PROVENTIL) neb solution 2.5 mg     aspirin EC tablet 81 mg     atorvastatin (LIPITOR) tablet 20 mg     bisacodyl (DULCOLAX) Suppository 10 mg     clopidogrel (PLAVIX) tablet 75 mg     furosemide (LASIX) half-tab 10 mg     ipratropium - albuterol 0.5 mg/2.5 mg/3 mL (DUONEB) neb solution 3 mL     lidocaine (LMX4) cream     lidocaine (XYLOCAINE) 2 % external gel 6 mL     lidocaine 1 % 0.1-1 mL     lisinopril (ZESTRIL) tablet 5 mg     melatonin tablet 1 mg     metoprolol succinate ER (TOPROL-XL) 24 hr tablet 50 mg     naloxone (NARCAN) injection 0.1-0.4 mg     nitroGLYcerin (NITROSTAT) sublingual tablet 0.4 mg     polyethylene glycol (MIRALAX) Packet 17 g     senna-docusate (SENOKOT-S/PERICOLACE) 8.6-50 MG per tablet 1 tablet    Or     senna-docusate (SENOKOT-S/PERICOLACE) 8.6-50 MG per tablet 2 tablet     sodium chloride (PF) 0.9% PF flush 3 mL     sodium chloride (PF) 0.9% PF flush 3 mL     spironolactone (ALDACTONE) half-tab 12.5 mg     tamsulosin (FLOMAX) capsule 0.4 mg             Physical Exam:   Blood pressure 125/59, pulse 87, temperature 96.9  F (36.1  C), temperature source Oral, resp. rate 18, height 1.854 m (6' 1\"), weight 57.6 kg (127 lb), SpO2 100 %.  Wt Readings from Last 4 Encounters:   20 57.6 kg (127 lb)   20 64.5 kg (142 lb 1.6 oz)   20 66.2 kg (145 lb 14.4 oz)   20 67.4 kg (148 lb 9.6 oz)         Vital Sign Ranges  Temperature Temp  Av.7  F (35.9  C)  Min: 95.8  F (35.4  C)  Max: 97.6  F (36.4  C)   Blood pressure Systolic (24hrs), Av , Min:101 , Max:143        Diastolic (24hrs), Av, Min:51, Max:72      Pulse Pulse  Av  Min: 87  Max: 87   Respirations Resp  Av.3  Min: 18  Max: 20   Pulse oximetry SpO2  Av %  Min: 95 %  Max: 100 %         Intake/Output Summary (Last 24 hours) at 3/3/2020 1620  Last data filed at 3/3/2020 1411  Gross per 24 hour   Intake 370 ml   Output  " ml   Net -1655 ml          Cardiovascular:   Normal apical impulse, regular rate and rhythm, normal S1 and S2, no S3 or S4, and no murmur noted   Chest clear.  No peripheral oedema         Data:     Labs:  Lab Results   Component Value Date     03/03/2020    Lab Results   Component Value Date    CHLORIDE 108 03/03/2020    Lab Results   Component Value Date    BUN 29 03/03/2020      Lab Results   Component Value Date    POTASSIUM 3.7 03/03/2020    Lab Results   Component Value Date    CO2 28 03/03/2020    Lab Results   Component Value Date    CR 1.07 03/03/2020        Lab Results   Component Value Date    WBC 7.9 03/03/2020    HGB 12.4 (L) 03/03/2020    HCT 38.2 (L) 03/03/2020    MCV 96 03/03/2020     03/03/2020     Lab Results   Component Value Date    TROPONIN 0.09 (H) 03/01/2020    TROPI 0.082 (H) 03/03/2020           Attestation:  I have reviewed today's vital signs, notes, medications, labs and imaging.         DR DEZ MURDOCK MD 3/3/2020  4:20 PM

## 2020-03-03 NOTE — PLAN OF CARE
After second walk of the day Patient reported feeling SOB. VSS resp 20 97% on room air. Denied CP, nausea, lightheaded. Family at bedside. Patient instructed to advise nurse if any new SX and if SOB gets worse. Will continue to monitor.

## 2020-03-04 VITALS
DIASTOLIC BLOOD PRESSURE: 57 MMHG | OXYGEN SATURATION: 96 % | WEIGHT: 127 LBS | RESPIRATION RATE: 17 BRPM | BODY MASS INDEX: 16.83 KG/M2 | HEART RATE: 87 BPM | SYSTOLIC BLOOD PRESSURE: 109 MMHG | TEMPERATURE: 96.1 F | HEIGHT: 73 IN

## 2020-03-04 LAB
ANION GAP SERPL CALCULATED.3IONS-SCNC: 4 MMOL/L (ref 3–14)
BASOPHILS # BLD AUTO: 0 10E9/L (ref 0–0.2)
BASOPHILS NFR BLD AUTO: 0.4 %
BUN SERPL-MCNC: 33 MG/DL (ref 7–30)
CALCIUM SERPL-MCNC: 9.2 MG/DL (ref 8.5–10.1)
CHLORIDE SERPL-SCNC: 107 MMOL/L (ref 94–109)
CO2 SERPL-SCNC: 27 MMOL/L (ref 20–32)
CREAT SERPL-MCNC: 0.96 MG/DL (ref 0.66–1.25)
DIFFERENTIAL METHOD BLD: ABNORMAL
EOSINOPHIL # BLD AUTO: 0.1 10E9/L (ref 0–0.7)
EOSINOPHIL NFR BLD AUTO: 1 %
ERYTHROCYTE [DISTWIDTH] IN BLOOD BY AUTOMATED COUNT: 12.8 % (ref 10–15)
GFR SERPL CREATININE-BSD FRML MDRD: 70 ML/MIN/{1.73_M2}
GLUCOSE SERPL-MCNC: 93 MG/DL (ref 70–99)
HCT VFR BLD AUTO: 40 % (ref 40–53)
HGB BLD-MCNC: 13 G/DL (ref 13.3–17.7)
IMM GRANULOCYTES # BLD: 0 10E9/L (ref 0–0.4)
IMM GRANULOCYTES NFR BLD: 0.3 %
LYMPHOCYTES # BLD AUTO: 1.9 10E9/L (ref 0.8–5.3)
LYMPHOCYTES NFR BLD AUTO: 28 %
MCH RBC QN AUTO: 31.8 PG (ref 26.5–33)
MCHC RBC AUTO-ENTMCNC: 32.5 G/DL (ref 31.5–36.5)
MCV RBC AUTO: 98 FL (ref 78–100)
MONOCYTES # BLD AUTO: 0.8 10E9/L (ref 0–1.3)
MONOCYTES NFR BLD AUTO: 12.2 %
NEUTROPHILS # BLD AUTO: 3.9 10E9/L (ref 1.6–8.3)
NEUTROPHILS NFR BLD AUTO: 58.1 %
NRBC # BLD AUTO: 0 10*3/UL
NRBC BLD AUTO-RTO: 0 /100
PLATELET # BLD AUTO: 235 10E9/L (ref 150–450)
POTASSIUM SERPL-SCNC: 3.8 MMOL/L (ref 3.4–5.3)
RBC # BLD AUTO: 4.09 10E12/L (ref 4.4–5.9)
SODIUM SERPL-SCNC: 138 MMOL/L (ref 133–144)
WBC # BLD AUTO: 6.7 10E9/L (ref 4–11)

## 2020-03-04 PROCEDURE — 36415 COLL VENOUS BLD VENIPUNCTURE: CPT | Performed by: INTERNAL MEDICINE

## 2020-03-04 PROCEDURE — 99239 HOSP IP/OBS DSCHRG MGMT >30: CPT | Performed by: INTERNAL MEDICINE

## 2020-03-04 PROCEDURE — 25000132 ZZH RX MED GY IP 250 OP 250 PS 637: Mod: GY | Performed by: INTERNAL MEDICINE

## 2020-03-04 PROCEDURE — 85025 COMPLETE CBC W/AUTO DIFF WBC: CPT | Performed by: INTERNAL MEDICINE

## 2020-03-04 PROCEDURE — 25000132 ZZH RX MED GY IP 250 OP 250 PS 637: Mod: GY | Performed by: UROLOGY

## 2020-03-04 PROCEDURE — 80048 BASIC METABOLIC PNL TOTAL CA: CPT | Performed by: INTERNAL MEDICINE

## 2020-03-04 RX ORDER — TAMSULOSIN HYDROCHLORIDE 0.4 MG/1
0.4 CAPSULE ORAL DAILY
Qty: 30 CAPSULE | Refills: 0 | Status: SHIPPED | OUTPATIENT
Start: 2020-03-05 | End: 2020-04-06

## 2020-03-04 RX ORDER — FUROSEMIDE 20 MG
10 TABLET ORAL DAILY
Qty: 15 TABLET | Refills: 0 | Status: SHIPPED | OUTPATIENT
Start: 2020-03-05 | End: 2020-03-27

## 2020-03-04 RX ADMIN — METOPROLOL SUCCINATE 50 MG: 50 TABLET, EXTENDED RELEASE ORAL at 09:37

## 2020-03-04 RX ADMIN — Medication 10 MG: at 09:36

## 2020-03-04 RX ADMIN — TAMSULOSIN HYDROCHLORIDE 0.4 MG: 0.4 CAPSULE ORAL at 09:37

## 2020-03-04 RX ADMIN — ASPIRIN 81 MG: 81 TABLET, COATED ORAL at 09:39

## 2020-03-04 RX ADMIN — Medication 12.5 MG: at 09:37

## 2020-03-04 RX ADMIN — CLOPIDOGREL BISULFATE 75 MG: 75 TABLET ORAL at 09:36

## 2020-03-04 ASSESSMENT — ACTIVITIES OF DAILY LIVING (ADL)
ADLS_ACUITY_SCORE: 13

## 2020-03-04 NOTE — PLAN OF CARE
"Presentation/Diagnosis: shortness of breath; acute on chronic CHF.  Vitals: /57 (BP Location: Left arm)   Pulse 87   Temp 96.1  F (35.6  C) (Oral)   Resp 17   Ht 1.854 m (6' 1\")   Wt 57.6 kg (127 lb)   SpO2 96%   BMI 16.76 kg/m    GI/: discharging home with watson catheter, leg bag and standard bag per patient request.    LDAs: piv to be removed for discharge.  Teaching: educated about plan of care and meds.  Plan: Discharge home this evening.     "

## 2020-03-04 NOTE — PROGRESS NOTES
Care Coordination:  Pt has orders to discharge home today with order for home care RN. Spoke to pt and family yesterday about home care and they were agreeable. Met with pt and family to verify discharge plan and they would like to use [facility] since pt has Dr Leigh as her PCP. [facility] referral sent. Pt will discharge home with his wife. Family is present and supportive and will be available for transport ride home. Pt has no other discharge needs at this time.     Dee England RN BSN CM  Inpatient Care Coordination  [facility]  [phone]

## 2020-03-04 NOTE — DISCHARGE SUMMARY
[facility]  Hospitalist Discharge Summary       Date of Admission:  2/25/2020  Date of Discharge:  2/29/2020  2:18 PM  Discharging Provider: Rl Wright MD      Discharge Diagnoses   Acute on chronic systolic congestive heart failure  Ischemic cardiomyopathy secondary to coronary artery disease  Hypertension.  Pneumonia.  Cognitive deficit  Severe malnutrition in the context of acute illness on chronic disease.    Follow-ups Needed After Discharge   Follow-up Appointments     Follow-up and recommended labs and tests       Follow up with primary care provider, Tess Leigh, within 7   days for hospital follow- up.  The following labs/tests are recommended:   BMP, CBC 1 week, result to PCP.  Follow up with cardiology as instructed and ordered.           Unresulted Labs Ordered in the Past 30 Days of this Admission     No orders found from 1/26/2020 to 2/26/2020.        Discharge Disposition   Discharged to home  Condition at discharge: [facility]   Deshawn Burrows is an 86 year old male with past medical history of hypertension, coronary artery disease, with previous stent placement, left bundle branch block, dyslipidemia, who presented to the emergency room with a complaint of worsening shortness of breath, chest x-ray showed patchy consolidation, placed on supplemental O2 in the ED, started on IV antibiotics, given IV Lasix and admitted to inpatient for further evaluation management.  Patient condition significantly improved, shortness of breath resolved, chest x-ray showed some patchy consolidation versus infiltrate, treated with Rocephin and Zithromax to complete total of 5 days.  Echocardiogram done that showed ejection fraction of 24%, he had missed stress test done February 18, 2020 which was abnormal .  Cardiology was consulted, patient underwent coronary angiogram, drug-eluting stent placed to mid LAD.  Patient continued on metoprolol, spironolactone,  lisinopril, aspirin Plavix.  Patient had Faustin catheter which was removed, and will make sure he urinats prior to discharge.  I discussed at length with patient the plan of care all his question concerns addressed.    Consultations This Hospital Stay   CARDIOLOGY IP CONSULT  CARE COORDINATOR IP CONSULT  NUTRITION SERVICES ADULT IP CONSULT  CARDIAC REHAB IP CONSULT  PHARMACY IP CONSULT  PHARMACY IP CONSULT  SMOKING CESSATION PROGRAM IP CONSULT    Code Status   DNR/DNI    Time Spent on this Encounter   I, Rl Wright MD, personally saw the patient today and spent greater than 30 minutes discharging this patient.       Rl Wright MD  [facility]  ______________________________________________________________________    Physical Exam   Vital Signs:                    Weight: 142 lbs 1.6 oz Blood pressure 130/80, pulse rate 78, oxygen saturation 96% on room air.  General Appearance:  Awake and alert, not in distress, slow to respond to questions today, he is in bed.  Respiratory: Good air entry bilateral, basal decreased breath sound.  Cardiovascular: S1 and S2 regular, no gallop, positive murmur.  GI: Soft, nontender, nondistended, positive bowel sounds.  Skin: No rash, exanthems or petechia.  Psych: Normal mood and affect, keeps eye contact, responds to question appropriately.           Primary Care Physician   Tess Leigh    Discharge Orders      Basic metabolic panel     Discharge Order: F/U with Cardiac  [facility]      Follow-Up with Cardiologist      CARDIAC REHAB REFERRAL      Discharge Order: F/U with Cardiac  [facility]      Reason for your hospital stay    CHF, CAD s/p ROSENDO to LAD     Follow-up and recommended labs and tests     Follow up with primary care provider, Tess Leigh, within 7 days for hospital follow- up.  The following labs/tests are recommended: BMP, CBC 1 week, result to PCP.  Follow up with cardiology as instructed and ordered.     Activity     Your activity upon discharge: activity as tolerated and activity as tolerated.     Diet    Follow this diet upon discharge: Orders Placed This Encounter      Combination Diet Regular Diet Adult; 2 gm NA Diet       Significant Results and Procedures       Discharge Medications   Discharge Medication List as of 2/29/2020 12:59 PM      START taking these medications    Details   clopidogrel (PLAVIX) 75 MG tablet Take 1 tablet (75 mg) by mouth daily, Disp-30 tablet, R-11, E-Prescribe      lisinopril (ZESTRIL) 5 MG tablet Take 1 tablet (5 mg) by mouth daily, Disp-30 tablet, R-1, E-Prescribe      spironolactone (ALDACTONE) 25 MG tablet Take 0.5 tablets (12.5 mg) by mouth daily, Disp-30 tablet, R-1, E-Prescribe      nitroGLYcerin (NITROSTAT) 0.4 MG sublingual tablet Place 1 tablet (0.4 mg) under the tongue every 5 minutes as needed for chest pain, Disp-30 tablet, R-0, E-Prescribe         CONTINUE these medications which have NOT CHANGED    Details   aspirin 81 MG EC tablet Take 81 mg by mouth daily, Historical      atorvastatin (LIPITOR) 40 MG tablet Take 20 mg by mouth At Bedtime, Historical      metoprolol succinate ER (TOPROL-XL) 50 MG 24 hr tablet Take 1 tablet (50 mg) by mouth daily, Disp-90 tablet, R-3, E-Prescribe      multivitamin, therapeutic with minerals (THERA-VIT-M) TABS Take 1 tablet by mouth daily, Historical           Allergies   Allergies   Allergen Reactions     Penicillins

## 2020-03-04 NOTE — PROVIDER NOTIFICATION
"MD paged: \"DAMARIS temp 103.2. Pt on comfort cares but could possibly benefit from Tylenol suppository. Thanks.\"   " 13.5   13.85 )-----------( 215      ( 28 Aug 2017 06:26 )             39.4     08-27    136  |  95<L>  |  18  ----------------------------<  153<H>  3.7   |  27  |  0.80    Ca    9.4      27 Aug 2017 00:00    TPro  7.3  /  Alb  4.0  /  TBili  0.4  /  DBili  x   /  AST  21  /  ALT  14  /  AlkPhos  75  08-27

## 2020-03-04 NOTE — PLAN OF CARE
Resumed care of pt at 1900. A&Ox4. VSS. Tele: SR w/ BBB. LS: diminished, fine crackles. Denies pain. Ambulated in hallway. Up SBA with GB. Low fat, low Na diet. Faustin emptied. Urine characteristics is red with some clots. Cards and urology following. See notes. Possible discharge tomorrow if pt remains asymptomatic. Will continue with plan of care.

## 2020-03-04 NOTE — DISCHARGE INSTRUCTIONS
Your home care referral was sent to [facility]  If you haven't heard from them within the next 24-48 hours,  Please call them at [phone]

## 2020-03-04 NOTE — PLAN OF CARE
A&OX4. VSS. O2 97% RA. Denies pain/ SOB. Up with SBA. Lungs sounds  diminished and fine crackles noted. Faustin catheter was placed for urinary retention, Red/pink color output.  Low fat, low Na diet. Pt. is on PO Lasix. Tele: SR, BBB with inverted T wave.  Cardiology and  urology following. Possible discharge tomorrow if pt remains asymptomatic.     Jerald Brandon RN on 3/4/2020 at 6:26 AM

## 2020-03-04 NOTE — PROGRESS NOTES
[facility]  Met with pt and family to discuss plans for HC.  Pt to be discharged home today  and has agreed to have [facility] follow with services of .  Central Intake processing referral.  Pt and family verbalized understanding that initial visit is scheduled for 3/5 or 3/6/2020.  Pt has 24 hour phone number for [facility] for any questions or concerns.

## 2020-03-04 NOTE — DISCHARGE SUMMARY
[facility]  Discharge Summary  Hospitalist      Date of Admission:  3/1/2020  Date of Discharge:  3/4/2020  Provider:  Elda Yang MD  Date of Service (when I last saw the patient): 03/04/20      Primary Provider: Tess Leigh          Discharge Diagnosis:   Discharge Diagnoses   Acute on chronic systolic heart failure presented with dyspnea on exertion and shortness of breath  Acute urinary retention    Other medical issues:  Past Medical History:   Diagnosis Date     Acute systolic heart failure (H) 2/25/2020    Added automatically from request for surgery [id number]     CAD (coronary artery disease) 2004    2 cardiac stents      Dyspnea on exertion 2/25/2020    Added automatically from request for surgery [id number]     Essential hypertension with goal blood pressure less than 140/90 8/10/2016     Hyperlipidemia LDL goal <100 8/10/2016     Ischemic cardiomyopathy 2/25/2020    Added automatically from request for surgery [id number]     LBBB (left bundle branch block) 2/25/2020          History of Present Illness   Deshawn Burrows is an 86 year old male who presented with shortness of breath, dyspnea on exertion and urinary obstruction.  Please see the admission history and physical for full details.    Hospital Course     Deshawn Burrows was admitted on 3/1/2020. He is a 86 year old male who was admitted with dyspnea on exertion.  Patient with known history of coronary artery disease past medical history significant for atherosclerotic cardiovascular disease status post recent PCI stenting of his LAD on 2/28/2020 on aspirin and Plavix, hypertension, CHF, COPD, chronic left bundle branch block and dyslipidemia.  He presented with dyspnea on exertion and continues to have intermittent episodes of dyspnea on exertion since his recent discharge from hospital.  In the emergency room patient also had episode of urinary retention for which Faustin catheter was placed.    The following  problems were addressed during his hospitalization:    Dyspnea on exertion and shortness of breath  -Likely secondary to mild exacerbation of chronic systolic heart failure which improved with IV Lasix initially.  Will be discharged on oral Lasix.  -Cardiology was consulted.  Patient symptoms likely due to heart failure and he is scheduled to follow-up in cardiology clinic.  -There is slight troponin leak which is understandable from recent angioplasty  -Last echo showed poor EF  -Family is concerned about these episodes of dyspnea on exertion periodically.    Appreciate cardiology input.  Patient will be followed closely in cardiology clinic on discharge  -Started on Lasix 10 mg daily  -Continue all other cardiac medication  -Monitor with daily weights I's and O's  -Clinically does not have any bronchospasm.  Doubt COPD  -Continue Lipitor 20 mg daily, lisinopril 5 mg daily, Toprol XL 50 mg daily, Aldactone 12.5 mg daily.        Urinary retention and gross hematuria  -Faustin was placed in the emergency room.  Now has indwelling Faustin catheter  -Patient will have void trial in 1 week in urology clinic after discharge.  -Started on Flomax  -Outpatient urology follow-up     History of COPD: No evidence of acute exacerbation  -Continue duo nebs on a as needed basis  -Do note that patient was given prednisone 40 mg x 1 in the ED     Congestive heart failure with reduced ejection fraction: New diagnosis last admission.  LVEF reduced at 25% on echocardiogram and previously had been 45 to 50% on stress echocardiogram back in February 2017.  -Plan for diuresis as above.  Otherwise, continue medical management as above  -We will need follow-up in cardiology clinic.     Dyslipidemia: Continue statin       Significant Results and Procedures   As noted above    Pending Results   Unresulted Labs Ordered in the Past 30 Days of this Admission     No orders found from 1/31/2020 to 3/2/2020.          Code Status   DNR / DNI        Primary Care Physician   Tess Leigh    Physical Exam   Temp: 96.1  F (35.6  C) Temp src: Oral BP: 109/57 Pulse: 87 Heart Rate: 89 Resp: 17 SpO2: 96 % O2 Device: None (Room air)    Vitals:    03/02/20 0849   Weight: 57.6 kg (127 lb)     Vital Signs with Ranges  Temp:  [96.1  F (35.6  C)-97.4  F (36.3  C)] 96.1  F (35.6  C)  Pulse:  [87] 87  Heart Rate:  [77-89] 89  Resp:  [16-18] 17  BP: (109-125)/(51-59) 109/57  SpO2:  [96 %-100 %] 96 %  I/O last 3 completed shifts:  In: 610 [P.O.:610]  Out: 1875 [Urine:1875]    Constitutional: Awake, alert, cooperative, no apparent distress, and appears stated age.  Respiratory: Few basilar crackles otherwise no increased work of breathing, good air exchange, clear to auscultation bilaterally, no wheezing.  Cardiovascular: Normal apical impulse,normal S1 and S2,   GI: normal bowel sounds, soft, non-distended, non-tender    Discharge Disposition   Discharged to home    Consultations This Hospital Stay   UROLOGY IP CONSULT  CARE COORDINATOR IP CONSULT  CARDIOLOGY IP CONSULT    Time Spent on this Encounter   I, Elda Yang MD, personally saw the patient today and spent greater than 30 minutes discharging this patient.    Discharge Orders      Home care nursing referral      Reason for your hospital stay    Please refer to discharge summary briefly patient admitted for acute urinary retention and shortness of breath attribute it to exacerbation of congestive heart failure.  Improved with diuresis     Follow-up and recommended labs and tests     Follow up with primary care provider, Tess eLigh, within 7 days for hospital follow- up.  The following labs/tests are recommended: Basic metabolic panel as patient started on diuretics  Follow-up with cardiology as scheduled  Follow-up with urology in 1 week for void trial  Discharging home with indwelling Faustin catheter.    Please call or come if there are any new or worsening symptoms     Activity     Your activity upon discharge: activity as tolerated     Monitor and record    blood pressure daily     MD face to face encounter    Documentation of Face to Face and Certification for Home Health Services    I certify that patient: Deshawn Burrows is under my care and that I, or a nurse practitioner or physician's assistant working with me, had a face-to-face encounter that meets the physician face-to-face encounter requirements with this patient on: 3/4/2020.    This encounter with the patient was in whole, or in part, for the following medical condition, which is the primary reason for home health care: RN care, new watson catheter.    I certify that, based on my findings, the following services are medically necessary home health services: Nursing.    My clinical findings support the need for the above services because: Nurse is needed: To provide caregiver training to assist with: watson care and home safety..    Further, I certify that my clinical findings support that this patient is homebound (i.e. absences from home require considerable and taxing effort and are for medical reasons or [employer] services or infrequently or of short duration when for other reasons) because: Requires assistance of another person or specialized equipment to access medical services because patient: Is prone to wander/get lost without assistance...    Based on the above findings. I certify that this patient is confined to the home and needs intermittent skilled nursing care, physical therapy and/or speech therapy.  The patient is under my care, and I have initiated the establishment of the plan of care.  This patient will be followed by a physician who will periodically review the plan of care.  Physician/Provider to provide follow up care: Tess Leigh    Attending hospital physician (the Medicare certified [place] provider): Elda Yang MD  Physician Signature: See electronic signature associated with these  discharge orders.  Date: 3/4/2020     DNR/DNI     Diet    Follow this diet upon discharge: Orders Placed This Encounter      Combination Diet Low Saturated Fat Na <2400mg Diet, No Caffeine Diet     Discharge Medications   Current Discharge Medication List      START taking these medications    Details   furosemide (LASIX) 20 MG tablet Take 0.5 tablets (10 mg) by mouth daily  Qty: 15 tablet, Refills: 0    Associated Diagnoses: Acute systolic congestive heart failure (H)      tamsulosin (FLOMAX) 0.4 MG capsule Take 1 capsule (0.4 mg) by mouth daily  Qty: 30 capsule, Refills: 0    Associated Diagnoses: Urinary retention         CONTINUE these medications which have NOT CHANGED    Details   aspirin 81 MG EC tablet Take 81 mg by mouth daily      atorvastatin (LIPITOR) 40 MG tablet Take 20 mg by mouth At Bedtime      clopidogrel (PLAVIX) 75 MG tablet Take 1 tablet (75 mg) by mouth daily  Qty: 30 tablet, Refills: 11    Associated Diagnoses: Coronary artery disease involving native coronary artery of native heart without angina pectoris      lisinopril (ZESTRIL) 5 MG tablet Take 1 tablet (5 mg) by mouth daily  Qty: 30 tablet, Refills: 1    Associated Diagnoses: Coronary artery disease involving native coronary artery of native heart without angina pectoris; Ischemic cardiomyopathy      metoprolol succinate ER (TOPROL-XL) 50 MG 24 hr tablet Take 1 tablet (50 mg) by mouth daily  Qty: 90 tablet, Refills: 3    Associated Diagnoses: Coronary artery disease involving native heart, angina presence unspecified, unspecified vessel or lesion type; Essential hypertension with goal blood pressure less than 140/90      multivitamin, therapeutic with minerals (THERA-VIT-M) TABS Take 1 tablet by mouth daily      spironolactone (ALDACTONE) 25 MG tablet Take 0.5 tablets (12.5 mg) by mouth daily  Qty: 30 tablet, Refills: 1    Associated Diagnoses: Ischemic cardiomyopathy      nitroGLYcerin (NITROSTAT) 0.4 MG sublingual tablet Place 1 tablet  (0.4 mg) under the tongue every 5 minutes as needed for chest pain  Qty: 30 tablet, Refills: 0    Associated Diagnoses: Coronary artery disease involving native coronary artery of native heart without angina pectoris           Allergies   Allergies   Allergen Reactions     Penicillins      Data   Most Recent 3 CBC's:  Recent Labs   Lab Test 03/04/20  0724 03/03/20  0632 03/02/20  1839 03/01/20  1803   WBC 6.7 7.9  --  8.7   HGB 13.0* 12.4* 13.3 13.0*   MCV 98 96  --  98    233  --  252      Most Recent 3 BMP's:  Recent Labs   Lab Test 03/04/20  0724 03/03/20  0632 03/02/20  1839    141 137   POTASSIUM 3.8 3.7 4.0   CHLORIDE 107 108 105   CO2 27 28 26   BUN 33* 29 31*   CR 0.96 1.07 1.07   ANIONGAP 4 5 6   FLORES 9.2 9.1 9.0   GLC 93 101* 102*     Most Recent 2 LFT's:  Recent Labs   Lab Test 03/01/20  1803 07/17/19  1412   AST 39 18   ALT 49 20   ALKPHOS 76 65   BILITOTAL 0.6 0.4     Most Recent INR's and Anticoagulation Dosing History:  Anticoagulation Dose History     Recent Dosing and Labs Latest Ref Rng & Units 9/16/2006 9/24/2007 2/25/2020    INR 0.86 - 1.14 1.15(H) 1.28(H) 1.01        Most Recent 3 Troponin's:  Recent Labs   Lab Test 03/03/20  1330 03/03/20  0632 03/02/20  1839 03/01/20  1805  02/25/20  0705  02/22/14  1537   TROPI 0.082* 0.104* 0.093*   < >  --    < >  --    < >  --    TROPONIN  --   --   --   --  0.09*  --  0.01  --  0.01    < > = values in this interval not displayed.     Most Recent Cholesterol Panel:  Recent Labs   Lab Test 02/28/20  1641   CHOL 89   LDL 48   HDL 34*   TRIG 36     Most Recent 6 Bacteria Isolates From Any Culture (See EPIC Reports for Culture Details):  Recent Labs   Lab Test 02/25/20  0940 02/25/20  0931 05/13/16  1832 05/13/16  1823   CULT No growth No growth No growth No growth     Most Recent TSH, T4 and A1c Labs:  Recent Labs   Lab Test 05/15/18  1556   TSH 2.67     Results for orders placed or performed during the hospital encounter of 03/01/20   CT Chest  Pulmonary Embolism w Contrast    Narrative    EXAM: CT CHEST PULMONARY EMBOLISM W CONTRAST  LOCATION: [facility]  DATE/TIME: 3/1/2020 6:59 PM    INDICATION: Shortness of breath  COMPARISON: 05/14/2016  TECHNIQUE: CT angiogram chest during arterial phase injection IV contrast. 2D and 3D MIP reconstructions were performed by the CT technologist. Dose reduction techniques were used.   CONTRAST:  54mL Isovue-370    FINDINGS:  ANGIOGRAM CHEST: Pulmonary arteries are normal caliber and negative for pulmonary emboli. Thoracic aorta is negative for dissection in the ascending segment or proximal descending, contrast timing limits assessment of distal descending thoracic aorta. No   CT evidence of right heart strain.    LUNGS AND PLEURA: Small to moderate bilateral simple pleural effusions. Severe emphysema. Scattered calcified granulomas. There is some smooth septal thickening in the bases, likely due to interstitial edema. Mild dependent atelectasis in the lung bases.   No significant lung consolidation. Small linear scar like nodule measuring 6 x 3 mm right upper lung image #47 series 8, is adjacent to a calcified granuloma and not significantly changed, considered benign. Apical pleural-parenchymal scarring   bilaterally.    MEDIASTINUM/AXILLAE: Normal heart size. Moderate coronary artery calcification. No pericardial effusion. Normal esophagus. No adenopathy. Calcified lymph nodes noted bilaterally at the lower hilum and in the mediastinum.    UPPER ABDOMEN: Calcified granulomas in the liver and spleen. Unchanged simple appearing upper pole renal cysts. No follow-up needed.    MUSCULOSKELETAL: Degenerative changes in the spine. No fracture or significant osseous lesion.      Impression    IMPRESSION:  1.  No PE.    2.  Severe emphysema. There is interstitial edema and symmetric simple small to moderate bilateral pleural effusions are noted, which may be related to CHF, although heart size is normal. There  is moderate coronary artery calcification noted.    3.  Evidence of previous granulomatous disease.           Disclaimer: This note consists of symbols derived from keyboarding, dictation and/or voice recognition software. As a result, there may be errors in the script that have gone undetected. Please consider this when interpreting information found in this chart.

## 2020-03-04 NOTE — DISCHARGE SUMMARY
AVS reviewed with patient and family. Patient is in stable condition, VSS, no co pain/cp/sob. All discharge education reviewed with pt in regards to: diet, activity, safety, s/s to report, medications and rx, follow up appointments/care.  All questions answered. Pt denies any further questions or concerns. PIV removed. No complications. Telemetry monitor removed. All belongings returned. Pt escorted to front door by [facility] staff.

## 2020-03-05 ENCOUNTER — TELEPHONE (OUTPATIENT)
Dept: INTERNAL MEDICINE | Facility: CLINIC | Age: 85
End: 2020-03-05

## 2020-03-05 ENCOUNTER — PATIENT OUTREACH (OUTPATIENT)
Dept: CARE COORDINATION | Facility: CLINIC | Age: 85
End: 2020-03-05

## 2020-03-05 DIAGNOSIS — Z71.89 OTHER SPECIFIED COUNSELING: ICD-10-CM

## 2020-03-05 ASSESSMENT — ACTIVITIES OF DAILY LIVING (ADL): DEPENDENT_IADLS:: INDEPENDENT

## 2020-03-05 NOTE — PROGRESS NOTES
Transition Communication Hand-off for Care Transitions to Next Level of Care Provider    Name: Deshawn Burrows  : 3/8/1933  MRN #: [id number]  Primary Care Provider: Tess Leigh  Primary Care MD Name: Dr Leigh  Primary Clinic: [address] [facility] [address]  Primary Care Clinic Name: [facility]  Reason for Hospitalization:  Urinary retention [R33.9]  Elevated troponin [R79.89]  Pulmonary emphysema, unspecified emphysema type (H) [J43.9]  Dyspnea, unspecified type [R06.00]  Congestive heart failure, unspecified HF chronicity, unspecified heart failure type (H) [I50.9]  Admit Date/Time: 3/1/2020  5:40 PM  Discharge Date: 3/4/20  Payor Source: Payor: MEDICARE / Plan: MEDICARE / Product Type: Medicare /     Readmission Assessment Measure (JANE) Risk Score/category: Avg         Reason for Communication Hand-off Referral: Admission diagnoses: CHF    Discharge Plan:       Concern for non-adherence with plan of care:   no  Discharge Needs Assessment:  Needs      Most Recent Value   # of Referrals Placed by CTS  Communication hand-offs to next level of Care Providers          Already enrolled in Tele-monitoring program and name of program:  NA  Follow-up specialty is recommended: No    Follow-up plan:    Future Appointments   Date Time Provider Department Center   3/6/2020  1:30 PM [facility]   3/6/2020  2:30 PM Matt Alexander NP [facility]   3/9/2020  1:30 PM [facility] N[facility]SE [facility]   3/17/2020  8:15 AM 1, [facility]       Any outstanding tests or procedures:        Referrals     Future Labs/Procedures    Home care nursing referral     Comments:    RN skilled nursing visit. RN to assess vital signs and weight, respiratory and cardiac status, patients ability to take and record daily blood pressure, temp and weight, pain level and activity tolerance and home safety.  RN to provide watson care and safety assessment.    Your provider has  ordered home care nursing services. If you have not been contacted within 2 days of your discharge please call the inpatient department phone number at [phone] .            Key Recommendations:  CTS following for readmission, CHF diagnosis and discharge planning. Pt was admitted from 2/25-2/29. He discharged home with his wife and was readmitted on same day for shortness of breath and urine retention. His BNP on admit was 6111 which was up from prior admission 5301. He received IV lasix in ER and is now on PO lasix. He also had urine retention in the ER and has a watson which was placed by Urology in OR. Met with pt, wife and family members in room to discuss hospitalization and plan of care for discharge. They verified that pt discharged home with his wife on Sat and was readmitted with SOB and urine retention. Per Urology, plan will be for pt to discharge home with watson catheter and follow up with Urology for trial void. We discussed plan with watson at home and use of leg bag. HE discharged home with support of [facility].      Pt's family member expressed concerns about pt's SOB prior to admission. We had a discussion about CHF and recommendation for low Na diet. Pt does not like the low Na diet but family and wife are working on getting him to follow it at home. We reviewed Low Na recommendations and salt substitute options. They said they received low Na diet education from Dietician last week and don't feel they need further education at this time. We also discussed how this may help prevent further readmissions. Pt has follow up scheduled with Cardiology on 3/6, and Urology on 3/9.     He will need continued support regarding CHF recommendations, low Na diet and being home with watson.    Dee England RN    AVS/Discharge Summary is the source of truth; this is a helpful guide for improved communication of patient story

## 2020-03-05 NOTE — PROGRESS NOTES
Patient identified as high risk for readmission.  Patient meets criteria for care coordination outreach.    Shruthi Bueno RN  Care Coordination  Phone:  [phone]  Email: [email or web address] [place] and [facility]

## 2020-03-05 NOTE — TELEPHONE ENCOUNTER
IP F/U    Date: 3/4/20  Diagnosis: Dyspnea, acute systolic congestive heart failure   Is patient active in care coordination? Yes - Route to Care Coordination (P [id number])  Was patient in TCU? No

## 2020-03-05 NOTE — PROGRESS NOTES
"Clinic Care Coordination Contact  Care Coordination Communication    Referral Source: IP Handoff    Clinical Data: Chart reviewed.  Patient was initially admitted to [facility] 2/25-2/29 for CHF.  EF was found to be 24%.  Cardiology was consulted and patient underwent Angiogram with ROSENDO to mid LAD on 2/28.     Patient was readmitted to [facility] 3/1-3/4 with shortness of breath and urinary retention.    Patient was discharged with watson catheter.  Home care ordered to assist with catheter management.  Patient to f/u with urology as out patient on 3/9/20.    Patient to f/u with cardiology on 3/6  Patient to initiate cardiac rehab on 3/17    Home Care Contact:              Home Care Agency: [facility] RN               Contact name () and phone number: KUMAR Rey [phone]               Anticipated start of care date: 3/5/20    Patient Contact:               Introduced self and role of care coordination.               Patient was very pleasant.  He states he is feeling good.  Has some confusion about his medications and is trying to \"sort it all out\".  RN CC reminded patient he has RN visit scheduled for today and she will review that with him.      No pcp f/u scheduled.  Patient will call Rn CC when he is ready to schedule.    Plan: RN/SW Care Coordinator will await notification from home care staff informing RN/SW Care Coordinator of patients discharge plans/needs.     Shruthi Bueno RN  Care Coordination  Phone:  [phone]  Email: [email or web address], [place] and [facility]            "
chest

## 2020-03-05 NOTE — LETTER
[place] CARE COORDINATION  [address] [place]  [facility] [address]    March 5, 2020    Deshawn Burrows  [address]   [place] [address]      Dear Deshawn,    I am a clinic care coordinator who works with Tess Leigh MD at [facility]. I wanted to introduce myself and provide you with my contact information for you to be able to call me with any questions or concerns.  Below is a description of clinic care coordination and how I can further assist you.      The clinic care coordinator team is made up of a registered nurse,  and community health worker who understand the health care system. The goal of clinic care coordination is to help you manage your health and improve access to the health care system in the most efficient manner. The team can assist you in meeting your health care goals by providing education, coordinating services, strengthening the communication among your providers  and supporting you with any resource needs.    Please feel free to contact me, at [phone] with any questions or concerns. We are focused on providing you with the highest-quality healthcare experience possible and that all starts with you.     Sincerely,     Shruthi Bueno RN  Care Coordination  Phone:  [phone]  Email: [email or web address]  [facility] [place], [place] and [facility]

## 2020-03-06 ENCOUNTER — OFFICE VISIT (OUTPATIENT)
Dept: CARDIOLOGY | Facility: CLINIC | Age: 85
End: 2020-03-06
Attending: NURSE PRACTITIONER
Payer: MEDICARE

## 2020-03-06 VITALS
HEIGHT: 73 IN | SYSTOLIC BLOOD PRESSURE: 106 MMHG | WEIGHT: 138.3 LBS | DIASTOLIC BLOOD PRESSURE: 60 MMHG | BODY MASS INDEX: 18.33 KG/M2 | HEART RATE: 84 BPM

## 2020-03-06 DIAGNOSIS — I10 ESSENTIAL HYPERTENSION WITH GOAL BLOOD PRESSURE LESS THAN 140/90: ICD-10-CM

## 2020-03-06 DIAGNOSIS — I10 HTN, GOAL BELOW 130/80: ICD-10-CM

## 2020-03-06 DIAGNOSIS — I25.10 CORONARY ARTERY DISEASE INVOLVING NATIVE CORONARY ARTERY OF NATIVE HEART WITHOUT ANGINA PECTORIS: Primary | ICD-10-CM

## 2020-03-06 DIAGNOSIS — I25.5 ISCHEMIC CARDIOMYOPATHY: ICD-10-CM

## 2020-03-06 DIAGNOSIS — I50.21 ACUTE SYSTOLIC HEART FAILURE (H): ICD-10-CM

## 2020-03-06 DIAGNOSIS — R06.09 DYSPNEA ON EXERTION: ICD-10-CM

## 2020-03-06 DIAGNOSIS — I42.8 OTHER CARDIOMYOPATHY (H): ICD-10-CM

## 2020-03-06 DIAGNOSIS — I25.10 CORONARY ARTERY DISEASE INVOLVING NATIVE HEART, ANGINA PRESENCE UNSPECIFIED, UNSPECIFIED VESSEL OR LESION TYPE: ICD-10-CM

## 2020-03-06 DIAGNOSIS — E78.5 HYPERLIPIDEMIA LDL GOAL <70: ICD-10-CM

## 2020-03-06 LAB
ANION GAP SERPL CALCULATED.3IONS-SCNC: 4 MMOL/L (ref 3–14)
BUN SERPL-MCNC: 38 MG/DL (ref 7–30)
CALCIUM SERPL-MCNC: 9.1 MG/DL (ref 8.5–10.1)
CHLORIDE SERPL-SCNC: 105 MMOL/L (ref 94–109)
CO2 SERPL-SCNC: 29 MMOL/L (ref 20–32)
CREAT SERPL-MCNC: 1.11 MG/DL (ref 0.66–1.25)
GFR SERPL CREATININE-BSD FRML MDRD: 59 ML/MIN/{1.73_M2}
GLUCOSE SERPL-MCNC: 126 MG/DL (ref 70–99)
POTASSIUM SERPL-SCNC: 3.9 MMOL/L (ref 3.4–5.3)
SODIUM SERPL-SCNC: 138 MMOL/L (ref 133–144)

## 2020-03-06 PROCEDURE — 80048 BASIC METABOLIC PNL TOTAL CA: CPT | Performed by: NURSE PRACTITIONER

## 2020-03-06 PROCEDURE — 99214 OFFICE O/P EST MOD 30 MIN: CPT | Performed by: NURSE PRACTITIONER

## 2020-03-06 PROCEDURE — 36415 COLL VENOUS BLD VENIPUNCTURE: CPT | Performed by: NURSE PRACTITIONER

## 2020-03-06 ASSESSMENT — MIFFLIN-ST. JEOR: SCORE: 1361.2

## 2020-03-06 NOTE — PROGRESS NOTES
Cardiology Clinic Progress Note    Service Date: March 6, 2020    PRIMARY CARDIOLOGIST: Dr. Palmer      REASON FOR VISIT: Hospital follow up    HPI:   I had the pleasure of seeing Mr. Deshawn Burrows in the clinic today. He is a delightful 86 year old gentleman with a past medical history of hypertension, dyslipidemia, and coronary artery disease with 2 stents placed remotely in the 1990s in [place]. I met him initially during his recent admission at [facility]. He presented to the emergency room on 2/25/2020 following an episode of acute shortness of breath at rest. This happened in the context of having a significant episode of chest discomfort about 4 weeks prior that woke him from sleep and lasted for several hours. He unfortunately did not seek care and then about a week later he started noticing decline in his endurance.       He subsequently followed up with his primary care physician a few days after his episode of chest pain and was noted to have new left bundle branch block prompting a Lexiscan stress test. The stress test was abnormal showing a large area of nontransmural infarct in inferior inferoseptal segment and also nontransmural infarct and apical, anteroseptal and anterior segment with mild agnieszka-infarct ischemia with severe reduced LV systolic function.     An echocardiogram during his admission confirmed severe reduction in LV systolic function with an ejection fraction of 24%. Moderate mitral regurgitation and mild tricuspid regurgitation were noted. The RVSP was elevated consistent with mild to moderate pulmonary hypertension. He was diuresed with IV Lasix and subsequently sent for coronary angiography, which revealed in segment restenosis just beyond the mid LAD stent. An additional stent was successfully placed.   The degree of LV dysfunction was felt to be disproportionate to his burden of coronary disease.  He was discharged on a regimen of spironolactone 12.5 mg once daily,  furosemide 10 mg once daily, metoprolol succinate 50 mg once daily, lisinopril 5 mg once daily, Plavix 75 mg once daily, and a baby aspirin daily.      Unfortunately, he ended up presenting back to the hospital about 2 days later due to concerns for shortness of breath.  His work-up was largely unremarkable. He suspected that his symptoms were related to his significant LV dysfunction with issues with urinary retention contributing.  He was seen by urology and had a Faustin catheter placed with resolution of his symptoms.    Today, Mr. Burrows presents to the clinic with his grandson, Jefe, in follow up of his recent hospital stay. He tells me that he has been feeling generally well since he has been home. He has not been quite as active as he usually is, but he has not noticed significant dyspnea on with light activity around the house. He denies any chest pain. He has not had orthopnea, PND, lower extremity edema, palpitations, dizziness, presyncope, or syncope. He said that his wife is unfortunately currently ill, so their grandson has been staying with him and has been helping care for the two of them. A basic metabolic panel was drawn prior to the visit today and shows stable electrolytes and kidney function with a potassium of 3.9 with a creatinine of 1.11.    ASSESSMENT AND PLAN:  1. Coronary artery disease    Status post stenting of the LAD remotely over 20 years ago in [place], with in segment restenosis of the mid LAD with an additional stent placed on 2/28/2020.    Appears to be stable without anginal symptoms.    Continue on aspirin 81 mg once daily, Plavix 75 mg once daily, statin, beta-blocker.    Recommend outpatient cardiac rehab.  2. Chronic heart failure with reduced ejection fraction, severe cardiomyopathy    EF 24% on echocardiography during his recent admission. Suspect likely mixed ischemic and nonischemic etiology. The degree of LV dysfunction was felt to be disproportionate to the  severity of his coronary disease on his recent angiogram.    Appears well compensated on exam without any signs of acute CHF.    Continue current regimen of Lasix 10 mg once daily, spironolactone 12.5 mg once daily, metoprolol succinate 50 mg once daily, and lisinopril 5 mg once daily.  3. Dyslipidemia    Treated on atorvastatin 20 mg once daily with an LDL of 48.  4. Hypertension    Well-controlled on current regimen.  5. Urinary retention    A Faustin catheter remains in place. He is planning to follow-up with urology in the next couple of weeks.    Thank you for the opportunity to participate in this pleasant patient's care. I will have him see Dr. Palmer in follow up in about 3 months with a cardiac MRI beforehand for further evaluation of his cardiomyopathy and reassessment of his LV function. I encouraged him to call with concerns in the meantime and we would be happy to see him sooner if needed.    BLUE Aldridge, CNP   Text Page  (8am - 5pm, M-F)    Orders this Visit:  No orders of the defined types were placed in this encounter.    No orders of the defined types were placed in this encounter.    There are no discontinued medications.    Encounter Diagnoses   Name Primary?     Coronary artery disease involving native coronary artery of native heart without angina pectoris Yes     Ischemic cardiomyopathy      HTN, goal below 130/80      Hyperlipidemia LDL goal <70      Dyspnea on exertion      Acute systolic heart failure (H)      Essential hypertension with goal blood pressure less than 140/90      Coronary artery disease involving native heart, angina presence unspecified, unspecified vessel or lesion type      CURRENT MEDICATIONS:  Current Outpatient Medications   Medication Sig Dispense Refill     aspirin 81 MG EC tablet Take 81 mg by mouth daily       atorvastatin (LIPITOR) 40 MG tablet Take 20 mg by mouth At Bedtime       clopidogrel (PLAVIX) 75 MG tablet Take 1 tablet (75 mg) by mouth daily 30 tablet 11  "    furosemide (LASIX) 20 MG tablet Take 0.5 tablets (10 mg) by mouth daily 15 tablet 0     lisinopril (ZESTRIL) 5 MG tablet Take 1 tablet (5 mg) by mouth daily 30 tablet 1     metoprolol succinate ER (TOPROL-XL) 50 MG 24 hr tablet Take 1 tablet (50 mg) by mouth daily 90 tablet 3     multivitamin, therapeutic with minerals (THERA-VIT-M) TABS Take 1 tablet by mouth daily       nitroGLYcerin (NITROSTAT) 0.4 MG sublingual tablet Place 1 tablet (0.4 mg) under the tongue every 5 minutes as needed for chest pain 30 tablet 0     spironolactone (ALDACTONE) 25 MG tablet Take 0.5 tablets (12.5 mg) by mouth daily 30 tablet 1     tamsulosin (FLOMAX) 0.4 MG capsule Take 1 capsule (0.4 mg) by mouth daily 30 capsule 0     ALLERGIES  Allergies   Allergen Reactions     Penicillins      PAST MEDICAL, SURGICAL, FAMILY HISTORY:  History was reviewed and updated as needed, see medical record.    SOCIAL HISTORY:  He is . He lives in a home with his wife in [place]. They have 3 adult daughters, two of whom live in the area. He is active and independent. He walks regularly at the [place] mall usually going a little over 2 miles about 3 times a week. He previously smoked tobacco out of a pipe and quit this over 40 years ago. He drinks alcohol rarely. No other drug use.    Review of Systems:  Skin:  Negative     Eyes:  Negative    ENT:  Positive for hearing loss  Respiratory:  Positive for shortness of breath  Cardiovascular:  Negative    Gastroenterology: Negative    Genitourinary:  Positive for    Musculoskeletal:  Negative    Neurologic:  Negative    Psychiatric:  Negative    Heme/Lymph/Imm:  Negative    Endocrine:  Negative       Physical Exam:  Vitals: /60 (BP Location: Right arm, Patient Position: Chair, Cuff Size: Adult Regular)   Pulse 84   Ht 1.854 m (6' 1\")   Wt 62.7 kg (138 lb 4.8 oz)   BMI 18.25 kg/m     Wt Readings from Last 4 Encounters:   03/06/20 62.7 kg (138 lb 4.8 oz)   03/02/20 57.6 kg (127 lb) "   02/29/20 64.5 kg (142 lb 1.6 oz)   02/11/20 66.2 kg (145 lb 14.4 oz)     CONSTITUTIONAL: Jovial, tall, thin elderly male, well nourished, and in no acute distress.  HEENT: Pupils equal, round. Sclerae nonicteric.    NECK: Supple. JVP appears normal.  C/V: Regular rate and rhythm, normal S1 and S2, no S3 or S4, no murmur, rub or gallop.   RESP: Respirations are unlabored. Lungs are clear to auscultation bilaterally without wheezes or crackles.  GI: Abdomen soft, non-tender, non-distended.  EXTREM: No clubbing, cyanosis, or lower extremity edema bilaterally.   NEURO: Alert and oriented, cooperative. Gait steady. No gross focal deficits.   PSYCH: Affect appropriate, bright. Mentation normal. Responds to questions appropriately.  SKIN: Warm and dry. No apparent rashes or bruising.    Recent Lab Results:  LIPID RESULTS:  Lab Results   Component Value Date    CHOL 89 02/28/2020    HDL 34 (L) 02/28/2020    LDL 48 02/28/2020    TRIG 36 02/28/2020     LIVER ENZYME RESULTS:  Lab Results   Component Value Date    AST 39 03/01/2020    ALT 49 03/01/2020     CBC RESULTS:  Lab Results   Component Value Date    WBC 6.7 03/04/2020    RBC 4.09 (L) 03/04/2020    HGB 13.0 (L) 03/04/2020    HCT 40.0 03/04/2020    MCV 98 03/04/2020    MCH 31.8 03/04/2020    MCHC 32.5 03/04/2020    RDW 12.8 03/04/2020     03/04/2020     BMP RESULTS:  Lab Results   Component Value Date     03/06/2020    POTASSIUM 3.9 03/06/2020    CHLORIDE 105 03/06/2020    CO2 29 03/06/2020    ANIONGAP 4 03/06/2020     (H) 03/06/2020    BUN 38 (H) 03/06/2020    CR 1.11 03/06/2020    GFRESTIMATED 59 (L) 03/06/2020    GFRESTBLACK 69 03/06/2020    FLORES 9.1 03/06/2020      This note was completed in part using Dragon voice recognition software. Although reviewed after completion, some word and grammatical errors may occur.

## 2020-03-06 NOTE — PATIENT INSTRUCTIONS
Thanks for coming into the [facility] Heart clinic today.    Today's plan:   1. Continue your current medications.   2. Continue with cardiac rehab as planned.  3. Follow up with Dr. Palmer in about 3 months.   4. We'll give you a call to update you on the lab results from today.    If you have questions or concerns, please call my nurse at [phone].    Scheduling phone number: [phone]    Reminder: Please bring in all current medications, any over the counter supplements, and any vitamin bottles to your next appointment.    It was a pleasure seeing you today!     BLUE Aldridge, CNP   03/06/2020

## 2020-03-06 NOTE — LETTER
3/6/2020    Tess Leigh MD  [address] [facility] [address]    RE: Deshawn Watsonallan       Dear Colleague,    I had the pleasure of seeing Deshawn Burrows in the [facility].    Cardiology Clinic Progress Note    Service Date: March 6, 2020    PRIMARY CARDIOLOGIST: Dr. Palmer      REASON FOR VISIT: Hospital follow up    HPI:   I had the pleasure of seeing Mr. Deshawn Burrows in the clinic today. He is a delightful 86 year old gentleman with a past medical history of hypertension, dyslipidemia, and coronary artery disease with 2 stents placed remotely in the 1990s in [place]. I met him initially during his recent admission at [facility]. He presented to the emergency room on 2/25/2020 following an episode of acute shortness of breath at rest. This happened in the context of having a significant episode of chest discomfort about 4 weeks prior that woke him from sleep and lasted for several hours. He unfortunately did not seek care and then about a week later he started noticing decline in his endurance.       He subsequently followed up with his primary care physician a few days after his episode of chest pain and was noted to have new left bundle branch block prompting a Lexiscan stress test. The stress test was abnormal showing a large area of nontransmural infarct in inferior inferoseptal segment and also nontransmural infarct and apical, anteroseptal and anterior segment with mild agnieszka-infarct ischemia with severe reduced LV systolic function.     An echocardiogram during his admission confirmed severe reduction in LV systolic function with an ejection fraction of 24%. Moderate mitral regurgitation and mild tricuspid regurgitation were noted. The RVSP was elevated consistent with mild to moderate pulmonary hypertension. He was diuresed with IV Lasix and subsequently sent for coronary angiography, which revealed in segment restenosis just  beyond the mid LAD stent. An additional stent was successfully placed.   The degree of LV dysfunction was felt to be disproportionate to his burden of coronary disease.  He was discharged on a regimen of spironolactone 12.5 mg once daily, furosemide 10 mg once daily, metoprolol succinate 50 mg once daily, lisinopril 5 mg once daily, Plavix 75 mg once daily, and a baby aspirin daily.      Unfortunately, he ended up presenting back to the hospital about 2 days later due to concerns for shortness of breath.  His work-up was largely unremarkable. He suspected that his symptoms were related to his significant LV dysfunction with issues with urinary retention contributing.  He was seen by urology and had a Faustin catheter placed with resolution of his symptoms.    Today, Mr. Burrows presents to the clinic with his grandson, Jefe, in follow up of his recent hospital stay. He tells me that he has been feeling generally well since he has been home. He has not been quite as active as he usually is, but he has not noticed significant dyspnea on with light activity around the house. He denies any chest pain. He has not had orthopnea, PND, lower extremity edema, palpitations, dizziness, presyncope, or syncope. He said that his wife is unfortunately currently ill, so their grandson has been staying with him and has been helping care for the two of them. A basic metabolic panel was drawn prior to the visit today and shows stable electrolytes and kidney function with a potassium of 3.9 with a creatinine of 1.11.    ASSESSMENT AND PLAN:  1. Coronary artery disease    Status post stenting of the LAD remotely over 20 years ago in [place], with in segment restenosis of the mid LAD with an additional stent placed on 2/28/2020.    Appears to be stable without anginal symptoms.    Continue on aspirin 81 mg once daily, Plavix 75 mg once daily, statin, beta-blocker.    Recommend outpatient cardiac rehab.  2. Chronic heart failure with  reduced ejection fraction, severe cardiomyopathy    EF 24% on echocardiography during his recent admission. Suspect likely mixed ischemic and nonischemic etiology. The degree of LV dysfunction was felt to be disproportionate to the severity of his coronary disease on his recent angiogram.    Appears well compensated on exam without any signs of acute CHF.    Continue current regimen of Lasix 10 mg once daily, spironolactone 12.5 mg once daily, metoprolol succinate 50 mg once daily, and lisinopril 5 mg once daily.  3. Dyslipidemia    Treated on atorvastatin 20 mg once daily with an LDL of 48.  4. Hypertension    Well-controlled on current regimen.  5. Urinary retention    A Faustin catheter remains in place. He is planning to follow-up with urology in the next couple of weeks.    Thank you for the opportunity to participate in this pleasant patient's care. I will have him see Dr. Palmer in follow up in about 3 months with a cardiac MRI beforehand for further evaluation of his cardiomyopathy and reassessment of his LV function. I encouraged him to call with concerns in the meantime and we would be happy to see him sooner if needed.    BLUE Aldridge, CNP   Text Page  (8am - 5pm, M-F)    Orders this Visit:  No orders of the defined types were placed in this encounter.    No orders of the defined types were placed in this encounter.    There are no discontinued medications.    Encounter Diagnoses   Name Primary?     Coronary artery disease involving native coronary artery of native heart without angina pectoris Yes     Ischemic cardiomyopathy      HTN, goal below 130/80      Hyperlipidemia LDL goal <70      Dyspnea on exertion      Acute systolic heart failure (H)      Essential hypertension with goal blood pressure less than 140/90      Coronary artery disease involving native heart, angina presence unspecified, unspecified vessel or lesion type      CURRENT MEDICATIONS:  Current Outpatient Medications   Medication Sig  Dispense Refill     aspirin 81 MG EC tablet Take 81 mg by mouth daily       atorvastatin (LIPITOR) 40 MG tablet Take 20 mg by mouth At Bedtime       clopidogrel (PLAVIX) 75 MG tablet Take 1 tablet (75 mg) by mouth daily 30 tablet 11     furosemide (LASIX) 20 MG tablet Take 0.5 tablets (10 mg) by mouth daily 15 tablet 0     lisinopril (ZESTRIL) 5 MG tablet Take 1 tablet (5 mg) by mouth daily 30 tablet 1     metoprolol succinate ER (TOPROL-XL) 50 MG 24 hr tablet Take 1 tablet (50 mg) by mouth daily 90 tablet 3     multivitamin, therapeutic with minerals (THERA-VIT-M) TABS Take 1 tablet by mouth daily       nitroGLYcerin (NITROSTAT) 0.4 MG sublingual tablet Place 1 tablet (0.4 mg) under the tongue every 5 minutes as needed for chest pain 30 tablet 0     spironolactone (ALDACTONE) 25 MG tablet Take 0.5 tablets (12.5 mg) by mouth daily 30 tablet 1     tamsulosin (FLOMAX) 0.4 MG capsule Take 1 capsule (0.4 mg) by mouth daily 30 capsule 0     ALLERGIES  Allergies   Allergen Reactions     Penicillins      PAST MEDICAL, SURGICAL, FAMILY HISTORY:  History was reviewed and updated as needed, see medical record.    SOCIAL HISTORY:  He is . He lives in a home with his wife in [place]. They have 3 adult daughters, two of whom live in the area. He is active and independent. He walks regularly at the [place] mall usually going a little over 2 miles about 3 times a week. He previously smoked tobacco out of a pipe and quit this over 40 years ago. He drinks alcohol rarely. No other drug use.    Review of Systems:  Skin:  Negative     Eyes:  Negative    ENT:  Positive for hearing loss  Respiratory:  Positive for shortness of breath  Cardiovascular:  Negative    Gastroenterology: Negative    Genitourinary:  Positive for    Musculoskeletal:  Negative    Neurologic:  Negative    Psychiatric:  Negative    Heme/Lymph/Imm:  Negative    Endocrine:  Negative       Physical Exam:  Vitals: /60 (BP Location: Right arm, Patient  "Position: Chair, Cuff Size: Adult Regular)   Pulse 84   Ht 1.854 m (6' 1\")   Wt 62.7 kg (138 lb 4.8 oz)   BMI 18.25 kg/m     Wt Readings from Last 4 Encounters:   03/06/20 62.7 kg (138 lb 4.8 oz)   03/02/20 57.6 kg (127 lb)   02/29/20 64.5 kg (142 lb 1.6 oz)   02/11/20 66.2 kg (145 lb 14.4 oz)     CONSTITUTIONAL: Jovial, tall, thin elderly male, well nourished, and in no acute distress.  HEENT: Pupils equal, round. Sclerae nonicteric.    NECK: Supple. JVP appears normal.  C/V: Regular rate and rhythm, normal S1 and S2, no S3 or S4, no murmur, rub or gallop.   RESP: Respirations are unlabored. Lungs are clear to auscultation bilaterally without wheezes or crackles.  GI: Abdomen soft, non-tender, non-distended.  EXTREM: No clubbing, cyanosis, or lower extremity edema bilaterally.   NEURO: Alert and oriented, cooperative. Gait steady. No gross focal deficits.   PSYCH: Affect appropriate, bright. Mentation normal. Responds to questions appropriately.  SKIN: Warm and dry. No apparent rashes or bruising.    Recent Lab Results:  LIPID RESULTS:  Lab Results   Component Value Date    CHOL 89 02/28/2020    HDL 34 (L) 02/28/2020    LDL 48 02/28/2020    TRIG 36 02/28/2020     LIVER ENZYME RESULTS:  Lab Results   Component Value Date    AST 39 03/01/2020    ALT 49 03/01/2020     CBC RESULTS:  Lab Results   Component Value Date    WBC 6.7 03/04/2020    RBC 4.09 (L) 03/04/2020    HGB 13.0 (L) 03/04/2020    HCT 40.0 03/04/2020    MCV 98 03/04/2020    MCH 31.8 03/04/2020    MCHC 32.5 03/04/2020    RDW 12.8 03/04/2020     03/04/2020     BMP RESULTS:  Lab Results   Component Value Date     03/06/2020    POTASSIUM 3.9 03/06/2020    CHLORIDE 105 03/06/2020    CO2 29 03/06/2020    ANIONGAP 4 03/06/2020     (H) 03/06/2020    BUN 38 (H) 03/06/2020    CR 1.11 03/06/2020    GFRESTIMATED 59 (L) 03/06/2020    GFRESTBLACK 69 03/06/2020    FLORES 9.1 03/06/2020      This note was completed in part using Dragon voice " recognition software. Although reviewed after completion, some word and grammatical errors may occur.      Thank you for allowing me to participate in the care of your patient.    Sincerely,     Matt Alexander NP     [facility]

## 2020-03-09 ENCOUNTER — ALLIED HEALTH/NURSE VISIT (OUTPATIENT)
Dept: UROLOGY | Facility: CLINIC | Age: 85
End: 2020-03-09
Payer: MEDICARE

## 2020-03-09 DIAGNOSIS — R31.9 HEMATURIA: Primary | ICD-10-CM

## 2020-03-09 PROCEDURE — 99211 OFF/OP EST MAY X REQ PHY/QHP: CPT

## 2020-03-09 NOTE — PROGRESS NOTES
Deshawn Burrows comes into clinic today at the request of Dr. Kraft Ordering Provider for catheter removal.      Catheter removal documentation on 3/9/2020:    Deshawn Burrows presents to the clinic for catheter removal.  Reason for removal: post-cysto  Order has been verified. yes  Catheter successfully removed at 1:47 PM without immediate complication.  50 cc's of urine present in the catheter bag.  Urethral meatus is free of secretions and encrustation.  The patient is afebrile.  The patient tolerated the procedure and was instructed to follow up with their PCP or consultant as planned    Gail Johnson CMA    This service provided today was under the supervising provider of the day Dr. Kraft, who was available if needed.    Gail Johnson CMA

## 2020-03-09 NOTE — TELEPHONE ENCOUNTER
Pt f/u with Aric Alexander, OSCAR on 3/6/20 as scheduled. Will close this encounter. NATTY De Souza RN.

## 2020-03-10 LAB — CATH EF ESTIMATED: 25 %

## 2020-03-20 ENCOUNTER — PATIENT OUTREACH (OUTPATIENT)
Dept: CARE COORDINATION | Facility: CLINIC | Age: 85
End: 2020-03-20

## 2020-03-20 DIAGNOSIS — I50.21 ACUTE SYSTOLIC CONGESTIVE HEART FAILURE (H): Primary | ICD-10-CM

## 2020-03-20 ASSESSMENT — ACTIVITIES OF DAILY LIVING (ADL): DEPENDENT_IADLS:: INDEPENDENT

## 2020-03-20 NOTE — LETTER
[place] CARE COORDINATION  [address]  [facility] [address]    March 20, 2020    Deshawn Burrows  [address]   [place] [address]      Dear Deshawn,    I am a clinic care coordinator who works with Tess Leigh MD at [facility]. I wanted to introduce myself and provide you with my contact information for you to be able to call me with any questions or concerns. Below is a description of clinic care coordination and how I can further assist you.      The clinic care coordinator team is made up of a registered nurse,  and community health worker who understand the health care system. The goal of clinic care coordination is to help you manage your health and improve access to the health care system in the most efficient manner. The team can assist you in meeting your health care goals by providing education, coordinating services, strengthening the communication among your providers  and supporting you with any resource needs.    Please feel free to contact me, at [phone] with any questions or concerns. We are focused on providing you with the highest-quality healthcare experience possible and that all starts with you.     Sincerely,     Shruthi Bueno RN  Care Coordination  Phone:  [phone]  Email: [email or web address]  [facility], [place] and [facility]

## 2020-03-20 NOTE — PROGRESS NOTES
Clinic Care Coordination Contact    Follow Up Progress Note   Patient was discharged from home care service on 3/20.   RN CC spoke with patient over phone.    Assessment: patient very pleasant and appreciated follow up call from RN CC.  Patient stated he feels he is doing well.  Patient reports being independent with ADLs.  Does not use DME.  He managed his own medications and is able to do some driving.    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      He has children and grandchildren in local area who are very helpful and supportive.    Home Care SW did visit with patient and provided community resources on meal delivery.    Patient denied any other needs at this time.  He was provided contact info an encouraged to call with any needs.    Shruthi Bueno RN  Care Coordination  Phone:  [phone]  Email: [email or web address]  [facility], [place], [place] and [facility]

## 2020-03-24 DIAGNOSIS — I25.10 CORONARY ARTERY DISEASE INVOLVING NATIVE CORONARY ARTERY OF NATIVE HEART WITHOUT ANGINA PECTORIS: ICD-10-CM

## 2020-03-24 DIAGNOSIS — R33.9 URINARY RETENTION: ICD-10-CM

## 2020-03-24 DIAGNOSIS — I10 ESSENTIAL HYPERTENSION WITH GOAL BLOOD PRESSURE LESS THAN 140/90: ICD-10-CM

## 2020-03-24 DIAGNOSIS — I50.21 ACUTE SYSTOLIC CONGESTIVE HEART FAILURE (H): ICD-10-CM

## 2020-03-24 DIAGNOSIS — I25.10 CORONARY ARTERY DISEASE INVOLVING NATIVE HEART, ANGINA PRESENCE UNSPECIFIED, UNSPECIFIED VESSEL OR LESION TYPE: ICD-10-CM

## 2020-03-24 DIAGNOSIS — I25.5 ISCHEMIC CARDIOMYOPATHY: ICD-10-CM

## 2020-03-24 NOTE — TELEPHONE ENCOUNTER
Patient's grandson Jefe ([phone] called to ask for refills of all patient's medications to be sent to [facility] pharmacy in [place]. Medications have changed since being hospitalized earlier this month. He is wondering if there should be a hospital follow up appointment.Also need to discuss cardiac rehab, patient declined thinking it was physical therapy. Jefe is not on the consent to communicate. Advised we would need to call the patient back to discuss. Jefe wanted himself and patient's daughter Trisha Johnson added to the consent to communicate, advised that could not be done over the phone. Call patient to advise.

## 2020-03-26 NOTE — TELEPHONE ENCOUNTER
"Please review meds and renew.  There had been some changes made during last 2 hospitalizations (2/25 and 3/1).  Route to staff for us to contact patient for phone visit.  Requested Prescriptions   Pending Prescriptions Disp Refills     atorvastatin (LIPITOR) 40 MG tablet 15 tablet      Sig: Take 0.5 tablets (20 mg) by mouth At Bedtime       Statins Protocol Passed - 3/26/2020  3:00 PM        Passed - LDL on file in past 12 months     Recent Labs   Lab Test 02/28/20  1641   LDL 48             Passed - No abnormal creatine kinase in past 12 months     No lab results found.             Passed - Recent (12 mo) or future (30 days) visit within the authorizing provider's specialty     Patient has had an office visit with the authorizing provider or a provider within the authorizing providers department within the previous 12 mos or has a future within next 30 days. See \"Patient Info\" tab in inbasket, or \"Choose Columns\" in Meds & Orders section of the refill encounter.              Passed - Medication is active on med list        Passed - Patient is age 18 or older           clopidogrel (PLAVIX) 75 MG tablet 90 tablet      Sig: Take 1 tablet (75 mg) by mouth daily       Plavix Failed - 3/26/2020  3:00 PM        Failed - Normal HGB on file in past 12 months     Recent Labs   Lab Test 03/04/20  0724   HGB 13.0*               Passed - No active PPI on record unless is Protonix        Passed - Normal Platelets on file in past 12 months     Recent Labs   Lab Test 03/04/20  0724                  Passed - Recent (12 mo) or future (30 days) visit within the authorizing provider's specialty     Patient has had an office visit with the authorizing provider or a provider within the authorizing providers department within the previous 12 mos or has a future within next 30 days. See \"Patient Info\" tab in inbasket, or \"Choose Columns\" in Meds & Orders section of the refill encounter.              Passed - Medication is active on " "med list        Passed - Patient is age 18 or older           furosemide (LASIX) 20 MG tablet 15 tablet      Sig: Take 0.5 tablets (10 mg) by mouth daily       Diuretics (Including Combos) Protocol Passed - 3/26/2020  3:00 PM        Passed - Blood pressure under 140/90 in past 12 months     BP Readings from Last 3 Encounters:   03/06/20 106/60   03/04/20 109/57   02/29/20 116/61                 Passed - Recent (12 mo) or future (30 days) visit within the authorizing provider's specialty     Patient has had an office visit with the authorizing provider or a provider within the authorizing providers department within the previous 12 mos or has a future within next 30 days. See \"Patient Info\" tab in inbasket, or \"Choose Columns\" in Meds & Orders section of the refill encounter.              Passed - Medication is active on med list        Passed - Patient is age 18 or older        Passed - Normal serum creatinine on file in past 12 months     Recent Labs   Lab Test 03/06/20  1335   CR 1.11              Passed - Normal serum potassium on file in past 12 months     Recent Labs   Lab Test 03/06/20  1335   POTASSIUM 3.9                    Passed - Normal serum sodium on file in past 12 months     Recent Labs   Lab Test 03/06/20  1335                    lisinopril (ZESTRIL) 5 MG tablet 90 tablet      Sig: Take 1 tablet (5 mg) by mouth daily       ACE Inhibitors (Including Combos) Protocol Passed - 3/26/2020  3:00 PM        Passed - Blood pressure under 140/90 in past 12 months     BP Readings from Last 3 Encounters:   03/06/20 106/60   03/04/20 109/57   02/29/20 116/61                 Passed - Recent (12 mo) or future (30 days) visit within the authorizing provider's specialty     Patient has had an office visit with the authorizing provider or a provider within the authorizing providers department within the previous 12 mos or has a future within next 30 days. See \"Patient Info\" tab in inbasket, or \"Choose Columns\" " "in Meds & Orders section of the refill encounter.              Passed - Medication is active on med list        Passed - Patient is age 18 or older        Passed - Normal serum creatinine on file in past 12 months     Recent Labs   Lab Test 03/06/20  1335   CR 1.11       Ok to refill medication if creatinine is low          Passed - Normal serum potassium on file in past 12 months     Recent Labs   Lab Test 03/06/20  1335   POTASSIUM 3.9                metoprolol succinate ER (TOPROL-XL) 50 MG 24 hr tablet 90 tablet      Sig: Take 1 tablet (50 mg) by mouth daily       Beta-Blockers Protocol Passed - 3/26/2020  3:00 PM        Passed - Blood pressure under 140/90 in past 12 months     BP Readings from Last 3 Encounters:   03/06/20 106/60   03/04/20 109/57   02/29/20 116/61                 Passed - Patient is age 6 or older        Passed - Recent (12 mo) or future (30 days) visit within the authorizing provider's specialty     Patient has had an office visit with the authorizing provider or a provider within the authorizing providers department within the previous 12 mos or has a future within next 30 days. See \"Patient Info\" tab in inbasket, or \"Choose Columns\" in Meds & Orders section of the refill encounter.              Passed - Medication is active on med list           nitroGLYcerin (NITROSTAT) 0.4 MG sublingual tablet 30 tablet      Sig: Place 1 tablet (0.4 mg) under the tongue every 5 minutes as needed for chest pain       Nitrates Failed - 3/26/2020  3:00 PM        Failed - Sublingual nitro order needs review     If refill exceeds 1 bottle per month, please forward request to provider.           Passed - Blood pressure under 140/90 in past 12 months     BP Readings from Last 3 Encounters:   03/06/20 106/60   03/04/20 109/57   02/29/20 116/61                 Passed - Pt is not on erectile dysfunction medications        Passed - Recent (12 mo) or future (30 days) visit within the authorizing provider's specialty " "    Patient has had an office visit with the authorizing provider or a provider within the authorizing providers department within the previous 12 mos or has a future within next 30 days. See \"Patient Info\" tab in inbasket, or \"Choose Columns\" in Meds & Orders section of the refill encounter.              Passed - Medication is active on med list        Passed - Patient is age 18 or older           spironolactone (ALDACTONE) 25 MG tablet 90 tablet      Sig: Take 0.5 tablets (12.5 mg) by mouth daily       Diuretics (Including Combos) Protocol Passed - 3/26/2020  3:00 PM        Passed - Blood pressure under 140/90 in past 12 months     BP Readings from Last 3 Encounters:   03/06/20 106/60   03/04/20 109/57   02/29/20 116/61                 Passed - Recent (12 mo) or future (30 days) visit within the authorizing provider's specialty     Patient has had an office visit with the authorizing provider or a provider within the authorizing providers department within the previous 12 mos or has a future within next 30 days. See \"Patient Info\" tab in inTherapeutic Systemssket, or \"Choose Columns\" in Meds & Orders section of the refill encounter.              Passed - Medication is active on med list        Passed - Patient is age 18 or older        Passed - Normal serum creatinine on file in past 12 months     Recent Labs   Lab Test 03/06/20  1335   CR 1.11              Passed - Normal serum potassium on file in past 12 months     Recent Labs   Lab Test 03/06/20  1335   POTASSIUM 3.9                    Passed - Normal serum sodium on file in past 12 months     Recent Labs   Lab Test 03/06/20  1335                    tamsulosin (FLOMAX) 0.4 MG capsule 90 capsule      Sig: Take 1 capsule (0.4 mg) by mouth daily       Alpha Blockers Passed - 3/26/2020  3:00 PM        Passed - Blood pressure under 140/90 in past 12 months     BP Readings from Last 3 Encounters:   03/06/20 106/60   03/04/20 109/57   02/29/20 116/61                 Passed - " "Recent (12 mo) or future (30 days) visit within the authorizing provider's specialty     Patient has had an office visit with the authorizing provider or a provider within the authorizing providers department within the previous 12 mos or has a future within next 30 days. See \"Patient Info\" tab in inbasket, or \"Choose Columns\" in Meds & Orders section of the refill encounter.              Passed - Patient does not have Tadalafil, Vardenafil, or Sildenafil on their medication list        Passed - Medication is active on med list        Passed - Patient is 18 years of age or older             "

## 2020-03-27 ENCOUNTER — VIRTUAL VISIT (OUTPATIENT)
Dept: INTERNAL MEDICINE | Facility: CLINIC | Age: 85
End: 2020-03-27
Payer: MEDICARE

## 2020-03-27 DIAGNOSIS — I25.10 CORONARY ARTERY DISEASE INVOLVING NATIVE HEART, ANGINA PRESENCE UNSPECIFIED, UNSPECIFIED VESSEL OR LESION TYPE: ICD-10-CM

## 2020-03-27 DIAGNOSIS — R33.9 URINARY RETENTION: ICD-10-CM

## 2020-03-27 DIAGNOSIS — I50.21 ACUTE SYSTOLIC CONGESTIVE HEART FAILURE (H): ICD-10-CM

## 2020-03-27 DIAGNOSIS — I25.5 ISCHEMIC CARDIOMYOPATHY: ICD-10-CM

## 2020-03-27 DIAGNOSIS — I10 ESSENTIAL HYPERTENSION WITH GOAL BLOOD PRESSURE LESS THAN 140/90: ICD-10-CM

## 2020-03-27 PROCEDURE — G2012 BRIEF CHECK IN BY MD/QHP: HCPCS | Performed by: INTERNAL MEDICINE

## 2020-03-27 RX ORDER — NITROGLYCERIN 0.4 MG/1
0.4 TABLET SUBLINGUAL EVERY 5 MIN PRN
Qty: 30 TABLET | OUTPATIENT
Start: 2020-03-27

## 2020-03-27 RX ORDER — ATORVASTATIN CALCIUM 20 MG/1
20 TABLET, FILM COATED ORAL DAILY
Qty: 90 TABLET | Refills: 1 | Status: SHIPPED | OUTPATIENT
Start: 2020-03-27 | End: 2020-11-13

## 2020-03-27 RX ORDER — FUROSEMIDE 20 MG
10 TABLET ORAL DAILY
Qty: 45 TABLET | Refills: 1 | Status: SHIPPED | OUTPATIENT
Start: 2020-03-27 | End: 2020-09-14

## 2020-03-27 RX ORDER — SPIRONOLACTONE 25 MG/1
12.5 TABLET ORAL DAILY
Qty: 45 TABLET | Refills: 1 | Status: SHIPPED | OUTPATIENT
Start: 2020-03-27 | End: 2020-10-19

## 2020-03-27 RX ORDER — TAMSULOSIN HYDROCHLORIDE 0.4 MG/1
0.4 CAPSULE ORAL DAILY
Qty: 90 CAPSULE | OUTPATIENT
Start: 2020-03-27

## 2020-03-27 RX ORDER — LISINOPRIL 5 MG/1
5 TABLET ORAL DAILY
Qty: 90 TABLET | Refills: 1 | Status: SHIPPED | OUTPATIENT
Start: 2020-03-27 | End: 2020-10-19

## 2020-03-27 RX ORDER — ATORVASTATIN CALCIUM 40 MG/1
20 TABLET, FILM COATED ORAL AT BEDTIME
Qty: 15 TABLET | OUTPATIENT
Start: 2020-03-27

## 2020-03-27 RX ORDER — SPIRONOLACTONE 25 MG/1
12.5 TABLET ORAL DAILY
Qty: 90 TABLET | OUTPATIENT
Start: 2020-03-27

## 2020-03-27 RX ORDER — METOPROLOL SUCCINATE 50 MG/1
50 TABLET, EXTENDED RELEASE ORAL DAILY
Qty: 90 TABLET | OUTPATIENT
Start: 2020-03-27

## 2020-03-27 RX ORDER — CLOPIDOGREL BISULFATE 75 MG/1
75 TABLET ORAL DAILY
Qty: 90 TABLET | OUTPATIENT
Start: 2020-03-27

## 2020-03-27 RX ORDER — LISINOPRIL 5 MG/1
5 TABLET ORAL DAILY
Qty: 90 TABLET | OUTPATIENT
Start: 2020-03-27

## 2020-03-27 RX ORDER — FUROSEMIDE 20 MG
10 TABLET ORAL DAILY
Qty: 15 TABLET | OUTPATIENT
Start: 2020-03-27

## 2020-03-27 NOTE — TELEPHONE ENCOUNTER
Pt needs phone viisit- f/u hospitalizations and med refills, pl schedule phone visit . Pl inform pt

## 2020-03-27 NOTE — PROGRESS NOTES
Subjective     Deshawn Burrows is a 87 year old male for phone visit today for the following health issues:    HPI     Phone visit- 11:05 am - 11: 16 am       Hospital Follow-up Visit:    Hospital/Nursing Home/IP Rehab Facility: [facility]  Date of Admission: 3/1/2020  Date of Discharge: 3/4/2020  Reason(s) for Admission: Acute systolic congestive heart failure, urinary retention            Problems taking medications regularly:  None       Medication changes since discharge: None       Problems adhering to non-medication therapy:  None    Summary of hospitalization:  [facility] discharge summary reviewed  Diagnostic Tests/Treatments reviewed.  Follow up needed:   Other Healthcare Providers Involved in Patient s Care:         Specialist appointment - Cardiology  Update since discharge: improved.     Post Discharge Medication Reconciliation: discharge medications reconciled, continue medications without change.  Plan of care communicated with patient and family     Coding guidelines for this visit:  Type of Medical   Decision Making Face-to-Face Visit       within 7 Days of discharge Face-to-Face Visit        within 14 days of discharge   Moderate Complexity [id number]   High Complexity [id number]          Deshawn Burrows was admitted on 3/1/2020. He is a 86 year old male who was admitted with dyspnea on exertion.  Patient with known history of coronary artery disease past medical history significant for atherosclerotic cardiovascular disease status post recent PCI stenting of his LAD on 2/28/2020 on aspirin and Plavix, hypertension, CHF, COPD, chronic left bundle branch block and dyslipidemia.  He presented with dyspnea on exertion and continues to have intermittent episodes of dyspnea on exertion since his recent discharge from hospital.  In the emergency room patient also had episode of urinary retention for which Faustin catheter was placed.    The following problems were addressed  during his hospitalization:     1. Coronary artery disease    Status post stenting of the LAD remotely over 20 years ago in [place], with in segment restenosis of the mid LAD with an additional stent placed on 2/28/2020.    Appears to be stable without anginal symptoms.    Continue on aspirin 81 mg once daily, Plavix 75 mg once daily, statin, beta-blocker.    Recommend outpatient cardiac rehab.      Dyspnea on exertion and shortness of breath  -Likely secondary to mild exacerbation of chronic systolic heart failure which improved with IV Lasix initially.  Will be discharged on oral Lasix.  -Cardiology was consulted.  Patient symptoms likely due to heart failure and he is scheduled to follow-up in cardiology clinic.  -There is slight troponin leak which is understandable from recent angioplasty  -Last echo showed poor EF  -Family is concerned about these episodes of dyspnea on exertion periodically.    Appreciate cardiology input.  Patient will be followed closely in cardiology clinic on discharge  -Started on Lasix 10 mg daily  -Continue all other cardiac medication  -Monitor with daily weights I's and O's  -Clinically does not have any bronchospasm.  Doubt COPD  -Continue Lipitor 20 mg daily, lisinopril 5 mg daily, Toprol XL 50 mg daily, Aldactone 12.5 mg daily.        Urinary retention and gross hematuria  -Faustin was placed in the emergency room.  Now has indwelling Faustin catheter  -Patient will have void trial in 1 week in urology clinic after discharge.  -Started on Flomax  -Outpatient urology follow-up     History of COPD: No evidence of acute exacerbation  -Continue duo nebs on a as needed basis  -Do note that patient was given prednisone 40 mg x 1 in the ED     Congestive heart failure with reduced ejection fraction: New diagnosis last admission.  LVEF reduced at 25% on echocardiogram and previously had been 45 to 50% on stress echocardiogram back in February 2017.  -Plan for diuresis   Otherwise, continue  medical management as above  - need follow-up in cardiology clinic      Patient Active Problem List   Diagnosis     Vasculitis (H)     Essential hypertension with goal blood pressure less than 140/90     Hyperlipidemia LDL goal <100     Myocardial infarction (H)     Coronary artery disease involving native heart, angina presence unspecified, unspecified vessel or lesion type     Dyspnea     LBBB (left bundle branch block)     Coronary artery disease involving native coronary artery of native heart without angina pectoris     Ischemic cardiomyopathy     Acute systolic heart failure (H)     Abnormal cardiovascular stress test     Dyspnea on exertion     CHF (congestive heart failure) (H)     Past Surgical History:   Procedure Laterality Date     CV CORONARY ANGIOGRAM N/A 2/28/2020    Procedure: CV CORONARY ANGIOGRAM;  Surgeon: Andres Mann MD;  Location:  HEART CARDIAC CATH LAB     CV LEFT HEART CATH N/A 2/28/2020    Procedure: Left Heart Cath;  Surgeon: Andres Mann MD;  Location:  HEART CARDIAC CATH LAB     CV LEFT VENTRICULOGRAM N/A 2/28/2020    Procedure: Left Ventriculogram;  Surgeon: Andres Mann MD;  Location:  HEART CARDIAC CATH LAB     CV PCI STENT DRUG ELUTING N/A 2/28/2020    Procedure: Percutaneous Coronary Intervention Stent Drug Eluting;  Surgeon: Andres Mann MD;  Location:  HEART CARDIAC CATH LAB     CYSTOSCOPY N/A 3/1/2020    Procedure: CYSTOSCOPY;  Surgeon: Rios Gomez MD;  Location: [facility] OR     HEART CATH DRUG ELUTING STENT PLACEMENT  02/28/2020     HERNIA REPAIR  2007    right inguinal hernia repair     STENT, CORONARY, ANTIONE  2004    2 stents       Social History     Tobacco Use     Smoking status: Former Smoker     Smokeless tobacco: Never Used     Tobacco comment: Quit 30 years ago   Substance Use Topics     Alcohol use: Yes     Alcohol/week: 0.0 standard drinks     Comment: Moderate     Family History   Family history unknown: Yes         Current  Outpatient Medications   Medication Sig Dispense Refill     aspirin 81 MG EC tablet Take 81 mg by mouth daily       atorvastatin (LIPITOR) 40 MG tablet Take 20 mg by mouth At Bedtime       clopidogrel (PLAVIX) 75 MG tablet Take 1 tablet (75 mg) by mouth daily 30 tablet 11     furosemide (LASIX) 20 MG tablet Take 0.5 tablets (10 mg) by mouth daily 15 tablet 0     lisinopril (ZESTRIL) 5 MG tablet Take 1 tablet (5 mg) by mouth daily 30 tablet 1     metoprolol succinate ER (TOPROL-XL) 50 MG 24 hr tablet Take 1 tablet (50 mg) by mouth daily 90 tablet 3     multivitamin, therapeutic with minerals (THERA-VIT-M) TABS Take 1 tablet by mouth daily       nitroGLYcerin (NITROSTAT) 0.4 MG sublingual tablet Place 1 tablet (0.4 mg) under the tongue every 5 minutes as needed for chest pain 30 tablet 0     spironolactone (ALDACTONE) 25 MG tablet Take 0.5 tablets (12.5 mg) by mouth daily 30 tablet 1     tamsulosin (FLOMAX) 0.4 MG capsule Take 1 capsule (0.4 mg) by mouth daily 30 capsule 0          Reviewed and updated as needed this visit by Provider                Assessment & Plan      (I25.5) Ischemic cardiomyopathy  Plan: spironolactone (ALDACTONE) 25 MG tablet,         lisinopril (ZESTRIL) 5 MG tablet            (I25.10) Coronary artery disease involving native heart, angina presence unspecified, unspecified vessel or lesion type  Plan: s/p stent , on aspirin and plavix , atorvastatin (LIPITOR) 20 MG tablet            (I10) Essential hypertension with goal blood pressure less than 140/90        (I50.21) Acute systolic congestive heart failure (H)  Plan: furosemide (LASIX) 20 MG tablet          (R33.9) Urinary retention  Plan: resolved, has seen urologist        Above med refilled.explained clearly about the medications,insructions and side effects.          Tess Leigh MD  [facility]

## 2020-03-27 NOTE — TELEPHONE ENCOUNTER
Called patient and assisted in scheduling a phone visit.      Next 5 appointments (look out 90 days)    Mar 27, 2020 10:40 AM CDT  Telephone Visit with Tess Leigh MD  [facility] ([facility]) [address]  [facility] [id number] [phone]        Discussed the phone visit pricing and how the visit will work.  Patient verbalized understanding.  Telephone visit - a phone visit with your provider:  Contact the office to schedule this (or request through [employer]). Most phone visits are 10 minutes or less. Cost varies by length of visit:  $30: Up to 10 minutes  $59: 11-20 minutes  $85: 21-30 minutes  We'll bill your insurance directly. If your provider determines an in-clinic visit is needed, there is no charge for the visit.

## 2020-04-01 ENCOUNTER — TELEPHONE (OUTPATIENT)
Dept: INTERNAL MEDICINE | Facility: CLINIC | Age: 85
End: 2020-04-01

## 2020-04-02 DIAGNOSIS — Z53.9 DIAGNOSIS NOT YET DEFINED: Primary | ICD-10-CM

## 2020-04-02 PROCEDURE — G0180 MD CERTIFICATION HHA PATIENT: HCPCS | Performed by: INTERNAL MEDICINE

## 2020-04-04 ENCOUNTER — NURSE TRIAGE (OUTPATIENT)
Dept: NURSING | Facility: CLINIC | Age: 85
End: 2020-04-04

## 2020-04-04 DIAGNOSIS — R33.9 URINARY RETENTION: ICD-10-CM

## 2020-04-04 NOTE — TELEPHONE ENCOUNTER
"Pharmacist calling as patient was out of the Flowmax that was started during his hospital admission in early March.  In reviewing notes documentation, it was queued up to order a refill on 3/26/2020, but was never completed.  Routed note to clinic staff to address on Monday.  Pharmacist did report that hospitalist at [facility] had also been contacted and gave a verbal consent for a 14 day supply.    iDane Montoya RN on 4/4/2020 at 3:20 PM      Reason for Disposition    Pharmacy calling with prescription questions and triager unable to answer question    Additional Information    Negative: MORE THAN A DOUBLE DOSE of a prescription or over-the-counter (OTC) drug    Negative: [1] DOUBLE DOSE (an extra dose or lesser amount) of over-the-counter (OTC) drug AND [2] any symptoms (e.g., dizziness, nausea, pain, sleepiness)    Negative: [1] DOUBLE DOSE (an extra dose or lesser amount) of prescription drug AND [2] any symptoms (e.g., dizziness, nausea, pain, sleepiness)    Negative: Took another person's prescription drug    Negative: Drug overdose and nurse unable to answer question    Negative: Caller requesting information not related to medicine    Negative: Caller requesting a prescription for Strep throat and has a positive culture result    Negative: Rash while taking a medication or within 3 days of stopping it    Negative: Immunization reaction suspected    Negative: [1] Asthma and [2] having symptoms of asthma (cough, wheezing, etc)    Negative: [1] Prescription not at pharmacy AND [2] was prescribed today by PCP    Negative: Diabetes drug error or overdose (e.g., insulin or extra dose)    Negative: [1] DOUBLE DOSE (an extra dose or lesser amount) of prescription drug AND [2] NO symptoms (Exception: a double dose of antibiotics)    Negative: [1] Request for URGENT new prescription or refill of \"essential\" medication (i.e., likelihood of harm to patient if not taken) AND [2] triager unable to fill per unit " policy    Protocols used: MEDICATION QUESTION CALL-A-AH

## 2020-04-06 RX ORDER — TAMSULOSIN HYDROCHLORIDE 0.4 MG/1
0.4 CAPSULE ORAL DAILY
Qty: 90 CAPSULE | Refills: 1 | Status: SHIPPED | OUTPATIENT
Start: 2020-04-06 | End: 2020-10-13

## 2020-04-06 NOTE — TELEPHONE ENCOUNTER
Medication pended, will route to primary care provider for review as this was last prescribed in the hospital

## 2020-07-09 ENCOUNTER — NURSE TRIAGE (OUTPATIENT)
Dept: INTERNAL MEDICINE | Facility: CLINIC | Age: 85
End: 2020-07-09

## 2020-07-09 NOTE — TELEPHONE ENCOUNTER
Patient's grandson calling.  He has been with him this morning.  Patient has been resting off and on throughout the morning.  Dizziness has improved, but he is feeling very nauseated.  Patient has been drinking water this morning and urinating as usual.  Vomited once earlier this morning.   No fever.  BP was taken while nurse was on the line and was 126/63, O2 97 and pulse 63.  Patient was laying down and grandson sat him up to take BP.  After he was sitting for a bit, he started sweating and vomited again.  Then patient laid down again.  Patient has not had anything to eat today.  Denies any chest pain, SOB, headaches, or problems with speaking or vision.  Recommended trying to have him slowly sit up in bed and have some juice.  If he is able to tolerate this ok then try a few crackers for about the next hour.  If sweating continues and patient is nauseated and unable to sit up or walk, has chest pain, SOB, headaches or dizziness returns then advised that grandson bring him to ER or call EMS for help.    If patient starts to slowly feel better after siting, but is still nauseated, can something be prescribed to help him with nausea?  Joseline Sneed RN

## 2020-07-09 NOTE — TELEPHONE ENCOUNTER
Patient calling stating that he woke up to go to the bathroom at about 4 am.  He stood up and he became very dizzy.  Patient denied any dizziness while laying flat, but as soon as he sits up, he becomes dizzy and every time he gets up, becomes dizzy.  Patient can walk, but is unsteady.  Patient takes blood pressure medications.  Pulse 75.  Blood pressure 136/75. Patient stated he doesn't drink a lot of fluids during the day.  Patient denied any weakness, nausea, fever, headache, difficulty speaking, bleeding, diarrhea, numbness.  Patient has more than one risk factor for cva.  Advised patient to go to the ER.  Patient verbalized understanding.       Additional Information    Negative: Shock suspected (e.g., cold/pale/clammy skin, too weak to stand, low BP, rapid pulse)    Negative: Difficult to awaken or acting confused (e.g., disoriented, slurred speech)    Negative: Fainted, and still feels dizzy afterwards    Negative: Severe difficulty breathing (e.g., struggling for each breath, speaks in single words)    Negative: Overdose (accidental or intentional) of medications    Negative: New neurologic deficit that is present now: * Weakness of the face, arm, or leg on one side of the body * Numbness of the face, arm, or leg on one side of the body * Loss of speech or garbled speech    Negative: Heart beating < 50 beats per minute OR > 140 beats per minute    Negative: Sounds like a life-threatening emergency to the triager    Negative: Chest pain    Negative: Rectal bleeding, bloody stool, or tarry-black stool    Negative: Vomiting is the main symptom    Negative: Diarrhea is the main symptom    Negative: Headache is the main symptom    Negative: Heat exhaustion suspected (i.e., dehydration from heat exposure)    Negative: Patient states that he/she is having an anxiety/panic attack    Spinning or tilting sensation (vertigo) present now and one or more stroke risk factors (i.e., hypertension, diabetes, prior  "stroke/TIA, heart attack, age over 60) (Exception: prior physician evaluation for this AND no different/worse than usual)    Answer Assessment - Initial Assessment Questions  1. DESCRIPTION: \"Describe your dizziness.\"      Patient feels dizzy when he stands up  2. LIGHTHEADED: \"Do you feel lightheaded?\" (e.g., somewhat faint, woozy, weak upon standing)      Dizzy and lightheaded  3. VERTIGO: \"Do you feel like either you or the room is spinning or tilting?\" (i.e. vertigo)      Patient doesn't describe it as either, just dizzy  4. SEVERITY: \"How bad is it?\"  \"Do you feel like you are going to faint?\" \"Can you stand and walk?\"    - MILD - walking normally    - MODERATE - interferes with normal activities (e.g., work, school)     - SEVERE - unable to stand, requires support to walk, feels like passing out now.       moderate  5. ONSET:  \"When did the dizziness begin?\"      This am about 4 am  6. AGGRAVATING FACTORS: \"Does anything make it worse?\" (e.g., standing, change in head position)      standing  7. HEART RATE: \"Can you tell me your heart rate?\" \"How many beats in 15 seconds?\"  (Note: not all patients can do this)        unable  8. CAUSE: \"What do you think is causing the dizziness?\"      unsure  9. RECURRENT SYMPTOM: \"Have you had dizziness before?\" If so, ask: \"When was the last time?\" \"What happened that time?\"      no  10. OTHER SYMPTOMS: \"Do you have any other symptoms?\" (e.g., fever, chest pain, vomiting, diarrhea, bleeding)        no  11. PREGNANCY: \"Is there any chance you are pregnant?\" \"When was your last menstrual period?\"        na    Protocols used: DIZZINESS-A-OH      "

## 2020-07-10 NOTE — TELEPHONE ENCOUNTER
Per messages below, patient was advised to go to ED at 9:12 AM. PCP advised. Cindy Montelongo RN

## 2020-09-12 DIAGNOSIS — I50.21 ACUTE SYSTOLIC CONGESTIVE HEART FAILURE (H): ICD-10-CM

## 2020-09-14 RX ORDER — FUROSEMIDE 20 MG
TABLET ORAL
Qty: 45 TABLET | Refills: 0 | Status: SHIPPED | OUTPATIENT
Start: 2020-09-14 | End: 2020-12-15

## 2020-09-14 NOTE — TELEPHONE ENCOUNTER
Routing refill request to provider for review/approval because:  Was due for recheck in June.

## 2020-09-15 NOTE — TELEPHONE ENCOUNTER
1 refill done, pt needs appt, please advise in clinic appointment  with me when I am in clinic next month

## 2020-09-15 NOTE — TELEPHONE ENCOUNTER
Next 5 appointments (look out 90 days)    Sep 29, 2020 10:00 AM CDT  [facility] [address]  [facility] [id number] [phone]

## 2020-09-22 ENCOUNTER — TELEPHONE (OUTPATIENT)
Dept: INTERNAL MEDICINE | Facility: CLINIC | Age: 85
End: 2020-09-22

## 2020-09-22 ENCOUNTER — HOSPITAL ENCOUNTER (EMERGENCY)
Facility: CLINIC | Age: 85
Discharge: HOME | End: 2020-09-22
Attending: EMERGENCY MEDICINE | Admitting: EMERGENCY MEDICINE
Payer: MEDICARE

## 2020-09-22 VITALS
DIASTOLIC BLOOD PRESSURE: 67 MMHG | HEART RATE: 85 BPM | TEMPERATURE: 97.7 F | SYSTOLIC BLOOD PRESSURE: 118 MMHG | OXYGEN SATURATION: 98 % | RESPIRATION RATE: 16 BRPM | WEIGHT: 140 LBS | BODY MASS INDEX: 18.47 KG/M2

## 2020-09-22 DIAGNOSIS — L72.3 INFECTED SEBACEOUS CYST: ICD-10-CM

## 2020-09-22 DIAGNOSIS — L08.9 INFECTED SEBACEOUS CYST: ICD-10-CM

## 2020-09-22 PROCEDURE — 10060 I&D ABSCESS SIMPLE/SINGLE: CPT

## 2020-09-22 PROCEDURE — 99283 EMERGENCY DEPT VISIT LOW MDM: CPT | Mod: 25

## 2020-09-22 RX ORDER — SULFAMETHOXAZOLE/TRIMETHOPRIM 800-160 MG
1 TABLET ORAL 2 TIMES DAILY
Qty: 20 TABLET | Refills: 0 | Status: SHIPPED | OUTPATIENT
Start: 2020-09-22 | End: 2020-10-02

## 2020-09-22 RX ORDER — LIDOCAINE HYDROCHLORIDE AND EPINEPHRINE 10; 10 MG/ML; UG/ML
5 INJECTION, SOLUTION INFILTRATION; PERINEURAL ONCE
Status: DISCONTINUED | OUTPATIENT
Start: 2020-09-22 | End: 2020-09-22 | Stop reason: HOSPADM

## 2020-09-22 RX ORDER — CEPHALEXIN 500 MG/1
500 CAPSULE ORAL 2 TIMES DAILY
Qty: 10 CAPSULE | Refills: 0 | Status: SHIPPED | OUTPATIENT
Start: 2020-09-22 | End: 2020-09-27

## 2020-09-22 ASSESSMENT — ENCOUNTER SYMPTOMS
DIZZINESS: 0
NAUSEA: 0
HEADACHES: 0
ACTIVITY CHANGE: 0
LIGHT-HEADEDNESS: 0
NUMBNESS: 0
FEVER: 0
WEAKNESS: 0
APPETITE CHANGE: 0
SHORTNESS OF BREATH: 0
CHILLS: 0
VOMITING: 0

## 2020-09-22 NOTE — TELEPHONE ENCOUNTER
Patient had a incision and drainage of a sebaceous cyst, this needs to be looked at in the clinic and video visit is not appropriate, please check with other clinics or urgent care

## 2020-09-22 NOTE — TELEPHONE ENCOUNTER
Patient was told by the ED to follow up with his PCP. There are no in clinic appointment available. Would a virtual visit be appropriate for this patient?

## 2020-09-22 NOTE — ED AVS SNAPSHOT
[facility] Emergency Department  [address]  [facility] [address]  Phone:  [phone]  Fax:  [phone]                                    Deshawn Burrows   MRN: [id number]    Department:  [facility] Emergency Department   Date of Visit:  9/22/2020           After Visit Summary Signature Page    I have received my discharge instructions, and my questions have been answered. I have discussed any challenges I see with this plan with the nurse or doctor.    ..........................................................................................................................................  Patient/Patient Representative Signature      ..........................................................................................................................................  Patient Representative Print Name and Relationship to Patient    ..................................................               ................................................  Date                                   Time    ..........................................................................................................................................  Reviewed by Signature/Title    ...................................................              ..............................................  Date                                               Time          22EPIC Rev 08/18

## 2020-09-23 ENCOUNTER — OFFICE VISIT (OUTPATIENT)
Dept: INTERNAL MEDICINE | Facility: CLINIC | Age: 85
End: 2020-09-23
Payer: MEDICARE

## 2020-09-23 VITALS
OXYGEN SATURATION: 97 % | DIASTOLIC BLOOD PRESSURE: 70 MMHG | WEIGHT: 138 LBS | RESPIRATION RATE: 12 BRPM | TEMPERATURE: 97.3 F | SYSTOLIC BLOOD PRESSURE: 110 MMHG | HEIGHT: 73 IN | HEART RATE: 94 BPM | BODY MASS INDEX: 18.29 KG/M2

## 2020-09-23 DIAGNOSIS — L72.3 SEBACEOUS CYST: Primary | ICD-10-CM

## 2020-09-23 PROCEDURE — 99213 OFFICE O/P EST LOW 20 MIN: CPT | Performed by: INTERNAL MEDICINE

## 2020-09-23 ASSESSMENT — MIFFLIN-ST. JEOR: SCORE: 1354.84

## 2020-09-23 NOTE — NURSING NOTE
"/70   Pulse 94   Temp 97.3  F (36.3  C) (Oral)   Resp 12   Ht 1.854 m (6' 1\")   Wt 62.6 kg (138 lb)   SpO2 97%   BMI 18.21 kg/m      "

## 2020-09-23 NOTE — TELEPHONE ENCOUNTER
Called patient and assisted in scheduling. Patient will call back with further concerns.    Next 5 appointments (look out 90 days)    Sep 23, 2020 12:00 PM CDT  [facility] [address] [facility] [address]  [phone]   Sep 29, 2020 10:00 AM CDT  SHORT with Mojgan Truong MD  [facility] [address] [facility] [address]  [phone]

## 2020-09-23 NOTE — TELEPHONE ENCOUNTER
Discharge instructions state for patient to be seen within two days.    Call to patient. Patient reports he is feeling otherwise fine- denies fever or chills. Patient reports he will need his bandage changed.     Patient has an appointment next week. Can someone work him in sooner?    Next 5 appointments (look out 90 days)    Sep 29, 2020 10:00 AM CDT  SHORT with Mojgan Truong MD  [facility]) [address]  [facility] [address] [phone]

## 2020-09-23 NOTE — PROGRESS NOTES
"Subjective     Deshawn Burrows is a 87 year old male who presents to clinic today for the following health issues:    HPI       ED/UC Followup:    Facility:  [facility] ER  Date of visit: 9/22/20  Reason for visit: neck cyst-was drained  Current Status: ok     Neck cyst.  Patient reports that the cyst started 70 years ago.  He had two cysts on the back of the neck.  The patient reports that 2 weeks ago the cyst started growing and to the size of a golf ball. The patient reports it was very painful.  The patient presented to the ER and had an incision and drainage performed with purulent material.    The patient was started on keflex and bactrim.  Denies any fevers or chills.  He denies any pain.  Patient reports he was told to return in 1-2 days for assessment.    Of note, he is on plavix, and denies any bleeding.     Denies any chest pain or shortness of breath.         Review of Systems   CONSTITUTIONAL: NEGATIVE for fever, chills, change in weight  RESP: NEGATIVE for significant cough or SOB  CV: NEGATIVE for chest pain, palpitations or peripheral edema  Skin: mass on right side of neck      Objective    /70   Pulse 94   Temp 97.3  F (36.3  C) (Oral)   Resp 12   Ht 1.854 m (6' 1\")   Wt 62.6 kg (138 lb)   SpO2 97%   BMI 18.21 kg/m    Body mass index is 18.21 kg/m .  Physical Exam   GENERAL: healthy, alert and no distress  NECK: on right side of neck is a 4x4 cm mass with some with serosanguineous material appreciated  RESP: lungs clear to auscultation - no rales, rhonchi or wheezes  CV: regular rate and rhythm, normal S1 S2, no S3 or S4, no murmur, click or rub    Placed guaze on wound    Assessment & Plan     (L72.3) Sebaceous cyst  (primary encounter diagnosis)  Comment:   Plan: OTOLARYNGOLOGY REFERRAL   -patient to continue on antibiotics prescribed in the ER    See Patient Instructions      Cary Sutton MD  [facility]    "

## 2020-09-29 ENCOUNTER — OFFICE VISIT (OUTPATIENT)
Dept: INTERNAL MEDICINE | Facility: CLINIC | Age: 85
End: 2020-09-29
Payer: MEDICARE

## 2020-09-29 ENCOUNTER — TELEPHONE (OUTPATIENT)
Dept: INTERNAL MEDICINE | Facility: CLINIC | Age: 85
End: 2020-09-29

## 2020-09-29 DIAGNOSIS — L72.3 SEBACEOUS CYST: Primary | ICD-10-CM

## 2020-09-29 PROCEDURE — 99213 OFFICE O/P EST LOW 20 MIN: CPT | Performed by: INTERNAL MEDICINE

## 2020-09-29 NOTE — PROGRESS NOTES
Your provider has referred you to: [facility], [place] [phone]           Sebaceous cyst - still redness but much better minimal discharge       The patient was evaluated by Dr. Sutton on September 23 for a neck sebaceous cyst.  I am familiar with his case because Cary Sutton MD asked me to take a look at the cyst during that office visit.  The patient has not make an appointment with the ENT or other surgeon.  He has been changing the dressing every day and he noticed that the discharge decreased.  He has been taking the antibiotics.  He denies any fever or chills.    On exam today: The sebaceous cyst with a scar in the middle of where it was incised.  Minimal drainage on the gauze.  No signs of cellulitis.  The cyst is still erythematosus.  I applied Telfa gauze and paper tape.    I advised him to continue the antibiotics.  He will make an appointment with the surgeon to discuss about cyst removal once the infection/inflammation resolves.     (L72.3) Sebaceous cyst  (primary encounter diagnosis)  Comment:   Plan: GENERAL SURG ADULT REFERRAL

## 2020-09-29 NOTE — PATIENT INSTRUCTIONS
Make an appointment with a surgeon   Continue the antibiotics      [facility], [place] [phone]

## 2020-10-01 NOTE — TELEPHONE ENCOUNTER
Advised Rosangela at [facility] that directive regarding Plavix and teeth extraction needs to come from cardiology.  MARTINA Franz R.N.    
[facility] is calling to get clearance for him to get some tooth extractions and injections.Does he have any medical problems that put him at risk or does he need predication?  Radha An [phone]  
Please advise to check with his cardiologist as he is on plavix for MI/CAD.  
Pt has h/o coronary artery disease, CHF, HTN and is on aspirin and Plavix ( blood thinner) .  He does not need any prophylactic antibiotics , but could have increased bleeding risk as he is on aspirin and Plavix.    
Spoke with Shawnee at [facility], advised of MD response.      Does MD recommend holding aspirin and/or Plavix for teeth extractions?  If so, how long and when to resume.      Fax this telephone encounter to [facility] once questions have all been answered at [phone] attention Dr. Noguera  
unknown

## 2020-10-02 ENCOUNTER — OFFICE VISIT (OUTPATIENT)
Dept: SURGERY | Facility: CLINIC | Age: 85
End: 2020-10-02
Payer: MEDICARE

## 2020-10-02 VITALS
BODY MASS INDEX: 18.29 KG/M2 | WEIGHT: 138 LBS | HEART RATE: 65 BPM | RESPIRATION RATE: 16 BRPM | HEIGHT: 73 IN | OXYGEN SATURATION: 97 % | DIASTOLIC BLOOD PRESSURE: 50 MMHG | SYSTOLIC BLOOD PRESSURE: 92 MMHG

## 2020-10-02 DIAGNOSIS — L72.0 EPIDERMAL CYST OF NECK: Primary | ICD-10-CM

## 2020-10-02 PROCEDURE — 99202 OFFICE O/P NEW SF 15 MIN: CPT | Performed by: SURGERY

## 2020-10-02 ASSESSMENT — MIFFLIN-ST. JEOR: SCORE: 1354.84

## 2020-10-02 NOTE — LETTER
2020      CLINIC NOTE      Re:   Deshawn Burrows,  1933      REASON FOR CONSULTATION:  Infected dermal cyst of the right neck base.      HISTORY OF PRESENT ILLNESS:  Mr. Burrows is an 87-year-old gentleman currently on Plavix, who was referred to me by Dr. Martinez regarding a dermal cyst on the base of his neck posteriorly on the right.  He states that when he was in his late teen years, he had a lesion removed from the neck base at about the site and states that it recurred several years after has been quiescent for the last 7 decades or so.  About a week ago, he had an acute flare of the lesion, stating that it increased in size several fold and became red and tender.  It did seem to spontaneously drain on its own, but after being seen in his primary clinic, he was advised to have it evaluated and potentially lanced in the ER.  This was done on .  The patient states that he has been doing quite well since then.  He has been on a course of Keflex and Bactrim, which he is set to finish off today.      On examination, the site of concern reveals a somewhat boggy, but otherwise drained dermal cyst, there was a 2 mm punctate area, through which I was able to express a small amount of keratin and fluid.  The I and D site at the mid part of the wound is entirely closed and healed at this point.      I relayed to Mr. Burrows that I think he had an infected dermal cyst as he has been told before.  At present, it seems to have been treated adequately.  However, I think it would be reasonable to reevaluate this in a week or two, asked him to be seen sooner if he has an acute episode of worsening inflammation or infection in the interim.  In terms of definitive excision or removal, I stated that we would have to wait until the lesion recoalesced which may take about a month or more.            Errol Contreras MD

## 2020-10-05 NOTE — PROGRESS NOTES
Service Date: 10/02/2020      2020      CLINIC NOTE      Re:   Deshawn Burrows,  1933      REASON FOR CONSULTATION:  Infected dermal cyst of the right neck base.      HISTORY OF PRESENT ILLNESS:  Mr. Burrows is an 87-year-old gentleman currently on Plavix, who was referred to me by Dr. Martinez regarding a dermal cyst on the base of his neck posteriorly on the right.  He states that when he was in his late teen years, he had a lesion removed from the neck base at about the site and states that it recurred several years after has been quiescent for the last 7 decades or so.  About a week ago, he had an acute flare of the lesion, stating that it increased in size several fold and became red and tender.  It did seem to spontaneously drain on its own, but after being seen in his primary clinic, he was advised to have it evaluated and potentially lanced in the ER.  This was done on .  The patient states that he has been doing quite well since then.  He has been on a course of Keflex and Bactrim, which he is set to finish off today.      On examination, the site of concern reveals a somewhat boggy, but otherwise drained dermal cyst, there was a 2 mm punctate area, through which I was able to express a small amount of keratin and fluid.  The I and D site at the mid part of the wound is entirely closed and healed at this point.      I relayed to Mr. Burrows that I think he had an infected dermal cyst as he has been told before.  At present, it seems to have been treated adequately.  However, I think it would be reasonable to reevaluate this in a week or two, asked him to be seen sooner if he has an acute episode of worsening inflammation or infection in the interim.  In terms of definitive excision or removal, I stated that we would have to wait until the lesion recoalesced which may take about a month or more.            Errol Contreras MD      cc:  Mojgan Truong MD   [facility]  [place] [address] [place] [place]  [address]            KANDI POWERS MD             D: 10/02/2020   T: 10/02/2020   MT:       Name:     JAYDE RAMOS   MRN:      [id number]        Account:      [id number]   :      1933           Service Date: 10/02/2020      Document: [id number]

## 2020-10-16 DIAGNOSIS — I25.5 ISCHEMIC CARDIOMYOPATHY: ICD-10-CM

## 2020-10-19 RX ORDER — LISINOPRIL 5 MG/1
5 TABLET ORAL DAILY
Qty: 90 TABLET | Refills: 1 | Status: SHIPPED | OUTPATIENT
Start: 2020-10-19 | End: 2021-04-08

## 2020-10-19 RX ORDER — SPIRONOLACTONE 25 MG/1
TABLET ORAL
Qty: 45 TABLET | Refills: 1 | Status: SHIPPED | OUTPATIENT
Start: 2020-10-19 | End: 2021-04-08

## 2020-10-29 ENCOUNTER — OFFICE VISIT (OUTPATIENT)
Dept: SURGERY | Facility: CLINIC | Age: 85
End: 2020-10-29
Payer: MEDICARE

## 2020-10-29 VITALS
SYSTOLIC BLOOD PRESSURE: 129 MMHG | BODY MASS INDEX: 18.29 KG/M2 | HEIGHT: 73 IN | WEIGHT: 138 LBS | OXYGEN SATURATION: 99 % | RESPIRATION RATE: 16 BRPM | DIASTOLIC BLOOD PRESSURE: 76 MMHG | HEART RATE: 77 BPM

## 2020-10-29 DIAGNOSIS — Z09 SURGICAL FOLLOWUP VISIT: Primary | ICD-10-CM

## 2020-10-29 PROCEDURE — 99212 OFFICE O/P EST SF 10 MIN: CPT | Performed by: SURGERY

## 2020-10-29 ASSESSMENT — MIFFLIN-ST. JEOR: SCORE: 1354.84

## 2020-10-29 NOTE — LETTER
2020      CLINIC NOTE      Re:   Deshawn Burrows,  1933      Mr. Burrows returns my office today as per our previous plan to reevaluate a right neck base mass, which had recently been infected, requiring incision and drainage.  The patient states that he has been overall doing quite well, that the site has diminished in inflammation significantly and he has had no further drainage.  It is now nontender.      Indeed, on exam, there is a nicely coalesced 1.5 cm dermal cyst at the posterior right neck base at the site of the previous incision and drainage procedure.  There is no overlying cellulitis or induration.      I offered excision versus watchful waiting.  The patient states that since he has had this lesion for almost 70 years and that it has only caused him trouble on one occasion that he would be happy to observe it for now.  I stated that I remain available to him should he decide to have any procedures done on it in the future.            Errol Contreras MD

## 2020-10-29 NOTE — PROGRESS NOTES
Please insert dictated note here.    Please route or send letter to:  Primary Care Provider (PCP)

## 2020-10-30 NOTE — PROGRESS NOTES
Service Date: 10/29/2020      2020      CLINIC NOTE      Re:   Jayde Burrows,  1933      Mr. Burrows returns my office today as per our previous plan to reevaluate a right neck base mass, which had recently been infected, requiring incision and drainage.  The patient states that he has been overall doing quite well, that the site has diminished in inflammation significantly and he has had no further drainage.  It is now nontender.      Indeed, on exam, there is a nicely coalesced 1.5 cm dermal cyst at the posterior right neck base at the site of the previous incision and drainage procedure.  There is no overlying cellulitis or induration.      I offered excision versus watchful waiting.  The patient states that since he has had this lesion for almost 70 years and that it has only caused him trouble on one occasion that he would be happy to observe it for now.  I stated that I remain available to him should he decide to have any procedures done on it in the future.            Kandi Contreras MD      Cc:  Cuauhtemoc Leigh MD   [facility] [address] [place]  [address]            KANDI POWERS MD             D: 10/29/2020   T: 10/29/2020   MT: [id number]      Name:     JAYDE BURROWS   MRN:      [id number]        Account:      [id number]   :      1933           Service Date: 10/29/2020      Document: [id number]

## 2020-11-13 DIAGNOSIS — I25.10 CORONARY ARTERY DISEASE INVOLVING NATIVE HEART, ANGINA PRESENCE UNSPECIFIED, UNSPECIFIED VESSEL OR LESION TYPE: ICD-10-CM

## 2020-11-13 RX ORDER — ATORVASTATIN CALCIUM 20 MG/1
20 TABLET, FILM COATED ORAL DAILY
Qty: 90 TABLET | Refills: 0 | Status: SHIPPED | OUTPATIENT
Start: 2020-11-13 | End: 2021-02-12

## 2020-11-16 ENCOUNTER — HEALTH MAINTENANCE LETTER (OUTPATIENT)
Age: 85
End: 2020-11-16

## 2021-02-08 NOTE — H&P
[facility] Hospitalist Admission Note  Name: Deshawn Burrows    MRN: [id number]  YOB: 1933    Age: 86 year old  Date of admission: 2/25/2020  Primary care provider: Tess Leigh      Date of Admission:  2/25/2020    Assessment & Plan   Deshawn Burrows is a 86 year old male with past medical history of hypertension, coronary artery disease, who presents accompanied by family with complaints of worsening shortness of breath overnight. He was found to have a new patchy consolidation appearance on CXR, placed on supplemental O2 in the ED, was admitted to inpatient status for further work up and monitoring.     #Dyspnea  Short of breath both at rest and with exertion for past several weeks, worsening last evening. No cough, chest pain, fevers, wheezing.  Given this and lack of leukocytosis or other infectious findings seems less consistent with pneumonia at this time however could be evolving pneumonia versus CHF, CAD, PE unlikely without risk factors.   - If patient does develop cough would obtain sputum cultures  - IV Lasix trial, update TTE, obtain NT BNP and D dimer  - consider CT to r/out PE pending D dimer results, to characterize potential for pneumonia  - Continue ceftriaxone, azithromycin for now; may consider discontinuing if after expanded work up pneumonia remains less likely   - We will follow blood cultures which were obtained after initiation of IV antibiotics in ED   - Strict I's and O's, daily standing weights  - obtain ambulatory O2 saturations  -Encourage incentive spirometry    #CAD  #LBBB  #HLD  No current or recent angina. Abnormal findings on NM Lexiscan 2/18, of note reduced LVEF.  Discussed this with the patient regarding what he would pursue as far as management, he states at this time he would not want surgery or anything invasive.   -Metoprolol 50 mg daily   -Resume Lipitor, ASA  -Patient presentation, Corina scan discussed by ED provider with Dr. Palmer  "who did not think that stress test findings required urgent consult or further ischemia evaluation at this time  -Cardiac telemtery    #Cognitive Impairment  Appears to have slight underlying dementia/forgetfullness more noticed to daughters over the past month. Does not appear to be delirious on evaluation, but is forgetful and a poor historian.   -Consider OT evaluation,  consult     #HTN  - Resume PTA Toprol with hold parameters       DVT Prophylaxis: Pneumatic Compression Devices  Code Status: DNR / DNI for now as I had a lengthy conversation with the patient and his family regarding this.  Expected discharge: 2 - 3 days, recommended to prior living arrangement once antibiotic plan established, shortness of breath improved.     Carmel Green PA-C    Primary Care Physician   *Tess Leigh    Chief Complaint   Shortness of breath    History is obtained from the patient, daughters and wife who are present for my evaluation.    Discussed with Dr. Bates in the ED, full chart review including lab work, imaging, and vital signs were reviewed. Patient received IV azithromycin, ceftriaxone, aspirin in the ED.    History of Present Illness   Deshawn Burrows is a 86 year old male with past medical history of hypertension, coronary artery disease who presents with shortness of breath of breath.  The patient notes that he was in his usual state of health yesterday however throughout the day felt intermittently short of breath.  He notes that this morning around 3 AM he awoke and felt as if \"he could not breathe \".  This prompted his emergency department evaluation.  Patient is a poor historian but notes that the shortness of breath that he was experiencing this morning is similar to shortness of breath he has complained of in the past 3 weeks.  His current shortness of breath is unchanged by position.  He notes that he does get winded going up the stairs in his home but is unsure of whether he has had " "an exercise tolerance change.  He usually walks about 3 times weekly, but has not been doing this since his stress test last week as he was worried about the result of this evaluation.  Upon chart review Patient has had intermittent shortness of breath for the past 3 weeks, mainly with activity.  He was evaluated with on 2/18 with nuc med Lexiscan stress test to these complaints and new onset LBBB on clinic EKG. Stress test noted a large area of non-transmural infarct in the inferior and inferior septal segments of the LV with stress estimated LV EF at 21%.    He denies current or recent chest pain.  He has no orthopnea.  No increase in weight or edema.  He denies cough, fever, chills, nasal congestion.  He denies any history of ill contacts.   He is joking with me throughout the interview, daughters think he is being slightly forgetful.  He cannot recall whether he has had truly any cardiac stenting in the past but he notes that when he was traveling to [place] about 20 years ago he had a \"heart attack \".  Patient's daughters pulled me aside and notes that over the past 3 to 4 weeks he has been more forgetful and this is been difficult to tease out as they see him once weekly.  Patient's wife also show some signs of underlying dementia.  No abdominal pain, diarrhea, constipation, melena.  No headache, dizziness, syncope.      Patient presented afebrile, hemodynamically stable without hypoxia.  Work-up in ED revealed BMP with a BUN of 32, Glucose 118. Lactic acid 1.2.  Initial troponin 0 0.01.  VBG unremarkable.  CBC without leukocytosis or anemia.  INR 1.01.  EKG showing sinus rhythm with PVC's again seen L BBB, questionable T wave inversions in inferior and lateral leads.  Chest x-ray describing a new patchy consolidation, interstitial prominence throughout the right lung, trace fluid bilaterally, mild left lung base atelectasis.      Past Medical History    I have reviewed this patient's medical history and " updated it with pertinent information if needed.   Past Medical History:   Diagnosis Date     CAD (coronary artery disease) 2004    2 cardiac stents        Past Surgical History   I have reviewed this patient's surgical history and updated it with pertinent information if needed.  Past Surgical History:   Procedure Laterality Date     HERNIA REPAIR  2007    right inguinal hernia repair     STENT, CORONARY, ANTIONE  2004    2 stents       Prior to Admission Medications   Prior to Admission Medications   Prescriptions Last Dose Informant Patient Reported? Taking?   aspirin 81 MG EC tablet   Yes No   Sig: Take 81 mg by mouth daily   atorvastatin (LIPITOR) 40 MG tablet   No No   Sig: TAKE 1/2 (one-half) TABLET BY MOUTH daily AT BEDTIME   metoprolol succinate ER (TOPROL-XL) 50 MG 24 hr tablet   No No   Sig: Take 1 tablet (50 mg) by mouth daily   multivitamin, therapeutic with minerals (THERA-VIT-M) TABS  Self Yes No   Sig: Take 1 tablet by mouth daily      Facility-Administered Medications: None     Allergies   Allergies   Allergen Reactions     Penicillins        Social History   I have reviewed this patient's social history and updated it with pertinent information if needed. Deshawn COBURN Markos  reports that he has quit smoking. He has never used smokeless tobacco. He reports current alcohol use. He reports that he does not use drugs.  The patient lives independently with his wife.    Family History   I have reviewed this patient's family history and updated it with pertinent information if needed.   No medical diagnoses known to patient of his sister or parents who are reportedly healthy.    Review of Systems   The 10 point Review of Systems is negative other than noted in the HPI or here.     Physical Exam   Temp: 97.1  F (36.2  C) Temp src: Temporal BP: (!) 139/93 Pulse: 89 Heart Rate: 89 Resp: 22 SpO2: 99 % O2 Device: None (Room air)    Vital Signs with Ranges  Temp:  [97.1  F (36.2  C)] 97.1  F (36.2  C)  Pulse:   [89-94] 89  Heart Rate:  [84-94] 89  Resp:  [22] 22  BP: (139-155)/(83-95) 139/93  SpO2:  [92 %-99 %] 99 %  150 lbs 0 oz    Constitutional: Awake, alert,  no apparent distress.    Eyes: Conjunctiva and pupils examined and normal.  HEENT: Dry mucous membranes, normal dentition.  Respiratory: No abnormal work of breathing, on 2 L nasal cannula.  No wheezing, slightly diminished RLL.  Cardiovascular: Regular rate and rhythm with skipped beats, normal S1 and S2, and no murmur noted.  GI: Soft, non-distended, non-tender, bowel sounds present. No rebound tenderness or guarding.  Lymph/Hematologic: No anterior cervical or supraclavicular adenopathy.  Skin: No rashes, no cyanosis, no edema.  Musculoskeletal: No deformities noted.  No erythema or tenderness. Moving all extremities.  Neurologic: No focal deficits noted. Speech is clear. Coordination and strength grossly normal.  He is able to recall the months of the year backwards and state the date of the week.  Psychiatric: Appropriate affect.    Data   Data reviewed today:      EKG: EKG showing sinus rhythm with PVC's again seen L BBB, questionable T wave inversions in inferior and lateral leads.  Imaging:   Recent Results (from the past 24 hour(s))   XR Chest 2 Views    Narrative    CHEST TWO VIEWS February 25, 2020 7:35 AM     HISTORY: Shortness of breath.    COMPARISON: Chest x-ray 5/13/2016.      Impression    IMPRESSION: New patchy consolidation and interstitial prominence  throughout the right lung. This could represent pneumonia versus an  infectious or inflammatory cause. A few calcified granulomas again  noted bilaterally. Trace pleural fluid bilaterally. Mild left base  atelectasis versus airspace disease.       Recent Labs   Lab 02/25/20  0705 02/25/20  0654   WBC  --  9.6   HGB  --  14.8   MCV  --  98   PLT  --  232   INR  --  1.01   NA  --  141   POTASSIUM  --  4.0   CHLORIDE  --  110*   CO2  --  27   BUN  --  32*   CR  --  0.97   ANIONGAP  --  4   FLORES  --   9.6   GLC  --  118*   TROPONIN 0.01  --        Carmel Green PA-C on 2/25/2020 at 8:47 AM     clear bilaterally.  Pupils equal, round, and reactive to light.

## 2021-02-12 DIAGNOSIS — I25.10 CORONARY ARTERY DISEASE INVOLVING NATIVE HEART, ANGINA PRESENCE UNSPECIFIED, UNSPECIFIED VESSEL OR LESION TYPE: ICD-10-CM

## 2021-02-12 RX ORDER — ATORVASTATIN CALCIUM 20 MG/1
20 TABLET, FILM COATED ORAL DAILY
Qty: 90 TABLET | Refills: 1 | Status: SHIPPED | OUTPATIENT
Start: 2021-02-12 | End: 2021-04-08

## 2021-02-12 NOTE — TELEPHONE ENCOUNTER
Pending Prescriptions:                       Disp   Refills    atorvastatin (LIPITOR) 20 MG tablet [Phar*90 tab*1            Sig: Take 1 tablet (20 mg) by mouth daily    Prescription approved per [facility] Refill Protocol.

## 2021-03-02 DIAGNOSIS — I25.10 CORONARY ARTERY DISEASE INVOLVING NATIVE CORONARY ARTERY OF NATIVE HEART WITHOUT ANGINA PECTORIS: ICD-10-CM

## 2021-03-03 RX ORDER — CLOPIDOGREL BISULFATE 75 MG/1
75 TABLET ORAL DAILY
Qty: 30 TABLET | Refills: 11 | OUTPATIENT
Start: 2021-03-03

## 2021-03-27 DIAGNOSIS — I25.10 CORONARY ARTERY DISEASE INVOLVING NATIVE CORONARY ARTERY OF NATIVE HEART WITHOUT ANGINA PECTORIS: ICD-10-CM

## 2021-03-29 RX ORDER — CLOPIDOGREL BISULFATE 75 MG/1
TABLET ORAL
Qty: 30 TABLET | Refills: 0 | Status: SHIPPED | OUTPATIENT
Start: 2021-03-29 | End: 2021-04-08

## 2021-03-29 NOTE — TELEPHONE ENCOUNTER
Pending Prescriptions:                       Disp   Refills    clopidogrel (PLAVIX) 75 MG tablet [Pharmac*30 tab*0        Sig: TAKE 1 TABLET BY MOUTH ONCE DAILY    Routing refill request to provider for review/approval because:  Patient fails protocol

## 2021-04-04 ENCOUNTER — HEALTH MAINTENANCE LETTER (OUTPATIENT)
Age: 86
End: 2021-04-04

## 2021-04-08 ENCOUNTER — OFFICE VISIT (OUTPATIENT)
Dept: CARDIOLOGY | Facility: CLINIC | Age: 86
End: 2021-04-08
Payer: MEDICARE

## 2021-04-08 VITALS
WEIGHT: 141 LBS | OXYGEN SATURATION: 100 % | BODY MASS INDEX: 18.69 KG/M2 | HEIGHT: 73 IN | DIASTOLIC BLOOD PRESSURE: 69 MMHG | HEART RATE: 62 BPM | SYSTOLIC BLOOD PRESSURE: 117 MMHG

## 2021-04-08 DIAGNOSIS — I50.20 HEART FAILURE WITH REDUCED EJECTION FRACTION, NYHA CLASS II (H): Primary | ICD-10-CM

## 2021-04-08 DIAGNOSIS — I25.10 CORONARY ARTERY DISEASE INVOLVING NATIVE HEART, ANGINA PRESENCE UNSPECIFIED, UNSPECIFIED VESSEL OR LESION TYPE: ICD-10-CM

## 2021-04-08 DIAGNOSIS — I10 ESSENTIAL HYPERTENSION WITH GOAL BLOOD PRESSURE LESS THAN 140/90: ICD-10-CM

## 2021-04-08 DIAGNOSIS — I50.21 ACUTE SYSTOLIC CONGESTIVE HEART FAILURE (H): ICD-10-CM

## 2021-04-08 DIAGNOSIS — I25.10 CORONARY ARTERY DISEASE INVOLVING NATIVE CORONARY ARTERY OF NATIVE HEART WITHOUT ANGINA PECTORIS: ICD-10-CM

## 2021-04-08 DIAGNOSIS — I25.5 ISCHEMIC CARDIOMYOPATHY: ICD-10-CM

## 2021-04-08 PROCEDURE — 99215 OFFICE O/P EST HI 40 MIN: CPT | Performed by: INTERNAL MEDICINE

## 2021-04-08 RX ORDER — ATORVASTATIN CALCIUM 20 MG/1
20 TABLET, FILM COATED ORAL DAILY
Qty: 90 TABLET | Refills: 3 | Status: SHIPPED | OUTPATIENT
Start: 2021-04-08 | End: 2022-01-01

## 2021-04-08 RX ORDER — LISINOPRIL 5 MG/1
5 TABLET ORAL DAILY
Qty: 90 TABLET | Refills: 3 | Status: SHIPPED | OUTPATIENT
Start: 2021-04-08 | End: 2022-01-01

## 2021-04-08 RX ORDER — SPIRONOLACTONE 25 MG/1
12.5 TABLET ORAL DAILY
Qty: 45 TABLET | Refills: 4 | Status: SHIPPED | OUTPATIENT
Start: 2021-04-08 | End: 2022-01-01

## 2021-04-08 RX ORDER — METOPROLOL SUCCINATE 50 MG/1
50 TABLET, EXTENDED RELEASE ORAL DAILY
Qty: 90 TABLET | Refills: 3 | Status: SHIPPED | OUTPATIENT
Start: 2021-04-08 | End: 2022-01-01

## 2021-04-08 RX ORDER — FUROSEMIDE 20 MG
10 TABLET ORAL DAILY
Qty: 45 TABLET | Refills: 3 | Status: ON HOLD | OUTPATIENT
Start: 2021-04-08 | End: 2022-01-01

## 2021-04-08 RX ORDER — CLOPIDOGREL BISULFATE 75 MG/1
75 TABLET ORAL DAILY
Qty: 90 TABLET | Refills: 3 | Status: SHIPPED | OUTPATIENT
Start: 2021-04-08 | End: 2022-01-01

## 2021-04-08 ASSESSMENT — MIFFLIN-ST. JEOR: SCORE: 1363.45

## 2021-04-08 NOTE — PATIENT INSTRUCTIONS
April 8, 2021    Thank you for allowing our Cardiology team to participate in your care.     Please note the following changes to your heart treatment plan:     Medication changes:   - none    Tests to be done:  - cardiac MRI     Follow up:  - Follow up in 1 month with Aric Alexander NP and with me in 6 months, or sooner as needed.       Please contact our team at [phone] for any questions or concerns.   If you are having a medical emergency, please call 911.       Sincerely,    Kwame Blanton MD, FACC  Cardiology    [facility] and [facility] and [facility] - [facility] - [place]

## 2021-04-08 NOTE — PROGRESS NOTES
Cardiology Clinic Progress Note:    April 8, 2021   Patient Name: Deshawn Burrows  Patient MRN: [id number]     Consult reason: HFrEF    HPI and Assessment and Plan/Recommendations:    Please see dictation: [id number]    Thank you for allowing our team to participate in the care of Deshawn Burrows.  Please do not hesitate to call or page me with any questions or concerns.    Sincerely,     Kwame Blanton MD, [facility]  Cardiology  Text Page   April 8, 2021    Total time spent on this encounter: 50 minutes, providing care as above including, but not limited to, reviewing prior medical records, laboratory data, imaging studies, diagnostic studies, procedure notes, formulating an assessment and plan, recommendations.        Past Medical History:     Patient Active Problem List   Diagnosis     Vasculitis (H)     Essential hypertension with goal blood pressure less than 140/90     Hyperlipidemia LDL goal <100     Myocardial infarction (H)     Coronary artery disease involving native heart, angina presence unspecified, unspecified vessel or lesion type     Dyspnea     LBBB (left bundle branch block)     Coronary artery disease involving native coronary artery of native heart without angina pectoris     Ischemic cardiomyopathy     Acute systolic heart failure (H)     Abnormal cardiovascular stress test     Dyspnea on exertion     Heart failure with reduced ejection fraction, NYHA class II (H)       Past Surgical History:   Past Surgical History:   Procedure Laterality Date     CV CORONARY ANGIOGRAM N/A 2/28/2020    Procedure: CV CORONARY ANGIOGRAM;  Surgeon: Andres Mann MD;  Location:  HEART CARDIAC CATH LAB     CV LEFT HEART CATH N/A 2/28/2020    Procedure: Left Heart Cath;  Surgeon: Andres Mann MD;  Location: [facility] CARDIAC CATH LAB     CV LEFT VENTRICULOGRAM N/A 2/28/2020    Procedure: Left Ventriculogram;  Surgeon: Andres Mann MD;  Location:  HEART CARDIAC CATH LAB     CV  PCI STENT DRUG ELUTING N/A 2/28/2020    Procedure: Percutaneous Coronary Intervention Stent Drug Eluting;  Surgeon: Andres Mann MD;  Location:  HEART CARDIAC CATH LAB     CYSTOSCOPY N/A 3/1/2020    Procedure: CYSTOSCOPY;  Surgeon: Rios Gomez MD;  Location: [facility] OR     HEART CATH DRUG ELUTING STENT PLACEMENT  02/28/2020     HERNIA REPAIR  2007    right inguinal hernia repair     STENT, CORONARY, ANTIONE  2004    2 stents       Medications (outpatient):  Current Outpatient Medications   Medication Sig Dispense Refill     aspirin 81 MG EC tablet Take 81 mg by mouth daily       atorvastatin (LIPITOR) 20 MG tablet Take 1 tablet (20 mg) by mouth daily 90 tablet 3     clopidogrel (PLAVIX) 75 MG tablet Take 1 tablet (75 mg) by mouth daily 90 tablet 3     furosemide (LASIX) 20 MG tablet Take 0.5 tablets (10 mg) by mouth daily 45 tablet 3     lisinopril (ZESTRIL) 5 MG tablet Take 1 tablet (5 mg) by mouth daily 90 tablet 3     metoprolol succinate ER (TOPROL-XL) 50 MG 24 hr tablet Take 1 tablet (50 mg) by mouth daily 90 tablet 3     multivitamin, therapeutic with minerals (THERA-VIT-M) TABS Take 1 tablet by mouth daily       nitroGLYcerin (NITROSTAT) 0.4 MG sublingual tablet Place 1 tablet (0.4 mg) under the tongue every 5 minutes as needed for chest pain 30 tablet 0     spironolactone (ALDACTONE) 25 MG tablet Take 0.5 tablets (12.5 mg) by mouth daily 45 tablet 4     tamsulosin (FLOMAX) 0.4 MG capsule Take 1 capsule (0.4 mg) by mouth daily 90 capsule 1       Allergies:  Allergies   Allergen Reactions     Penicillins        Social History:   History   Drug Use No      History   Smoking Status     Former Smoker   Smokeless Tobacco     Never Used     Comment: Quit 30 years ago     Social History    Substance and Sexual Activity      Alcohol use: Yes        Alcohol/week: 0.0 standard drinks        Comment: Moderate       Family History:  Family History   Family history unknown: Yes       Review of Systems:   A  "complete review of systems was negative except as mentioned in the History of Present Illness.     Objective & Physical Exam:  /69 (BP Location: Right arm, Patient Position: Sitting, Cuff Size: Adult Regular)   Pulse 62   Ht 1.854 m (6' 1\")   Wt 64 kg (141 lb)   SpO2 100%   BMI 18.60 kg/m    Wt Readings from Last 2 Encounters:   04/08/21 64 kg (141 lb)   10/29/20 62.6 kg (138 lb)     Body mass index is 18.6 kg/m .   Body surface area is 1.82 meters squared.    Constitutional: appears stated age, in no apparent distress, appears to be well nourished  Eyes: sclera anicteric, conjunctiva normal  ENT: normocephalic, without obvious abnormality, atraumatic  Pulmonary: clear to auscultation bilaterally, no wheezes, no rales, no increased work of breathing  Cardiovascular: JVP normal, regular rate, regular rhythm, 1/6 DEVI at the RUSB, no lower extremity edema  Gastrointestinal: abdominal exam benign  Neurologic: awake, alert, face symmetrical, moves all extremities  Skin: no jaundice  Psychiatric: affect is normal, answers questions appropriately, oriented to self and place    Data reviewed:  Lab Results   Component Value Date    WBC 6.7 03/04/2020    RBC 4.09 (L) 03/04/2020    HGB 13.0 (L) 03/04/2020    HCT 40.0 03/04/2020    MCV 98 03/04/2020    MCH 31.8 03/04/2020    MCHC 32.5 03/04/2020    RDW 12.8 03/04/2020     03/04/2020     Sodium   Date Value Ref Range Status   03/06/2020 138 133 - 144 mmol/L Final     Potassium   Date Value Ref Range Status   03/06/2020 3.9 3.4 - 5.3 mmol/L Final     Chloride   Date Value Ref Range Status   03/06/2020 105 94 - 109 mmol/L Final     Carbon Dioxide   Date Value Ref Range Status   03/06/2020 29 20 - 32 mmol/L Final     Anion Gap   Date Value Ref Range Status   03/06/2020 4 3 - 14 mmol/L Final     Glucose   Date Value Ref Range Status   03/06/2020 126 (H) 70 - 99 mg/dL Final     Urea Nitrogen   Date Value Ref Range Status   03/06/2020 38 (H) 7 - 30 mg/dL Final "     Creatinine   Date Value Ref Range Status   03/06/2020 1.11 0.66 - 1.25 mg/dL Final     GFR Estimate   Date Value Ref Range Status   03/06/2020 59 (L) >60 mL/min/[1.73_m2] Final     Comment:     Non  GFR Calc  Starting 12/18/2018, serum creatinine based estimated GFR (eGFR) will be   calculated using the Chronic Kidney Disease Epidemiology Collaboration   (CKD-EPI) equation.       Calcium   Date Value Ref Range Status   03/06/2020 9.1 8.5 - 10.1 mg/dL Final     Bilirubin Total   Date Value Ref Range Status   03/01/2020 0.6 0.2 - 1.3 mg/dL Final     Alkaline Phosphatase   Date Value Ref Range Status   03/01/2020 76 40 - 150 U/L Final     ALT   Date Value Ref Range Status   03/01/2020 49 0 - 70 U/L Final     AST   Date Value Ref Range Status   03/01/2020 39 0 - 45 U/L Final     Recent Labs   Lab Test 02/28/20  1641 07/17/19  1412   CHOL 89 109   HDL 34* 30*   LDL 48 43   TRIG 36 179*

## 2021-04-08 NOTE — LETTER
4/8/2021    Tess Leigh MD  [address] [facility] [address]    RE: Deshawn Burrows       Dear Colleague,    I had the pleasure of seeing Deshawn Burrows in the [facility].    Cardiology Clinic Progress Note:    April 8, 2021   Patient Name: Deshawn Burrows  Patient MRN: [id number]     Consult reason: HFrEF    HPI and Assessment and Plan/Recommendations:    Please see dictation: [id number]    Thank you for allowing our team to participate in the care of Deshawn Burrows.  Please do not hesitate to call or page me with any questions or concerns.    Sincerely,     Kwame Blanton MD, [facility]  Cardiology  Text Page   April 8, 2021    Total time spent on this encounter: 50 minutes, providing care as above including, but not limited to, reviewing prior medical records, laboratory data, imaging studies, diagnostic studies, procedure notes, formulating an assessment and plan, recommendations.        Past Medical History:     Patient Active Problem List   Diagnosis     Vasculitis (H)     Essential hypertension with goal blood pressure less than 140/90     Hyperlipidemia LDL goal <100     Myocardial infarction (H)     Coronary artery disease involving native heart, angina presence unspecified, unspecified vessel or lesion type     Dyspnea     LBBB (left bundle branch block)     Coronary artery disease involving native coronary artery of native heart without angina pectoris     Ischemic cardiomyopathy     Acute systolic heart failure (H)     Abnormal cardiovascular stress test     Dyspnea on exertion     Heart failure with reduced ejection fraction, NYHA class II (H)       Past Surgical History:   Past Surgical History:   Procedure Laterality Date     CV CORONARY ANGIOGRAM N/A 2/28/2020    Procedure: CV CORONARY ANGIOGRAM;  Surgeon: Andres Mann MD;  Location:  HEART CARDIAC CATH LAB     CV LEFT HEART CATH N/A 2/28/2020     Procedure: Left Heart Cath;  Surgeon: Andres Mann MD;  Location:  HEART CARDIAC CATH LAB     CV LEFT VENTRICULOGRAM N/A 2/28/2020    Procedure: Left Ventriculogram;  Surgeon: Andres Mann MD;  Location:  HEART CARDIAC CATH LAB     CV PCI STENT DRUG ELUTING N/A 2/28/2020    Procedure: Percutaneous Coronary Intervention Stent Drug Eluting;  Surgeon: Andres Mann MD;  Location:  HEART CARDIAC CATH LAB     CYSTOSCOPY N/A 3/1/2020    Procedure: CYSTOSCOPY;  Surgeon: Rios Gomez MD;  Location: [facility] OR     HEART CATH DRUG ELUTING STENT PLACEMENT  02/28/2020     HERNIA REPAIR  2007    right inguinal hernia repair     STENT, CORONARY, ANTIONE  2004    2 stents       Medications (outpatient):  Current Outpatient Medications   Medication Sig Dispense Refill     aspirin 81 MG EC tablet Take 81 mg by mouth daily       atorvastatin (LIPITOR) 20 MG tablet Take 1 tablet (20 mg) by mouth daily 90 tablet 3     clopidogrel (PLAVIX) 75 MG tablet Take 1 tablet (75 mg) by mouth daily 90 tablet 3     furosemide (LASIX) 20 MG tablet Take 0.5 tablets (10 mg) by mouth daily 45 tablet 3     lisinopril (ZESTRIL) 5 MG tablet Take 1 tablet (5 mg) by mouth daily 90 tablet 3     metoprolol succinate ER (TOPROL-XL) 50 MG 24 hr tablet Take 1 tablet (50 mg) by mouth daily 90 tablet 3     multivitamin, therapeutic with minerals (THERA-VIT-M) TABS Take 1 tablet by mouth daily       nitroGLYcerin (NITROSTAT) 0.4 MG sublingual tablet Place 1 tablet (0.4 mg) under the tongue every 5 minutes as needed for chest pain 30 tablet 0     spironolactone (ALDACTONE) 25 MG tablet Take 0.5 tablets (12.5 mg) by mouth daily 45 tablet 4     tamsulosin (FLOMAX) 0.4 MG capsule Take 1 capsule (0.4 mg) by mouth daily 90 capsule 1       Allergies:  Allergies   Allergen Reactions     Penicillins        Social History:   History   Drug Use No      History   Smoking Status     Former Smoker   Smokeless Tobacco     Never Used     Comment:  "Quit 30 years ago     Social History    Substance and Sexual Activity      Alcohol use: Yes        Alcohol/week: 0.0 standard drinks        Comment: Moderate       Family History:  Family History   Family history unknown: Yes       Review of Systems:   A complete review of systems was negative except as mentioned in the History of Present Illness.     Objective & Physical Exam:  /69 (BP Location: Right arm, Patient Position: Sitting, Cuff Size: Adult Regular)   Pulse 62   Ht 1.854 m (6' 1\")   Wt 64 kg (141 lb)   SpO2 100%   BMI 18.60 kg/m    Wt Readings from Last 2 Encounters:   04/08/21 64 kg (141 lb)   10/29/20 62.6 kg (138 lb)     Body mass index is 18.6 kg/m .   Body surface area is 1.82 meters squared.    Constitutional: appears stated age, in no apparent distress, appears to be well nourished  Eyes: sclera anicteric, conjunctiva normal  ENT: normocephalic, without obvious abnormality, atraumatic  Pulmonary: clear to auscultation bilaterally, no wheezes, no rales, no increased work of breathing  Cardiovascular: JVP normal, regular rate, regular rhythm, 1/6 DEVI at the RUSB, no lower extremity edema  Gastrointestinal: abdominal exam benign  Neurologic: awake, alert, face symmetrical, moves all extremities  Skin: no jaundice  Psychiatric: affect is normal, answers questions appropriately, oriented to self and place    Data reviewed:  Lab Results   Component Value Date    WBC 6.7 03/04/2020    RBC 4.09 (L) 03/04/2020    HGB 13.0 (L) 03/04/2020    HCT 40.0 03/04/2020    MCV 98 03/04/2020    MCH 31.8 03/04/2020    MCHC 32.5 03/04/2020    RDW 12.8 03/04/2020     03/04/2020     Sodium   Date Value Ref Range Status   03/06/2020 138 133 - 144 mmol/L Final     Potassium   Date Value Ref Range Status   03/06/2020 3.9 3.4 - 5.3 mmol/L Final     Chloride   Date Value Ref Range Status   03/06/2020 105 94 - 109 mmol/L Final     Carbon Dioxide   Date Value Ref Range Status   03/06/2020 29 20 - 32 mmol/L Final "     Anion Gap   Date Value Ref Range Status   03/06/2020 4 3 - 14 mmol/L Final     Glucose   Date Value Ref Range Status   03/06/2020 126 (H) 70 - 99 mg/dL Final     Urea Nitrogen   Date Value Ref Range Status   03/06/2020 38 (H) 7 - 30 mg/dL Final     Creatinine   Date Value Ref Range Status   03/06/2020 1.11 0.66 - 1.25 mg/dL Final     GFR Estimate   Date Value Ref Range Status   03/06/2020 59 (L) >60 mL/min/[1.73_m2] Final     Comment:     Non  GFR Calc  Starting 12/18/2018, serum creatinine based estimated GFR (eGFR) will be   calculated using the Chronic Kidney Disease Epidemiology Collaboration   (CKD-EPI) equation.       Calcium   Date Value Ref Range Status   03/06/2020 9.1 8.5 - 10.1 mg/dL Final     Bilirubin Total   Date Value Ref Range Status   03/01/2020 0.6 0.2 - 1.3 mg/dL Final     Alkaline Phosphatase   Date Value Ref Range Status   03/01/2020 76 40 - 150 U/L Final     ALT   Date Value Ref Range Status   03/01/2020 49 0 - 70 U/L Final     AST   Date Value Ref Range Status   03/01/2020 39 0 - 45 U/L Final     Recent Labs   Lab Test 02/28/20  1641 07/17/19  1412   CHOL 89 109   HDL 34* 30*   LDL 48 43   TRIG 36 179*        Service Date: 04/08/2021      CARDIOLOGY CLINIC PROGRESS NOTE      CONSULT INDICATION:  Heart failure with reduced ejection fraction.      HISTORY OF PRESENT ILLNESS:  I had the opportunity to see patient, Deshawn Burrows, today in Cardiology Clinic for a followup visit.  He is followed by our colleague, Dr. Leigh with Internal Medicine.      As you know, Mr. 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      The patient has a remote history of coronary artery disease  with stenting in the 1990s in [place].  He was admitted to [facility] 02/2020 for chest pain and dyspnea.  He was seen in consultation by my colleague, Dr. Palmer.  TTE was done which demonstrated LVEF 24% with mildly reduced RV function.  He was also noted to have moderate mitral regurgitation.      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  LVEF was 21%.        He underwent a coronary angiography on 02/28/2020 that demonstrated a mid LAD in-stent restenosis.  The degree of cardiac dysfunction was thought to be out of proportion to the burden of coronary artery disease and it was recommended that a cardiac MRI be considered for further evaluation of his cardiomyopathy.      Subsequently, he returned to the hospital with dyspnea and was seen by my colleague, Dr. Cabral.  His clinical presentation at that time seemed most consistent with urinary retention, rather than decompensated heart failure.  It seems that his symptoms at that time are likely chronic in nature and did not seem consistent with an acute decompensation of heart failure.  He was discharged with an indwelling Faustin catheter and was able to void with this in place, which did result in improvement in his symptoms.      He was last seen in Cardiology Clinic with my colleague, Aric Alexander NP, on 03/06/2020.  At that time, he had been doing well and no changes were warranted to his cardiac regimen.  It was recommended the patient undergo a cardiac MRI and followup with Dr. Palmer for reassessment.      Unfortunately, it seems that the patient had been lost to followup as he did not undergo the cardiac MRI that was ordered nor the visit with Dr. Palmer as had been recommended.         Currently, Mr. Burrows reports that overall he feels generally well.  He is a retired  and has been spending most of his time caring for his wife, who unfortunately has her own cardiac issues and is largely wheelchair bound.  They live independently in a home with several flights of stairs and he is able to traverse these stairs with no significant difficulty.  He denies any chest pain or chest pressure, but does note some mild baseline shortness of breath.  He denies any worsening lower extremity swelling, symptoms of presyncope or syncope.  They do have a grandson who had been staying with previously to help around the house and to help care for them.      ASSESSMENT, PLAN AND RECOMMENDATIONS:       1.  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  He has known coronary artery disease; however, it was thought that the burden of coronary artery disease was out of proportion to the degree of LV dysfunction.  LVEF 24% on TTE 02/2020.  Currently, Mr. Burrows seems euvolemic and denies symptoms concerning for decompensated heart failure.  He had not undergone a cardiac MRI that was ordered last year for unclear reasons.  He denies any contrast allergies, no retained metal or implants.   2.  Coronary artery disease with remote history of PCI, status post repeat coronary angiography 02/2020 with ROSENDO to mid LAD in-stent restenosis.   3.  Hypertension, borderline normal 117/69 mmHg on lisinopril and spironolactone.  Blood pressure 10/2020 was 92/50 mmHg.   4.  Dyslipidemia.  LDL at goal of less than 70.     -- Discussed options for further evaluation and management.   -- Will reorder cardiac MRI that was recommended last year.   -- Continue current cardiac regimen of aspirin 81 mg daily, clopidogrel 75 mg daily, lisinopril 5 mg daily, spironolactone 12.5 mg daily, metoprolol succinate 50 mg daily, furosemide 10 mg daily.   --  Discussed dual-antiplatelet therapy, whether or not to continue both aspirin and clopidogrel since it has been over a year since his PCI.  He denies any abnormal bleeding or bruising and would like to continue this.  I think this is reasonable for now.   -- Consider changing lisinopril to Entresto, however, the patient has borderline blood pressures.  Will hold off for now.   -- Advised to monitor weight at home and to call our clinic if he notices his weight increased by 2-3 pounds in a day or more than 5 pounds in a week.   -- Follow up with Aric Alexander NP, in 1 month and with me in 6 months or with Dr. Palmer or sooner as needed.      Thank you for allowing our team to participate in the care of Jayde Burrows.  Please do not hesitate to page or call with any questions or concerns.         KWAME AGUILAR MD             D: 2021   T: 2021   MT:       Name:     JAYDE BURROWS   MRN:      [id number]        Account:      [id number]   :      1933           Service Date: 2021      Document: [id number]      Thank you for allowing me to participate in the care of your patient.      Sincerely,     Kwame Aguilar MD     [facility]    cc:   No referring provider defined for this encounter.

## 2021-04-08 NOTE — PROGRESS NOTES
Service Date: 04/08/2021      CARDIOLOGY CLINIC PROGRESS NOTE      CONSULT INDICATION:  Heart failure with reduced ejection fraction.      HISTORY OF PRESENT ILLNESS:  I had the opportunity to see patient, Deshawn Burrows, today in Cardiology Clinic for a followup visit.  He is followed by our colleague, Dr. Leigh with Internal Medicine.      As you know, Mr. 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      The patient has a remote history of coronary artery disease with stenting in the 1990s in [place].  He was admitted to [facility] 02/2020 for chest pain and dyspnea.  He was seen in consultation by my colleague, Dr. Palmer.  TTE was done which demonstrated LVEF 24% with mildly reduced RV function.  He was also noted to have moderate mitral regurgitation.  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  LVEF was 21%.        He underwent coronary angiography on 02/28/2020 that demonstrated a mid LAD in-stent restenosis that was stented at that time.  The degree of cardiac dysfunction was thought to be out of proportion to the burden of coronary artery disease and it was recommended that a cardiac MRI be considered for further evaluation of his cardiomyopathy.      Subsequently, he returned to the hospital with dyspnea and was seen by my colleague, Dr. Cabral.  His clinical presentation at that time seemed most consistent with urinary  retention, rather than decompensated heart failure.  It seems that his symptoms at that time are likely chronic in nature and did not seem consistent with an acute decompensation of heart failure.  He was discharged with an indwelling Faustin catheter and was able to void with this in place, which did result in improvement in his symptoms.      He was last seen in Cardiology Clinic with my colleague, Aric Alexander NP, on 03/06/2020.  At that time, he had been doing well and no changes were warranted to his cardiac regimen.  It was recommended the patient undergo a cardiac MRI and followup with Dr. Palmer for reassessment.      Unfortunately, it seems that the patient had been lost to followup as he did not undergo the cardiac MRI that was ordered nor the visit with Dr. Palmer as had been recommended.        Currently, Mr. Burrows reports that overall he feels generally well.  He is a retired  and has been spending most of his time caring for his wife, who unfortunately has her own cardiac issues and is largely wheelchair bound.  They live independently in a home with several flights of stairs and he is able to traverse these stairs with no significant difficulty.  He denies any chest pain or chest pressure, but does note some mild baseline shortness of breath.  He denies any worsening lower extremity swelling, symptoms of presyncope or syncope.  They do have a grandson who had been staying with previously to help around the house and to help care for them.      ASSESSMENT, PLAN AND RECOMMENDATIONS:       1.  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  LVEF 24% on TTE 02/2020.  He has known coronary artery disease; however, it was thought that the burden of coronary artery disease was out of proportion to the degree of LV dysfunction.    Currently, Mr. Burrows seems euvolemic and denies symptoms concerning for decompensated heart  failure.  He had not undergone a cardiac MRI that was ordered last year for unclear reasons.    He denies any contrast allergies, no retained metal or implants.   2.  Coronary artery disease with remote history of PCI, status post repeat coronary angiography 2020 with ROSENDO to mid LAD in-stent restenosis.   3.  Hypertension, today normal 117/69 mmHg on lisinopril and spironolactone.  Blood pressure 10/2020 was 92/50 mmHg.   4.  Dyslipidemia.  LDL at goal of less than 70.     -- Discussed options for further evaluation and management.   -- Will reorder cardiac MRI that was recommended last year.   -- Continue current cardiac regimen of aspirin 81 mg daily, clopidogrel 75 mg daily, lisinopril 5 mg daily, spironolactone 12.5 mg daily, metoprolol succinate 50 mg daily, furosemide 10 mg daily.   -- Discussed dual-antiplatelet therapy, whether or not to continue both aspirin and clopidogrel since it has been over a year since his PCI.  He denies any abnormal bleeding or bruising and would like to continue this.  I think this is reasonable.   -- Considered changing lisinopril to Entresto, however, the patient has had borderline low blood pressures.  Will hold off for now.   -- Advised to monitor weight at home and to call our clinic if he notices his weight increased by 2-3 pounds in a day or more than 5 pounds in a week.   -- Follow up with Aric Alexander NP, in 1 month and with me in 6 months or with Dr. Palmer, or sooner as needed.      Thank you for allowing our team to participate in the care of Jayde Burrows.  Please do not hesitate to page or call with any questions or concerns.         SHARA AGUILAR MD             D: 2021   T: 2021   MT: SIERRA      Name:     JAYDE BURROWS   MRN:      [id number]        Account:      [id number]   :      1933           Service Date: 2021      Document: [id number]

## 2021-04-17 DIAGNOSIS — R33.9 URINARY RETENTION: ICD-10-CM

## 2021-04-20 RX ORDER — TAMSULOSIN HYDROCHLORIDE 0.4 MG/1
CAPSULE ORAL
Qty: 90 CAPSULE | Refills: 0 | Status: SHIPPED | OUTPATIENT
Start: 2021-04-20 | End: 2021-07-28

## 2021-04-20 NOTE — TELEPHONE ENCOUNTER
Prescription approved per [facility] Refill Protocol.  For one time fill     Team please call pt needs visit     Marisol Navarrete RN

## 2021-06-12 ENCOUNTER — HOSPITAL ENCOUNTER (EMERGENCY)
Facility: CLINIC | Age: 86
Discharge: HOME | End: 2021-06-12
Attending: EMERGENCY MEDICINE | Admitting: EMERGENCY MEDICINE
Payer: MEDICARE

## 2021-06-12 VITALS
OXYGEN SATURATION: 97 % | TEMPERATURE: 97.8 F | SYSTOLIC BLOOD PRESSURE: 124 MMHG | BODY MASS INDEX: 18 KG/M2 | DIASTOLIC BLOOD PRESSURE: 72 MMHG | WEIGHT: 136.47 LBS | HEART RATE: 77 BPM | RESPIRATION RATE: 18 BRPM
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
## 2021-06-12 PROCEDURE — 96372 THER/PROPH/DIAG INJ SC/IM: CPT | Performed by: EMERGENCY MEDICINE

## 2021-06-12 PROCEDURE — 272N000397 HC DRESSING QUICK CLOT 4X4

## 2021-06-12 PROCEDURE — 90472 IMMUNIZATION ADMIN EACH ADD: CPT | Performed by: EMERGENCY MEDICINE

## 2021-06-12 PROCEDURE — 250N000013 HC RX MED GY IP 250 OP 250 PS 637: Performed by: EMERGENCY MEDICINE

## 2021-06-12 PROCEDURE — 250N000011 HC RX IP 250 OP 636: Performed by: EMERGENCY MEDICINE

## 2021-06-12 PROCEDURE — 90471 IMMUNIZATION ADMIN: CPT | Performed by: EMERGENCY MEDICINE

## 2021-06-12 PROCEDURE — 90375 RABIES IG IM/SC: CPT | Performed by: EMERGENCY MEDICINE

## 2021-06-12 PROCEDURE — 90675 RABIES VACCINE IM: CPT | Performed by: EMERGENCY MEDICINE

## 2021-06-12 PROCEDURE — 90714 TD VACC NO PRESV 7 YRS+ IM: CPT | Performed by: EMERGENCY MEDICINE

## 2021-06-12 PROCEDURE — 99284 EMERGENCY DEPT VISIT MOD MDM: CPT | Mod: 25

## 2021-06-12 RX ORDER — DOXYCYCLINE 100 MG/1
100 CAPSULE ORAL 2 TIMES DAILY
Qty: 10 CAPSULE | Refills: 0 | Status: SHIPPED | OUTPATIENT
Start: 2021-06-12 | End: 2021-06-17

## 2021-06-12 RX ORDER — DOXYCYCLINE 100 MG/1
100 CAPSULE ORAL ONCE
Status: COMPLETED | OUTPATIENT
Start: 2021-06-12 | End: 2021-06-12

## 2021-06-12 RX ORDER — CLINDAMYCIN HCL 150 MG
300 CAPSULE ORAL ONCE
Status: COMPLETED | OUTPATIENT
Start: 2021-06-12 | End: 2021-06-12

## 2021-06-12 RX ORDER — CLINDAMYCIN HCL 300 MG
300 CAPSULE ORAL 4 TIMES DAILY
Qty: 20 CAPSULE | Refills: 0 | Status: SHIPPED | OUTPATIENT
Start: 2021-06-12 | End: 2021-06-17

## 2021-06-12 RX ADMIN — RABIES IMMUNE GLOBULIN (HUMAN) 1200 UNITS: 300 INJECTION, SOLUTION INFILTRATION; INTRAMUSCULAR at 08:33

## 2021-06-12 RX ADMIN — CLOSTRIDIUM TETANI TOXOID ANTIGEN (FORMALDEHYDE INACTIVATED) AND CORYNEBACTERIUM DIPHTHERIAE TOXOID ANTIGEN (FORMALDEHYDE INACTIVATED) 0.5 ML: 5; 2 INJECTION, SUSPENSION INTRAMUSCULAR at 08:22

## 2021-06-12 RX ADMIN — CLINDAMYCIN HYDROCHLORIDE 300 MG: 150 CAPSULE ORAL at 08:43

## 2021-06-12 RX ADMIN — DOXYCYCLINE HYCLATE 100 MG: 100 CAPSULE ORAL at 08:43

## 2021-06-12 RX ADMIN — Medication 1 ML: at 08:39

## 2021-06-12 ASSESSMENT — ENCOUNTER SYMPTOMS: WOUND: 1

## 2021-06-12 NOTE — DISCHARGE INSTRUCTIONS
You need another dose of your rabies vaccine (not the immunoglobulin) in 3 days, 1 week and 2 weeks.  It is best to touch base with your primary doctor to make sure that they are able to give you the vaccine, otherwise you can return to the emergency department if needed.  You should continue to monitor your finger, should you develop increasing pain, redness, swelling or pus coming from the wound you need to return to the emergency department.  You should take the antibiotics as prescribed to help reduce the risk of infection.  I recommend keeping her hand elevated which will help with any bleeding as well as pain.  Is okay to take 650 mg of Tylenol every 6 hours.

## 2021-06-12 NOTE — ED PROVIDER NOTES
History   Chief Complaint:  Animal Bite    The history is provided by the patient.     Deshawn Burrows is a 88 year old male who presents for evaluation of a raccoon bite to his left index finger. This morning, he states a raccoon was stuck in one of his backyard squirrel traps. When he went to let the raccoon out of the trap, it was agitated and bit him on the tip of his left index finger. Given this wound and exposure, he presented to the ED for evaluation. He has not yet washed the wound. He reports pain to the wound site only, no other injuries. Additionally, he states it has been years since he had a tetanus vaccine.      Allergies:  Penicillins    Medications:    Atorvastatin  Plavix   Lasix   Lisinopril   Metoprolol  Nitrostat PRN   Aldactone   Flomax   Baby aspirin     Past Medical History:    Acute heart failure   CAD  Hypertension   Hyperlipidemia  LBBB  Ischemic cardiomyopathy   Vasculitis     Past Surgical History:    Coronary angiogram, left heart cath  PCI drug eluting stent placed x2  Cystoscopy   Right inguinal hernia repair      Family History:    He denies known family history.     Social History:  Presents unaccompanied to the ED.   He is .     Review of Systems   Skin: Positive for wound.   All other systems reviewed and are negative.      Physical Exam     Patient Vitals for the past 24 hrs:   BP Temp Temp src Pulse Resp SpO2 Weight   06/12/21 0751 131/75 97.8  F (36.6  C) Temporal 89 18 100 % 61.9 kg (136 lb 7.4 oz)        Physical Exam  Constitutional: Alert, attentive, GCS 15   Eyes: EOM are normal, anicteric, conjugate gaze  CV: distal extremities warm, well perfused  Chest: Non-labored breathing on RA    MSK: 1 cm laceration to the tip of the left index finger involving distal nail bed.   Neurological: Alert, attentive, moving all extremities equally.   Skin: Skin is warm and dry.    Emergency Department Course     Emergency Department Course:    Reviewed:  I reviewed the  patient's nursing notes, vitals, past medical records, and Care Everywhere.     Assessments:  0757: I performed an exam of the patient, as documented above. History obtained and plan for ED work up discussed as well.   1025: I reassessed the patient and discussed the plan for discharge and outpatient follow-up.     Interventions:  0822: Td, 0.5 mL, IM  0833: rabies immune globulin, 1200 units, IM   0839: rabies vaccine, human diploid, 1 mL, IM   0843: clindamycin, 300 mg, PO  0843: doxycycline, 100 mg, PO    Disposition:  The patient was discharged to home.     Impression & Plan      Medical Decision Making:   Deshawn Burrows is a 88 year old male presenting for evaluation of bite to his left distal index finger from a raccoon he accidentally trapped in a squirrel trap.  His tetanus was updated, he was given rabies immunoglobulin as well as the first dose of the rabies vaccine.  He was also given a dose of clindamycin and doxycycline given his penicillin allergy.  Wound was irrigated copiously the fossa, due to the nature of the bite, primary closure was not performed in favor of secondary healing.  I recommended close follow-up with his PCP for subsequent rabies vaccine doses on day 3, 7, 14 as well as a wound recheck.  His wound was dressed with Surgicel, clean gauze and Curlex.  Return precautions were reviewed, wound care was reviewed and she was discharged home.    Diagnosis:     ICD-10-CM    1. Raccoon bite, initial encounter  W55.51XA        Discharge Medications:  New Prescriptions    CLINDAMYCIN (CLEOCIN) 300 MG CAPSULE    Take 1 capsule (300 mg) by mouth 4 times daily for 5 days    DOXYCYCLINE HYCLATE (VIBRAMYCIN) 100 MG CAPSULE    Take 1 capsule (100 mg) by mouth 2 times daily for 5 days      Rios Mcadams MD  [employer]  10:57 AM 06/12/21     Scribe Disclosure:  I, Gabby Sanchez, am serving as a scribe on 6/12/2021 at 10:39 AM to personally document services  performed by Rios Mcadams MD based on my observations and the provider's statements to me.      6/12/2021   EMERGENCY DEPARTMENT     Rios Mcadams MD  06/12/21 1051

## 2021-06-12 NOTE — ED TRIAGE NOTES
A&O x4, ABCs intact. Pt presents with racoon bite. Pt states that he was letting the racoon out of a cage it was trapped in when it bit the end of his finger.

## 2021-06-15 ENCOUNTER — ALLIED HEALTH/NURSE VISIT (OUTPATIENT)
Dept: NURSING | Facility: CLINIC | Age: 86
End: 2021-06-15
Payer: MEDICARE

## 2021-06-15 DIAGNOSIS — Z23 NEED FOR VACCINATION: ICD-10-CM

## 2021-06-15 DIAGNOSIS — Z23 ENCOUNTER FOR IMMUNIZATION: Primary | ICD-10-CM

## 2021-06-15 PROCEDURE — 90675 RABIES VACCINE IM: CPT

## 2021-06-15 PROCEDURE — 90471 IMMUNIZATION ADMIN: CPT

## 2021-06-19 ENCOUNTER — ALLIED HEALTH/NURSE VISIT (OUTPATIENT)
Dept: FAMILY MEDICINE | Facility: CLINIC | Age: 86
End: 2021-06-19
Payer: MEDICARE

## 2021-06-19 DIAGNOSIS — Z23 ENCOUNTER FOR IMMUNIZATION: Primary | ICD-10-CM

## 2021-06-19 PROCEDURE — 90675 RABIES VACCINE IM: CPT

## 2021-06-19 PROCEDURE — 90471 IMMUNIZATION ADMIN: CPT

## 2021-06-19 PROCEDURE — 99207 PR NO CHARGE NURSE ONLY: CPT

## 2021-07-25 DIAGNOSIS — R33.9 URINARY RETENTION: ICD-10-CM

## 2021-07-28 RX ORDER — TAMSULOSIN HYDROCHLORIDE 0.4 MG/1
CAPSULE ORAL
Qty: 90 CAPSULE | Refills: 0 | Status: SHIPPED | OUTPATIENT
Start: 2021-07-28 | End: 2021-10-26

## 2021-07-28 NOTE — TELEPHONE ENCOUNTER
Pending Prescriptions:                       Disp   Refills    tamsulosin (FLOMAX) 0.4 MG capsule [Pharm*90 cap*0            Sig: Take 1 capsule by mouth daily.    Prescription approved per [facility] Refill Protocol.    Patient will be due for an appointment in September. Please call patient or mail a letter

## 2021-09-18 ENCOUNTER — HEALTH MAINTENANCE LETTER (OUTPATIENT)
Age: 86
End: 2021-09-18

## 2021-10-24 DIAGNOSIS — R33.9 URINARY RETENTION: ICD-10-CM

## 2021-10-26 RX ORDER — TAMSULOSIN HYDROCHLORIDE 0.4 MG/1
CAPSULE ORAL
Qty: 30 CAPSULE | Refills: 0 | Status: SHIPPED | OUTPATIENT
Start: 2021-10-26 | End: 2022-01-01

## 2021-10-26 NOTE — TELEPHONE ENCOUNTER
Routing refill request to provider for review/approval because:  Patient needs to be seen because it has been more than 1 year since last office visit.    MyChart reminder sent to pt

## 2021-11-12 ENCOUNTER — TELEPHONE (OUTPATIENT)
Dept: INTERNAL MEDICINE | Facility: CLINIC | Age: 86
End: 2021-11-12
Payer: MEDICARE

## 2021-11-12 NOTE — TELEPHONE ENCOUNTER
Attempted to call patient x 3. First time someone picked up but did not answer. Second and time received voice mail but voice mail is full.

## 2021-11-12 NOTE — TELEPHONE ENCOUNTER
Reason for Call:  Other appointment    Detailed comments: Patient has UTI. Could not get appt till Dec 24th. Patient only wants to see Dr Leigh. If there are any openings please give Lizette call.     Phone Number Patient can be reached at: Home number on file [phone] (home)    Best Time: anytime    Can we leave a detailed message on this number? Not Applicable    Call taken on 11/12/2021 at 8:46 AM by Jenny Jones

## 2021-11-17 NOTE — TELEPHONE ENCOUNTER
"Patient states he is no longer having any urinary symptoms, feels \"fine\" and will call if anything changes. Orin Franz R.N.    "

## 2021-12-02 NOTE — PROGRESS NOTES
02/29/20 1330   Quick Adds   Type of Visit Initial PT Evaluation   Living Environment   Lives With spouse   Living Arrangements house   Home Accessibility no concerns   Transportation Anticipated family or friend will provide   Self-Care   Usual Activity Tolerance good   Current Activity Tolerance good   Regular Exercise Yes   Activity/Exercise Type walking   Exercise Amount/Frequency 3-5 times/wk   Equipment Currently Used at Home none   Functional Level Prior   Ambulation 0-->independent   Transferring 0-->independent   Toileting 0-->independent   Bathing 0-->independent   Fall history within last six months no   Which of the above functional risks had a recent onset or change?   (activity tolerance)   General Information   Onset of Illness/Injury or Date of Surgery - Date 02/28/20   Referring Physician Andres Mann MD   Patient/Family Goals Statement Discharge home today   Pertinent History of Current Problem (include personal factors and/or comorbidities that impact the POC) Deshawn Burrows is an 86 year old male with past medical history of hypertension, coronary artery disease, with previous stent placement, left bundle branch block, dyslipidemia, who presented to the emergency room with a complaint of worsening shortness of breath, chest x-ray showed patchy consolidation, placed on supplemental O2 in the ED, started on IV antibiotics, given IV Lasix and admitted to inpatient for further evaluation management.  Overnight patient showed significant improvement, respiratory status also improved. S/p angio with stent   Precautions/Limitations fall precautions   Heart Disease Risk Factors High blood pressure;Dislipidemia;Medical history;Gender;Age   Cognitive Status Examination   Orientation orientation to person, place and time   Level of Consciousness alert   Follows Commands and Answers Questions 100% of the time   Personal Safety and Judgment intact   Memory intact   Pain Assessment   Patient  Fax completed "Currently in Pain No   Integumentary/Edema   Integumentary/Edema no deficits were identifed   Posture    Posture Forward head position;Protracted shoulders   Range of Motion (ROM)   ROM Comment LE ROM WNL   Strength   Strength Comments WFL   Bed Mobility   Bed Mobility Comments indep   Transfer Skills   Transfer Comments indep   Gait   Gait Comments indep   Balance   Balance no deficits were identified   Sensory Examination   Sensory Perception no deficits were identified   Coordination   Coordination no deficits were identified   Muscle Tone   Muscle Tone no deficits were identified   General Therapy Interventions   Planned Therapy Interventions risk factor education;home program guidelines;progressive activity/exercise   Clinical Impression   Criteria for Skilled Therapeutic Intervention yes, treatment indicated   PT Diagnosis Impaired activity tolerance   Influenced by the following impairments Deconditioning following MI   Functional limitations due to impairments Difficulty with functional endurance   Clinical Presentation Stable/Uncomplicated   Clinical Presentation Rationale medically stable, medically cleared for DC   Clinical Decision Making (Complexity) Low complexity   Therapy Frequency Daily   Predicted Duration of Therapy Intervention (days/wks) 1 day   Anticipated Discharge Disposition Home with Outpatient Therapy   Risk & Benefits of therapy have been explained Yes   Patient, Family & other staff in agreement with plan of care Yes   [facility] [employer] \"6 Clicks\"   2016, Trustees of [facility], under license to [employer].  All rights reserved.   6 Clicks Short Forms Basic Mobility Inpatient Short Form   [facility] [employer]  \"6 Clicks\" V.2 Basic Mobility Inpatient Short Form   1. Turning from your back to your side while in a flat bed without using bedrails? 4 - None   2. Moving from lying on your back to sitting on the side of a flat bed without using bedrails? 4 - None   3. " Moving to and from a bed to a chair (including a wheelchair)? 4 - None   4. Standing up from a chair using your arms (e.g., wheelchair, or bedside chair)? 4 - None   5. To walk in hospital room? 4 - None   6. Climbing 3-5 steps with a railing? 4 - None   Basic Mobility Raw Score (Score out of 24.Lower scores equate to lower levels of function) 24   Total Evaluation Time   Total Evaluation Time (Minutes) 5

## 2021-12-09 ENCOUNTER — APPOINTMENT (OUTPATIENT)
Dept: CARDIOLOGY | Facility: CLINIC | Age: 86
DRG: 280 | End: 2021-12-09
Attending: PHYSICIAN ASSISTANT
Payer: MEDICARE

## 2021-12-09 ENCOUNTER — HOSPITAL ENCOUNTER (INPATIENT)
Facility: CLINIC | Age: 86
LOS: 2 days | Discharge: HOME HEALTH | DRG: 280 | End: 2021-12-11
Attending: EMERGENCY MEDICINE | Admitting: INTERNAL MEDICINE
Payer: MEDICARE

## 2021-12-09 ENCOUNTER — APPOINTMENT (OUTPATIENT)
Dept: CT IMAGING | Facility: CLINIC | Age: 86
DRG: 280 | End: 2021-12-09
Attending: EMERGENCY MEDICINE
Payer: MEDICARE

## 2021-12-09 DIAGNOSIS — I50.20 HEART FAILURE WITH REDUCED EJECTION FRACTION, NYHA CLASS II (H): Primary | ICD-10-CM

## 2021-12-09 DIAGNOSIS — J90 PLEURAL EFFUSION: ICD-10-CM

## 2021-12-09 DIAGNOSIS — I50.9 ACUTE ON CHRONIC CONGESTIVE HEART FAILURE, UNSPECIFIED HEART FAILURE TYPE (H): ICD-10-CM

## 2021-12-09 DIAGNOSIS — I25.5 ISCHEMIC CARDIOMYOPATHY: ICD-10-CM

## 2021-12-09 DIAGNOSIS — R09.02 HYPOXIA: ICD-10-CM

## 2021-12-09 DIAGNOSIS — I44.7 LBBB (LEFT BUNDLE BRANCH BLOCK): ICD-10-CM

## 2021-12-09 LAB
ANION GAP SERPL CALCULATED.3IONS-SCNC: 4 MMOL/L (ref 3–14)
ATRIAL RATE - MUSE: 103 BPM
BASE EXCESS BLDV CALC-SCNC: 1.8 MMOL/L (ref -7.7–1.9)
BASOPHILS # BLD AUTO: 0 10E3/UL (ref 0–0.2)
BASOPHILS # BLD AUTO: 0 10E3/UL (ref 0–0.2)
BASOPHILS NFR BLD AUTO: 0 %
BASOPHILS NFR BLD AUTO: 0 %
BUN SERPL-MCNC: 23 MG/DL (ref 7–30)
CALCIUM SERPL-MCNC: 8.6 MG/DL (ref 8.5–10.1)
CHLORIDE BLD-SCNC: 110 MMOL/L (ref 94–109)
CO2 SERPL-SCNC: 27 MMOL/L (ref 20–32)
CREAT SERPL-MCNC: 1.05 MG/DL (ref 0.66–1.25)
DIASTOLIC BLOOD PRESSURE - MUSE: NORMAL MMHG
EOSINOPHIL # BLD AUTO: 0 10E3/UL (ref 0–0.7)
EOSINOPHIL # BLD AUTO: 0.1 10E3/UL (ref 0–0.7)
EOSINOPHIL NFR BLD AUTO: 0 %
EOSINOPHIL NFR BLD AUTO: 1 %
ERYTHROCYTE [DISTWIDTH] IN BLOOD BY AUTOMATED COUNT: 12.4 % (ref 10–15)
ERYTHROCYTE [DISTWIDTH] IN BLOOD BY AUTOMATED COUNT: 12.5 % (ref 10–15)
FLUAV RNA SPEC QL NAA+PROBE: NEGATIVE
FLUBV RNA RESP QL NAA+PROBE: NEGATIVE
GFR SERPL CREATININE-BSD FRML MDRD: 63 ML/MIN/1.73M2
GLUCOSE BLD-MCNC: 119 MG/DL (ref 70–99)
HCO3 BLDV-SCNC: 28 MMOL/L (ref 21–28)
HCT VFR BLD AUTO: 42.7 % (ref 40–53)
HCT VFR BLD AUTO: 44.2 % (ref 40–53)
HGB BLD-MCNC: 13.4 G/DL (ref 13.3–17.7)
HGB BLD-MCNC: 14.5 G/DL (ref 13.3–17.7)
HOLD SPECIMEN: NORMAL
HOLD SPECIMEN: NORMAL
IMM GRANULOCYTES # BLD: 0 10E3/UL
IMM GRANULOCYTES # BLD: 0.1 10E3/UL
IMM GRANULOCYTES NFR BLD: 0 %
IMM GRANULOCYTES NFR BLD: 0 %
INTERPRETATION ECG - MUSE: NORMAL
LVEF ECHO: NORMAL
LYMPHOCYTES # BLD AUTO: 0.9 10E3/UL (ref 0.8–5.3)
LYMPHOCYTES # BLD AUTO: 1 10E3/UL (ref 0.8–5.3)
LYMPHOCYTES NFR BLD AUTO: 10 %
LYMPHOCYTES NFR BLD AUTO: 7 %
MAGNESIUM SERPL-MCNC: 2.1 MG/DL (ref 1.6–2.3)
MCH RBC QN AUTO: 31.8 PG (ref 26.5–33)
MCH RBC QN AUTO: 32.7 PG (ref 26.5–33)
MCHC RBC AUTO-ENTMCNC: 31.4 G/DL (ref 31.5–36.5)
MCHC RBC AUTO-ENTMCNC: 32.8 G/DL (ref 31.5–36.5)
MCV RBC AUTO: 100 FL (ref 78–100)
MCV RBC AUTO: 101 FL (ref 78–100)
MONOCYTES # BLD AUTO: 0.9 10E3/UL (ref 0–1.3)
MONOCYTES # BLD AUTO: 1 10E3/UL (ref 0–1.3)
MONOCYTES NFR BLD AUTO: 9 %
MONOCYTES NFR BLD AUTO: 9 %
NEUTROPHILS # BLD AUTO: 10.2 10E3/UL (ref 1.6–8.3)
NEUTROPHILS # BLD AUTO: 8.1 10E3/UL (ref 1.6–8.3)
NEUTROPHILS NFR BLD AUTO: 80 %
NEUTROPHILS NFR BLD AUTO: 84 %
NRBC # BLD AUTO: 0 10E3/UL
NRBC # BLD AUTO: 0 10E3/UL
NRBC BLD AUTO-RTO: 0 /100
NRBC BLD AUTO-RTO: 0 /100
NT-PROBNP SERPL-MCNC: 5680 PG/ML (ref 0–1800)
O2/TOTAL GAS SETTING VFR VENT: 0 %
OXYHGB MFR BLDV: 26 % (ref 70–75)
P AXIS - MUSE: 74 DEGREES
PCO2 BLDV: 46 MM HG (ref 40–50)
PH BLDV: 7.38 [PH] (ref 7.32–7.43)
PLATELET # BLD AUTO: 206 10E3/UL (ref 150–450)
PLATELET # BLD AUTO: 209 10E3/UL (ref 150–450)
PO2 BLDV: 22 MM HG (ref 25–47)
POTASSIUM BLD-SCNC: 3.7 MMOL/L (ref 3.4–5.3)
PR INTERVAL - MUSE: 154 MS
PROCALCITONIN SERPL-MCNC: <0.05 NG/ML
QRS DURATION - MUSE: 158 MS
QT - MUSE: 392 MS
QTC - MUSE: 513 MS
R AXIS - MUSE: 42 DEGREES
RBC # BLD AUTO: 4.22 10E6/UL (ref 4.4–5.9)
RBC # BLD AUTO: 4.44 10E6/UL (ref 4.4–5.9)
SARS-COV-2 RNA RESP QL NAA+PROBE: NEGATIVE
SODIUM SERPL-SCNC: 141 MMOL/L (ref 133–144)
SYSTOLIC BLOOD PRESSURE - MUSE: NORMAL MMHG
T AXIS - MUSE: 108 DEGREES
TROPONIN I SERPL HS-MCNC: 29 NG/L
TROPONIN I SERPL HS-MCNC: 50 NG/L
TROPONIN I SERPL HS-MCNC: 89 NG/L
VENTRICULAR RATE- MUSE: 103 BPM
WBC # BLD AUTO: 10.1 10E3/UL (ref 4–11)
WBC # BLD AUTO: 12.2 10E3/UL (ref 4–11)

## 2021-12-09 PROCEDURE — 36415 COLL VENOUS BLD VENIPUNCTURE: CPT | Performed by: PHYSICIAN ASSISTANT

## 2021-12-09 PROCEDURE — 250N000011 HC RX IP 250 OP 636: Performed by: INTERNAL MEDICINE

## 2021-12-09 PROCEDURE — 250N000011 HC RX IP 250 OP 636: Performed by: EMERGENCY MEDICINE

## 2021-12-09 PROCEDURE — 99223 1ST HOSP IP/OBS HIGH 75: CPT | Mod: AI | Performed by: INTERNAL MEDICINE

## 2021-12-09 PROCEDURE — 250N000009 HC RX 250: Performed by: EMERGENCY MEDICINE

## 2021-12-09 PROCEDURE — 71275 CT ANGIOGRAPHY CHEST: CPT

## 2021-12-09 PROCEDURE — 93321 DOPPLER ECHO F-UP/LMTD STD: CPT

## 2021-12-09 PROCEDURE — 85025 COMPLETE CBC W/AUTO DIFF WBC: CPT | Performed by: EMERGENCY MEDICINE

## 2021-12-09 PROCEDURE — 250N000009 HC RX 250: Performed by: PHYSICIAN ASSISTANT

## 2021-12-09 PROCEDURE — 83880 ASSAY OF NATRIURETIC PEPTIDE: CPT | Performed by: EMERGENCY MEDICINE

## 2021-12-09 PROCEDURE — 82374 ASSAY BLOOD CARBON DIOXIDE: CPT | Performed by: EMERGENCY MEDICINE

## 2021-12-09 PROCEDURE — 93308 TTE F-UP OR LMTD: CPT | Mod: 26 | Performed by: INTERNAL MEDICINE

## 2021-12-09 PROCEDURE — 84145 PROCALCITONIN (PCT): CPT | Performed by: PHYSICIAN ASSISTANT

## 2021-12-09 PROCEDURE — 96376 TX/PRO/DX INJ SAME DRUG ADON: CPT

## 2021-12-09 PROCEDURE — 87636 SARSCOV2 & INF A&B AMP PRB: CPT | Performed by: EMERGENCY MEDICINE

## 2021-12-09 PROCEDURE — 93321 DOPPLER ECHO F-UP/LMTD STD: CPT | Mod: 26 | Performed by: INTERNAL MEDICINE

## 2021-12-09 PROCEDURE — 99223 1ST HOSP IP/OBS HIGH 75: CPT | Mod: 25 | Performed by: INTERNAL MEDICINE

## 2021-12-09 PROCEDURE — 93325 DOPPLER ECHO COLOR FLOW MAPG: CPT | Mod: 26 | Performed by: INTERNAL MEDICINE

## 2021-12-09 PROCEDURE — 83735 ASSAY OF MAGNESIUM: CPT | Performed by: PHYSICIAN ASSISTANT

## 2021-12-09 PROCEDURE — 250N000013 HC RX MED GY IP 250 OP 250 PS 637: Performed by: PHYSICIAN ASSISTANT

## 2021-12-09 PROCEDURE — 999N000105 HC STATISTIC NO DOCUMENTATION TO SUPPORT CHARGE

## 2021-12-09 PROCEDURE — 36415 COLL VENOUS BLD VENIPUNCTURE: CPT | Performed by: EMERGENCY MEDICINE

## 2021-12-09 PROCEDURE — 84484 ASSAY OF TROPONIN QUANT: CPT | Performed by: EMERGENCY MEDICINE

## 2021-12-09 PROCEDURE — 120N000001 HC R&B MED SURG/OB

## 2021-12-09 PROCEDURE — 84484 ASSAY OF TROPONIN QUANT: CPT | Performed by: PHYSICIAN ASSISTANT

## 2021-12-09 PROCEDURE — 99207 PR APP CREDIT; MD BILLING SHARED VISIT: CPT | Performed by: PHYSICIAN ASSISTANT

## 2021-12-09 PROCEDURE — 99285 EMERGENCY DEPT VISIT HI MDM: CPT | Mod: 25

## 2021-12-09 PROCEDURE — 96374 THER/PROPH/DIAG INJ IV PUSH: CPT | Mod: 59

## 2021-12-09 PROCEDURE — 82805 BLOOD GASES W/O2 SATURATION: CPT | Performed by: EMERGENCY MEDICINE

## 2021-12-09 RX ORDER — METOPROLOL SUCCINATE 50 MG/1
50 TABLET, EXTENDED RELEASE ORAL DAILY
Status: DISCONTINUED | OUTPATIENT
Start: 2021-12-09 | End: 2021-12-11 | Stop reason: HOSPADM

## 2021-12-09 RX ORDER — ONDANSETRON 4 MG/1
4 TABLET, ORALLY DISINTEGRATING ORAL EVERY 6 HOURS PRN
Status: DISCONTINUED | OUTPATIENT
Start: 2021-12-09 | End: 2021-12-11 | Stop reason: HOSPADM

## 2021-12-09 RX ORDER — AMOXICILLIN 250 MG
1 CAPSULE ORAL 2 TIMES DAILY PRN
Status: DISCONTINUED | OUTPATIENT
Start: 2021-12-09 | End: 2021-12-11 | Stop reason: HOSPADM

## 2021-12-09 RX ORDER — LISINOPRIL 5 MG/1
5 TABLET ORAL DAILY
Status: DISCONTINUED | OUTPATIENT
Start: 2021-12-09 | End: 2021-12-11 | Stop reason: HOSPADM

## 2021-12-09 RX ORDER — FUROSEMIDE 10 MG/ML
40 INJECTION INTRAMUSCULAR; INTRAVENOUS ONCE
Status: DISCONTINUED | OUTPATIENT
Start: 2021-12-09 | End: 2021-12-09

## 2021-12-09 RX ORDER — ATORVASTATIN CALCIUM 20 MG/1
20 TABLET, FILM COATED ORAL DAILY
Status: DISCONTINUED | OUTPATIENT
Start: 2021-12-09 | End: 2021-12-11 | Stop reason: HOSPADM

## 2021-12-09 RX ORDER — CLOPIDOGREL BISULFATE 75 MG/1
75 TABLET ORAL DAILY
Status: DISCONTINUED | OUTPATIENT
Start: 2021-12-09 | End: 2021-12-11 | Stop reason: HOSPADM

## 2021-12-09 RX ORDER — IBUPROFEN 200 MG
400 TABLET ORAL EVERY 6 HOURS PRN
Status: ON HOLD | COMMUNITY
End: 2022-01-01

## 2021-12-09 RX ORDER — ACETAMINOPHEN 325 MG/1
650 TABLET ORAL EVERY 6 HOURS PRN
Status: DISCONTINUED | OUTPATIENT
Start: 2021-12-09 | End: 2021-12-11 | Stop reason: HOSPADM

## 2021-12-09 RX ORDER — AMOXICILLIN 250 MG
2 CAPSULE ORAL 2 TIMES DAILY PRN
Status: DISCONTINUED | OUTPATIENT
Start: 2021-12-09 | End: 2021-12-11 | Stop reason: HOSPADM

## 2021-12-09 RX ORDER — FUROSEMIDE 10 MG/ML
40 INJECTION INTRAMUSCULAR; INTRAVENOUS 2 TIMES DAILY
Status: DISCONTINUED | OUTPATIENT
Start: 2021-12-09 | End: 2021-12-10

## 2021-12-09 RX ORDER — IOPAMIDOL 755 MG/ML
500 INJECTION, SOLUTION INTRAVASCULAR ONCE
Status: COMPLETED | OUTPATIENT
Start: 2021-12-09 | End: 2021-12-09

## 2021-12-09 RX ORDER — ONDANSETRON 2 MG/ML
4 INJECTION INTRAMUSCULAR; INTRAVENOUS EVERY 6 HOURS PRN
Status: DISCONTINUED | OUTPATIENT
Start: 2021-12-09 | End: 2021-12-11 | Stop reason: HOSPADM

## 2021-12-09 RX ORDER — IPRATROPIUM BROMIDE AND ALBUTEROL SULFATE 2.5; .5 MG/3ML; MG/3ML
3 SOLUTION RESPIRATORY (INHALATION)
Status: DISCONTINUED | OUTPATIENT
Start: 2021-12-09 | End: 2021-12-11 | Stop reason: HOSPADM

## 2021-12-09 RX ORDER — ASPIRIN 81 MG/1
81 TABLET ORAL DAILY
Status: DISCONTINUED | OUTPATIENT
Start: 2021-12-09 | End: 2021-12-11 | Stop reason: HOSPADM

## 2021-12-09 RX ORDER — FAMOTIDINE 20 MG/1
20 TABLET, FILM COATED ORAL 2 TIMES DAILY
Status: DISCONTINUED | OUTPATIENT
Start: 2021-12-09 | End: 2021-12-11 | Stop reason: HOSPADM

## 2021-12-09 RX ORDER — LIDOCAINE 40 MG/G
CREAM TOPICAL
Status: DISCONTINUED | OUTPATIENT
Start: 2021-12-09 | End: 2021-12-11 | Stop reason: HOSPADM

## 2021-12-09 RX ORDER — FUROSEMIDE 10 MG/ML
40 INJECTION INTRAMUSCULAR; INTRAVENOUS ONCE
Status: COMPLETED | OUTPATIENT
Start: 2021-12-09 | End: 2021-12-09

## 2021-12-09 RX ORDER — ALBUTEROL SULFATE 0.83 MG/ML
2.5 SOLUTION RESPIRATORY (INHALATION)
Status: DISCONTINUED | OUTPATIENT
Start: 2021-12-09 | End: 2021-12-11 | Stop reason: HOSPADM

## 2021-12-09 RX ORDER — ACETAMINOPHEN 650 MG/1
650 SUPPOSITORY RECTAL EVERY 6 HOURS PRN
Status: DISCONTINUED | OUTPATIENT
Start: 2021-12-09 | End: 2021-12-11 | Stop reason: HOSPADM

## 2021-12-09 RX ORDER — TAMSULOSIN HYDROCHLORIDE 0.4 MG/1
0.4 CAPSULE ORAL DAILY
Status: DISCONTINUED | OUTPATIENT
Start: 2021-12-09 | End: 2021-12-11 | Stop reason: HOSPADM

## 2021-12-09 RX ORDER — SPIRONOLACTONE 25 MG
12.5 TABLET ORAL DAILY
Status: DISCONTINUED | OUTPATIENT
Start: 2021-12-09 | End: 2021-12-11 | Stop reason: HOSPADM

## 2021-12-09 RX ADMIN — TAMSULOSIN HYDROCHLORIDE 0.4 MG: 0.4 CAPSULE ORAL at 20:27

## 2021-12-09 RX ADMIN — IPRATROPIUM BROMIDE AND ALBUTEROL SULFATE 3 ML: 2.5; .5 SOLUTION RESPIRATORY (INHALATION) at 20:30

## 2021-12-09 RX ADMIN — IOPAMIDOL 62 ML: 755 INJECTION, SOLUTION INTRAVENOUS at 07:46

## 2021-12-09 RX ADMIN — SPIRONOLACTONE 12.5 MG: 25 TABLET ORAL at 20:28

## 2021-12-09 RX ADMIN — ATORVASTATIN CALCIUM 20 MG: 20 TABLET, FILM COATED ORAL at 20:28

## 2021-12-09 RX ADMIN — FUROSEMIDE 40 MG: 10 INJECTION, SOLUTION INTRAMUSCULAR; INTRAVENOUS at 17:10

## 2021-12-09 RX ADMIN — SODIUM CHLORIDE 84 ML: 9 INJECTION, SOLUTION INTRAVENOUS at 07:46

## 2021-12-09 RX ADMIN — FAMOTIDINE 20 MG: 20 TABLET ORAL at 20:27

## 2021-12-09 RX ADMIN — CLOPIDOGREL BISULFATE 75 MG: 75 TABLET ORAL at 20:27

## 2021-12-09 RX ADMIN — ASPIRIN 81 MG: 81 TABLET, COATED ORAL at 20:28

## 2021-12-09 RX ADMIN — FUROSEMIDE 40 MG: 10 INJECTION, SOLUTION INTRAMUSCULAR; INTRAVENOUS at 09:27

## 2021-12-09 ASSESSMENT — ACTIVITIES OF DAILY LIVING (ADL)
ADLS_ACUITY_SCORE: 12

## 2021-12-09 ASSESSMENT — ENCOUNTER SYMPTOMS
DYSURIA: 0
SHORTNESS OF BREATH: 1
FEVER: 0
DIFFICULTY URINATING: 0
DIARRHEA: 0
APPETITE CHANGE: 0
CONSTIPATION: 0
VOMITING: 0
ARTHRALGIAS: 0
MYALGIAS: 0

## 2021-12-09 NOTE — PHARMACY-ADMISSION MEDICATION HISTORY
Admission medication history interview status for this patient is complete. See [facility] admission navigator for allergy information, prior to admission medications and immunization status.     Medication history interview source(s):Patient  Medication history resources (including written lists, pill bottles, clinic record):EPIC list, Sure Scripts  Primary pharmacy:[facility]    Changes made to PTA medication list:  Added: ibuprofen  Deleted: ---  Changed: ---    Actions taken by pharmacist (provider contacted, etc):None     Additional medication history information:None    Medication reconciliation/reorder completed by provider prior to medication history? No, sticky note left for MD      For patients on insulin therapy:N    Prior to Admission medications    Medication Sig Last Dose Taking? Auth Provider   aspirin 81 MG EC tablet Take 81 mg by mouth daily 12/8/2021 at Unknown time Yes Reported, Patient   atorvastatin (LIPITOR) 20 MG tablet Take 1 tablet (20 mg) by mouth daily 12/8/2021 at Unknown time Yes Kwame Blanton MD   clopidogrel (PLAVIX) 75 MG tablet Take 1 tablet (75 mg) by mouth daily 12/8/2021 at Unknown time Yes Kwame Blanton MD   furosemide (LASIX) 20 MG tablet Take 0.5 tablets (10 mg) by mouth daily 12/8/2021 at Unknown time Yes Kwame Blanton MD   ibuprofen (ADVIL/MOTRIN) 200 MG tablet Take 400 mg by mouth every 6 hours as needed for mild pain  at no recent usage Yes Unknown, Entered By History   lisinopril (ZESTRIL) 5 MG tablet Take 1 tablet (5 mg) by mouth daily 12/8/2021 at Unknown time Yes Kwame Blanton MD   metoprolol succinate ER (TOPROL-XL) 50 MG 24 hr tablet Take 1 tablet (50 mg) by mouth daily 12/8/2021 at Unknown time Yes Kwame Blanton MD   multivitamin, therapeutic with minerals (THERA-VIT-M) TABS Take 1 tablet by mouth daily 12/8/2021 at Unknown time Yes Unknown, Entered By History   nitroGLYcerin (NITROSTAT) 0.4 MG sublingual tablet Place 1 tablet (0.4 mg) under the tongue every 5 minutes  as needed for chest pain  at no usage Yes Rl Wright MD   spironolactone (ALDACTONE) 25 MG tablet Take 0.5 tablets (12.5 mg) by mouth daily 12/8/2021 at Unknown time Yes Kwame Blanton MD   tamsulosin (FLOMAX) 0.4 MG capsule Take 1 capsule by mouth daily. 12/8/2021 at Unknown time Yes Tess Leigh MD

## 2021-12-09 NOTE — H&P
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    Acute Respiratory Failure   Acute CHF Exacerbation  Bilateral Pleural Effusions - pt presents with two days of shortness of breath.  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    ED work up revealed patient hypoxic 83% on room air, requiring 6 lpm oximask, HR 90-100s, afebrile, noromtensive.  Laboratory work up revealed BNP 5,680 with otherwise normal BMP, Troponin.  Negative Covid and Influenza.  CT chest revealed coronary artery calcifications, no PE, bilateral patchy groundglass opacities, interstitial prominence and peribronchial wall thickening. Bilateral moderate pleural fluid.  Patient received Lasix and admitted for further management.  Infectious etiology appears less likely given afebrile, normal wbc and no cough.  - continue IV Lasix  - echocardiogram  - trend troponin  - monitor on telemetry  - Cardiology consult  - will diureses and repeat CXR early 12/10.  If persistent effusions, consider IR consult to consider thoracentesis     CAD - hx of coronary artery disease with stenting in the 1990s in [place].  Most recently s/p repeat coronary angiography 02/2020 with ROSENDO to mid LAD in-stent restenosis.  Troponin wnl.  - continue Atorvastatin, Plavix and ASA    Hx Tobacco Abuse - former smoker for 30 years.  Quit ~15 years ago.  No hx of COPD on  file.  No significant wheezing on my exam, however discussed with Cardiology who heard mild wheezing which may be contributing to presentation.  - start scheduled Duonebs, prn Albuterol   - hold off on any steroids at this time     HTN - continue Toprol XL, Lisinopril    HLD - check lipid panel.  Continue Atorvastatin and ASA     BPH - continue Flomax      CODE: Full  Diet/IVF: low sodium  GI ppx:  pepcid  DVT ppx: Plavix    Patient discussed with Dr. 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    Patient reports he awoke on 12/8 at 3am with shortness of breath.  He denies any chest pain, but reports an uncomfortable feeling in his chest which resolved with rest.  He remained short of breath yesterday.  He denies any coughing, wheezing, edema, weight gain.  He again awoke around 3am today feeling shortness of breath an presented to the ED.         Past Medical History:     Past Medical History:   Diagnosis Date     Acute systolic heart failure (H) 2/25/2020    Added automatically from request for surgery [id number]     CAD (coronary artery disease) 2004    2 cardiac stents      Dyspnea on exertion 2/25/2020    Added automatically from request for surgery [id number]     Essential hypertension with goal blood pressure less than 140/90 8/10/2016     Hyperlipidemia LDL goal <100 8/10/2016     Ischemic cardiomyopathy 2/25/2020    Added automatically from request for surgery [id number]     LBBB (left bundle branch block) 2/25/2020          Past Surgical History:     Past Surgical History:   Procedure Laterality Date     CV CORONARY ANGIOGRAM N/A  2/28/2020    Procedure: CV CORONARY ANGIOGRAM;  Surgeon: Andres Mann MD;  Location: [facility]     CV LEFT HEART CATH N/A 2/28/2020    Procedure: Left Heart Cath;  Surgeon: Andres Mann MD;  Location: [facility]     CV LEFT VENTRICULOGRAM N/A 2/28/2020    Procedure: Left Ventriculogram;  Surgeon: Andres Mann MD;  Location: [facility]     CV PCI STENT DRUG ELUTING N/A 2/28/2020    Procedure: Percutaneous Coronary Intervention Stent Drug Eluting;  Surgeon: Andres Mann MD;  Location: [facility]     CYSTOSCOPY N/A 3/1/2020    Procedure: CYSTOSCOPY;  Surgeon: Rios Gomez MD;  Location: [facility] OR     HEART CATH DRUG ELUTING STENT PLACEMENT  02/28/2020     HERNIA REPAIR  2007    right inguinal hernia repair     STENT, CORONARY, ANTIONE  2004    2 stents          Social History:     Social History     Socioeconomic History     Marital status:      Spouse name: Not on file     Number of children: Not on file     Years of education: Not on file     Highest education level: Not on file   Occupational History     Not on file   Tobacco Use     Smoking status: Former Smoker     Smokeless tobacco: Never Used     Tobacco comment: Quit 30 years ago   Substance and Sexual Activity     Alcohol use: Yes     Alcohol/week: 0.0 standard drinks     Comment: Moderate     Drug use: No     Sexual activity: Not Currently   Other Topics Concern     Parent/sibling w/ CABG, MI or angioplasty before 65F 55M? Not Asked   Social History Narrative     Not on file     Social Determinants of Health     Financial Resource Strain: Not on file   Food Insecurity: Not on file   Transportation Needs: Not on file   Physical Activity: Not on file   Stress: Not on file   Social Connections: Not on file   Intimate Partner Violence: Not on file   Housing Stability: Not on file   hx of tobacco abuse for 30 years, quit 10-15 years ago.       Family History:      Family History   Family history unknown: Yes          Allergies:      Allergies   Allergen Reactions     Penicillins           Medications:     Prior to Admission medications    Medication Sig Last Dose Taking? Auth Provider   aspirin 81 MG EC tablet Take 81 mg by mouth daily   Reported, Patient   atorvastatin (LIPITOR) 20 MG tablet Take 1 tablet (20 mg) by mouth daily   Kwame Blanton MD   clopidogrel (PLAVIX) 75 MG tablet Take 1 tablet (75 mg) by mouth daily   Kwame Blanton MD   furosemide (LASIX) 20 MG tablet Take 0.5 tablets (10 mg) by mouth daily   Kwame Blanton MD   lisinopril (ZESTRIL) 5 MG tablet Take 1 tablet (5 mg) by mouth daily   Kwame Blanton MD   metoprolol succinate ER (TOPROL-XL) 50 MG 24 hr tablet Take 1 tablet (50 mg) by mouth daily   Kwame Blanton MD   multivitamin, therapeutic with minerals (THERA-VIT-M) TABS Take 1 tablet by mouth daily   Unknown, Entered By History   nitroGLYcerin (NITROSTAT) 0.4 MG sublingual tablet Place 1 tablet (0.4 mg) under the tongue every 5 minutes as needed for chest pain   Rl Wright MD   spironolactone (ALDACTONE) 25 MG tablet Take 0.5 tablets (12.5 mg) by mouth daily   Kwame Blanton MD   tamsulosin (FLOMAX) 0.4 MG capsule Take 1 capsule by mouth daily.   Tess Leigh MD          Review of Systems:   A complete ROS was performed and is negative other than what is stated in the HPI.       Physical Exam:   Blood pressure 135/89, pulse 94, temperature 97.4  F (36.3  C), temperature source Oral, resp. rate 16, SpO2 98 %.  General: Alert, interactive, NAD, lying in bed, pleasant and cooperative.  HEENT: AT/NC  Chest/Resp: no wheezing, mild dyspnea, decreased breath sounds at the bases  Heart/CV: regular rate and rhythm, no murmur  Abdomen/GI: Soft, nontender, nondistended. +BS.  Extremities/MSK: No LE edema  Skin: Warm and dry  Neuro: Alert & oriented x 3, no focal deficits, moves all extremities equally         Labs:   ROUTINE ICU LABS (Last four  results)  CMP  Recent Labs   Lab 12/09/21  0649      POTASSIUM 3.7   CHLORIDE 110*   CO2 27   ANIONGAP 4   *   BUN 23   CR 1.05   GFRESTIMATED 63   FLORES 8.6     CBC  Recent Labs   Lab 12/09/21  0649   WBC 10.1   RBC 4.22*   HGB 13.4   HCT 42.7   *   MCH 31.8   MCHC 31.4*   RDW 12.5        INRNo lab results found in last 7 days.  Arterial Blood Gas  Recent Labs   Lab 12/09/21  0649   O2PER 0          Imaging/Procedures:     Results for orders placed or performed during the hospital encounter of 12/09/21   CT Chest Pulmonary Embolism w Contrast    Narrative    CT CHEST PULMONARY EMBOLISM WITH CONTRAST  12/9/2021 8:12 AM    CLINICAL HISTORY: CT chest 3/1/2020.    TECHNIQUE: CT angiogram chest during arterial phase injection IV  contrast. 2D and 3D MIP reconstructions were performed by the CT  technologist. Dose reduction techniques were used.     CONTRAST: 62 mL Isovue-[id number]    COMPARISON: None.    FINDINGS:  ANGIOGRAM CHEST: Pulmonary arteries are normal caliber and negative  for pulmonary emboli. Thoracic aorta is negative for dissection.     LUNGS AND PLEURA: Bilateral moderate pleural effusions. Bilateral  irregular pulmonary consolidation involving the upper and lower lobes  with interstitial thickening and peribronchial wall thickening as  well. Bibasilar atelectasis.    MEDIASTINUM/AXILLAE: Edema of the mediastinum fat. A few prominent  lymph nodes _______ 1.4 cm, series 8 image 140.    CORONARY ARTERY CALCIFICATION: Moderate.    UPPER ABDOMEN: A few incidental renal cysts without specific imaging  follow-up recommended. No acute abnormality.    MUSCULOSKELETAL: Spine degenerative change.      Impression    IMPRESSION:  1.  Bilateral patchy groundglass opacities, interstitial prominence  and peribronchial wall thickening. Bilateral moderate pleural fluid.  Findings could relate to pulmonary edema and CHF. Underlying atypical  pneumonia is also a possibility.  2.  No evidence for pulmonary  embolism.  3.  Coronary artery calcifications.

## 2021-12-09 NOTE — ED PROVIDER NOTES
History   Chief Complaint:  Shortness of Breath     The history is provided by the patient.      Deshawn Burrows is a 88 year old male with history of CAD, systolic heart failure, hypertension, hyperlipidemia, MI who presents with shortness of breath. Patient received his Covid booster shot yesterday and started feeling short of breath last night. He woke up this morning with continued shortness of breath. Initials saturations for EMS were high 70's. Denies chest pain. No fever or vomiting. No body aches. No bowel or urinary changes. No p.o. intake change.     Review of Systems   Constitutional: Negative for appetite change and fever.   Respiratory: Positive for shortness of breath.    Cardiovascular: Negative for chest pain.   Gastrointestinal: Negative for constipation, diarrhea and vomiting.   Genitourinary: Negative for decreased urine volume, difficulty urinating and dysuria.   Musculoskeletal: Negative for arthralgias and myalgias.   All other systems reviewed and are negative.    Allergies:  Penicillins     Medications:  Aspirin 81 mg  Lipitor   Plavix   Lasix   Zestril   Toprol   Nitrostat   Aldactone  Flomax     Past Medical History:     Acute systolic heart failure   CAD  Hypertension   Hyperlipidemia   Ischemic cardiomyopathy   Left bundle branch block   Vasculitis   MI       Past Surgical History:    Coronary angiogram  Left heart cath   Left ventriculogram   Pci stent drug eluting   Cystoscopy   Inguinal hernia repair   Coronary stent placement      Social History:  Presents to ED alone  Presents via EMS     Physical Exam     Patient Vitals for the past 24 hrs:   BP Temp Temp src Pulse Resp SpO2   12/09/21 1130 123/84 -- -- 92 -- --   12/09/21 1100 (!) 137/93 -- -- 112 -- --   12/09/21 1045 123/73 -- -- 101 -- 94 %   12/09/21 1030 (!) 151/84 -- -- 112 -- --   12/09/21 1015 129/78 -- -- 99 -- 97 %   12/09/21 1000 133/83 -- -- 107 -- --   12/09/21 0945 (!) 136/90 -- -- 87 -- 99 %   12/09/21 0930  135/77 -- -- 101 -- 96 %   12/09/21 0915 135/89 -- -- 94 -- 98 %   12/09/21 0900 (!) 134/93 -- -- 100 -- 100 %   12/09/21 0845 134/89 -- -- 97 -- 100 %   12/09/21 0830 136/86 -- -- 96 -- 100 %   12/09/21 0815 (!) 141/90 -- -- 97 -- 100 %   12/09/21 0715 132/84 -- -- 94 -- 98 %   12/09/21 0705 -- -- -- 94 -- 99 %   12/09/21 0700 132/87 -- -- 87 -- 96 %   12/09/21 0655 -- -- -- 92 -- 95 %   12/09/21 0647 -- 97.4  F (36.3  C) Oral -- -- 90 %   12/09/21 0645 (!) 153/101 -- -- 108 -- (!) 86 %   12/09/21 0644 -- -- -- 107 16 (!) 83 %       Physical Exam  Constitutional: Patient is well appearing. No distress.  Head: Atraumatic.  Eyes: Conjunctivae and EOM are normal. No scleral icterus.  Neck: Normal range of motion. Neck supple.   Cardiovascular: Normal rate, regular rhythm, normal heart sounds and intact distal pulses.   Pulmonary/Chest: No respiratory distress. Right sided diminished breath sounds with high pitched rhonchi. Slight crackles on the right.   Abdominal: Soft. Bowel sounds are normal. No distension. No tenderness. No rebound or guarding.   Musculoskeletal: Normal range of motion. No edema or tenderness.   Neurological: Alert and orientated to person, place, and time. No observable focal neuro deficit  Skin: Warm and dry. No rash noted. Not diaphoretic.     Emergency Department Course     Imaging:  Echo Limited   Final Result      CT Chest Pulmonary Embolism w Contrast   Preliminary Result   IMPRESSION:   1.  Bilateral patchy groundglass opacities, interstitial prominence   and peribronchial wall thickening. Bilateral moderate pleural fluid.   Findings could relate to pulmonary edema and CHF. Underlying atypical   pneumonia is also a possibility.   2.  No evidence for pulmonary embolism.   3.  Coronary artery calcifications.      Report per radiology    Laboratory:  Labs Ordered and Resulted from Time of ED Arrival to Time of ED Departure   BLOOD GAS VENOUS WITH OXYHEMOGLOBIN - Abnormal       Result Value     pH Venous 7.38      pCO2 Venous 46      pO2 Venous 22 (*)     Bicarbonate Venous 28      FIO2 0      Oxyhemoglobin Venous 26 (*)     Base Excess/Deficit (+/-) 1.8     BASIC METABOLIC PANEL - Abnormal    Sodium 141      Potassium 3.7      Chloride 110 (*)     Carbon Dioxide (CO2) 27      Anion Gap 4      Urea Nitrogen 23      Creatinine 1.05      Calcium 8.6      Glucose 119 (*)     GFR Estimate 63     CBC WITH PLATELETS AND DIFFERENTIAL - Abnormal    WBC Count 10.1      RBC Count 4.22 (*)     Hemoglobin 13.4      Hematocrit 42.7       (*)     MCH 31.8      MCHC 31.4 (*)     RDW 12.5      Platelet Count 206      % Neutrophils 80      % Lymphocytes 10      % Monocytes 9      % Eosinophils 1      % Basophils 0      % Immature Granulocytes 0      NRBCs per 100 WBC 0      Absolute Neutrophils 8.1      Absolute Lymphocytes 1.0      Absolute Monocytes 0.9      Absolute Eosinophils 0.1      Absolute Basophils 0.0      Absolute Immature Granulocytes 0.0      Absolute NRBCs 0.0     NT PROBNP INPATIENT - Abnormal    N terminal Pro BNP Inpatient 5,680 (*)    TROPONIN I - Normal    Troponin I High Sensitivity 29     INFLUENZA A/B & SARS-COV2 PCR MULTIPLEX - Normal    Influenza A PCR Negative      Influenza B PCR Negative      SARS CoV2 PCR Negative     TROPONIN I   PROCALCITONIN   MAGNESIUM      Emergency Department Course:    Reviewed:  I reviewed nursing notes, vitals, past medical history and Care Everywhere    Assessments:  0647 I obtained history and examined the patient as noted above.   1245 I rechecked the patient and explained findings.     Consults:  5900 I spoke with Unique Ewing PA-C for Dr. Guerra from the Hospitalist service regarding patient's presentation, findings, and plan of care.     Interventions:  0927 Lasix, 40 mg, IV    Disposition:  The patient was admitted to the hospital under the care of Dr. Guerra.     Impression & Plan     CMS Diagnoses: None    Medical Decision Making:  Hypoxic resp failure.   Easily reverse with O2.  No hx of ros to signify pneumonia.  Appears CHF with bilateral pleural effusions.  Admit for further workup and mgmt.     Diagnosis:    ICD-10-CM    1. Acute on chronic congestive heart failure, unspecified heart failure type (H)  I50.9    2. Hypoxia  R09.02    3. Pleural effusion  J90        Covid-19  Deshawn Burrows was evaluated during a global COVID-19 pandemic, which necessitated consideration that the patient might be at risk for infection with the SARS-CoV-2 virus that causes COVID-19.   Applicable protocols for evaluation were followed during the patient's care.   COVID-19 was considered as part of the patient's evaluation. The plan for testing is:  a test was obtained during this visit.    Scribe Disclosure:  Partha DUCKWORTH, am serving as a scribe at 6:48 AM on 12/9/2021 to document services personally performed by Emanuel Buchanan MD based on my observations and the provider's statements to me.            Emanuel Buchanan MD  12/09/21 7251

## 2021-12-09 NOTE — CONSULTS
Cardiology Consultation:    Deshawn Burrows MRN#: [id number]   YOB: 1933 Age: 88 year old     Date of Admission: 12/9/2021  Consult indication: CHF         Assessment and Plan / Recommendations:      # Acute hypoxic respiratory failure.  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  Weight is 136 pounds, previously in clinic 4/2021 weight was 141 pounds.  TTE 12/9/2021 LVEF 20-25%, LVEF 24% on TTE 02/2020.  He has known coronary artery disease; however, it was thought that the burden of coronary artery disease was out of proportion to the degree of LV dysfunction.    Etiology of decompensation not entirely clear, clinical presentation does not seem consistent with an ischemic event, high-sensitivity troponin was normal, initial ECG demonstrated chronic left bundle branch block.    # Mod-severe MR. Has known MR, now likely worse due to fluid overload.   # Chronic LBBB  # Coronary artery disease with remote history of PCI, status post repeat coronary angiography 02/2020 with ROSENDO to mid LAD in-stent restenosis.   # Hypertension, well controlled, previously has had issues with hypotension, blood pressure 10/2020 was 92/50 mmHg.   # Dyslipidemia.     -Agree with continued diuresis, previously was on furosemide 10 mg daily at home, responding well to furosemide 40 mg IV twice daily, hopefully can transition to PO regimen tomorrow  -Agree with continuing home cardiac regimen of aspirin, statin, clopidogrel, lisinopril, metoprolol succinate, spironolactone  -Consider thoracentesis  -Consider treatment for COPD/emphysema given mild wheezing on exam  -Maintain potassium greater than 4, mag greater than 2  -Patient had been lost to follow-up in clinic for  unclear reasons, though now he is amenable to following up, will need assessment by EP for consideration of CRT-D therapy  -Cardiology will follow    Thank you for allowing our team to participate in the care of Deshawn Burrows. Please do not hesitate to page me with any questions or concerns.    Kwame Blanton MD, [facility]  Cardiology  Text Page   December 9, 2021    Voice recognition software utilized.          History of Present Illness:     I had the opportunity to see patient Deshawn Burrows at [facility] for a cardiology consultation.    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    Patient follows with me in clinic.  I had last seen him in clinic on 4/8/2021.  The patient has a remote history of coronary artery disease with stenting in the 1990s in [place].  He was admitted to [facility] 02/2020 for chest pain and dyspnea.  He was seen in consultation by my colleague, Dr. Palmer.  TTE was done which demonstrated LVEF 24% with mildly reduced RV function.  He was also noted to have moderate mitral regurgitation.  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  LVEF was 21%.        He underwent coronary angiography on 02/28/2020 that demonstrated a mid LAD in-stent restenosis that was stented at that time.  The degree of cardiac dysfunction was thought to be out of proportion to the burden of coronary artery disease and  it was recommended that a cardiac MRI be considered for further evaluation of his cardiomyopathy.     Unfortunately, for unclear reasons, patient had not undergone the MRI.  When I had seen him we had recommended close follow-up with her clinic, in 1 month, for reassessment of LV function and consideration of device therapy (CRT-D).  Unfortunately this appointment is also not scheduled.    Patient reports that since then he had been doing reasonably well, until 2 to 3 days ago, when he started developing gradually worsening shortness of breath.  Symptoms were mild, however yesterday evening, after having received a Covid booster earlier that day, he developed significant dyspnea.  He had also been experiencing some intermittent chest discomfort.  No recent change in weight, no change in diet, he has been adhering to a low-salt diet.    In the ED, initial blood pressure was 153/101 mmHg, heart rate was 108 bpm, sinus tachycardia, he was hypoxic at 83% on room air.  CT PE study demonstrated bilateral patchy groundglass opacities, bilateral moderate pleural effusions, suggestive of pulmonary edema and CHF, also possible atypical pneumonia.  No evidence of pulmonary embolism.    Prior cardiac diagnostic studies are notable for an echocardiogram done on 2/25/2020, LVEF 24% on biplane imaging, moderate MR, mild TR, mild to moderate pulmonary hypertension.    He was admitted to the Internal Medicine service, and has undergone treatment with IV diuresis, which seems to have improved his overall symptoms of dyspnea.  Repeat TTE earlier today demonstrates similar LVEF 20 to 25%, moderate to severe LV dilatation, wall motion likely reflective of conduction abnormality, moderate to severe MR, moderate pulmonary hypertension.    Initial labs notable for potassium 3.7, creatinine 1.05, NT proBNP 5700, troponin 29 (normal, high sensitivity assay), Covid negative, influenza negative.    He is a retired  and has  been spending most of his time caring for his wife, who unfortunately has her own cardiac issues and is largely wheelchair bound.           Past Medical History:   I have reviewed this patient's past medical history    Past Medical History:   Diagnosis Date     Acute systolic heart failure (H) 2/25/2020    Added automatically from request for surgery [id number]     CAD (coronary artery disease) 2004    2 cardiac stents      Dyspnea on exertion 2/25/2020    Added automatically from request for surgery [id number]     Essential hypertension with goal blood pressure less than 140/90 8/10/2016     Hyperlipidemia LDL goal <100 8/10/2016     Ischemic cardiomyopathy 2/25/2020    Added automatically from request for surgery [id number]     LBBB (left bundle branch block) 2/25/2020             Past Surgical History:   I have reviewed this patient's past surgical history   Past Surgical History:   Procedure Laterality Date     CV CORONARY ANGIOGRAM N/A 2/28/2020    Procedure: CV CORONARY ANGIOGRAM;  Surgeon: Andres Mann MD;  Location:  HEART CARDIAC CATH LAB     CV LEFT HEART CATH N/A 2/28/2020    Procedure: Left Heart Cath;  Surgeon: Andres Mann MD;  Location:  HEART CARDIAC CATH LAB     CV LEFT VENTRICULOGRAM N/A 2/28/2020    Procedure: Left Ventriculogram;  Surgeon: Andres Mann MD;  Location:  HEART CARDIAC CATH LAB     CV PCI STENT DRUG ELUTING N/A 2/28/2020    Procedure: Percutaneous Coronary Intervention Stent Drug Eluting;  Surgeon: Andres Mann MD;  Location:  HEART CARDIAC CATH LAB     CYSTOSCOPY N/A 3/1/2020    Procedure: CYSTOSCOPY;  Surgeon: Rios Gomez MD;  Location: [facility] OR     HEART CATH DRUG ELUTING STENT PLACEMENT  02/28/2020     HERNIA REPAIR  2007    right inguinal hernia repair     STENT, CORONARY, ANTIONE  2004    2 stents             Social History:   I have reviewed this patient's social history  Social History     Tobacco Use     Smoking status: Former Smoker      Smokeless tobacco: Never Used     Tobacco comment: Quit 30 years ago   Substance Use Topics     Alcohol use: Yes     Alcohol/week: 0.0 standard drinks     Comment: Moderate             Family History:   I have reviewed this patient's family history  Family History   Family history unknown: Yes             Allergies:   I have reviewed this patient's allergy history  Allergies   Allergen Reactions     Penicillins              Medications reviewed:   Prior to admission medications:  Prior to Admission medications    Medication Sig Start Date End Date Taking? Authorizing Provider   aspirin 81 MG EC tablet Take 81 mg by mouth daily   Yes Reported, Patient   atorvastatin (LIPITOR) 20 MG tablet Take 1 tablet (20 mg) by mouth daily 4/8/21  Yes Kwame Blanton MD   clopidogrel (PLAVIX) 75 MG tablet Take 1 tablet (75 mg) by mouth daily 4/8/21  Yes Kwame Blanton MD   furosemide (LASIX) 20 MG tablet Take 0.5 tablets (10 mg) by mouth daily 4/8/21  Yes Kwame Blanton MD   ibuprofen (ADVIL/MOTRIN) 200 MG tablet Take 400 mg by mouth every 6 hours as needed for mild pain   Yes Unknown, Entered By History   lisinopril (ZESTRIL) 5 MG tablet Take 1 tablet (5 mg) by mouth daily 4/8/21  Yes Kwame Blanton MD   metoprolol succinate ER (TOPROL-XL) 50 MG 24 hr tablet Take 1 tablet (50 mg) by mouth daily 4/8/21  Yes Kwame Blanton MD   multivitamin, therapeutic with minerals (THERA-VIT-M) TABS Take 1 tablet by mouth daily   Yes Unknown, Entered By History   nitroGLYcerin (NITROSTAT) 0.4 MG sublingual tablet Place 1 tablet (0.4 mg) under the tongue every 5 minutes as needed for chest pain 2/29/20  Yes Rl Wright MD   spironolactone (ALDACTONE) 25 MG tablet Take 0.5 tablets (12.5 mg) by mouth daily 4/8/21  Yes Kwame Blanton MD   tamsulosin (FLOMAX) 0.4 MG capsule Take 1 capsule by mouth daily. 10/26/21  Yes Tess Leigh MD      Current medications:  Current Facility-Administered Medications Ordered in Epic   Medication Dose  Route Frequency Last Rate Last Admin     acetaminophen (TYLENOL) tablet 650 mg  650 mg Oral Q6H PRN        Or     acetaminophen (TYLENOL) Suppository 650 mg  650 mg Rectal Q6H PRN         aspirin EC tablet 81 mg  81 mg Oral Daily         atorvastatin (LIPITOR) tablet 20 mg  20 mg Oral Daily         clopidogrel (PLAVIX) tablet 75 mg  75 mg Oral Daily         famotidine (PEPCID) tablet 20 mg  20 mg Oral BID         furosemide (LASIX) injection 40 mg  40 mg Intravenous BID         lidocaine (LMX4) cream   Topical Q1H PRN         lidocaine 1 % 0.1-1 mL  0.1-1 mL Other Q1H PRN         lisinopril (ZESTRIL) tablet 5 mg  5 mg Oral Daily         metoprolol succinate ER (TOPROL-XL) 24 hr tablet 50 mg  50 mg Oral Daily         ondansetron (ZOFRAN-ODT) ODT tab 4 mg  4 mg Oral Q6H PRN        Or     ondansetron (ZOFRAN) injection 4 mg  4 mg Intravenous Q6H PRN         senna-docusate (SENOKOT-S/PERICOLACE) 8.6-50 MG per tablet 1 tablet  1 tablet Oral BID PRN        Or     senna-docusate (SENOKOT-S/PERICOLACE) 8.6-50 MG per tablet 2 tablet  2 tablet Oral BID PRN         sodium chloride (PF) 0.9% PF flush 3 mL  3 mL Intracatheter Q8H         sodium chloride (PF) 0.9% PF flush 3 mL  3 mL Intracatheter q1 min prn         spironolactone (ALDACTONE) half-tab 12.5 mg  12.5 mg Oral Daily         tamsulosin (FLOMAX) capsule 0.4 mg  0.4 mg Oral Daily         Current Outpatient Medications Ordered in Epic   Medication     aspirin 81 MG EC tablet     atorvastatin (LIPITOR) 20 MG tablet     clopidogrel (PLAVIX) 75 MG tablet     furosemide (LASIX) 20 MG tablet     ibuprofen (ADVIL/MOTRIN) 200 MG tablet     lisinopril (ZESTRIL) 5 MG tablet     metoprolol succinate ER (TOPROL-XL) 50 MG 24 hr tablet     multivitamin, therapeutic with minerals (THERA-VIT-M) TABS     nitroGLYcerin (NITROSTAT) 0.4 MG sublingual tablet     spironolactone (ALDACTONE) 25 MG tablet     tamsulosin (FLOMAX) 0.4 MG capsule             Review of Systems:   A complete review  of systems was performed and was negative except as mentioned in the HPI.          Physical Exam:   Vital signs were personally reviewed:  Temperatures:  Current - Temp: 97.4  F (36.3  C); Max - Temp  Av.4  F (36.3  C)  Min: 97.4  F (36.3  C)  Max: 97.4  F (36.3  C)  Respiration range: Resp  Av  Min: 16  Max: 16  Pulse range: Pulse  Av.8  Min: 87  Max: 112  Blood pressure range: Systolic (24hrs), Av , Min:123 , Max:153   ; Diastolic (24hrs), Av, Min:73, Max:101    Pulse oximetry range: SpO2  Av.7 %  Min: 83 %  Max: 100 %  No intake or output data in the 24 hours ending 21 1230  0 lbs 0 oz  There is no height or weight on file to calculate BMI.   There is no height or weight on file to calculate BSA.    Constitutional: appears stated age, dyspneic with oxymask in place, dyspneic   Head: normocephalic, atraumatic  Neck: supple, trachea midline, no bruit bilaterally   Pulmonary: diminished bilaterally, mild expiratory wheezing   Cardiovascular: tachycardic, regular rhythm, 1/6 DEVI, no lower extremity edema  Gastrointestinal: no guarding, non-rigid   Neurologic: awake, alert, moves all extremities  Skin: no jaundice, warm on limited exam  Psychiatric: affect is normal, answers questions appropriately, oriented to self and place         Laboratory tests:   Laboratory tests personally reviewed:   CMP  Recent Labs   Lab 21  0649      POTASSIUM 3.7   CHLORIDE 110*   CO2 27   ANIONGAP 4   *   BUN 23   CR 1.05   GFRESTIMATED 63   FLORES 8.6     CBC  Recent Labs   Lab 21  1230 21  0649   WBC 12.2* 10.1   RBC 4.44 4.22*   HGB 14.5 13.4   HCT 44.2 42.7    101*   MCH 32.7 31.8   MCHC 32.8 31.4*   RDW 12.4 12.5    206     INRNo lab results found in last 7 days.  Lab Results   Component Value Date    TROPI 0.082 (H) 2020    TROPI 0.104 (H) 2020    TROPI 0.093 (H) 2020    TROPONIN 0.09 (H) 2020    TROPONIN 0.01 2020    TROPONIN  "0.01 2014     Recent Labs   Lab Test 20  1641 19  1412   CHOL 89 109   HDL 34* 30*   LDL 48 43   TRIG 36 179*     No results found for: A1C  TSH   Date Value Ref Range Status   05/15/2018 2.67 0.40 - 4.00 mU/L Final            Imaging and Additional Data:   Additional data personally reviewed:  Recent Results (from the past 4320 hour(s))   Echo Limited   Result Value    LVEF  20-25%    [facility]    [id number]  [id number]  [id number]  [id number]^ANTONIETTA^TAYA^S     [facility]  Echocardiography Laboratory  [address] [place] [address]     Name: JAYDE RAMOS  MRN: [id number]  : 1933  Study Date: 2021 10:39 AM  Age: 88 yrs  Gender: Male  Patient Location: [facility]  Reason For Study: CHF  Ordering Physician: TAYA MANE  Performed By: Wyatt Lindsey RDCS     BSA: 1.7 m2  Height: 73 in  Weight: 120 lb  HR: 105  BP: 151/84 mmHg  ______________________________________________________________________________  Procedure  Limited Portable Echo Adult.  ______________________________________________________________________________  Interpretation Summary     The left ventricle is severely dilated.  The visual ejection fraction is 20-25%.  There is severe global hypokinesia of the left ventricle.  Septal motion is consistent with conduction abnormality.  Mod to severe LV dilatation. Abnormal sequence of activation due to IVCD. In  addition there is a small part of distal septum with worse wall motion than  the remainder of the globally abnormal wall motion. There is increased  sphericity index and abnormal \"torsion/rotation\" of the contraction sequence.  There may be very mild \"non compaction\" of the apex. No clots seen. Consider  BiV ICD  RV apex may be mildly hypokinetic. Given that a portion of the LV septum also  showed more hypokinesis consider prior distal Lad infarction including RV apex  Diastolic function not assessed due to tachycardia.  Diastolic Doppler " "findings (E/E' ratio and/or other parameters) suggest left  ventricular filling pressures are increased.  The mitral valve leaflets are moderately thickened.  There is moderately severe (3+) mitral regurgitation.  Right ventricular systolic pressure is elevated, consistent with moderate  pulmonary hypertension.  Moderate bilateral pleural effusion  ______________________________________________________________________________  Left Ventricle  The left ventricle is severely dilated. The visual ejection fraction is 20-  25%. Diastolic function not assessed due to tachycardia. Diastolic Doppler  findings (E/E' ratio and/or other parameters) suggest left ventricular filling  pressures are increased. There is severe global hypokinesia of the left  ventricle. Septal motion is consistent with conduction abnormality. There is  septal akinesis. Mod to severe LV dilatation. Abnormal sequence of activation  due to IVCD. In addition there is a small part of distal septum with worse  wall motion than the remainder of the globally abnormal wall motion. There is  increased sphericity index and abnormal \"torsion/rotation\" of the contraction  sequence. There may be very mild \"non compaction\" of the apex. No clots seen.  Consider BiV ICD. No evidence of left ventricular mass or tumors.     Right Ventricle  The right ventricle is normal in size and function. RV apex may be mildly  hypokinetic. Given that a portion of the LV septum also showed more  hypokinesis consider prior distal Lad infarction including RV apex.     Atria  The left atrium is moderately dilated. Right atrial size is normal.     Mitral Valve  The mitral valve leaflets are moderately thickened. There is moderately severe  (3+) mitral regurgitation. There is no mitral valve stenosis.     Tricuspid Valve  The right ventricular systolic pressure is approximated at 42.5 mmHg plus the  right atrial pressure. IVC diameter and respiratory changes fall into an  intermediate " range suggesting an RA pressure of 8 mmHg. Right ventricular  systolic pressure is elevated, consistent with moderate pulmonary  hypertension.     Aortic Valve  The aortic valve is trileaflet with aortic valve sclerosis. There is mild (1+)  aortic regurgitation. No hemodynamically significant valvular aortic stenosis.     Pulmonic Valve  The pulmonic valve is not well seen, but is grossly normal.     Vessels  The aortic root is normal size.     Pericardium  The pericardium appears normal. Moderate bilateral pleural effusion.     Rhythm  The rhythm was sinus tachycardia.  ______________________________________________________________________________  Doppler Measurements & Calculations  MV E max anthony: 161.0 cm/sec  MV dec time: 0.10 sec  TR max anthony: 325.8 cm/sec  TR max P.5 mmHg  E/E' av.1  Lateral E/e': 10.0  Medial E/e': 18.3     ______________________________________________________________________________  Report approved by: Nam Moise 2021 12:21 PM            Clinically Significant Risk Factors Present on Admission             # Platelet Defect: home medication list includes an antiplatelet medication     Cardiovascular: End Stage Heart Failure    Fluid & Electrolyte Disorders: Fluid overload, unspecified  Pulmonology: Pulmonary Heart Disease (Pulmonary hypertension or Cor pulmonale): Pulmonary hypertension, unspecified    Systemic: Chronic Fatigue and Other Debilities: Chronic fatigue, unspecified

## 2021-12-09 NOTE — ED NOTES
.  [facility]  ED Nurse Handoff Report    Deshawn Burrows is a 88 year old male   ED Chief complaint: Shortness of Breath  . ED Diagnosis:   Final diagnoses:   Acute on chronic congestive heart failure, unspecified heart failure type (H)   Hypoxia   Pleural effusion     Allergies:   Allergies   Allergen Reactions     Penicillins        Code Status: Full Code  Activity level - Baseline/Home:  Independent. Activity Level - Current:   Stand by Assist. Lift room needed: No. Bariatric: No   Needed: No   Isolation: No. Infection: Not Applicable.     Vital Signs:   Vitals:    12/09/21 1600 12/09/21 1715 12/09/21 1730 12/09/21 1745   BP: 119/67 99/43 114/68    Pulse: 85 78 74 72   Resp:       Temp:       TempSrc:       SpO2: 94% 95% 96% 95%       Cardiac Rhythm:  ,      Pain level:    Patient confused: intermittent; has been increasing over past yeat. Patient Falls Risk: Yes.   Elimination Status: Has voided numerous times with urinal; rcvd lasix.  Has purewick in place  Patient Report - Initial Complaint: a 88 year old male with history of CAD, systolic heart failure, hypertension, hyperlipidemia, MI who presents with shortness of breath. Patient received his Covid booster shot yesterday and started feeling short of breath last night. He woke up this morning with continued shortness of breath. Initials saturations for EMS were high 70's. Denies chest pain. No fever or vomiting. No body aches. No bowel or urinary changes. No p.o. intake change.    . Focused Assessment: Respiratory - Respiratory WDL: rhythm/pattern  Rhythm/Pattern, Respiratory: tachypnea  Breath Sounds: All Fields   Head To Toe Assessment - All Lung Fields Breath Sounds: Anterior:; Posterior:; wheezes, inspiratory; wheezes, expiratory; crackles, fine (R lung)   Tests Performed: EKG, labs, CT chest . Abnormal Results: .  Labs Ordered and Resulted from Time of ED Arrival to Time of ED Departure   BLOOD GAS VENOUS WITH OXYHEMOGLOBIN  - Abnormal       Result Value    pH Venous 7.38      pCO2 Venous 46      pO2 Venous 22 (*)     Bicarbonate Venous 28      FIO2 0      Oxyhemoglobin Venous 26 (*)     Base Excess/Deficit (+/-) 1.8     BASIC METABOLIC PANEL - Abnormal    Sodium 141      Potassium 3.7      Chloride 110 (*)     Carbon Dioxide (CO2) 27      Anion Gap 4      Urea Nitrogen 23      Creatinine 1.05      Calcium 8.6      Glucose 119 (*)     GFR Estimate 63     CBC WITH PLATELETS AND DIFFERENTIAL - Abnormal    WBC Count 10.1      RBC Count 4.22 (*)     Hemoglobin 13.4      Hematocrit 42.7       (*)     MCH 31.8      MCHC 31.4 (*)     RDW 12.5      Platelet Count 206      % Neutrophils 80      % Lymphocytes 10      % Monocytes 9      % Eosinophils 1      % Basophils 0      % Immature Granulocytes 0      NRBCs per 100 WBC 0      Absolute Neutrophils 8.1      Absolute Lymphocytes 1.0      Absolute Monocytes 0.9      Absolute Eosinophils 0.1      Absolute Basophils 0.0      Absolute Immature Granulocytes 0.0      Absolute NRBCs 0.0     NT PROBNP INPATIENT - Abnormal    N terminal Pro BNP Inpatient 5,680 (*)    CBC WITH PLATELETS AND DIFFERENTIAL - Abnormal    WBC Count 12.2 (*)     RBC Count 4.44      Hemoglobin 14.5      Hematocrit 44.2            MCH 32.7      MCHC 32.8      RDW 12.4      Platelet Count 209      % Neutrophils 84      % Lymphocytes 7      % Monocytes 9      % Eosinophils 0      % Basophils 0      % Immature Granulocytes 0      NRBCs per 100 WBC 0      Absolute Neutrophils 10.2 (*)     Absolute Lymphocytes 0.9      Absolute Monocytes 1.0      Absolute Eosinophils 0.0      Absolute Basophils 0.0      Absolute Immature Granulocytes 0.1      Absolute NRBCs 0.0     TROPONIN I - Normal    Troponin I High Sensitivity 29     INFLUENZA A/B & SARS-COV2 PCR MULTIPLEX - Normal    Influenza A PCR Negative      Influenza B PCR Negative      SARS CoV2 PCR Negative     TROPONIN I - Normal    Troponin I High Sensitivity 50      MAGNESIUM - Normal    Magnesium 2.1     PROCALCITONIN   TROPONIN I     .  Echo Limited   Final Result      CT Chest Pulmonary Embolism w Contrast   Final Result   IMPRESSION:   1.  Bilateral patchy groundglass opacities, interstitial prominence   and peribronchial wall thickening. Bilateral moderate pleural fluid.   Findings could relate to pulmonary edema and CHF. Underlying atypical   pneumonia is also a possibility.   2.  No evidence for pulmonary embolism.   3.  Coronary artery calcifications.      ANGELY BARRIOS MD            SYSTEM ID:  [id number]         Treatments provided: see ED meds below  Family Comments: thierno Mayberry, is contact at [phone]  OBS brochure/video discussed/provided to patient:  N/A  ED Medications:   Medications   lidocaine 1 % 0.1-1 mL (has no administration in time range)   lidocaine (LMX4) cream (has no administration in time range)   sodium chloride (PF) 0.9% PF flush 3 mL (has no administration in time range)   sodium chloride (PF) 0.9% PF flush 3 mL (has no administration in time range)   acetaminophen (TYLENOL) tablet 650 mg (has no administration in time range)     Or   acetaminophen (TYLENOL) Suppository 650 mg (has no administration in time range)   senna-docusate (SENOKOT-S/PERICOLACE) 8.6-50 MG per tablet 1 tablet (has no administration in time range)     Or   senna-docusate (SENOKOT-S/PERICOLACE) 8.6-50 MG per tablet 2 tablet (has no administration in time range)   ondansetron (ZOFRAN-ODT) ODT tab 4 mg (has no administration in time range)     Or   ondansetron (ZOFRAN) injection 4 mg (has no administration in time range)   famotidine (PEPCID) tablet 20 mg (has no administration in time range)   aspirin EC tablet 81 mg (has no administration in time range)   atorvastatin (LIPITOR) tablet 20 mg (has no administration in time range)   clopidogrel (PLAVIX) tablet 75 mg (has no administration in time range)   lisinopril (ZESTRIL) tablet 5 mg (has no administration in time  range)   metoprolol succinate ER (TOPROL-XL) 24 hr tablet 50 mg (has no administration in time range)   spironolactone (ALDACTONE) half-tab 12.5 mg (has no administration in time range)   tamsulosin (FLOMAX) capsule 0.4 mg (has no administration in time range)   furosemide (LASIX) injection 40 mg (40 mg Intravenous Given 12/9/21 1710)   ipratropium - albuterol 0.5 mg/2.5 mg/3 mL (DUONEB) neb solution 3 mL (has no administration in time range)   albuterol (PROVENTIL) neb solution 2.5 mg (has no administration in time range)   sodium chloride 0.9 % bag 500mL for CT scan flush use (84 mLs Intravenous Given 12/9/21 0746)   iopamidol (ISOVUE-370) solution 500 mL (62 mLs Intravenous Given 12/9/21 0746)   furosemide (LASIX) injection 40 mg (40 mg Intravenous Given 12/9/21 0927)     Drips infusing:  No  For the majority of the shift, the patient's behavior Green. Interventions performed were NA.    Sepsis treatment initiated: No     Patient tested for COVID 19 prior to admission: YES    ED Nurse Name/Phone Number: May Grimes RN,   11:42 AM    RECEIVING UNIT ED HANDOFF REVIEW    Above ED Nurse Handoff Report was reviewed: Yes  Reviewed by: Catrachita Aiken RN on December 10, 2021 at 12:16 PM   Did you vocera the ED RN: Yes

## 2021-12-09 NOTE — ED TRIAGE NOTES
Pt aox4, ABCs intact. Pt arrives via EMS for evaluation of SOB. Pt states that he started to feel SOB last night. Initial oxygen saturations for EMS were high 70%s. Pt got booster shot yesterday.

## 2021-12-09 NOTE — ED NOTES
Spoke with Jefe pa, at [phone] with update of admission along with diagnosis and plan for lasix at this time.

## 2021-12-10 ENCOUNTER — APPOINTMENT (OUTPATIENT)
Dept: GENERAL RADIOLOGY | Facility: CLINIC | Age: 86
DRG: 280 | End: 2021-12-10
Attending: PHYSICIAN ASSISTANT
Payer: MEDICARE

## 2021-12-10 LAB
ANION GAP SERPL CALCULATED.3IONS-SCNC: 6 MMOL/L (ref 3–14)
BUN SERPL-MCNC: 28 MG/DL (ref 7–30)
CALCIUM SERPL-MCNC: 9.3 MG/DL (ref 8.5–10.1)
CHLORIDE BLD-SCNC: 105 MMOL/L (ref 94–109)
CO2 SERPL-SCNC: 28 MMOL/L (ref 20–32)
CREAT SERPL-MCNC: 1.43 MG/DL (ref 0.66–1.25)
ERYTHROCYTE [DISTWIDTH] IN BLOOD BY AUTOMATED COUNT: 12.6 % (ref 10–15)
GFR SERPL CREATININE-BSD FRML MDRD: 43 ML/MIN/1.73M2
GLUCOSE BLD-MCNC: 153 MG/DL (ref 70–99)
HCT VFR BLD AUTO: 42.9 % (ref 40–53)
HGB BLD-MCNC: 13.7 G/DL (ref 13.3–17.7)
MAGNESIUM SERPL-MCNC: 2.3 MG/DL (ref 1.6–2.3)
MCH RBC QN AUTO: 32.3 PG (ref 26.5–33)
MCHC RBC AUTO-ENTMCNC: 31.9 G/DL (ref 31.5–36.5)
MCV RBC AUTO: 101 FL (ref 78–100)
PLATELET # BLD AUTO: 195 10E3/UL (ref 150–450)
POTASSIUM BLD-SCNC: 3.8 MMOL/L (ref 3.4–5.3)
RBC # BLD AUTO: 4.24 10E6/UL (ref 4.4–5.9)
SODIUM SERPL-SCNC: 139 MMOL/L (ref 133–144)
WBC # BLD AUTO: 8.2 10E3/UL (ref 4–11)

## 2021-12-10 PROCEDURE — 120N000001 HC R&B MED SURG/OB

## 2021-12-10 PROCEDURE — 85027 COMPLETE CBC AUTOMATED: CPT | Performed by: PHYSICIAN ASSISTANT

## 2021-12-10 PROCEDURE — 36415 COLL VENOUS BLD VENIPUNCTURE: CPT | Performed by: PHYSICIAN ASSISTANT

## 2021-12-10 PROCEDURE — 71046 X-RAY EXAM CHEST 2 VIEWS: CPT

## 2021-12-10 PROCEDURE — 250N000009 HC RX 250: Performed by: PHYSICIAN ASSISTANT

## 2021-12-10 PROCEDURE — 83735 ASSAY OF MAGNESIUM: CPT | Performed by: INTERNAL MEDICINE

## 2021-12-10 PROCEDURE — 250N000013 HC RX MED GY IP 250 OP 250 PS 637: Performed by: PHYSICIAN ASSISTANT

## 2021-12-10 PROCEDURE — 93005 ELECTROCARDIOGRAM TRACING: CPT

## 2021-12-10 PROCEDURE — 99233 SBSQ HOSP IP/OBS HIGH 50: CPT | Performed by: INTERNAL MEDICINE

## 2021-12-10 PROCEDURE — 99232 SBSQ HOSP IP/OBS MODERATE 35: CPT | Performed by: INTERNAL MEDICINE

## 2021-12-10 PROCEDURE — 258N000003 HC RX IP 258 OP 636: Performed by: INTERNAL MEDICINE

## 2021-12-10 PROCEDURE — 80048 BASIC METABOLIC PNL TOTAL CA: CPT | Performed by: PHYSICIAN ASSISTANT

## 2021-12-10 RX ORDER — FUROSEMIDE 20 MG/1
10 TABLET ORAL DAILY
Status: DISCONTINUED | OUTPATIENT
Start: 2021-12-11 | End: 2021-12-11 | Stop reason: HOSPADM

## 2021-12-10 RX ORDER — FUROSEMIDE 20 MG
20 TABLET ORAL DAILY
Status: DISCONTINUED | OUTPATIENT
Start: 2021-12-11 | End: 2021-12-10

## 2021-12-10 RX ADMIN — ATORVASTATIN CALCIUM 20 MG: 20 TABLET, FILM COATED ORAL at 09:46

## 2021-12-10 RX ADMIN — IPRATROPIUM BROMIDE AND ALBUTEROL SULFATE 3 ML: 2.5; .5 SOLUTION RESPIRATORY (INHALATION) at 20:53

## 2021-12-10 RX ADMIN — FAMOTIDINE 20 MG: 20 TABLET ORAL at 20:53

## 2021-12-10 RX ADMIN — FAMOTIDINE 20 MG: 20 TABLET ORAL at 09:45

## 2021-12-10 RX ADMIN — TAMSULOSIN HYDROCHLORIDE 0.4 MG: 0.4 CAPSULE ORAL at 09:45

## 2021-12-10 RX ADMIN — SPIRONOLACTONE 12.5 MG: 25 TABLET ORAL at 09:45

## 2021-12-10 RX ADMIN — SODIUM CHLORIDE 500 ML: 9 INJECTION, SOLUTION INTRAVENOUS at 15:06

## 2021-12-10 RX ADMIN — IPRATROPIUM BROMIDE AND ALBUTEROL SULFATE 3 ML: 2.5; .5 SOLUTION RESPIRATORY (INHALATION) at 09:58

## 2021-12-10 RX ADMIN — CLOPIDOGREL BISULFATE 75 MG: 75 TABLET ORAL at 09:45

## 2021-12-10 RX ADMIN — ASPIRIN 81 MG: 81 TABLET, COATED ORAL at 09:46

## 2021-12-10 ASSESSMENT — ACTIVITIES OF DAILY LIVING (ADL)
ADLS_ACUITY_SCORE: 14
ADLS_ACUITY_SCORE: 7
ADLS_ACUITY_SCORE: 12
ADLS_ACUITY_SCORE: 7
ADLS_ACUITY_SCORE: 12
ADLS_ACUITY_SCORE: 12
ADLS_ACUITY_SCORE: 14

## 2021-12-10 ASSESSMENT — MIFFLIN-ST. JEOR: SCORE: 1338.5

## 2021-12-10 NOTE — PROGRESS NOTES
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 In the emergency department the patient was found to have a SPO2 of 83% on room air and was subsequently placed on 6 L oxygen mask, heart rate 90-1 100s. Lab work revealed a proBNP of 5680. High-sensitivity troponin was initially negative, however subsequently increased to 89. Cardiology was consulted and recommended continued IV diuresis, and follow-up with electrophysiology for consideration of CRT-D therapy. The patient had an updated echocardiogram showing an ejection fraction of 20 to 25%, severe global hypokinesia of the left ventricle, moderate to severe left ventricular dilation, very mild noncompaction of the apex, mildly hypokinetic right ventricular apex, moderately severe 3+ mitral regurgitation, moderate pulmonary hypertension, moderate bilateral pleural effusions. The patient had a repeat chest x-ray the following day that showed improvement in the bilateral pleural effusions.    TODAY'S PLAN:  ?AICD - defer to Cardiology.  Would suspect this to be done as an outpatient with EP.  Pt has been lost to follow up previously so would hope to have a plan in place prior to discharge home.  Will monitor pt overnight given the abnormal echo findings above and appreciate Cardiology recommendations.  Updated thierno Mayberry via phone.  He enquired about home care.  Social Work consulted for assistance.  Plan to discharge home tomorrow after Cardiology input.     Problem List:   Acute Hypoxic Respiratory Failure  Acute Exacerbation of HFrEF - EF  20-25%  Bilateral Pleural Effusion  - Improving  - Start lasix 20 mg PO daily  - Continue spironolactone, plavix, ASA, atorvastatin, metoprolol, lisinopril  - Daily Weights, Strict I/O  - Appreciate Cardiology recommendations - ?AICD   - Updated echo on 12/9 showed ejection fraction of 20 to 25%, severe global hypokinesia of the left ventricle, moderate to severe left ventricular dilation, very mild noncompaction of the apex, mildly hypokinetic right ventricular apex, moderately severe 3+ mitral regurgitation, moderate pulmonary hypertension, moderate bilateral pleural effusions  - Discussed with pt the importance of following up with Cardiology    Moderate-to-Severe Mitral Regurgitation  - Appreciate Cardiology recommendations    NSTEMI - Type 1 vs Type 2  - Continue ASA and Plavix  - Repeat troponin in AM  - Cardiology recommendations appreciated    Hypertension  - Continue metoprolol, lisinopril, and spironolactone    Hyperlipidemia  - Continue atorvastatin    BPH  - Continue flomax    Underweight  - BMI 16.95    Prolonged QTc  - Avoid QT prolonging medications  - QTc = 513    DVT Prophylaxis: Pneumatic Compression Devices  Code Status: Full Code  Diet: Fluid restriction 2000 ML FLUID  Combination Diet Low Saturated Fat Na <2400mg Diet, No Caffeine Diet    Faustin Catheter: Not present  Disposition: Expected discharge tomorrow to home with home care. Goals prior to discharge include cardiology work up and evaluation complete.   Family updated today: Yes      Interval History   Pt seen and examined. Pt denies cp, sob.    -Data reviewed today: I personally reviewed all new labs and imaging results over the last 24 hours.     Physical Exam   Temp: 98.1  F (36.7  C) Temp src: Oral BP: 104/60 Pulse: 94   Resp: 8 SpO2: 97 % O2 Device: None (Room air)    Vitals:    12/10/21 0048   Weight: 59.9 kg (132 lb)     Vital Signs with Ranges  Temp:  [97.7  F (36.5  C)-98.3  F (36.8  C)] 98.1  F (36.7  C)  Pulse:  []  94  Resp:  [8-24] 8  BP: ()/(43-86) 104/60  SpO2:  [91 %-98 %] 97 %  I/O last 3 completed shifts:  In: -   Out: 1350 [Urine:1350]    GENERAL: No apparent distress. Awake, alert, and fully oriented.  HEENT: Normocephalic, atraumatic. Extraocular movements intact.  CARDIOVASCULAR: Regular rate and rhythm without murmurs or rubs. No S3.  PULMONARY: Clear bilaterally.  GASTROINTESTINAL: Soft, non-tender, non-distended. Bowel sounds normoactive.   EXTREMITIES: No cyanosis or clubbing. No edema.  NEUROLOGICAL: CN 2-12 grossly intact, no focal neurological deficits.  DERMATOLOGICAL: No rash, ulcer, bruising, nor jaundice.    Medications       aspirin  81 mg Oral Daily     atorvastatin  20 mg Oral Daily     clopidogrel  75 mg Oral Daily     famotidine  20 mg Oral BID     ipratropium - albuterol 0.5 mg/2.5 mg/3 mL  3 mL Nebulization 4x daily     lisinopril  5 mg Oral Daily     metoprolol succinate ER  50 mg Oral Daily     sodium chloride (PF)  3 mL Intracatheter Q8H     spironolactone  12.5 mg Oral Daily     tamsulosin  0.4 mg Oral Daily     Data     Laboratory:  Recent Labs   Lab 12/10/21  0934 12/09/21  1230 12/09/21  0649   WBC 8.2 12.2* 10.1   HGB 13.7 14.5 13.4   HCT 42.9 44.2 42.7   * 100 101*    209 206     Recent Labs   Lab 12/10/21  0934 12/09/21  0649    141   POTASSIUM 3.8 3.7   CHLORIDE 105 110*   CO2 28 27   ANIONGAP 6 4   * 119*   BUN 28 23   CR 1.43* 1.05   GFRESTIMATED 43* 63   FLORES 9.3 8.6     No results for input(s): TROPONIN, TROPI, TROPR in the last 168 hours.    Invalid input(s): TROP, TROPONINIES  No results for input(s): CULT in the last 168 hours.    Imaging:  Recent Results (from the past 24 hour(s))   XR Chest 2 Views    Narrative    EXAM: XR CHEST 2 VW  LOCATION: [facility]  DATE/TIME: 12/10/2021 6:15 AM    INDICATION: Pleural effusions.  COMPARISON: 12/9/2021.      Impression    IMPRESSION: Small bilateral pleural effusions appear decreased  compared to recent CT scan. Heart size and pulmonary vascularity within normal limits. No airspace consolidation or pneumothorax.                      Kamaljit Guerra DO  [facility] Hospitalist  [address] [place] [address]  12/10/2021

## 2021-12-10 NOTE — UTILIZATION REVIEW
[facility] Utilization Review  Admission Status; Secondary Review Determination     Admission Date: 12/9/2021  6:40 AM      Under the authority of the Utilization Management Committee, the utilization review process indicated a secondary review on the above patient.  The review outcome is based on review of the medical records, discussions with staff, and applying clinical experience noted on the date of the review.        (X)      Inpatient Status Appropriate - This patient's medical care is consistent with medical management for inpatient care and reasonable inpatient medical practice.          RATIONALE FOR DETERMINATION   Deshawn Burrows is a 88 year old male with past medical history of hypertension, hyperlipidemia, CAD status post PCI, HFrEF, who presented with shortness of breath with hypoxic respiratory failure, oxygen saturation 83% on room air requiring 6 L supplemental oxygen.  Initial evaluation revealing for acute on chronic pulmonary edema, elevated proBNP and subsequent increased troponin levels.  He is also noted to have significantly low LVEF 20% (change from prior).  He is diuresed with IV Lasix with improvement in clinical symptoms, EP consulted and possibly outpatient AICD.  Plan to continue diuresis, close monitoring of volume status and likely discharge to home in 1 to 2 days.  Cares are rendered complex due to multiple comorbidities, change in LVEF % with significant reduction in systolic function.  Anticipated length of stay is more than 2 midnights and patient is appropriately admitted as inpatient.      The definitions of Inpatient Status and Observation Status used in making the determination above are those provided in the CMS Coverage Manual, Chapter 1 and Chapter 6, section 70.4.      Sincerely,       Cirilo Hameed MD, MS  Physician Advisor  Utilization Review-[facility]    Phone: [phone]

## 2021-12-10 NOTE — PROGRESS NOTES
Pt up with assist, pt oriented but confused at times, pt got out of bed independently multiple times, pt denies Sob, pt remains on RA, no acute changes, pt transferred to inpatient unit, report given to bedside RN

## 2021-12-10 NOTE — PLAN OF CARE
"[id number]    VS BP 98/61 (BP Location: Right arm)   Pulse 74   Temp 97.7  F (36.5  C) (Oral)   Resp 17   Ht 1.88 m (6' 2\")   Wt 59.9 kg (132 lb)   SpO2 96%   BMI 16.95 kg/m    Lung sounds diminished, improved to clear with nebulizer  O2 room air  Periods of apnea over night while sleeping  Bowel sounds active  Tolerating cardiac diet  IVF saline locked  Tests CXR completed this AM  CMS intact  Drains external catheter in place  Activity up with stand by assist  Pain denies  Discharge plan pending echo results in AM    "

## 2021-12-10 NOTE — ED NOTES
Updated Jefe pa, regarding move to room 34.  Also updated on additional lasix given along with patient has been off O2 since 1230 satting mid-upper 90%.

## 2021-12-11 VITALS
WEIGHT: 131.5 LBS | HEIGHT: 74 IN | SYSTOLIC BLOOD PRESSURE: 122 MMHG | DIASTOLIC BLOOD PRESSURE: 69 MMHG | HEART RATE: 97 BPM | OXYGEN SATURATION: 97 % | TEMPERATURE: 97.5 F | RESPIRATION RATE: 18 BRPM | BODY MASS INDEX: 16.88 KG/M2

## 2021-12-11 LAB
ANION GAP SERPL CALCULATED.3IONS-SCNC: 6 MMOL/L (ref 3–14)
BUN SERPL-MCNC: 33 MG/DL (ref 7–30)
CALCIUM SERPL-MCNC: 8.6 MG/DL (ref 8.5–10.1)
CHLORIDE BLD-SCNC: 111 MMOL/L (ref 94–109)
CO2 SERPL-SCNC: 25 MMOL/L (ref 20–32)
CREAT SERPL-MCNC: 1.18 MG/DL (ref 0.66–1.25)
GFR SERPL CREATININE-BSD FRML MDRD: 55 ML/MIN/1.73M2
GLUCOSE BLD-MCNC: 93 MG/DL (ref 70–99)
MAGNESIUM SERPL-MCNC: 2.2 MG/DL (ref 1.6–2.3)
POTASSIUM BLD-SCNC: 3.8 MMOL/L (ref 3.4–5.3)
SODIUM SERPL-SCNC: 142 MMOL/L (ref 133–144)

## 2021-12-11 PROCEDURE — 94640 AIRWAY INHALATION TREATMENT: CPT

## 2021-12-11 PROCEDURE — 99239 HOSP IP/OBS DSCHRG MGMT >30: CPT | Performed by: INTERNAL MEDICINE

## 2021-12-11 PROCEDURE — 250N000013 HC RX MED GY IP 250 OP 250 PS 637: Performed by: INTERNAL MEDICINE

## 2021-12-11 PROCEDURE — 36415 COLL VENOUS BLD VENIPUNCTURE: CPT | Performed by: INTERNAL MEDICINE

## 2021-12-11 PROCEDURE — 250N000013 HC RX MED GY IP 250 OP 250 PS 637: Performed by: PHYSICIAN ASSISTANT

## 2021-12-11 PROCEDURE — 83735 ASSAY OF MAGNESIUM: CPT | Performed by: INTERNAL MEDICINE

## 2021-12-11 PROCEDURE — 250N000009 HC RX 250: Performed by: PHYSICIAN ASSISTANT

## 2021-12-11 PROCEDURE — 999N000157 HC STATISTIC RCP TIME EA 10 MIN

## 2021-12-11 PROCEDURE — 258N000003 HC RX IP 258 OP 636: Performed by: INTERNAL MEDICINE

## 2021-12-11 PROCEDURE — 80048 BASIC METABOLIC PNL TOTAL CA: CPT | Performed by: INTERNAL MEDICINE

## 2021-12-11 RX ORDER — POTASSIUM CHLORIDE 1500 MG/1
20 TABLET, EXTENDED RELEASE ORAL ONCE
Status: COMPLETED | OUTPATIENT
Start: 2021-12-11 | End: 2021-12-11

## 2021-12-11 RX ADMIN — POTASSIUM CHLORIDE 20 MEQ: 1500 TABLET, EXTENDED RELEASE ORAL at 10:04

## 2021-12-11 RX ADMIN — FAMOTIDINE 20 MG: 20 TABLET ORAL at 07:58

## 2021-12-11 RX ADMIN — IPRATROPIUM BROMIDE AND ALBUTEROL SULFATE 3 ML: 2.5; .5 SOLUTION RESPIRATORY (INHALATION) at 09:33

## 2021-12-11 RX ADMIN — ATORVASTATIN CALCIUM 20 MG: 20 TABLET, FILM COATED ORAL at 08:00

## 2021-12-11 RX ADMIN — SODIUM CHLORIDE 500 ML: 9 INJECTION, SOLUTION INTRAVENOUS at 01:16

## 2021-12-11 RX ADMIN — CLOPIDOGREL BISULFATE 75 MG: 75 TABLET ORAL at 08:00

## 2021-12-11 RX ADMIN — TAMSULOSIN HYDROCHLORIDE 0.4 MG: 0.4 CAPSULE ORAL at 07:58

## 2021-12-11 RX ADMIN — METOPROLOL SUCCINATE 50 MG: 50 TABLET, EXTENDED RELEASE ORAL at 08:01

## 2021-12-11 RX ADMIN — LISINOPRIL 5 MG: 5 TABLET ORAL at 07:58

## 2021-12-11 RX ADMIN — FUROSEMIDE 10 MG: 20 TABLET ORAL at 08:01

## 2021-12-11 RX ADMIN — SPIRONOLACTONE 12.5 MG: 25 TABLET ORAL at 08:01

## 2021-12-11 RX ADMIN — ASPIRIN 81 MG: 81 TABLET, COATED ORAL at 08:01

## 2021-12-11 ASSESSMENT — ACTIVITIES OF DAILY LIVING (ADL)
ADLS_ACUITY_SCORE: 7

## 2021-12-11 ASSESSMENT — MIFFLIN-ST. JEOR: SCORE: 1336.23

## 2021-12-11 NOTE — PROGRESS NOTES
Inpatient Cardiology Consultation Progress Note:    Deshawn Burrows MRN#: [id number]   YOB: 1933 Age: 88 year old     Date of Admission: 12/9/2021  Consult indication: dyspnea         Assessment and Plan:     # Acute hypoxic respiratory failure.  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  Weight is 136 pounds, previously in clinic 4/2021 weight was 141 pounds.  Initially suspected decompensated HF, however creatinine increased significantly with diuresis. Breathing seemed to be better with duonebs.   TTE 12/9/2021 LVEF 20-25%, LVEF 24% on TTE 02/2020.  He has known coronary artery disease; however, it was thought that the burden of coronary artery disease was out of proportion to the degree of LV dysfunction.    # SARAY on CKD  # Mod-severe MR. Has known MR, now likely worse due to fluid overload.   # Chronic LBBB  # Coronary artery disease with remote history of PCI, status post repeat coronary angiography 02/2020 with ROSENDO to mid LAD in-stent restenosis.   # Hypertension, well controlled, previously has had issues with hypotension, blood pressure 10/2020 was 92/50 mmHg.   # Dyslipidemia.     -Agree with continuing home cardiac regimen of aspirin, statin, clopidogrel, lisinopril, metoprolol succinate, spironolactone  -Will transition back to home furosemide 10mg daily   -Consider treatment for COPD/emphysema outpatient given favorable response  -Maintain potassium greater than 4, mag greater than 2  -Patient had been lost to follow-up in clinic for unclear reasons, though now he is amenable to following up, will need assessment by EP for consideration of CRT-D therapy  -Cardiology follow up ordered, will plan on repeat TTE in  will sign off    Thank you for allowing our team to participate in the care of Deshawn Burrows.  Please do not hesitate to page me with any questions or concerns.     Kwame Blanton MD, [facility]   Cardiology  December 10, 2021    Voice recognition software utilized.          Interval Events:     - net neg 1.3L, creatinine incr to 1.4  - breathing improved with duonebs  - no chest pain, feels back to baseline         Medications reviewed:     Current medications:  Current Facility-Administered Medications Ordered in Epic   Medication Dose Route Frequency Last Rate Last Admin     0.9% sodium chloride BOLUS  500 mL Intravenous Once 50 mL/hr at 12/10/21 1506 500 mL at 12/10/21 1506     acetaminophen (TYLENOL) tablet 650 mg  650 mg Oral Q6H PRN        Or     acetaminophen (TYLENOL) Suppository 650 mg  650 mg Rectal Q6H PRN         albuterol (PROVENTIL) neb solution 2.5 mg  2.5 mg Nebulization Q2H PRN         aspirin EC tablet 81 mg  81 mg Oral Daily   81 mg at 12/10/21 0946     atorvastatin (LIPITOR) tablet 20 mg  20 mg Oral Daily   20 mg at 12/10/21 0946     clopidogrel (PLAVIX) tablet 75 mg  75 mg Oral Daily   75 mg at 12/10/21 0945     famotidine (PEPCID) tablet 20 mg  20 mg Oral BID   20 mg at 12/10/21 0945     [START ON 12/11/2021] furosemide (LASIX) half-tab 10 mg  10 mg Oral Daily         ipratropium - albuterol 0.5 mg/2.5 mg/3 mL (DUONEB) neb solution 3 mL  3 mL Nebulization 4x daily   3 mL at 12/10/21 0958     lidocaine (LMX4) cream   Topical Q1H PRN         lidocaine 1 % 0.1-1 mL  0.1-1 mL Other Q1H PRN         lisinopril (ZESTRIL) tablet 5 mg  5 mg Oral Daily         metoprolol succinate ER (TOPROL-XL) 24 hr tablet 50 mg  50 mg Oral Daily         ondansetron (ZOFRAN-ODT) ODT tab 4 mg  4 mg Oral Q6H PRN        Or     ondansetron (ZOFRAN) injection 4 mg  4 mg Intravenous Q6H PRN         senna-docusate (SENOKOT-S/PERICOLACE) 8.6-50 MG per tablet 1 tablet  1 tablet Oral BID PRN        Or     senna-docusate (SENOKOT-S/PERICOLACE) 8.6-50 MG per tablet 2 tablet  2 tablet Oral BID PRN         sodium chloride (PF) 0.9% PF flush 3 mL  3 mL Intracatheter Q8H         sodium chloride (PF) 0.9% PF flush 3 mL  3 mL  Intracatheter q1 min prn         spironolactone (ALDACTONE) half-tab 12.5 mg  12.5 mg Oral Daily   12.5 mg at 12/10/21 0945     tamsulosin (FLOMAX) capsule 0.4 mg  0.4 mg Oral Daily   0.4 mg at 12/10/21 0945     No current [facility]-ordered outpatient medications on file.             Physical Exam:   Vital signs were reviewed:  Temperatures:  Current - Temp: 98.3  F (36.8  C); Max - Temp  Av  F (36.7  C)  Min: 97.7  F (36.5  C)  Max: 98.3  F (36.8  C)  Respiration range: Resp  Av  Min: 8  Max: 24  Pulse range: Pulse  Av.8  Min: 69  Max: 111  Blood pressure range: Systolic (24hrs), Av , Min:97 , Max:127   ; Diastolic (24hrs), Av, Min:54, Max:70    Pulse oximetry range: SpO2  Av.7 %  Min: 91 %  Max: 99 %    Intake/Output Summary (Last 24 hours) at 12/10/2021 1834  Last data filed at 12/10/2021 0552  Gross per 24 hour   Intake --   Output 1350 ml   Net -1350 ml     132 lbs 0 oz  Body mass index is 16.95 kg/m .   Body surface area is 1.77 meters squared.     Constitutional: appears stated age, in no apparent distress, appears to be well nourished  Head: normocephalic, atraumatic  Neck: supple, trachea midline  Pulmonary: clear to auscultation bilaterally  Cardiovascular: JVP normal, regular rate, regular rhythm, 2/6 systolic murmur at the base, no lower extremity edema  Gastrointestinal: no guarding, non-rigid            Selected laboratory tests:   Laboratory test results personally reviewed:   CMP  Recent Labs   Lab 12/10/21  0934 21  1230 21  0649     --  141   POTASSIUM 3.8  --  3.7   CHLORIDE 105  --  110*   CO2 28  --  27   ANIONGAP 6  --  4   *  --  119*   BUN 28  --  23   CR 1.43*  --  1.05   GFRESTIMATED 43*  --  63   FLORES 9.3  --  8.6   MAG 2.3 2.1  --      CBC  Recent Labs   Lab 12/10/21  0934 21  1230 21  0649   WBC 8.2 12.2* 10.1   RBC 4.24* 4.44 4.22*   HGB 13.7 14.5 13.4   HCT 42.9 44.2 42.7   * 100 101*   MCH 32.3 32.7 31.8   MCHC 31.9  "32.8 31.4*   RDW 12.6 12.4 12.5    209 206     INRNo lab results found in last 7 days.  Lab Results   Component Value Date    TROPI 0.082 (H) 2020    TROPI 0.104 (H) 2020    TROPI 0.093 (H) 2020    TROPONIN 0.09 (H) 2020    TROPONIN 0.01 2020    TROPONIN 0.01 2014     Recent Labs   Lab Test 20  1641 19  1412   CHOL 89 109   HDL 34* 30*   LDL 48 43   TRIG 36 179*     No results found for: A1C  TSH   Date Value Ref Range Status   05/15/2018 2.67 0.40 - 4.00 mU/L Final            Selected Imaging and Additional Data:   Additional data personally reviewed:  Recent Results (from the past 4320 hour(s))   Echo Limited   Result Value    LVEF  20-25%    Narrative    [id number]  [id number]  [id number]  [id number]^ANTONIETTA^TAYA^S     [facility]  Echocardiography Laboratory  [address] [place] [address]     Name: JAYDE RAMOS  MRN: [id number]  : 1933  Study Date: 2021 10:39 AM  Age: 88 yrs  Gender: Male  Patient Location: [facility]  Reason For Study: CHF  Ordering Physician: TAYA MANE  Performed By: Wyatt Lindsey RDCS     BSA: 1.7 m2  Height: 73 in  Weight: 120 lb  HR: 105  BP: 151/84 mmHg  ______________________________________________________________________________  Procedure  Limited Portable Echo Adult.  ______________________________________________________________________________  Interpretation Summary     The left ventricle is severely dilated.  The visual ejection fraction is 20-25%.  There is severe global hypokinesia of the left ventricle.  Septal motion is consistent with conduction abnormality.  Mod to severe LV dilatation. Abnormal sequence of activation due to IVCD. In  addition there is a small part of distal septum with worse wall motion than  the remainder of the globally abnormal wall motion. There is increased  sphericity index and abnormal \"torsion/rotation\" of the contraction sequence.  There may be very " "mild \"non compaction\" of the apex. No clots seen. Consider  BiV ICD  RV apex may be mildly hypokinetic. Given that a portion of the LV septum also  showed more hypokinesis consider prior distal Lad infarction including RV apex  Diastolic function not assessed due to tachycardia.  Diastolic Doppler findings (E/E' ratio and/or other parameters) suggest left  ventricular filling pressures are increased.  The mitral valve leaflets are moderately thickened.  There is moderately severe (3+) mitral regurgitation.  Right ventricular systolic pressure is elevated, consistent with moderate  pulmonary hypertension.  Moderate bilateral pleural effusion  ______________________________________________________________________________  Left Ventricle  The left ventricle is severely dilated. The visual ejection fraction is 20-  25%. Diastolic function not assessed due to tachycardia. Diastolic Doppler  findings (E/E' ratio and/or other parameters) suggest left ventricular filling  pressures are increased. There is severe global hypokinesia of the left  ventricle. Septal motion is consistent with conduction abnormality. There is  septal akinesis. Mod to severe LV dilatation. Abnormal sequence of activation  due to IVCD. In addition there is a small part of distal septum with worse  wall motion than the remainder of the globally abnormal wall motion. There is  increased sphericity index and abnormal \"torsion/rotation\" of the contraction  sequence. There may be very mild \"non compaction\" of the apex. No clots seen.  Consider BiV ICD. No evidence of left ventricular mass or tumors.     Right Ventricle  The right ventricle is normal in size and function. RV apex may be mildly  hypokinetic. Given that a portion of the LV septum also showed more  hypokinesis consider prior distal Lad infarction including RV apex.     Atria  The left atrium is moderately dilated. Right atrial size is normal.     Mitral Valve  The mitral valve leaflets are " moderately thickened. There is moderately severe  (3+) mitral regurgitation. There is no mitral valve stenosis.     Tricuspid Valve  The right ventricular systolic pressure is approximated at 42.5 mmHg plus the  right atrial pressure. IVC diameter and respiratory changes fall into an  intermediate range suggesting an RA pressure of 8 mmHg. Right ventricular  systolic pressure is elevated, consistent with moderate pulmonary  hypertension.     Aortic Valve  The aortic valve is trileaflet with aortic valve sclerosis. There is mild (1+)  aortic regurgitation. No hemodynamically significant valvular aortic stenosis.     Pulmonic Valve  The pulmonic valve is not well seen, but is grossly normal.     Vessels  The aortic root is normal size.     Pericardium  The pericardium appears normal. Moderate bilateral pleural effusion.     Rhythm  The rhythm was sinus tachycardia.  ______________________________________________________________________________  Doppler Measurements & Calculations  MV E max anthony: 161.0 cm/sec  MV dec time: 0.10 sec  TR max anthony: 325.8 cm/sec  TR max P.5 mmHg  E/E' av.1  Lateral E/e': 10.0  Medial E/e': 18.3     ______________________________________________________________________________  Report approved by: Nam Moise 2021 12:21 PM

## 2021-12-11 NOTE — PROGRESS NOTES
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 In the emergency department the patient was found to have a SPO2 of 83% on room air and was subsequently placed on 6 L oxygen mask, heart rate 90-1 100s. Lab work revealed a proBNP of 5680. High-sensitivity troponin was initially negative, however subsequently increased to 89. Cardiology was consulted and recommended continued IV diuresis, and follow-up with electrophysiology for consideration of CRT-D therapy. The patient had an updated echocardiogram showing an ejection fraction of 20 to 25%, severe global hypokinesia of the left ventricle, moderate to severe left ventricular dilation, very mild noncompaction of the apex, mildly hypokinetic right ventricular apex, moderately severe 3+ mitral regurgitation, moderate pulmonary hypertension, moderate bilateral pleural effusions. The patient had a repeat chest x-ray the following day that showed improvement in the bilateral pleural effusions.  The patient responded well to IV diuresis.  On 12/11/2021, the patient was transitioned back to oral lasix.  The patient's grandson requested home care and the patient was agreeable.  Social Work was consulted for assistance with home care setup.  On 12/11/2021, the patient was medically stable for discharge home with home care.    TODAY'S PLAN:  Plan to discharge home this morning.  Updated [place] via phone.  Discussed outpatient follow up recommendations.  Will coordinate home care at discharge.  Discussed the possibility of a catastrophic event at  home as the patient has CHF and predisposition to abnormal heart rhythms.  Recommended pt call 911 if he develops SOB again or any other symptom.  Jefe requested a phone call to his Aunt Orquidea who is in the medical field.  Attempted to call.  Left voicemail.      Problem List:   Acute Hypoxic Respiratory Failure  Acute Exacerbation of HFrEF - EF 20-25%  Bilateral Pleural Effusion  - Improving  - Continue lasix 10 mg PO daily  - Continue spironolactone, plavix, ASA, atorvastatin, metoprolol, lisinopril  - Daily Weights, Strict I/O  - Appreciate Cardiology recommendations - recommending outpatient follow up with EP for AICD  - Updated echo on 12/9 showed ejection fraction of 20 to 25%, severe global hypokinesia of the left ventricle, moderate to severe left ventricular dilation, very mild noncompaction of the apex, mildly hypokinetic right ventricular apex, moderately severe 3+ mitral regurgitation, moderate pulmonary hypertension, moderate bilateral pleural effusions  - Discussed with pt the importance of following up with Cardiology     Moderate-to-Severe Mitral Regurgitation  - Appreciate Cardiology recommendations     NSTEMI - Type 1 vs Type 2  - Continue ASA and Plavix  - Repeat troponin in AM  - Cardiology recommendations appreciated     Hypertension  - Continue metoprolol, lisinopril, and spironolactone     Hyperlipidemia  - Continue atorvastatin     BPH  - Continue flomax     Underweight  - BMI 16.95     Prolonged QTc  - Avoid QT prolonging medications  - QTc = 513    DVT Prophylaxis: Pneumatic Compression Devices  Code Status: Full Code  Diet: Fluid restriction 2000 ML FLUID  Combination Diet Low Saturated Fat Na <2400mg Diet, No Caffeine Diet    Faustin Catheter: Not present  Disposition: Expected discharge today to home. Goals prior to discharge include symptoms improved, vital signs stable.   Family updated today: Yes      Interval History   Pt seen and examined.  Pt denies cp, sob.    -Data reviewed  today: I personally reviewed all new labs and imaging results over the last 24 hours.     Physical Exam   Temp: 97.5  F (36.4  C) Temp src: Temporal BP: 122/69 Pulse: 97   Resp: 18 SpO2: 97 % O2 Device: None (Room air)    Vitals:    12/10/21 0048 12/11/21 0558   Weight: 59.9 kg (132 lb) 59.6 kg (131 lb 8 oz)     Vital Signs with Ranges  Temp:  [97.5  F (36.4  C)-99.1  F (37.3  C)] 97.5  F (36.4  C)  Pulse:  [] 97  Resp:  [8-24] 18  BP: (104-137)/(50-70) 122/69  SpO2:  [95 %-100 %] 97 %  No intake/output data recorded.    GENERAL: No apparent distress. Awake, alert, and fully oriented.  HEENT: Normocephalic, atraumatic. Extraocular movements intact.  CARDIOVASCULAR: Regular rate and rhythm without murmurs or rubs. No S3.  PULMONARY: Clear bilaterally.  GASTROINTESTINAL: Soft, non-tender, non-distended. Bowel sounds normoactive.   EXTREMITIES: No cyanosis or clubbing. No edema.  NEUROLOGICAL: CN 2-12 grossly intact, no focal neurological deficits.  DERMATOLOGICAL: No rash, ulcer, bruising, nor jaundice.    Medications       aspirin  81 mg Oral Daily     atorvastatin  20 mg Oral Daily     clopidogrel  75 mg Oral Daily     famotidine  20 mg Oral BID     furosemide  10 mg Oral Daily     ipratropium - albuterol 0.5 mg/2.5 mg/3 mL  3 mL Nebulization 4x daily     lisinopril  5 mg Oral Daily     metoprolol succinate ER  50 mg Oral Daily     potassium chloride  20 mEq Oral Once     sodium chloride (PF)  3 mL Intracatheter Q8H     spironolactone  12.5 mg Oral Daily     tamsulosin  0.4 mg Oral Daily     Data     Laboratory:  Recent Labs   Lab 12/10/21  0934 12/09/21  1230 12/09/21  0649   WBC 8.2 12.2* 10.1   HGB 13.7 14.5 13.4   HCT 42.9 44.2 42.7   * 100 101*    209 206     Recent Labs   Lab 12/11/21  0647 12/10/21  0934 12/09/21  0649    139 141   POTASSIUM 3.8 3.8 3.7   CHLORIDE 111* 105 110*   CO2 25 28 27   ANIONGAP 6 6 4   GLC 93 153* 119*   BUN 33* 28 23   CR 1.18 1.43* 1.05   GFRESTIMATED 55*  43* 63   FLORES 8.6 9.3 8.6     No results for input(s): CULT in the last 168 hours.    Imaging:  No results found for this or any previous visit (from the past 24 hour(s)).      Kamaljit Guerra DO  [facility] hospital Hospitalist  [address] [place] [address]  12/11/2021

## 2021-12-11 NOTE — DISCHARGE SUMMARY
Hospitalist Discharge Summary  [facility]    Deshawn Burrows MRN# [id number]   YOB: 1933 Age: 88 year old     Date of Admission:  12/9/2021  Date of Discharge:  12/11/2021 12:49 PM  Admitting Physician:  Kamaljit Guerra DO  Discharge Physician:  Kamaljit Guerra DO  Discharging Service:  Hospitalist     Primary Provider: Tess Leigh          Discharge Diagnosis:     Acute Hypoxic Respiratory Failure  Acute Exacerbation of HFrEF - EF 20-25%  Bilateral Pleural Effusion  - Improving  - Continue lasix 10 mg PO daily  - Continue spironolactone, plavix, ASA, atorvastatin, metoprolol, lisinopril  - Daily Weights, Strict I/O  - Appreciate Cardiology recommendations - recommending outpatient follow up with EP for AICD  - Updated echo on 12/9 showed ejection fraction of 20 to 25%, severe global hypokinesia of the left ventricle, moderate to severe left ventricular dilation, very mild noncompaction of the apex, mildly hypokinetic right ventricular apex, moderately severe 3+ mitral regurgitation, moderate pulmonary hypertension, moderate bilateral pleural effusions  - Discussed with pt the importance of following up with Cardiology     Moderate-to-Severe Mitral Regurgitation  - Appreciate Cardiology recommendations     NSTEMI - Type 1 vs Type 2  - Continue ASA and Plavix  - Repeat troponin in AM  - Cardiology recommendations appreciated     Hypertension  - Continue metoprolol, lisinopril, and spironolactone     Hyperlipidemia  - Continue atorvastatin     BPH  - Continue flomax     Underweight  - BMI 16.95     Prolonged QTc  - Avoid QT prolonging medications  - QTc = 513             Discharge Disposition:     Discharged to home           Allergies:     Allergies   Allergen Reactions     Penicillins               Discharge Medications:     Discharge Medication List as of 12/11/2021 12:46 PM      CONTINUE these medications which have NOT CHANGED    Details   aspirin 81 MG  "EC tablet Take 81 mg by mouth daily, Historical      atorvastatin (LIPITOR) 20 MG tablet Take 1 tablet (20 mg) by mouth daily, Disp-90 tablet, R-3, E-Prescribe      clopidogrel (PLAVIX) 75 MG tablet Take 1 tablet (75 mg) by mouth daily, Disp-90 tablet, R-3, E-Prescribe      furosemide (LASIX) 20 MG tablet Take 0.5 tablets (10 mg) by mouth daily, Disp-45 tablet, R-3, E-Prescribe      ibuprofen (ADVIL/MOTRIN) 200 MG tablet Take 400 mg by mouth every 6 hours as needed for mild pain, Historical      lisinopril (ZESTRIL) 5 MG tablet Take 1 tablet (5 mg) by mouth daily, Disp-90 tablet, R-3, E-Prescribe      metoprolol succinate ER (TOPROL-XL) 50 MG 24 hr tablet Take 1 tablet (50 mg) by mouth daily, Disp-90 tablet, R-3, E-Prescribe      multivitamin, therapeutic with minerals (THERA-VIT-M) TABS Take 1 tablet by mouth daily, Historical      nitroGLYcerin (NITROSTAT) 0.4 MG sublingual tablet Place 1 tablet (0.4 mg) under the tongue every 5 minutes as needed for chest pain, Disp-30 tablet, R-0, E-Prescribe      spironolactone (ALDACTONE) 25 MG tablet Take 0.5 tablets (12.5 mg) by mouth daily, Disp-45 tablet, R-4, E-Prescribe      tamsulosin (FLOMAX) 0.4 MG capsule Take 1 capsule by mouth daily., Disp-30 capsule, R-0, E-Prescribe                    Condition on Discharge:     Discharge condition: Fair   Discharge vitals: Blood pressure 122/69, pulse 97, temperature 97.5  F (36.4  C), temperature source Temporal, resp. rate 18, height 1.88 m (6' 2\"), weight 59.6 kg (131 lb 8 oz), SpO2 97 %.   Code status on discharge: DNR / DNI      BASIC PHYSICAL EXAMINATION:  GENERAL: No apparent distress.  CARDIOVASCULAR: Regular rate and rhythm without murmurs.  PULMONARY: Clear to auscultation bilaterally.   GASTROINTESTINAL: Abdomen soft, non-tender.  EXTREMITIES: No edema, pulses intact.  NEUROLOGIC: No focal deficits.            History of Illness:   See detailed admission note for full details.               Procedures excluding " imaging which is summarized below:     Please see details in the electronic medical record.           Consultations:     CARDIOLOGY IP CONSULT  SOCIAL WORK IP CONSULT  SOCIAL WORK IP CONSULT          Significant Results:     Results for orders placed or performed during the hospital encounter of 12/09/21   CT Chest Pulmonary Embolism w Contrast    Narrative    CT CHEST PULMONARY EMBOLISM WITH CONTRAST  12/9/2021 8:12 AM    CLINICAL HISTORY: CT chest 3/1/2020.    TECHNIQUE: CT angiogram chest during arterial phase injection IV  contrast. 2D and 3D MIP reconstructions were performed by the CT  technologist. Dose reduction techniques were used.     CONTRAST: 62 mL [id number]    COMPARISON: None.    FINDINGS:  ANGIOGRAM CHEST: Pulmonary arteries are normal caliber and negative  for pulmonary emboli. Thoracic aorta is negative for dissection.     LUNGS AND PLEURA: Bilateral moderate pleural effusions. Bilateral  irregular pulmonary consolidation involving the upper and lower lobes  with interstitial thickening and peribronchial wall thickening as  well. Bibasilar atelectasis.    MEDIASTINUM/AXILLAE: Edema of the mediastinum fat. A few prominent  lymph nodes with a subcarinal example measuring 1.4 cm, series 8 image  140.    CORONARY ARTERY CALCIFICATION: Moderate.    UPPER ABDOMEN: A few incidental renal cysts without specific imaging  follow-up recommended. No acute abnormality.    MUSCULOSKELETAL: Spine degenerative change.      Impression    IMPRESSION:  1.  Bilateral patchy groundglass opacities, interstitial prominence  and peribronchial wall thickening. Bilateral moderate pleural fluid.  Findings could relate to pulmonary edema and CHF. Underlying atypical  pneumonia is also a possibility.  2.  No evidence for pulmonary embolism.  3.  Coronary artery calcifications.    ANGELY BARRIOS MD         SYSTEM ID:  [id number]   XR Chest 2 Views    Narrative    EXAM: XR CHEST 2 VW  LOCATION: [facility]  "[facility]  DATE/TIME: 12/10/2021 6:15 AM    INDICATION: Pleural effusions.  COMPARISON: 2021.      Impression    IMPRESSION: Small bilateral pleural effusions appear decreased compared to recent CT scan. Heart size and pulmonary vascularity within normal limits. No airspace consolidation or pneumothorax.                Echo Limited     Value    LVEF  20-25%    [facility]    [id number]  [id number]  [id number]  [id number]^ANTONIETTA^TAYA^S     [facility]  Echocardiography Laboratory  [address] [place] [address]     Name: JAYDE RAMOS  MRN: [id number]  : 1933  Study Date: 2021 10:39 AM  Age: 88 yrs  Gender: Male  Patient Location: [facility]  Reason For Study: CHF  Ordering Physician: TAYA MANE  Performed By: Wyatt Lindsey RDCS     BSA: 1.7 m2  Height: 73 in  Weight: 120 lb  HR: 105  BP: 151/84 mmHg  ______________________________________________________________________________  Procedure  Limited Portable Echo Adult.  ______________________________________________________________________________  Interpretation Summary     The left ventricle is severely dilated.  The visual ejection fraction is 20-25%.  There is severe global hypokinesia of the left ventricle.  Septal motion is consistent with conduction abnormality.  Mod to severe LV dilatation. Abnormal sequence of activation due to IVCD. In  addition there is a small part of distal septum with worse wall motion than  the remainder of the globally abnormal wall motion. There is increased  sphericity index and abnormal \"torsion/rotation\" of the contraction sequence.  There may be very mild \"non compaction\" of the apex. No clots seen. Consider  BiV ICD  RV apex may be mildly hypokinetic. Given that a portion of the LV septum also  showed more hypokinesis consider prior distal Lad infarction including RV apex  Diastolic function not assessed due to tachycardia.  Diastolic Doppler findings (E/E' ratio and/or other " "parameters) suggest left  ventricular filling pressures are increased.  The mitral valve leaflets are moderately thickened.  There is moderately severe (3+) mitral regurgitation.  Right ventricular systolic pressure is elevated, consistent with moderate  pulmonary hypertension.  Moderate bilateral pleural effusion  ______________________________________________________________________________  Left Ventricle  The left ventricle is severely dilated. The visual ejection fraction is 20-  25%. Diastolic function not assessed due to tachycardia. Diastolic Doppler  findings (E/E' ratio and/or other parameters) suggest left ventricular filling  pressures are increased. There is severe global hypokinesia of the left  ventricle. Septal motion is consistent with conduction abnormality. There is  septal akinesis. Mod to severe LV dilatation. Abnormal sequence of activation  due to IVCD. In addition there is a small part of distal septum with worse  wall motion than the remainder of the globally abnormal wall motion. There is  increased sphericity index and abnormal \"torsion/rotation\" of the contraction  sequence. There may be very mild \"non compaction\" of the apex. No clots seen.  Consider BiV ICD. No evidence of left ventricular mass or tumors.     Right Ventricle  The right ventricle is normal in size and function. RV apex may be mildly  hypokinetic. Given that a portion of the LV septum also showed more  hypokinesis consider prior distal Lad infarction including RV apex.     Atria  The left atrium is moderately dilated. Right atrial size is normal.     Mitral Valve  The mitral valve leaflets are moderately thickened. There is moderately severe  (3+) mitral regurgitation. There is no mitral valve stenosis.     Tricuspid Valve  The right ventricular systolic pressure is approximated at 42.5 mmHg plus the  right atrial pressure. IVC diameter and respiratory changes fall into an  intermediate range suggesting an RA pressure of " 8 mmHg. Right ventricular  systolic pressure is elevated, consistent with moderate pulmonary  hypertension.     Aortic Valve  The aortic valve is trileaflet with aortic valve sclerosis. There is mild (1+)  aortic regurgitation. No hemodynamically significant valvular aortic stenosis.     Pulmonic Valve  The pulmonic valve is not well seen, but is grossly normal.     Vessels  The aortic root is normal size.     Pericardium  The pericardium appears normal. Moderate bilateral pleural effusion.     Rhythm  The rhythm was sinus tachycardia.  ______________________________________________________________________________  Doppler Measurements & Calculations  MV E max anthony: 161.0 cm/sec  MV dec time: 0.10 sec  TR max anthony: 325.8 cm/sec  TR max P.5 mmHg  E/E' av.1  Lateral E/e': 10.0  Medial E/e': 18.3     ______________________________________________________________________________  Report approved by: Nam Moise 2021 12:21 PM               Transthoracic Echocardiogram Results:  No results found for this or any previous visit (from the past 4320 hour(s)).             Pending Results:     Unresulted Labs Ordered in the Past 30 Days of this Admission     No orders found from 2021 to 12/10/2021.                      Discharge Instructions and Follow-Up:     Discharge instructions and follow-up:   Discharge Procedure Orders   Discharge Order: F/U with Cardiac  [facility]   Standing Status: Future   Referral Priority: Routine Referral Type: Consultation     Follow-Up with Electrophysiologist   Standing Status: Future   Referral Priority: Routine Referral Type: Consultation     Follow-Up with Cardiologist   Standing Status: Future   Referral Priority: Routine Referral Type: Consultation     Home care nursing referral   Referral Priority: Routine Referral Type: Home Health Therapies & Aides   Number of Visits Requested: 1     Reason for your hospital stay   Order Comments: Acute Exacerbation of Heart Failure      Follow-up and recommended labs and tests    Order Comments: Follow up with primary care provider, Tess Leigh, within 7 days for hospital follow- up.  The following labs/tests are recommended: CBC, BMP.      Follow up with Electrophysiologist at [facility] - [phone].  If you do not get a call from them by Tuesday.  Call to make an appointment for evaluation for an implantable defibrillator.     Follow up with Cardiology this week.  Call [phone] to set up an appointment for a hospital follow up.     Activity   Order Comments: Your activity upon discharge: activity as tolerated     Order Specific Question Answer Comments   Is discharge order? Yes      MD face to face encounter   Order Comments: Documentation of Face to Face and Certification for Home Health Services    I certify that patient: Deshawn Burrows is under my care and that I, or a nurse practitioner or physician's assistant working with me, had a face-to-face encounter that meets the physician face-to-face encounter requirements with this patient on: 12/11/2021.    This encounter with the patient was in whole, or in part, for the following medical condition, which is the primary reason for home health care: heart failure, generalized weakness.    I certify that, based on my findings, the following services are medically necessary home health services: Nursing.    My clinical findings support the need for the above services because: Nurse is needed: To provide assessment and oversight required in the home to assure adherence to the medical plan due to: heart failure, generalized weakness, shortness of breath with exertion..    Further, I certify that my clinical findings support that this patient is homebound (i.e. absences from home require considerable and taxing effort and are for medical reasons or Christianity services or infrequently or of short duration when for other reasons) because: Requires assistance of another  person or specialized equipment to access medical services because patient: Is unable to operate assistive equipment on their own...    Based on the above findings. I certify that this patient is confined to the home and needs intermittent skilled nursing care, physical therapy and/or speech therapy.  The patient is under my care, and I have initiated the establishment of the plan of care.  This patient will be followed by a physician who will periodically review the plan of care.  Physician/Provider to provide follow up care: Tess Leigh    Attending hospital physician (the Medicare [facility]): Kamaljit Guerra DO  Physician Signature: See electronic signature associated with these discharge orders.  Date: 12/11/2021     Diet   Order Comments: Follow this diet upon discharge: Orders Placed This Encounter      Fluid restriction 2000 ML FLUID      Combination Diet Low Saturated Fat Na <2400mg Diet, No Caffeine Diet     Order Specific Question Answer Comments   Is discharge order? 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 In the emergency department the patient was found to have a SPO2 of 83% on room air and was subsequently placed on 6 L oxygen mask, heart rate 90-1 100s. Lab work revealed a proBNP of 5680. High-sensitivity troponin was initially negative, however subsequently increased to 89. Cardiology was consulted and recommended continued IV diuresis, and follow-up with electrophysiology for consideration of CRT-D therapy. The patient had an updated echocardiogram showing an ejection fraction of 20 to 25%, severe global hypokinesia of the left ventricle, moderate to severe left ventricular dilation, very  mild noncompaction of the apex, mildly hypokinetic right ventricular apex, moderately severe 3+ mitral regurgitation, moderate pulmonary hypertension, moderate bilateral pleural effusions. The patient had a repeat chest x-ray the following day that showed improvement in the bilateral pleural effusions.  The patient responded well to IV diuresis.  On 12/11/2021, the patient was transitioned back to oral lasix.  The patient's grandson requested home care and the patient was agreeable.  Social Work was consulted for assistance with home care setup.  On 12/11/2021, the patient was medically stable for discharge home with home care.    The patient was seen, examined, and counseled on this day. The hospitalization and plan of care was reviewed with the patient extensively. All questions were addressed and the patient agreed to follow-up as noted above.      Total time spent in face to face contact with the patient and coordinating discharge was:  36 Minutes    Kamaljit Guerra DO  [facility] Hospitalist  [address] [place] [address]  12/11/2021

## 2021-12-11 NOTE — DISCHARGE INSTRUCTIONS
The  has arranged home care,  for you after you return home from the hospital.   The Home Care Agency Arranged is [facility]  The telephone number is [phone]   You will be contacted by phone within a few days after your hospital stay by the Home Care Agency to set up your first visit.     If you have not heard from the Home Care Agency within 1-2  Days after discharge from the hospital, please contact the number provided above.

## 2021-12-11 NOTE — PLAN OF CARE
Pt A&Ox4 but forgetful and very restless. Pt has EKG done due to changes with remote tele readings. See previous notes. LS clear.  Pt was given Potassium. Pt up independently in room. Pt tolerated regular diet. +bs/flatus. Pt denies pain, SOB and nausea. Fluid restriction of 2000 for the day. VSS afebrile RA. Grandson here to discharge pt. Discharge instructions discussed with pt and Grandson. Form signed and on chart. All belongings taken with pt. Pt left at 1245 with support staff.

## 2021-12-11 NOTE — PLAN OF CARE
RN [id number]  VSS Confused  Impulsive moves quick currently receiving IV fluids gets up, Does not bring IV pole with and pulls iv tubing.  PSC in room for safety and to protect IV.  Received 2 bolus as ordered Social work workng on safe discharge plan

## 2021-12-11 NOTE — CONSULTS
Care Management Discharge Note    Discharge Date: 12/11/2021       Discharge Disposition:  Home care    Discharge Services:  Home care    Discharge DME: none    Discharge Transportation:  12/11/2021    Private pay costs discussed: Not applicable    PAS Confirmation Code:  n/a  Patient/family educated on Medicare website which has current facility and service quality ratings:  n/a    Education Provided on the Discharge Plan:  yes  Persons Notified of Discharge Plans: Jefe (pts thierno), nursing  Patient/Family in Agreement with the Plan:  yes    Handoff Referral Completed: No    Additional Information:  SW spoke with thierno Mayberry, for discharge planning needs. Patient is alert and oriented X3, conversant, and able to participate in discharge planning.   Discussed with patient the need for home care services after discharge. The patient was given a list of home care agencies. Patient has chosen [facility]. The criteria for homebound status was explained as applicable. Home care was described to the patient as skilled, intermittent service. Length and frequency of home care visits will be determined by the home care agency.     [facility]  [phone]  [address]   [facility]. [id number]  [facility]             [facility]

## 2021-12-11 NOTE — PROVIDER NOTIFICATION
Page to Charlie Winston    tele tech called. Pt is ST with abbarent beats, occasional PVC's, and BBB. Please advise.    MD paged back. Pt VSS afebrile RA sats good. Pt seems more restless and confused.     MD ordered EKG and Potassium 20 Meq    Will continue to monitor pt    0950 tele tech called. Pt has 4 beats Vtach. MD was here when called. Potassium given at 1004 pt was sleeping and none sysmptomatic

## 2021-12-13 ENCOUNTER — PATIENT OUTREACH (OUTPATIENT)
Dept: CARE COORDINATION | Facility: CLINIC | Age: 86
End: 2021-12-13
Payer: MEDICARE

## 2021-12-13 ENCOUNTER — TELEPHONE (OUTPATIENT)
Dept: INTERNAL MEDICINE | Facility: CLINIC | Age: 86
End: 2021-12-13
Payer: MEDICARE

## 2021-12-13 DIAGNOSIS — Z71.89 OTHER SPECIFIED COUNSELING: ICD-10-CM

## 2021-12-13 NOTE — TELEPHONE ENCOUNTER
I have not seen this patient since more than 1 year, please advise office visit follow-up at least a virtual visit for hospital follow-up with any provider

## 2021-12-13 NOTE — TELEPHONE ENCOUNTER
[facility]     Looking for confirmation if Dr Leigh  Is going to be  following patient for home care services     [phone] / secure line

## 2021-12-13 NOTE — PROGRESS NOTES
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 In the emergency department the patient was found to have a SPO2 of 83% on room air and was subsequently placed on 6 L oxygen mask, heart rate 90-1 100s. Lab work revealed a proBNP of 5680. High-sensitivity troponin was initially negative, however subsequently increased to 89. Cardiology was consulted and recommended continued IV diuresis, and follow-up with electrophysiology for consideration of CRT-D therapy. The patient had an updated echocardiogram showing an ejection fraction of 20 to 25%, severe global hypokinesia of the left ventricle, moderate to severe left ventricular dilation, very mild noncompaction of the apex, mildly hypokinetic right ventricular apex, moderately severe 3+ mitral regurgitation, moderate pulmonary hypertension, moderate bilateral pleural effusions. The patient had a repeat chest x-ray the following day that showed improvement in the bilateral pleural effusions.  The patient responded well to IV diuresis.  On 12/11/2021, the patient was transitioned back to oral lasix.  The patient's grandson requested home care and the patient was agreeable.  Social Work was consulted for assistance with home care setup.  On 12/11/2021, the patient was medically stable for discharge home with home care.       ASSESSMENT      Enrollment  Primary Care  Care Coordination Status: Declined    Discharge Assessment  How are you doing now that you are home?: Doing quite, going out to plow  How are your symptoms? (Red Flag symptoms escalate to triage hotline per guidelines): Improved  Do you feel your condition is stable enough to be safe at home until your provider visit?: Yes  Does the patient have their discharge instructions? : Yes  Does the patient have questions regarding their discharge instructions? : No  Were you started on any new medications or were there changes to any of your previous medications? : Yes  Does the patient have all of their medications?: Yes  Do you have questions regarding any of your medications? : No  Do you have all of your needed medical supplies or equipment (DME)?  (i.e. oxygen tank, CPAP, cane, etc.): Yes  Discharge follow-up appointment scheduled within 14 calendar days? : Yes  Discharge Follow Up Appointment Date: 12/24/21  Discharge Follow Up Appointment Scheduled with?: Primary Care Provider    Post-op ([facility] Only)  If the patient had a surgery or procedure, do they have any questions for a nurse?: No            PLAN                                                      Outpatient Plan: Follow up with primary care provider, Tess Leigh, within 7 days for hospital follow- up.  The following labs/tests are recommended: CBC, BMP.       Follow up with Electrophysiologist at [facility] - [phone].  If you do not get a call from them by Tuesday.  Call to make an appointment for evaluation for an implantable defibrillator.      Follow up with Cardiology this week.  Call [phone] to set up an appointment for a hospital      Future Appointments   Date Time Provider Department Center   12/24/2021 11:00 AM [facility]         For any urgent concerns, please contact our 24 hour nurse triage line: [phone] ([phone])         Nuria Govea MA

## 2021-12-14 NOTE — TELEPHONE ENCOUNTER
Call to patient. Patient informed of Dr. Leigh's response below. Patient verbalized understanding and reports he already has an appointment scheduled for 12/24/2021 with Dr. Leigh.     Appointments in Next Year    Dec 24, 2021 11:00 AM  (Arrive by 10:40 AM)  Provider Visit with Tess Leigh MD  [facility] ([facility] ) [phone]        Routing to provider, Dr. Leigh. Patient has an appointment scheduled with you. Will you be following him for home care services?    Thank you!    Marj KNIGHT RN   [facility]

## 2021-12-15 LAB
ATRIAL RATE - MUSE: 75 BPM
ATRIAL RATE - MUSE: 79 BPM
DIASTOLIC BLOOD PRESSURE - MUSE: NORMAL MMHG
DIASTOLIC BLOOD PRESSURE - MUSE: NORMAL MMHG
INTERPRETATION ECG - MUSE: NORMAL
INTERPRETATION ECG - MUSE: NORMAL
P AXIS - MUSE: 64 DEGREES
P AXIS - MUSE: 75 DEGREES
PR INTERVAL - MUSE: 158 MS
PR INTERVAL - MUSE: 170 MS
QRS DURATION - MUSE: 160 MS
QRS DURATION - MUSE: 160 MS
QT - MUSE: 436 MS
QT - MUSE: 460 MS
QTC - MUSE: 486 MS
QTC - MUSE: 527 MS
R AXIS - MUSE: 51 DEGREES
R AXIS - MUSE: 75 DEGREES
SYSTOLIC BLOOD PRESSURE - MUSE: NORMAL MMHG
SYSTOLIC BLOOD PRESSURE - MUSE: NORMAL MMHG
T AXIS - MUSE: -79 DEGREES
T AXIS - MUSE: 258 DEGREES
VENTRICULAR RATE- MUSE: 75 BPM
VENTRICULAR RATE- MUSE: 79 BPM

## 2021-12-15 NOTE — TELEPHONE ENCOUNTER
Call to [facility] At [phone] / secure line. Detailed message left with Dr. Leigh's message below with the okay that Dr. Leigh will follow-up with home care service orders.     Marj KNIGHT RN   [facility]

## 2022-01-01 ENCOUNTER — MEDICAL CORRESPONDENCE (OUTPATIENT)
Dept: HEALTH INFORMATION MANAGEMENT | Facility: CLINIC | Age: 87
End: 2022-01-01

## 2022-01-01 ENCOUNTER — TELEPHONE (OUTPATIENT)
Dept: CARDIOLOGY | Facility: CLINIC | Age: 87
End: 2022-01-01

## 2022-01-01 ENCOUNTER — MEDICAL CORRESPONDENCE (OUTPATIENT)
Dept: HEALTH INFORMATION MANAGEMENT | Facility: CLINIC | Age: 87
End: 2022-01-01
Payer: MEDICARE

## 2022-01-01 ENCOUNTER — OFFICE VISIT (OUTPATIENT)
Dept: CARDIOLOGY | Facility: CLINIC | Age: 87
End: 2022-01-01
Payer: MEDICARE

## 2022-01-01 ENCOUNTER — LAB (OUTPATIENT)
Dept: LAB | Facility: CLINIC | Age: 87
End: 2022-01-01
Payer: MEDICARE

## 2022-01-01 ENCOUNTER — TELEPHONE (OUTPATIENT)
Dept: INTERNAL MEDICINE | Facility: CLINIC | Age: 87
End: 2022-01-01
Payer: MEDICARE

## 2022-01-01 ENCOUNTER — OFFICE VISIT (OUTPATIENT)
Dept: INTERNAL MEDICINE | Facility: CLINIC | Age: 87
End: 2022-01-01
Payer: MEDICARE

## 2022-01-01 ENCOUNTER — HOSPITAL ENCOUNTER (OUTPATIENT)
Dept: OCCUPATIONAL THERAPY | Facility: CLINIC | Age: 87
Setting detail: THERAPIES SERIES
Discharge: HOME | End: 2022-11-02
Attending: PSYCHIATRY & NEUROLOGY
Payer: MEDICARE

## 2022-01-01 ENCOUNTER — HOSPITAL ENCOUNTER (INPATIENT)
Facility: CLINIC | Age: 87
LOS: 2 days | Discharge: HOME HEALTH | DRG: 291 | End: 2022-02-14
Attending: EMERGENCY MEDICINE | Admitting: INTERNAL MEDICINE
Payer: MEDICARE

## 2022-01-01 ENCOUNTER — TELEPHONE (OUTPATIENT)
Dept: INTERNAL MEDICINE | Facility: CLINIC | Age: 87
End: 2022-01-01

## 2022-01-01 ENCOUNTER — MYC MEDICAL ADVICE (OUTPATIENT)
Dept: INTERNAL MEDICINE | Facility: CLINIC | Age: 87
End: 2022-01-01

## 2022-01-01 ENCOUNTER — HEALTH MAINTENANCE LETTER (OUTPATIENT)
Age: 87
End: 2022-01-01

## 2022-01-01 ENCOUNTER — OFFICE VISIT (OUTPATIENT)
Dept: NEUROLOGY | Facility: CLINIC | Age: 87
End: 2022-01-01
Attending: INTERNAL MEDICINE
Payer: MEDICARE

## 2022-01-01 ENCOUNTER — APPOINTMENT (OUTPATIENT)
Dept: PHYSICAL THERAPY | Facility: CLINIC | Age: 87
DRG: 280 | End: 2022-01-01
Attending: PHYSICIAN ASSISTANT
Payer: MEDICARE

## 2022-01-01 ENCOUNTER — HOSPITAL ENCOUNTER (EMERGENCY)
Facility: CLINIC | Age: 87
Discharge: HOME | End: 2022-06-11
Attending: EMERGENCY MEDICINE | Admitting: EMERGENCY MEDICINE
Payer: MEDICARE

## 2022-01-01 ENCOUNTER — APPOINTMENT (OUTPATIENT)
Dept: CT IMAGING | Facility: CLINIC | Age: 87
DRG: 280 | End: 2022-01-01
Attending: EMERGENCY MEDICINE
Payer: MEDICARE

## 2022-01-01 ENCOUNTER — APPOINTMENT (OUTPATIENT)
Dept: GENERAL RADIOLOGY | Facility: CLINIC | Age: 87
End: 2022-01-01
Attending: EMERGENCY MEDICINE
Payer: MEDICARE

## 2022-01-01 ENCOUNTER — DOCUMENTATION ONLY (OUTPATIENT)
Dept: OTHER | Facility: CLINIC | Age: 87
End: 2022-01-01

## 2022-01-01 ENCOUNTER — APPOINTMENT (OUTPATIENT)
Dept: OCCUPATIONAL THERAPY | Facility: CLINIC | Age: 87
DRG: 280 | End: 2022-01-01
Attending: PHYSICIAN ASSISTANT
Payer: MEDICARE

## 2022-01-01 ENCOUNTER — PATIENT OUTREACH (OUTPATIENT)
Dept: CARE COORDINATION | Facility: CLINIC | Age: 87
End: 2022-01-01

## 2022-01-01 ENCOUNTER — APPOINTMENT (OUTPATIENT)
Dept: GENERAL RADIOLOGY | Facility: CLINIC | Age: 87
DRG: 280 | End: 2022-01-01
Attending: EMERGENCY MEDICINE
Payer: MEDICARE

## 2022-01-01 ENCOUNTER — CARE COORDINATION (OUTPATIENT)
Dept: CARDIOLOGY | Facility: CLINIC | Age: 87
End: 2022-01-01

## 2022-01-01 ENCOUNTER — HOSPITAL ENCOUNTER (OUTPATIENT)
Dept: MRI IMAGING | Facility: CLINIC | Age: 87
Discharge: HOME | End: 2022-09-18
Attending: PSYCHIATRY & NEUROLOGY | Admitting: PSYCHIATRY & NEUROLOGY
Payer: MEDICARE

## 2022-01-01 ENCOUNTER — HOSPITAL ENCOUNTER (INPATIENT)
Facility: CLINIC | Age: 87
LOS: 2 days | Discharge: HOME HEALTH | DRG: 280 | End: 2022-11-09
Attending: EMERGENCY MEDICINE | Admitting: INTERNAL MEDICINE
Payer: MEDICARE

## 2022-01-01 ENCOUNTER — PATIENT OUTREACH (OUTPATIENT)
Dept: NURSING | Facility: CLINIC | Age: 87
End: 2022-01-01
Payer: MEDICARE

## 2022-01-01 ENCOUNTER — OFFICE VISIT (OUTPATIENT)
Dept: ORTHOPEDICS | Facility: CLINIC | Age: 87
End: 2022-01-01
Payer: MEDICARE

## 2022-01-01 ENCOUNTER — APPOINTMENT (OUTPATIENT)
Dept: CT IMAGING | Facility: CLINIC | Age: 87
DRG: 291 | End: 2022-01-01
Attending: EMERGENCY MEDICINE
Payer: MEDICARE

## 2022-01-01 ENCOUNTER — HOSPITAL ENCOUNTER (EMERGENCY)
Facility: CLINIC | Age: 87
Discharge: HOME | End: 2022-10-24
Attending: EMERGENCY MEDICINE | Admitting: EMERGENCY MEDICINE
Payer: MEDICARE

## 2022-01-01 ENCOUNTER — HOSPITAL ENCOUNTER (OUTPATIENT)
Dept: CARDIOLOGY | Facility: CLINIC | Age: 87
Discharge: HOME | End: 2022-09-20
Attending: INTERNAL MEDICINE | Admitting: INTERNAL MEDICINE
Payer: MEDICARE

## 2022-01-01 VITALS
TEMPERATURE: 97.4 F | DIASTOLIC BLOOD PRESSURE: 54 MMHG | WEIGHT: 117 LBS | OXYGEN SATURATION: 97 % | BODY MASS INDEX: 15.51 KG/M2 | RESPIRATION RATE: 8 BRPM | HEIGHT: 73 IN | HEART RATE: 82 BPM | SYSTOLIC BLOOD PRESSURE: 106 MMHG

## 2022-01-01 VITALS
HEART RATE: 80 BPM | OXYGEN SATURATION: 100 % | TEMPERATURE: 97.6 F | SYSTOLIC BLOOD PRESSURE: 118 MMHG | RESPIRATION RATE: 14 BRPM | DIASTOLIC BLOOD PRESSURE: 55 MMHG

## 2022-01-01 VITALS
SYSTOLIC BLOOD PRESSURE: 114 MMHG | BODY MASS INDEX: 17.48 KG/M2 | WEIGHT: 131.9 LBS | DIASTOLIC BLOOD PRESSURE: 70 MMHG | HEIGHT: 73 IN | OXYGEN SATURATION: 98 % | HEART RATE: 79 BPM

## 2022-01-01 VITALS
SYSTOLIC BLOOD PRESSURE: 102 MMHG | BODY MASS INDEX: 16.57 KG/M2 | TEMPERATURE: 97.2 F | OXYGEN SATURATION: 97 % | HEART RATE: 101 BPM | DIASTOLIC BLOOD PRESSURE: 60 MMHG | WEIGHT: 125 LBS | RESPIRATION RATE: 16 BRPM | HEIGHT: 73 IN

## 2022-01-01 VITALS
WEIGHT: 120.6 LBS | SYSTOLIC BLOOD PRESSURE: 104 MMHG | BODY MASS INDEX: 15.98 KG/M2 | HEART RATE: 76 BPM | OXYGEN SATURATION: 99 % | DIASTOLIC BLOOD PRESSURE: 66 MMHG | HEIGHT: 73 IN

## 2022-01-01 VITALS
HEIGHT: 73 IN | WEIGHT: 128.9 LBS | OXYGEN SATURATION: 100 % | TEMPERATURE: 97.9 F | RESPIRATION RATE: 18 BRPM | SYSTOLIC BLOOD PRESSURE: 82 MMHG | HEART RATE: 73 BPM | BODY MASS INDEX: 17.08 KG/M2 | DIASTOLIC BLOOD PRESSURE: 49 MMHG

## 2022-01-01 VITALS
SYSTOLIC BLOOD PRESSURE: 99 MMHG | DIASTOLIC BLOOD PRESSURE: 52 MMHG | HEIGHT: 73 IN | TEMPERATURE: 97.6 F | HEART RATE: 74 BPM | WEIGHT: 117.1 LBS | OXYGEN SATURATION: 99 % | RESPIRATION RATE: 16 BRPM | BODY MASS INDEX: 15.52 KG/M2

## 2022-01-01 VITALS
HEIGHT: 73 IN | DIASTOLIC BLOOD PRESSURE: 54 MMHG | BODY MASS INDEX: 16.57 KG/M2 | SYSTOLIC BLOOD PRESSURE: 98 MMHG | HEART RATE: 68 BPM | WEIGHT: 125 LBS

## 2022-01-01 VITALS
WEIGHT: 117 LBS | HEART RATE: 78 BPM | RESPIRATION RATE: 24 BRPM | OXYGEN SATURATION: 91 % | SYSTOLIC BLOOD PRESSURE: 106 MMHG | TEMPERATURE: 97.5 F | BODY MASS INDEX: 15.44 KG/M2 | DIASTOLIC BLOOD PRESSURE: 67 MMHG

## 2022-01-01 VITALS
BODY MASS INDEX: 17.76 KG/M2 | WEIGHT: 134 LBS | SYSTOLIC BLOOD PRESSURE: 90 MMHG | DIASTOLIC BLOOD PRESSURE: 60 MMHG | HEIGHT: 73 IN

## 2022-01-01 VITALS
WEIGHT: 133 LBS | HEART RATE: 76 BPM | TEMPERATURE: 98.2 F | RESPIRATION RATE: 18 BRPM | OXYGEN SATURATION: 98 % | BODY MASS INDEX: 17.55 KG/M2 | DIASTOLIC BLOOD PRESSURE: 66 MMHG | SYSTOLIC BLOOD PRESSURE: 114 MMHG

## 2022-01-01 VITALS
WEIGHT: 132.4 LBS | BODY MASS INDEX: 17.55 KG/M2 | SYSTOLIC BLOOD PRESSURE: 114 MMHG | HEART RATE: 69 BPM | OXYGEN SATURATION: 100 % | DIASTOLIC BLOOD PRESSURE: 68 MMHG | HEIGHT: 73 IN

## 2022-01-01 DIAGNOSIS — M21.372 LEFT FOOT DROP: ICD-10-CM

## 2022-01-01 DIAGNOSIS — I50.21 ACUTE HFREF (HEART FAILURE WITH REDUCED EJECTION FRACTION) (H): ICD-10-CM

## 2022-01-01 DIAGNOSIS — M25.552 ACUTE HIP PAIN, LEFT: Primary | ICD-10-CM

## 2022-01-01 DIAGNOSIS — I50.20 HEART FAILURE WITH REDUCED EJECTION FRACTION, NYHA CLASS II (H): Primary | ICD-10-CM

## 2022-01-01 DIAGNOSIS — I50.21 ACUTE HFREF (HEART FAILURE WITH REDUCED EJECTION FRACTION) (H): Primary | ICD-10-CM

## 2022-01-01 DIAGNOSIS — Z23 NEED FOR COVID-19 VACCINE: ICD-10-CM

## 2022-01-01 DIAGNOSIS — I25.10 CORONARY ARTERY DISEASE INVOLVING NATIVE CORONARY ARTERY OF NATIVE HEART WITHOUT ANGINA PECTORIS: ICD-10-CM

## 2022-01-01 DIAGNOSIS — I10 ESSENTIAL HYPERTENSION WITH GOAL BLOOD PRESSURE LESS THAN 140/90: ICD-10-CM

## 2022-01-01 DIAGNOSIS — I10 ESSENTIAL HYPERTENSION WITH GOAL BLOOD PRESSURE LESS THAN 140/90: Primary | ICD-10-CM

## 2022-01-01 DIAGNOSIS — I50.9 ACUTE ON CHRONIC CONGESTIVE HEART FAILURE, UNSPECIFIED HEART FAILURE TYPE (H): ICD-10-CM

## 2022-01-01 DIAGNOSIS — I25.5 ISCHEMIC CARDIOMYOPATHY: ICD-10-CM

## 2022-01-01 DIAGNOSIS — R06.00 DYSPNEA, UNSPECIFIED TYPE: ICD-10-CM

## 2022-01-01 DIAGNOSIS — R55 NEAR SYNCOPE: ICD-10-CM

## 2022-01-01 DIAGNOSIS — R91.8 OPACITIES OF BOTH LUNGS PRESENT ON CHEST X-RAY: ICD-10-CM

## 2022-01-01 DIAGNOSIS — I50.21 ACUTE SYSTOLIC CONGESTIVE HEART FAILURE (H): ICD-10-CM

## 2022-01-01 DIAGNOSIS — I50.9 CONGESTIVE HEART FAILURE, UNSPECIFIED HF CHRONICITY, UNSPECIFIED HEART FAILURE TYPE (H): ICD-10-CM

## 2022-01-01 DIAGNOSIS — J18.9 COMMUNITY ACQUIRED PNEUMONIA, UNSPECIFIED LATERALITY: ICD-10-CM

## 2022-01-01 DIAGNOSIS — R41.89 COGNITIVE DECLINE: ICD-10-CM

## 2022-01-01 DIAGNOSIS — S70.12XA CONTUSION OF LEFT HIP AND THIGH, INITIAL ENCOUNTER: ICD-10-CM

## 2022-01-01 DIAGNOSIS — R93.89 ABNORMAL FINDINGS ON DIAGNOSTIC IMAGING OF OTHER SPECIFIED BODY STRUCTURES: ICD-10-CM

## 2022-01-01 DIAGNOSIS — I50.21 ACUTE SYSTOLIC CONGESTIVE HEART FAILURE (H): Primary | ICD-10-CM

## 2022-01-01 DIAGNOSIS — R41.89 COGNITIVE DECLINE: Primary | ICD-10-CM

## 2022-01-01 DIAGNOSIS — S93.402A SPRAIN OF LEFT ANKLE, UNSPECIFIED LIGAMENT, INITIAL ENCOUNTER: ICD-10-CM

## 2022-01-01 DIAGNOSIS — E78.5 HYPERLIPIDEMIA LDL GOAL <100: ICD-10-CM

## 2022-01-01 DIAGNOSIS — I34.0 NONRHEUMATIC MITRAL VALVE REGURGITATION: ICD-10-CM

## 2022-01-01 DIAGNOSIS — J96.01 ACUTE RESPIRATORY FAILURE WITH HYPOXIA (H): ICD-10-CM

## 2022-01-01 DIAGNOSIS — Z53.9 DIAGNOSIS NOT YET DEFINED: Primary | ICD-10-CM

## 2022-01-01 DIAGNOSIS — M16.0 PRIMARY OSTEOARTHRITIS OF BOTH HIPS: ICD-10-CM

## 2022-01-01 DIAGNOSIS — R91.8 PULMONARY MASS: ICD-10-CM

## 2022-01-01 DIAGNOSIS — J18.9 COMMUNITY ACQUIRED PNEUMONIA, UNSPECIFIED LATERALITY: Primary | ICD-10-CM

## 2022-01-01 DIAGNOSIS — S70.02XA CONTUSION OF LEFT HIP AND THIGH, INITIAL ENCOUNTER: ICD-10-CM

## 2022-01-01 DIAGNOSIS — I25.10 CORONARY ARTERY DISEASE INVOLVING NATIVE CORONARY ARTERY OF NATIVE HEART WITHOUT ANGINA PECTORIS: Primary | ICD-10-CM

## 2022-01-01 DIAGNOSIS — R22.2 LUMP IN CHEST: Primary | ICD-10-CM

## 2022-01-01 DIAGNOSIS — S80.212A ABRASION OF LEFT KNEE, INITIAL ENCOUNTER: ICD-10-CM

## 2022-01-01 LAB
ALBUMIN SERPL BCG-MCNC: 4.2 G/DL (ref 3.5–5.2)
ALBUMIN SERPL-MCNC: 3.4 G/DL (ref 3.4–5)
ALBUMIN SERPL-MCNC: 3.6 G/DL (ref 3.4–5)
ALBUMIN UR-MCNC: NEGATIVE MG/DL
ALP SERPL-CCNC: 100 U/L (ref 40–150)
ALP SERPL-CCNC: 86 U/L (ref 40–129)
ALP SERPL-CCNC: 91 U/L (ref 40–150)
ALT SERPL W P-5'-P-CCNC: 42 U/L (ref 0–70)
ALT SERPL W P-5'-P-CCNC: 49 U/L (ref 0–70)
ALT SERPL W P-5'-P-CCNC: 51 U/L (ref 10–50)
ANION GAP SERPL CALCULATED.3IONS-SCNC: 10 MMOL/L (ref 7–15)
ANION GAP SERPL CALCULATED.3IONS-SCNC: 11 MMOL/L (ref 7–15)
ANION GAP SERPL CALCULATED.3IONS-SCNC: 12 MMOL/L (ref 7–15)
ANION GAP SERPL CALCULATED.3IONS-SCNC: 13 MMOL/L (ref 7–15)
ANION GAP SERPL CALCULATED.3IONS-SCNC: 5 MMOL/L (ref 3–14)
ANION GAP SERPL CALCULATED.3IONS-SCNC: 6 MMOL/L (ref 3–14)
ANION GAP SERPL CALCULATED.3IONS-SCNC: 6 MMOL/L (ref 3–14)
ANION GAP SERPL CALCULATED.3IONS-SCNC: 7 MMOL/L (ref 3–14)
ANION GAP SERPL CALCULATED.3IONS-SCNC: 9 MMOL/L (ref 3–14)
APPEARANCE UR: CLEAR
AST SERPL W P-5'-P-CCNC: 22 U/L (ref 0–45)
AST SERPL W P-5'-P-CCNC: 27 U/L (ref 0–45)
AST SERPL W P-5'-P-CCNC: 40 U/L (ref 10–50)
ATRIAL RATE - MUSE: 66 BPM
ATRIAL RATE - MUSE: 70 BPM
ATRIAL RATE - MUSE: 82 BPM
ATRIAL RATE - MUSE: 84 BPM
ATRIAL RATE - MUSE: 94 BPM
BASOPHILS # BLD AUTO: 0 10E3/UL (ref 0–0.2)
BASOPHILS # BLD AUTO: 0.1 10E3/UL (ref 0–0.2)
BASOPHILS NFR BLD AUTO: 0 %
BASOPHILS NFR BLD AUTO: 1 %
BILIRUB DIRECT SERPL-MCNC: 0.3 MG/DL (ref 0–0.2)
BILIRUB SERPL-MCNC: 1 MG/DL (ref 0.2–1.3)
BILIRUB SERPL-MCNC: 1.1 MG/DL (ref 0.2–1.3)
BILIRUB SERPL-MCNC: 1.3 MG/DL
BILIRUB UR QL STRIP: NEGATIVE
BUN SERPL-MCNC: 30 MG/DL (ref 7–30)
BUN SERPL-MCNC: 30.7 MG/DL (ref 8–23)
BUN SERPL-MCNC: 37.8 MG/DL (ref 8–23)
BUN SERPL-MCNC: 41 MG/DL (ref 7–30)
BUN SERPL-MCNC: 41 MG/DL (ref 7–30)
BUN SERPL-MCNC: 42 MG/DL (ref 7–30)
BUN SERPL-MCNC: 51.4 MG/DL (ref 8–23)
BUN SERPL-MCNC: 53 MG/DL (ref 7–30)
BUN SERPL-MCNC: 57.9 MG/DL (ref 8–23)
CALCIUM SERPL-MCNC: 8.8 MG/DL (ref 8.5–10.1)
CALCIUM SERPL-MCNC: 9 MG/DL (ref 8.5–10.1)
CALCIUM SERPL-MCNC: 9.1 MG/DL (ref 8.5–10.1)
CALCIUM SERPL-MCNC: 9.2 MG/DL (ref 8.5–10.1)
CALCIUM SERPL-MCNC: 9.4 MG/DL (ref 8.8–10.2)
CALCIUM SERPL-MCNC: 9.5 MG/DL (ref 8.5–10.1)
CALCIUM SERPL-MCNC: 9.5 MG/DL (ref 8.8–10.2)
CALCIUM SERPL-MCNC: 9.7 MG/DL (ref 8.8–10.2)
CALCIUM SERPL-MCNC: 9.7 MG/DL (ref 8.8–10.2)
CHLORIDE BLD-SCNC: 103 MMOL/L (ref 94–109)
CHLORIDE BLD-SCNC: 104 MMOL/L (ref 94–109)
CHLORIDE BLD-SCNC: 107 MMOL/L (ref 94–109)
CHLORIDE BLD-SCNC: 108 MMOL/L (ref 94–109)
CHLORIDE BLD-SCNC: 110 MMOL/L (ref 94–109)
CHLORIDE SERPL-SCNC: 101 MMOL/L (ref 98–107)
CHLORIDE SERPL-SCNC: 104 MMOL/L (ref 98–107)
CHLORIDE SERPL-SCNC: 106 MMOL/L (ref 98–107)
CHLORIDE SERPL-SCNC: 108 MMOL/L (ref 98–107)
CHOLEST SERPL-MCNC: 112 MG/DL
CO2 SERPL-SCNC: 26 MMOL/L (ref 20–32)
CO2 SERPL-SCNC: 26 MMOL/L (ref 20–32)
CO2 SERPL-SCNC: 28 MMOL/L (ref 20–32)
CO2 SERPL-SCNC: 28 MMOL/L (ref 20–32)
CO2 SERPL-SCNC: 30 MMOL/L (ref 20–32)
COLOR UR AUTO: YELLOW
CREAT SERPL-MCNC: 1.03 MG/DL (ref 0.66–1.25)
CREAT SERPL-MCNC: 1.09 MG/DL (ref 0.67–1.17)
CREAT SERPL-MCNC: 1.1 MG/DL (ref 0.67–1.17)
CREAT SERPL-MCNC: 1.15 MG/DL (ref 0.66–1.25)
CREAT SERPL-MCNC: 1.16 MG/DL (ref 0.67–1.17)
CREAT SERPL-MCNC: 1.17 MG/DL (ref 0.67–1.17)
CREAT SERPL-MCNC: 1.2 MG/DL (ref 0.66–1.25)
CREAT SERPL-MCNC: 1.25 MG/DL (ref 0.66–1.25)
CREAT SERPL-MCNC: 1.25 MG/DL (ref 0.67–1.17)
CREAT SERPL-MCNC: 1.27 MG/DL (ref 0.67–1.17)
CREAT SERPL-MCNC: 1.29 MG/DL (ref 0.66–1.25)
D DIMER PPP FEU-MCNC: 0.56 UG/ML FEU (ref 0–0.5)
D DIMER PPP FEU-MCNC: 1.14 UG/ML FEU (ref 0–0.5)
DEPRECATED HCO3 PLAS-SCNC: 25 MMOL/L (ref 22–29)
DEPRECATED HCO3 PLAS-SCNC: 26 MMOL/L (ref 22–29)
DEPRECATED HCO3 PLAS-SCNC: 27 MMOL/L (ref 22–29)
DEPRECATED HCO3 PLAS-SCNC: 28 MMOL/L (ref 22–29)
DIASTOLIC BLOOD PRESSURE - MUSE: NORMAL MMHG
EOSINOPHIL # BLD AUTO: 0 10E3/UL (ref 0–0.7)
EOSINOPHIL NFR BLD AUTO: 0 %
EOSINOPHIL NFR BLD AUTO: 1 %
ERYTHROCYTE [DISTWIDTH] IN BLOOD BY AUTOMATED COUNT: 12.2 % (ref 10–15)
ERYTHROCYTE [DISTWIDTH] IN BLOOD BY AUTOMATED COUNT: 13.3 % (ref 10–15)
ERYTHROCYTE [DISTWIDTH] IN BLOOD BY AUTOMATED COUNT: 13.6 % (ref 10–15)
ERYTHROCYTE [DISTWIDTH] IN BLOOD BY AUTOMATED COUNT: 13.8 % (ref 10–15)
ERYTHROCYTE [DISTWIDTH] IN BLOOD BY AUTOMATED COUNT: 14.2 % (ref 10–15)
ERYTHROCYTE [DISTWIDTH] IN BLOOD BY AUTOMATED COUNT: 14.2 % (ref 10–15)
FASTING STATUS PATIENT QL REPORTED: NO
FLUAV RNA SPEC QL NAA+PROBE: NEGATIVE
FLUBV RNA RESP QL NAA+PROBE: NEGATIVE
FOLATE SERPL-MCNC: 28.1 NG/ML (ref 4.6–34.8)
GFR SERPL CREATININE-BSD FRML MDRD: 53 ML/MIN/1.73M2
GFR SERPL CREATININE-BSD FRML MDRD: 54 ML/MIN/1.73M2
GFR SERPL CREATININE-BSD FRML MDRD: 55 ML/MIN/1.73M2
GFR SERPL CREATININE-BSD FRML MDRD: 55 ML/MIN/1.73M2
GFR SERPL CREATININE-BSD FRML MDRD: 58 ML/MIN/1.73M2
GFR SERPL CREATININE-BSD FRML MDRD: 60 ML/MIN/1.73M2
GFR SERPL CREATININE-BSD FRML MDRD: 60 ML/MIN/1.73M2
GFR SERPL CREATININE-BSD FRML MDRD: 61 ML/MIN/1.73M2
GFR SERPL CREATININE-BSD FRML MDRD: 64 ML/MIN/1.73M2
GFR SERPL CREATININE-BSD FRML MDRD: 65 ML/MIN/1.73M2
GFR SERPL CREATININE-BSD FRML MDRD: 69 ML/MIN/1.73M2
GLUCOSE BLD-MCNC: 108 MG/DL (ref 70–99)
GLUCOSE BLD-MCNC: 112 MG/DL (ref 70–99)
GLUCOSE BLD-MCNC: 115 MG/DL (ref 70–99)
GLUCOSE BLD-MCNC: 142 MG/DL (ref 70–99)
GLUCOSE BLD-MCNC: 99 MG/DL (ref 70–99)
GLUCOSE SERPL-MCNC: 102 MG/DL (ref 70–99)
GLUCOSE SERPL-MCNC: 106 MG/DL (ref 70–99)
GLUCOSE SERPL-MCNC: 107 MG/DL (ref 70–99)
GLUCOSE SERPL-MCNC: 161 MG/DL (ref 70–99)
GLUCOSE UR STRIP-MCNC: NEGATIVE MG/DL
HCT VFR BLD AUTO: 37.7 % (ref 40–53)
HCT VFR BLD AUTO: 38.3 % (ref 40–53)
HCT VFR BLD AUTO: 39.5 % (ref 40–53)
HCT VFR BLD AUTO: 40.7 % (ref 40–53)
HCT VFR BLD AUTO: 41 % (ref 40–53)
HCT VFR BLD AUTO: 42.2 % (ref 40–53)
HCT VFR BLD AUTO: 44.3 % (ref 40–53)
HCT VFR BLD AUTO: 46.1 % (ref 40–53)
HDLC SERPL-MCNC: 45 MG/DL
HGB BLD-MCNC: 12.2 G/DL (ref 13.3–17.7)
HGB BLD-MCNC: 12.4 G/DL (ref 13.3–17.7)
HGB BLD-MCNC: 12.7 G/DL (ref 13.3–17.7)
HGB BLD-MCNC: 13 G/DL (ref 13.3–17.7)
HGB BLD-MCNC: 13.1 G/DL (ref 13.3–17.7)
HGB BLD-MCNC: 13.5 G/DL (ref 13.3–17.7)
HGB BLD-MCNC: 13.9 G/DL (ref 13.3–17.7)
HGB BLD-MCNC: 14.4 G/DL (ref 13.3–17.7)
HGB UR QL STRIP: NEGATIVE
HOLD SPECIMEN: NORMAL
HYALINE CASTS: 7 /LPF
IMM GRANULOCYTES # BLD: 0 10E3/UL
IMM GRANULOCYTES NFR BLD: 0 %
INR PPP: 1.14 (ref 0.85–1.15)
INTERPRETATION ECG - MUSE: NORMAL
KETONES UR STRIP-MCNC: NEGATIVE MG/DL
LDLC SERPL CALC-MCNC: 45 MG/DL
LEUKOCYTE ESTERASE UR QL STRIP: NEGATIVE
LVEF ECHO: NORMAL
LYMPHOCYTES # BLD AUTO: 1.1 10E3/UL (ref 0.8–5.3)
LYMPHOCYTES # BLD AUTO: 1.2 10E3/UL (ref 0.8–5.3)
LYMPHOCYTES # BLD AUTO: 1.7 10E3/UL (ref 0.8–5.3)
LYMPHOCYTES # BLD AUTO: 1.8 10E3/UL (ref 0.8–5.3)
LYMPHOCYTES NFR BLD AUTO: 11 %
LYMPHOCYTES NFR BLD AUTO: 12 %
LYMPHOCYTES NFR BLD AUTO: 14 %
LYMPHOCYTES NFR BLD AUTO: 22 %
MCH RBC QN AUTO: 31.5 PG (ref 26.5–33)
MCH RBC QN AUTO: 31.6 PG (ref 26.5–33)
MCH RBC QN AUTO: 31.6 PG (ref 26.5–33)
MCH RBC QN AUTO: 31.8 PG (ref 26.5–33)
MCH RBC QN AUTO: 32 PG (ref 26.5–33)
MCH RBC QN AUTO: 32.4 PG (ref 26.5–33)
MCHC RBC AUTO-ENTMCNC: 31.2 G/DL (ref 31.5–36.5)
MCHC RBC AUTO-ENTMCNC: 31.4 G/DL (ref 31.5–36.5)
MCHC RBC AUTO-ENTMCNC: 31.9 G/DL (ref 31.5–36.5)
MCHC RBC AUTO-ENTMCNC: 32 G/DL (ref 31.5–36.5)
MCHC RBC AUTO-ENTMCNC: 32 G/DL (ref 31.5–36.5)
MCHC RBC AUTO-ENTMCNC: 32.2 G/DL (ref 31.5–36.5)
MCHC RBC AUTO-ENTMCNC: 32.4 G/DL (ref 31.5–36.5)
MCHC RBC AUTO-ENTMCNC: 32.4 G/DL (ref 31.5–36.5)
MCV RBC AUTO: 101 FL (ref 78–100)
MCV RBC AUTO: 101 FL (ref 78–100)
MCV RBC AUTO: 102 FL (ref 78–100)
MCV RBC AUTO: 97 FL (ref 78–100)
MCV RBC AUTO: 98 FL (ref 78–100)
MCV RBC AUTO: 99 FL (ref 78–100)
METHYLMALONATE SERPL-SCNC: 0.28 UMOL/L (ref 0–0.4)
MONOCYTES # BLD AUTO: 0.6 10E3/UL (ref 0–1.3)
MONOCYTES # BLD AUTO: 0.7 10E3/UL (ref 0–1.3)
MONOCYTES # BLD AUTO: 0.7 10E3/UL (ref 0–1.3)
MONOCYTES # BLD AUTO: 1 10E3/UL (ref 0–1.3)
MONOCYTES NFR BLD AUTO: 7 %
MONOCYTES NFR BLD AUTO: 7 %
MONOCYTES NFR BLD AUTO: 8 %
MONOCYTES NFR BLD AUTO: 8 %
MUCOUS THREADS #/AREA URNS LPF: PRESENT /LPF
NEUTROPHILS # BLD AUTO: 5.3 10E3/UL (ref 1.6–8.3)
NEUTROPHILS # BLD AUTO: 7.9 10E3/UL (ref 1.6–8.3)
NEUTROPHILS # BLD AUTO: 9 10E3/UL (ref 1.6–8.3)
NEUTROPHILS # BLD AUTO: 9.2 10E3/UL (ref 1.6–8.3)
NEUTROPHILS NFR BLD AUTO: 68 %
NEUTROPHILS NFR BLD AUTO: 78 %
NEUTROPHILS NFR BLD AUTO: 81 %
NEUTROPHILS NFR BLD AUTO: 82 %
NITRATE UR QL: NEGATIVE
NONHDLC SERPL-MCNC: 67 MG/DL
NRBC # BLD AUTO: 0 10E3/UL
NRBC BLD AUTO-RTO: 0 /100
NT-PROBNP SERPL-MCNC: 3660 PG/ML (ref 0–1800)
NT-PROBNP SERPL-MCNC: 4718 PG/ML (ref 0–1800)
NT-PROBNP SERPL-MCNC: 9274 PG/ML (ref 0–1800)
NT-PROBNP SERPL-MCNC: ABNORMAL PG/ML (ref 0–1800)
P AXIS - MUSE: 67 DEGREES
P AXIS - MUSE: 72 DEGREES
P AXIS - MUSE: 74 DEGREES
P AXIS - MUSE: 76 DEGREES
P AXIS - MUSE: 78 DEGREES
PH UR STRIP: 5 [PH] (ref 5–7)
PLATELET # BLD AUTO: 209 10E3/UL (ref 150–450)
PLATELET # BLD AUTO: 211 10E3/UL (ref 150–450)
PLATELET # BLD AUTO: 221 10E3/UL (ref 150–450)
PLATELET # BLD AUTO: 239 10E3/UL (ref 150–450)
PLATELET # BLD AUTO: 271 10E3/UL (ref 150–450)
PLATELET # BLD AUTO: 294 10E3/UL (ref 150–450)
PLATELET # BLD AUTO: 297 10E3/UL (ref 150–450)
PLATELET # BLD AUTO: 355 10E3/UL (ref 150–450)
POTASSIUM BLD-SCNC: 3.4 MMOL/L (ref 3.4–5.3)
POTASSIUM BLD-SCNC: 3.5 MMOL/L (ref 3.4–5.3)
POTASSIUM BLD-SCNC: 3.6 MMOL/L (ref 3.4–5.3)
POTASSIUM BLD-SCNC: 4.1 MMOL/L (ref 3.4–5.3)
POTASSIUM BLD-SCNC: 4.2 MMOL/L (ref 3.4–5.3)
POTASSIUM BLD-SCNC: 5.2 MMOL/L (ref 3.4–5.3)
POTASSIUM SERPL-SCNC: 3.9 MMOL/L (ref 3.4–5.3)
POTASSIUM SERPL-SCNC: 4 MMOL/L (ref 3.4–5.3)
POTASSIUM SERPL-SCNC: 4.1 MMOL/L (ref 3.4–5.3)
POTASSIUM SERPL-SCNC: 4.8 MMOL/L (ref 3.4–5.3)
POTASSIUM SERPL-SCNC: 5.2 MMOL/L (ref 3.4–5.3)
PR INTERVAL - MUSE: 146 MS
PR INTERVAL - MUSE: 150 MS
PR INTERVAL - MUSE: 166 MS
PR INTERVAL - MUSE: 186 MS
PR INTERVAL - MUSE: 198 MS
PROCALCITONIN SERPL IA-MCNC: 0.07 NG/ML
PROCALCITONIN SERPL-MCNC: <0.05 NG/ML
PROT SERPL-MCNC: 6.7 G/DL (ref 6.8–8.8)
PROT SERPL-MCNC: 7.2 G/DL (ref 6.8–8.8)
PROT SERPL-MCNC: 7.5 G/DL (ref 6.4–8.3)
PYRIDOXAL PHOS SERPL-SCNC: 112.5 NMOL/L
QRS DURATION - MUSE: 108 MS
QRS DURATION - MUSE: 156 MS
QRS DURATION - MUSE: 166 MS
QRS DURATION - MUSE: 168 MS
QRS DURATION - MUSE: 168 MS
QT - MUSE: 418 MS
QT - MUSE: 442 MS
QT - MUSE: 454 MS
QT - MUSE: 462 MS
QT - MUSE: 470 MS
QTC - MUSE: 463 MS
QTC - MUSE: 507 MS
QTC - MUSE: 522 MS
QTC - MUSE: 536 MS
QTC - MUSE: 539 MS
R AXIS - MUSE: 45 DEGREES
R AXIS - MUSE: 51 DEGREES
R AXIS - MUSE: 68 DEGREES
R AXIS - MUSE: 68 DEGREES
R AXIS - MUSE: 73 DEGREES
RBC # BLD AUTO: 3.87 10E6/UL (ref 4.4–5.9)
RBC # BLD AUTO: 3.87 10E6/UL (ref 4.4–5.9)
RBC # BLD AUTO: 4 10E6/UL (ref 4.4–5.9)
RBC # BLD AUTO: 4.04 10E6/UL (ref 4.4–5.9)
RBC # BLD AUTO: 4.13 10E6/UL (ref 4.4–5.9)
RBC # BLD AUTO: 4.29 10E6/UL (ref 4.4–5.9)
RBC # BLD AUTO: 4.4 10E6/UL (ref 4.4–5.9)
RBC # BLD AUTO: 4.55 10E6/UL (ref 4.4–5.9)
RBC URINE: <1 /HPF
RSV RNA SPEC NAA+PROBE: NEGATIVE
RSV RNA SPEC NAA+PROBE: NEGATIVE
SARS-COV-2 RNA RESP QL NAA+PROBE: NEGATIVE
SODIUM SERPL-SCNC: 137 MMOL/L (ref 133–144)
SODIUM SERPL-SCNC: 138 MMOL/L (ref 136–145)
SODIUM SERPL-SCNC: 141 MMOL/L (ref 133–144)
SODIUM SERPL-SCNC: 141 MMOL/L (ref 133–144)
SODIUM SERPL-SCNC: 142 MMOL/L (ref 133–144)
SODIUM SERPL-SCNC: 142 MMOL/L (ref 133–144)
SODIUM SERPL-SCNC: 143 MMOL/L (ref 136–145)
SODIUM SERPL-SCNC: 144 MMOL/L (ref 136–145)
SODIUM SERPL-SCNC: 146 MMOL/L (ref 136–145)
SP GR UR STRIP: 1.01 (ref 1–1.03)
SYSTOLIC BLOOD PRESSURE - MUSE: NORMAL MMHG
T AXIS - MUSE: 113 DEGREES
T AXIS - MUSE: 130 DEGREES
T AXIS - MUSE: 252 DEGREES
T AXIS - MUSE: 85 DEGREES
T AXIS - MUSE: 93 DEGREES
T4 FREE SERPL-MCNC: 1.07 NG/DL (ref 0.9–1.7)
TRIGL SERPL-MCNC: 111 MG/DL
TROPONIN I SERPL HS-MCNC: 15 NG/L
TROPONIN I SERPL HS-MCNC: 18 NG/L
TROPONIN I SERPL HS-MCNC: 48 NG/L
TROPONIN T SERPL HS-MCNC: 23 NG/L
TROPONIN T SERPL HS-MCNC: 23 NG/L
TROPONIN T SERPL HS-MCNC: 31 NG/L
TROPONIN T SERPL HS-MCNC: 34 NG/L
TSH SERPL DL<=0.005 MIU/L-ACNC: 6.8 UIU/ML (ref 0.3–4.2)
UROBILINOGEN UR STRIP-MCNC: NORMAL MG/DL
VENTRICULAR RATE- MUSE: 66 BPM
VENTRICULAR RATE- MUSE: 70 BPM
VENTRICULAR RATE- MUSE: 82 BPM
VENTRICULAR RATE- MUSE: 84 BPM
VENTRICULAR RATE- MUSE: 94 BPM
VIT B1 PYROPHOSHATE BLD-SCNC: 145 NMOL/L
VIT B12 SERPL-MCNC: 620 PG/ML (ref 232–1245)
WBC # BLD AUTO: 11 10E3/UL (ref 4–11)
WBC # BLD AUTO: 11.4 10E3/UL (ref 4–11)
WBC # BLD AUTO: 12 10E3/UL (ref 4–11)
WBC # BLD AUTO: 19.7 10E3/UL (ref 4–11)
WBC # BLD AUTO: 6.5 10E3/UL (ref 4–11)
WBC # BLD AUTO: 7.2 10E3/UL (ref 4–11)
WBC # BLD AUTO: 7.9 10E3/UL (ref 4–11)
WBC # BLD AUTO: 9.7 10E3/UL (ref 4–11)
WBC URINE: 1 /HPF

## 2022-01-01 PROCEDURE — 93005 ELECTROCARDIOGRAM TRACING: CPT

## 2022-01-01 PROCEDURE — 99239 HOSP IP/OBS DSCHRG MGMT >30: CPT | Performed by: INTERNAL MEDICINE

## 2022-01-01 PROCEDURE — 84484 ASSAY OF TROPONIN QUANT: CPT | Mod: 91 | Performed by: EMERGENCY MEDICINE

## 2022-01-01 PROCEDURE — 80061 LIPID PANEL: CPT

## 2022-01-01 PROCEDURE — 250N000013 HC RX MED GY IP 250 OP 250 PS 637: Performed by: INTERNAL MEDICINE

## 2022-01-01 PROCEDURE — 250N000011 HC RX IP 250 OP 636: Performed by: EMERGENCY MEDICINE

## 2022-01-01 PROCEDURE — 93306 TTE W/DOPPLER COMPLETE: CPT | Mod: 26 | Performed by: INTERNAL MEDICINE

## 2022-01-01 PROCEDURE — 85027 COMPLETE CBC AUTOMATED: CPT | Performed by: INTERNAL MEDICINE

## 2022-01-01 PROCEDURE — 36415 COLL VENOUS BLD VENIPUNCTURE: CPT | Performed by: EMERGENCY MEDICINE

## 2022-01-01 PROCEDURE — 99214 OFFICE O/P EST MOD 30 MIN: CPT | Performed by: PHYSICIAN ASSISTANT

## 2022-01-01 PROCEDURE — 99285 EMERGENCY DEPT VISIT HI MDM: CPT | Mod: 25

## 2022-01-01 PROCEDURE — 85027 COMPLETE CBC AUTOMATED: CPT

## 2022-01-01 PROCEDURE — 36415 COLL VENOUS BLD VENIPUNCTURE: CPT

## 2022-01-01 PROCEDURE — 99207 PR MD CERTIFICATION HHA PATIENT: CPT | Performed by: INTERNAL MEDICINE

## 2022-01-01 PROCEDURE — 83921 ORGANIC ACID SINGLE QUANT: CPT

## 2022-01-01 PROCEDURE — 120N000001 HC R&B MED SURG/OB

## 2022-01-01 PROCEDURE — 82607 VITAMIN B-12: CPT

## 2022-01-01 PROCEDURE — 87636 SARSCOV2 & INF A&B AMP PRB: CPT | Performed by: EMERGENCY MEDICINE

## 2022-01-01 PROCEDURE — G1010 CDSM STANSON: HCPCS

## 2022-01-01 PROCEDURE — 96374 THER/PROPH/DIAG INJ IV PUSH: CPT | Mod: 59

## 2022-01-01 PROCEDURE — 99223 1ST HOSP IP/OBS HIGH 75: CPT | Performed by: CLINICAL NURSE SPECIALIST

## 2022-01-01 PROCEDURE — 87637 SARSCOV2&INF A&B&RSV AMP PRB: CPT | Performed by: EMERGENCY MEDICINE

## 2022-01-01 PROCEDURE — 99207 PR APP CREDIT; MD BILLING SHARED VISIT: CPT | Performed by: PHYSICIAN ASSISTANT

## 2022-01-01 PROCEDURE — 99214 OFFICE O/P EST MOD 30 MIN: CPT | Performed by: INTERNAL MEDICINE

## 2022-01-01 PROCEDURE — 84484 ASSAY OF TROPONIN QUANT: CPT | Performed by: EMERGENCY MEDICINE

## 2022-01-01 PROCEDURE — 97535 SELF CARE MNGMENT TRAINING: CPT | Mod: GO

## 2022-01-01 PROCEDURE — 84145 PROCALCITONIN (PCT): CPT | Performed by: PHYSICIAN ASSISTANT

## 2022-01-01 PROCEDURE — 82746 ASSAY OF FOLIC ACID SERUM: CPT

## 2022-01-01 PROCEDURE — 250N000009 HC RX 250: Performed by: EMERGENCY MEDICINE

## 2022-01-01 PROCEDURE — 80048 BASIC METABOLIC PNL TOTAL CA: CPT | Performed by: EMERGENCY MEDICINE

## 2022-01-01 PROCEDURE — 36415 COLL VENOUS BLD VENIPUNCTURE: CPT | Performed by: PHYSICIAN ASSISTANT

## 2022-01-01 PROCEDURE — 80048 BASIC METABOLIC PNL TOTAL CA: CPT | Performed by: PHYSICIAN ASSISTANT

## 2022-01-01 PROCEDURE — 80048 BASIC METABOLIC PNL TOTAL CA: CPT

## 2022-01-01 PROCEDURE — 83880 ASSAY OF NATRIURETIC PEPTIDE: CPT | Performed by: EMERGENCY MEDICINE

## 2022-01-01 PROCEDURE — 82310 ASSAY OF CALCIUM: CPT | Performed by: INTERNAL MEDICINE

## 2022-01-01 PROCEDURE — 84132 ASSAY OF SERUM POTASSIUM: CPT | Performed by: INTERNAL MEDICINE

## 2022-01-01 PROCEDURE — 99213 OFFICE O/P EST LOW 20 MIN: CPT | Performed by: INTERNAL MEDICINE

## 2022-01-01 PROCEDURE — 82565 ASSAY OF CREATININE: CPT | Performed by: INTERNAL MEDICINE

## 2022-01-01 PROCEDURE — 97166 OT EVAL MOD COMPLEX 45 MIN: CPT | Mod: GO

## 2022-01-01 PROCEDURE — 85610 PROTHROMBIN TIME: CPT | Performed by: EMERGENCY MEDICINE

## 2022-01-01 PROCEDURE — 93306 TTE W/DOPPLER COMPLETE: CPT

## 2022-01-01 PROCEDURE — 99232 SBSQ HOSP IP/OBS MODERATE 35: CPT | Performed by: INTERNAL MEDICINE

## 2022-01-01 PROCEDURE — 80053 COMPREHEN METABOLIC PANEL: CPT | Performed by: INTERNAL MEDICINE

## 2022-01-01 PROCEDURE — 85025 COMPLETE CBC W/AUTO DIFF WBC: CPT | Performed by: EMERGENCY MEDICINE

## 2022-01-01 PROCEDURE — 250N000013 HC RX MED GY IP 250 OP 250 PS 637: Performed by: PHYSICIAN ASSISTANT

## 2022-01-01 PROCEDURE — 99233 SBSQ HOSP IP/OBS HIGH 50: CPT | Performed by: INTERNAL MEDICINE

## 2022-01-01 PROCEDURE — 84207 ASSAY OF VITAMIN B-6: CPT

## 2022-01-01 PROCEDURE — G0180 MD CERTIFICATION HHA PATIENT: HCPCS | Performed by: INTERNAL MEDICINE

## 2022-01-01 PROCEDURE — 97165 OT EVAL LOW COMPLEX 30 MIN: CPT | Mod: GO

## 2022-01-01 PROCEDURE — 85379 FIBRIN DEGRADATION QUANT: CPT | Performed by: EMERGENCY MEDICINE

## 2022-01-01 PROCEDURE — 82565 ASSAY OF CREATININE: CPT | Performed by: PHYSICIAN ASSISTANT

## 2022-01-01 PROCEDURE — 84425 ASSAY OF VITAMIN B-1: CPT

## 2022-01-01 PROCEDURE — 82040 ASSAY OF SERUM ALBUMIN: CPT | Performed by: EMERGENCY MEDICINE

## 2022-01-01 PROCEDURE — C9803 HOPD COVID-19 SPEC COLLECT: HCPCS

## 2022-01-01 PROCEDURE — 258N000003 HC RX IP 258 OP 636: Performed by: INTERNAL MEDICINE

## 2022-01-01 PROCEDURE — 99495 TRANSJ CARE MGMT MOD F2F 14D: CPT | Performed by: INTERNAL MEDICINE

## 2022-01-01 PROCEDURE — 84443 ASSAY THYROID STIM HORMONE: CPT

## 2022-01-01 PROCEDURE — 999N000111 HC STATISTIC OT IP EVAL DEFER

## 2022-01-01 PROCEDURE — 99223 1ST HOSP IP/OBS HIGH 75: CPT | Mod: AI | Performed by: INTERNAL MEDICINE

## 2022-01-01 PROCEDURE — 250N000011 HC RX IP 250 OP 636: Performed by: PHYSICIAN ASSISTANT

## 2022-01-01 PROCEDURE — 80048 BASIC METABOLIC PNL TOTAL CA: CPT | Performed by: INTERNAL MEDICINE

## 2022-01-01 PROCEDURE — 250N000011 HC RX IP 250 OP 636: Performed by: INTERNAL MEDICINE

## 2022-01-01 PROCEDURE — 97161 PT EVAL LOW COMPLEX 20 MIN: CPT | Mod: GP | Performed by: PHYSICAL THERAPIST

## 2022-01-01 PROCEDURE — 83880 ASSAY OF NATRIURETIC PEPTIDE: CPT | Performed by: INTERNAL MEDICINE

## 2022-01-01 PROCEDURE — 80053 COMPREHEN METABOLIC PANEL: CPT | Performed by: EMERGENCY MEDICINE

## 2022-01-01 PROCEDURE — 96374 THER/PROPH/DIAG INJ IV PUSH: CPT

## 2022-01-01 PROCEDURE — 99285 EMERGENCY DEPT VISIT HI MDM: CPT | Mod: CS,25

## 2022-01-01 PROCEDURE — 36415 COLL VENOUS BLD VENIPUNCTURE: CPT | Performed by: INTERNAL MEDICINE

## 2022-01-01 PROCEDURE — 99204 OFFICE O/P NEW MOD 45 MIN: CPT | Performed by: ORTHOPAEDIC SURGERY

## 2022-01-01 PROCEDURE — 99284 EMERGENCY DEPT VISIT MOD MDM: CPT | Mod: 25

## 2022-01-01 PROCEDURE — 84484 ASSAY OF TROPONIN QUANT: CPT | Performed by: INTERNAL MEDICINE

## 2022-01-01 PROCEDURE — 99215 OFFICE O/P EST HI 40 MIN: CPT | Performed by: PHYSICIAN ASSISTANT

## 2022-01-01 PROCEDURE — 71046 X-RAY EXAM CHEST 2 VIEWS: CPT

## 2022-01-01 PROCEDURE — 99205 OFFICE O/P NEW HI 60 MIN: CPT | Performed by: PSYCHIATRY & NEUROLOGY

## 2022-01-01 PROCEDURE — 84145 PROCALCITONIN (PCT): CPT | Performed by: INTERNAL MEDICINE

## 2022-01-01 PROCEDURE — 96125 COGNITIVE TEST BY HC PRO: CPT | Mod: GO

## 2022-01-01 PROCEDURE — 84439 ASSAY OF FREE THYROXINE: CPT

## 2022-01-01 PROCEDURE — 81001 URINALYSIS AUTO W/SCOPE: CPT | Performed by: EMERGENCY MEDICINE

## 2022-01-01 PROCEDURE — 82248 BILIRUBIN DIRECT: CPT | Performed by: INTERNAL MEDICINE

## 2022-01-01 PROCEDURE — 85027 COMPLETE CBC AUTOMATED: CPT | Performed by: PHYSICIAN ASSISTANT

## 2022-01-01 PROCEDURE — G1004 CDSM NDSC: HCPCS

## 2022-01-01 PROCEDURE — 85014 HEMATOCRIT: CPT | Performed by: EMERGENCY MEDICINE

## 2022-01-01 PROCEDURE — 99215 OFFICE O/P EST HI 40 MIN: CPT | Performed by: INTERNAL MEDICINE

## 2022-01-01 RX ORDER — FUROSEMIDE 20 MG
TABLET ORAL
Qty: 90 TABLET | Refills: 3 | COMMUNITY
Start: 2022-01-01 | End: 2023-01-01

## 2022-01-01 RX ORDER — ATORVASTATIN CALCIUM 20 MG/1
20 TABLET, FILM COATED ORAL DAILY
Qty: 90 TABLET | Refills: 3 | Status: SHIPPED | OUTPATIENT
Start: 2022-01-01

## 2022-01-01 RX ORDER — FUROSEMIDE 20 MG
20 TABLET ORAL DAILY
Qty: 45 TABLET | Refills: 0 | Status: SHIPPED | OUTPATIENT
Start: 2022-01-01 | End: 2022-01-01

## 2022-01-01 RX ORDER — AMOXICILLIN 250 MG
2 CAPSULE ORAL 2 TIMES DAILY
Status: DISCONTINUED | OUTPATIENT
Start: 2022-01-01 | End: 2022-01-01 | Stop reason: HOSPADM

## 2022-01-01 RX ORDER — NITROGLYCERIN 0.4 MG/1
0.4 TABLET SUBLINGUAL EVERY 5 MIN PRN
Qty: 30 TABLET | Refills: 0 | Status: SHIPPED | OUTPATIENT
Start: 2022-01-01

## 2022-01-01 RX ORDER — FUROSEMIDE 20 MG
20 TABLET ORAL DAILY PRN
Qty: 30 TABLET | Refills: 0 | Status: SHIPPED | OUTPATIENT
Start: 2022-01-01 | End: 2022-01-01

## 2022-01-01 RX ORDER — ATORVASTATIN CALCIUM 20 MG/1
20 TABLET, FILM COATED ORAL DAILY
Qty: 90 TABLET | Refills: 0 | Status: SHIPPED | OUTPATIENT
Start: 2022-01-01 | End: 2022-01-01

## 2022-01-01 RX ORDER — IOPAMIDOL 755 MG/ML
500 INJECTION, SOLUTION INTRAVASCULAR ONCE
Status: COMPLETED | OUTPATIENT
Start: 2022-01-01 | End: 2022-01-01

## 2022-01-01 RX ORDER — FUROSEMIDE 20 MG
20 TABLET ORAL DAILY PRN
Qty: 60 TABLET | Refills: 3 | Status: ON HOLD | OUTPATIENT
Start: 2022-01-01 | End: 2022-01-01

## 2022-01-01 RX ORDER — AMOXICILLIN 250 MG
2 CAPSULE ORAL 2 TIMES DAILY PRN
Status: DISCONTINUED | OUTPATIENT
Start: 2022-01-01 | End: 2022-01-01 | Stop reason: HOSPADM

## 2022-01-01 RX ORDER — ATORVASTATIN CALCIUM 20 MG/1
20 TABLET, FILM COATED ORAL EVERY EVENING
Status: DISCONTINUED | OUTPATIENT
Start: 2022-01-01 | End: 2022-01-01 | Stop reason: HOSPADM

## 2022-01-01 RX ORDER — NITROGLYCERIN 0.4 MG/1
0.4 TABLET SUBLINGUAL EVERY 5 MIN PRN
Status: DISCONTINUED | OUTPATIENT
Start: 2022-01-01 | End: 2022-01-01 | Stop reason: HOSPADM

## 2022-01-01 RX ORDER — ACETAMINOPHEN 325 MG/1
650 TABLET ORAL EVERY 6 HOURS PRN
Status: DISCONTINUED | OUTPATIENT
Start: 2022-01-01 | End: 2022-01-01 | Stop reason: HOSPADM

## 2022-01-01 RX ORDER — IPRATROPIUM BROMIDE AND ALBUTEROL SULFATE 2.5; .5 MG/3ML; MG/3ML
3 SOLUTION RESPIRATORY (INHALATION) EVERY 4 HOURS PRN
Status: DISCONTINUED | OUTPATIENT
Start: 2022-01-01 | End: 2022-01-01 | Stop reason: HOSPADM

## 2022-01-01 RX ORDER — CLOPIDOGREL BISULFATE 75 MG/1
75 TABLET ORAL DAILY
Status: DISCONTINUED | OUTPATIENT
Start: 2022-01-01 | End: 2022-01-01 | Stop reason: HOSPADM

## 2022-01-01 RX ORDER — AZITHROMYCIN 250 MG/1
250 TABLET, FILM COATED ORAL DAILY
Qty: 3 TABLET | Refills: 0 | Status: SHIPPED | OUTPATIENT
Start: 2022-01-01 | End: 2022-01-01

## 2022-01-01 RX ORDER — FUROSEMIDE 10 MG/ML
40 INJECTION INTRAMUSCULAR; INTRAVENOUS
Status: DISCONTINUED | OUTPATIENT
Start: 2022-01-01 | End: 2022-01-01 | Stop reason: HOSPADM

## 2022-01-01 RX ORDER — SPIRONOLACTONE 25 MG
12.5 TABLET ORAL DAILY
Status: DISCONTINUED | OUTPATIENT
Start: 2022-01-01 | End: 2022-01-01 | Stop reason: HOSPADM

## 2022-01-01 RX ORDER — ONDANSETRON 2 MG/ML
4 INJECTION INTRAMUSCULAR; INTRAVENOUS EVERY 6 HOURS PRN
Status: DISCONTINUED | OUTPATIENT
Start: 2022-01-01 | End: 2022-01-01 | Stop reason: HOSPADM

## 2022-01-01 RX ORDER — ATORVASTATIN CALCIUM 20 MG/1
20 TABLET, FILM COATED ORAL DAILY
Qty: 90 TABLET | Refills: 3 | OUTPATIENT
Start: 2022-01-01

## 2022-01-01 RX ORDER — FUROSEMIDE 10 MG/ML
20 INJECTION INTRAMUSCULAR; INTRAVENOUS ONCE
Status: COMPLETED | OUTPATIENT
Start: 2022-01-01 | End: 2022-01-01

## 2022-01-01 RX ORDER — FUROSEMIDE 10 MG/ML
20 INJECTION INTRAMUSCULAR; INTRAVENOUS 2 TIMES DAILY
Status: COMPLETED | OUTPATIENT
Start: 2022-01-01 | End: 2022-01-01

## 2022-01-01 RX ORDER — POLYETHYLENE GLYCOL 3350 17 G/17G
17 POWDER, FOR SOLUTION ORAL DAILY PRN
Status: DISCONTINUED | OUTPATIENT
Start: 2022-01-01 | End: 2022-01-01 | Stop reason: HOSPADM

## 2022-01-01 RX ORDER — TAMSULOSIN HYDROCHLORIDE 0.4 MG/1
0.4 CAPSULE ORAL DAILY
Status: DISCONTINUED | OUTPATIENT
Start: 2022-01-01 | End: 2022-01-01 | Stop reason: HOSPADM

## 2022-01-01 RX ORDER — DOXYCYCLINE 100 MG/1
100 CAPSULE ORAL 2 TIMES DAILY
Qty: 14 CAPSULE | Refills: 0 | Status: SHIPPED | OUTPATIENT
Start: 2022-01-01 | End: 2022-01-01

## 2022-01-01 RX ORDER — ASPIRIN 81 MG/1
81 TABLET ORAL EVERY EVENING
Status: DISCONTINUED | OUTPATIENT
Start: 2022-01-01 | End: 2022-01-01 | Stop reason: HOSPADM

## 2022-01-01 RX ORDER — AMOXICILLIN 250 MG
1 CAPSULE ORAL 2 TIMES DAILY
Status: DISCONTINUED | OUTPATIENT
Start: 2022-01-01 | End: 2022-01-01 | Stop reason: HOSPADM

## 2022-01-01 RX ORDER — METOPROLOL SUCCINATE 50 MG/1
50 TABLET, EXTENDED RELEASE ORAL DAILY
Qty: 90 TABLET | Refills: 0 | Status: SHIPPED | OUTPATIENT
Start: 2022-01-01

## 2022-01-01 RX ORDER — AZITHROMYCIN 250 MG/1
250 TABLET, FILM COATED ORAL DAILY
Status: DISCONTINUED | OUTPATIENT
Start: 2022-01-01 | End: 2022-01-01 | Stop reason: HOSPADM

## 2022-01-01 RX ORDER — LISINOPRIL 5 MG/1
5 TABLET ORAL DAILY
Status: DISCONTINUED | OUTPATIENT
Start: 2022-01-01 | End: 2022-01-01 | Stop reason: HOSPADM

## 2022-01-01 RX ORDER — METOPROLOL SUCCINATE 50 MG/1
50 TABLET, EXTENDED RELEASE ORAL DAILY
Status: DISCONTINUED | OUTPATIENT
Start: 2022-01-01 | End: 2022-01-01 | Stop reason: HOSPADM

## 2022-01-01 RX ORDER — METOPROLOL SUCCINATE 50 MG/1
50 TABLET, EXTENDED RELEASE ORAL DAILY
Qty: 90 TABLET | Refills: 0 | Status: SHIPPED | OUTPATIENT
Start: 2022-01-01 | End: 2022-01-01

## 2022-01-01 RX ORDER — ATORVASTATIN CALCIUM 20 MG/1
20 TABLET, FILM COATED ORAL DAILY
Qty: 90 TABLET | Refills: 3 | Status: CANCELLED | OUTPATIENT
Start: 2022-01-01

## 2022-01-01 RX ORDER — ACETAMINOPHEN 650 MG/1
650 SUPPOSITORY RECTAL EVERY 6 HOURS PRN
Status: DISCONTINUED | OUTPATIENT
Start: 2022-01-01 | End: 2022-01-01 | Stop reason: HOSPADM

## 2022-01-01 RX ORDER — AZITHROMYCIN 500 MG/5ML
500 INJECTION, POWDER, LYOPHILIZED, FOR SOLUTION INTRAVENOUS ONCE
Status: COMPLETED | OUTPATIENT
Start: 2022-01-01 | End: 2022-01-01

## 2022-01-01 RX ORDER — CEFDINIR 300 MG/1
300 CAPSULE ORAL 2 TIMES DAILY
Qty: 10 CAPSULE | Refills: 0 | Status: SHIPPED | OUTPATIENT
Start: 2022-01-01 | End: 2022-01-01

## 2022-01-01 RX ORDER — TAMSULOSIN HYDROCHLORIDE 0.4 MG/1
0.4 CAPSULE ORAL DAILY
COMMUNITY
End: 2022-01-01

## 2022-01-01 RX ORDER — METOPROLOL SUCCINATE 50 MG/1
50 TABLET, EXTENDED RELEASE ORAL DAILY
Qty: 90 TABLET | Refills: 3 | Status: CANCELLED | OUTPATIENT
Start: 2022-01-01

## 2022-01-01 RX ORDER — LIDOCAINE 40 MG/G
CREAM TOPICAL
Status: DISCONTINUED | OUTPATIENT
Start: 2022-01-01 | End: 2022-01-01

## 2022-01-01 RX ORDER — IOPAMIDOL 755 MG/ML
52 INJECTION, SOLUTION INTRAVASCULAR ONCE
Status: COMPLETED | OUTPATIENT
Start: 2022-01-01 | End: 2022-01-01

## 2022-01-01 RX ORDER — ONDANSETRON 4 MG/1
4 TABLET, ORALLY DISINTEGRATING ORAL EVERY 6 HOURS PRN
Status: DISCONTINUED | OUTPATIENT
Start: 2022-01-01 | End: 2022-01-01 | Stop reason: HOSPADM

## 2022-01-01 RX ORDER — ASPIRIN 81 MG/1
81 TABLET ORAL DAILY
Status: DISCONTINUED | OUTPATIENT
Start: 2022-01-01 | End: 2022-01-01 | Stop reason: HOSPADM

## 2022-01-01 RX ORDER — CEFTRIAXONE 1 G/1
1 INJECTION, POWDER, FOR SOLUTION INTRAMUSCULAR; INTRAVENOUS EVERY 24 HOURS
Status: DISCONTINUED | OUTPATIENT
Start: 2022-01-01 | End: 2022-01-01 | Stop reason: HOSPADM

## 2022-01-01 RX ORDER — TAMSULOSIN HYDROCHLORIDE 0.4 MG/1
CAPSULE ORAL
COMMUNITY
Start: 2022-01-01 | End: 2022-01-01

## 2022-01-01 RX ORDER — FUROSEMIDE 20 MG
20 TABLET ORAL DAILY PRN
Status: DISCONTINUED | OUTPATIENT
Start: 2022-01-01 | End: 2022-01-01 | Stop reason: HOSPADM

## 2022-01-01 RX ORDER — POTASSIUM CHLORIDE 1500 MG/1
20 TABLET, EXTENDED RELEASE ORAL ONCE
Status: COMPLETED | OUTPATIENT
Start: 2022-01-01 | End: 2022-01-01

## 2022-01-01 RX ORDER — ENOXAPARIN SODIUM 100 MG/ML
30 INJECTION SUBCUTANEOUS EVERY 24 HOURS
Status: DISCONTINUED | OUTPATIENT
Start: 2022-01-01 | End: 2022-01-01

## 2022-01-01 RX ORDER — METOPROLOL SUCCINATE 50 MG/1
50 TABLET, EXTENDED RELEASE ORAL EVERY EVENING
Status: DISCONTINUED | OUTPATIENT
Start: 2022-01-01 | End: 2022-01-01 | Stop reason: HOSPADM

## 2022-01-01 RX ORDER — AMOXICILLIN 250 MG
1 CAPSULE ORAL 2 TIMES DAILY PRN
Status: DISCONTINUED | OUTPATIENT
Start: 2022-01-01 | End: 2022-01-01 | Stop reason: HOSPADM

## 2022-01-01 RX ORDER — FUROSEMIDE 20 MG
20 TABLET ORAL DAILY
Qty: 90 TABLET | Refills: 3 | Status: SHIPPED | OUTPATIENT
Start: 2022-01-01 | End: 2022-01-01

## 2022-01-01 RX ORDER — LIDOCAINE 40 MG/G
CREAM TOPICAL
Status: DISCONTINUED | OUTPATIENT
Start: 2022-01-01 | End: 2022-01-01 | Stop reason: HOSPADM

## 2022-01-01 RX ORDER — ATORVASTATIN CALCIUM 20 MG/1
20 TABLET, FILM COATED ORAL DAILY
Status: DISCONTINUED | OUTPATIENT
Start: 2022-01-01 | End: 2022-01-01 | Stop reason: HOSPADM

## 2022-01-01 RX ORDER — LISINOPRIL 5 MG/1
TABLET ORAL
Qty: 90 TABLET | Refills: 1 | Status: SHIPPED | OUTPATIENT
Start: 2022-01-01 | End: 2022-01-01

## 2022-01-01 RX ORDER — CLOPIDOGREL BISULFATE 75 MG/1
TABLET ORAL
Qty: 90 TABLET | Refills: 1 | Status: SHIPPED | OUTPATIENT
Start: 2022-01-01

## 2022-01-01 RX ORDER — FUROSEMIDE 20 MG
TABLET ORAL
Qty: 45 TABLET | Refills: 2 | Status: SHIPPED | OUTPATIENT
Start: 2022-01-01 | End: 2022-01-01

## 2022-01-01 RX ORDER — FUROSEMIDE 10 MG/ML
60 INJECTION INTRAMUSCULAR; INTRAVENOUS ONCE
Status: COMPLETED | OUTPATIENT
Start: 2022-01-01 | End: 2022-01-01

## 2022-01-01 RX ADMIN — FUROSEMIDE 60 MG: 10 INJECTION, SOLUTION INTRAMUSCULAR; INTRAVENOUS at 20:53

## 2022-01-01 RX ADMIN — CEFTRIAXONE 1 G: 1 INJECTION, POWDER, FOR SOLUTION INTRAMUSCULAR; INTRAVENOUS at 23:56

## 2022-01-01 RX ADMIN — CLOPIDOGREL BISULFATE 75 MG: 75 TABLET ORAL at 08:43

## 2022-01-01 RX ADMIN — AZITHROMYCIN MONOHYDRATE 500 MG: 500 INJECTION, POWDER, LYOPHILIZED, FOR SOLUTION INTRAVENOUS at 00:29

## 2022-01-01 RX ADMIN — SENNOSIDES AND DOCUSATE SODIUM 2 TABLET: 50; 8.6 TABLET ORAL at 08:43

## 2022-01-01 RX ADMIN — ASPIRIN 81 MG: 81 TABLET, COATED ORAL at 19:12

## 2022-01-01 RX ADMIN — AZITHROMYCIN MONOHYDRATE 250 MG: 250 TABLET ORAL at 08:23

## 2022-01-01 RX ADMIN — CLOPIDOGREL BISULFATE 75 MG: 75 TABLET ORAL at 19:12

## 2022-01-01 RX ADMIN — CLOPIDOGREL BISULFATE 75 MG: 75 TABLET ORAL at 08:48

## 2022-01-01 RX ADMIN — ATORVASTATIN CALCIUM 20 MG: 20 TABLET, FILM COATED ORAL at 19:44

## 2022-01-01 RX ADMIN — SENNOSIDES AND DOCUSATE SODIUM 2 TABLET: 50; 8.6 TABLET ORAL at 13:55

## 2022-01-01 RX ADMIN — METOPROLOL SUCCINATE 50 MG: 50 TABLET, EXTENDED RELEASE ORAL at 10:02

## 2022-01-01 RX ADMIN — METOPROLOL SUCCINATE 50 MG: 50 TABLET, EXTENDED RELEASE ORAL at 19:44

## 2022-01-01 RX ADMIN — FUROSEMIDE 20 MG: 10 INJECTION, SOLUTION INTRAMUSCULAR; INTRAVENOUS at 08:59

## 2022-01-01 RX ADMIN — AZITHROMYCIN MONOHYDRATE 250 MG: 250 TABLET ORAL at 10:02

## 2022-01-01 RX ADMIN — METOPROLOL SUCCINATE 50 MG: 50 TABLET, EXTENDED RELEASE ORAL at 20:12

## 2022-01-01 RX ADMIN — ASPIRIN 81 MG: 81 TABLET, COATED ORAL at 19:43

## 2022-01-01 RX ADMIN — ASPIRIN 81 MG: 81 TABLET, COATED ORAL at 10:02

## 2022-01-01 RX ADMIN — CLOPIDOGREL BISULFATE 75 MG: 75 TABLET ORAL at 10:03

## 2022-01-01 RX ADMIN — SENNOSIDES AND DOCUSATE SODIUM 2 TABLET: 50; 8.6 TABLET ORAL at 19:45

## 2022-01-01 RX ADMIN — SENNOSIDES AND DOCUSATE SODIUM 2 TABLET: 50; 8.6 TABLET ORAL at 20:12

## 2022-01-01 RX ADMIN — LISINOPRIL 5 MG: 5 TABLET ORAL at 10:03

## 2022-01-01 RX ADMIN — ATORVASTATIN CALCIUM 20 MG: 20 TABLET, FILM COATED ORAL at 19:12

## 2022-01-01 RX ADMIN — CLOPIDOGREL BISULFATE 75 MG: 75 TABLET ORAL at 13:55

## 2022-01-01 RX ADMIN — FUROSEMIDE 20 MG: 10 INJECTION, SOLUTION INTRAMUSCULAR; INTRAVENOUS at 20:13

## 2022-01-01 RX ADMIN — POTASSIUM CHLORIDE 20 MEQ: 20 TABLET, EXTENDED RELEASE ORAL at 10:02

## 2022-01-01 RX ADMIN — TAMSULOSIN HYDROCHLORIDE 0.4 MG: 0.4 CAPSULE ORAL at 08:43

## 2022-01-01 RX ADMIN — IOPAMIDOL 52 ML: 755 INJECTION, SOLUTION INTRAVENOUS at 19:38

## 2022-01-01 RX ADMIN — ATORVASTATIN CALCIUM 20 MG: 20 TABLET, FILM COATED ORAL at 20:12

## 2022-01-01 RX ADMIN — SENNOSIDES AND DOCUSATE SODIUM 1 TABLET: 50; 8.6 TABLET ORAL at 08:48

## 2022-01-01 RX ADMIN — TAMSULOSIN HYDROCHLORIDE 0.4 MG: 0.4 CAPSULE ORAL at 19:12

## 2022-01-01 RX ADMIN — ASPIRIN 81 MG: 81 TABLET, COATED ORAL at 20:13

## 2022-01-01 RX ADMIN — CEFTRIAXONE 1 G: 1 INJECTION, POWDER, FOR SOLUTION INTRAMUSCULAR; INTRAVENOUS at 23:46

## 2022-01-01 RX ADMIN — IOPAMIDOL 52 ML: 755 INJECTION, SOLUTION INTRAVENOUS at 09:37

## 2022-01-01 RX ADMIN — TAMSULOSIN HYDROCHLORIDE 0.4 MG: 0.4 CAPSULE ORAL at 13:55

## 2022-01-01 RX ADMIN — FUROSEMIDE 40 MG: 10 INJECTION, SOLUTION INTRAMUSCULAR; INTRAVENOUS at 08:23

## 2022-01-01 RX ADMIN — ENOXAPARIN SODIUM 30 MG: 30 INJECTION SUBCUTANEOUS at 13:55

## 2022-01-01 RX ADMIN — TAMSULOSIN HYDROCHLORIDE 0.4 MG: 0.4 CAPSULE ORAL at 10:03

## 2022-01-01 RX ADMIN — SODIUM CHLORIDE 74 ML: 9 INJECTION, SOLUTION INTRAVENOUS at 19:38

## 2022-01-01 RX ADMIN — FUROSEMIDE 20 MG: 10 INJECTION, SOLUTION INTRAMUSCULAR; INTRAVENOUS at 13:56

## 2022-01-01 RX ADMIN — ATORVASTATIN CALCIUM 20 MG: 20 TABLET, FILM COATED ORAL at 10:02

## 2022-01-01 RX ADMIN — TAMSULOSIN HYDROCHLORIDE 0.4 MG: 0.4 CAPSULE ORAL at 08:48

## 2022-01-01 ASSESSMENT — ENCOUNTER SYMPTOMS
DIARRHEA: 0
APPETITE CHANGE: 0
APPETITE CHANGE: 1
NAUSEA: 0
FEVER: 0
FEVER: 0
NEUROLOGICAL NEGATIVE: 1
SHORTNESS OF BREATH: 1
FATIGUE: 1
CARDIOVASCULAR NEGATIVE: 1
FATIGUE: 0
GASTROINTESTINAL NEGATIVE: 1
RESPIRATORY NEGATIVE: 1
SHORTNESS OF BREATH: 1
RESPIRATORY NEGATIVE: 1
WEAKNESS: 1
DIZZINESS: 0
DIARRHEA: 0
GASTROINTESTINAL NEGATIVE: 1
PALPITATIONS: 0
DIFFICULTY URINATING: 0
CONSTITUTIONAL NEGATIVE: 1
DIARRHEA: 0
MUSCULOSKELETAL NEGATIVE: 1
DIFFICULTY URINATING: 0
DIAPHORESIS: 1
HEMATURIA: 0
ACTIVITY CHANGE: 1
SHORTNESS OF BREATH: 1
LIGHT-HEADEDNESS: 0
CONSTITUTIONAL NEGATIVE: 1
VOMITING: 0
COUGH: 1
HEADACHES: 0
FEVER: 0
CHILLS: 0
DIZZINESS: 1
DIZZINESS: 1
SEIZURES: 0
VOMITING: 0
NAUSEA: 0
DIFFICULTY URINATING: 0
ABDOMINAL PAIN: 0
DYSURIA: 0
DIZZINESS: 0
MUSCULOSKELETAL NEGATIVE: 1
CONFUSION: 0
CARDIOVASCULAR NEGATIVE: 1

## 2022-01-01 ASSESSMENT — ACTIVITIES OF DAILY LIVING (ADL)
ADLS_ACUITY_SCORE: 37
ADLS_ACUITY_SCORE: 12
ADLS_ACUITY_SCORE: 9
ADLS_ACUITY_SCORE: 35
ADLS_ACUITY_SCORE: 10
ADLS_ACUITY_SCORE: 35
ADLS_ACUITY_SCORE: 10
ADLS_ACUITY_SCORE: 9
ADLS_ACUITY_SCORE: 35
ADLS_ACUITY_SCORE: 8
ADLS_ACUITY_SCORE: 37
ADLS_ACUITY_SCORE: 10
ADLS_ACUITY_SCORE: 35
ADLS_ACUITY_SCORE: 37
ADLS_ACUITY_SCORE: 41
ADLS_ACUITY_SCORE: 37
ADLS_ACUITY_SCORE: 11
ADLS_ACUITY_SCORE: 9
ADLS_ACUITY_SCORE: 9
IADL_QUICK_ADDS: MEAL PLANNING/PREPARATION;HOME/FINANCIAL/MANAGEMENT;COMMUNICATION/COMPUTER USE;COMMUNITY MOBILITY
ADLS_ACUITY_SCORE: 9
ADLS_ACUITY_SCORE: 35
ADLS_ACUITY_SCORE: 9
ADLS_ACUITY_SCORE: 37
ADLS_ACUITY_SCORE: 17
ADLS_ACUITY_SCORE: 35
ADLS_ACUITY_SCORE: 10
ADLS_ACUITY_SCORE: 9
ADLS_ACUITY_SCORE: 10
ADLS_ACUITY_SCORE: 9
DEPENDENT_IADLS:: INDEPENDENT
ADLS_ACUITY_SCORE: 9
ADLS_ACUITY_SCORE: 13
ADLS_ACUITY_SCORE: 37
ADLS_ACUITY_SCORE: 6
ADLS_ACUITY_SCORE: 9
ADLS_ACUITY_SCORE: 12
ADLS_ACUITY_SCORE: 11
ADLS_ACUITY_SCORE: 10
ADLS_ACUITY_SCORE: 17
ADLS_ACUITY_SCORE: 9
ADLS_ACUITY_SCORE: 37
ADLS_ACUITY_SCORE: 9
ADLS_ACUITY_SCORE: 35
ADLS_ACUITY_SCORE: 35
ADLS_ACUITY_SCORE: 39
ADLS_ACUITY_SCORE: 11
ADLS_ACUITY_SCORE: 9
ADLS_ACUITY_SCORE: 37
ADLS_ACUITY_SCORE: 35
ADLS_ACUITY_SCORE: 39
ADLS_ACUITY_SCORE: 39
ADLS_ACUITY_SCORE: 10
ADLS_ACUITY_SCORE: 10
ADLS_ACUITY_SCORE: 17
ADLS_ACUITY_SCORE: 9
ADLS_ACUITY_SCORE: 37
ADLS_ACUITY_SCORE: 35
ADLS_ACUITY_SCORE: 11
ADLS_ACUITY_SCORE: 35
ADLS_ACUITY_SCORE: 41
ADLS_ACUITY_SCORE: 37
ADLS_ACUITY_SCORE: 17
ADLS_ACUITY_SCORE: 6
ADLS_ACUITY_SCORE: 41
ADLS_ACUITY_SCORE: 37
ADLS_ACUITY_SCORE: 9
ADLS_ACUITY_SCORE: 41
ADLS_ACUITY_SCORE: 10

## 2022-01-01 ASSESSMENT — MONTREAL COGNITIVE ASSESSMENT (MOCA)
6. READ LIST OF DIGITS [FORWARD/BACKWARD]: 2
7. [VIGILENCE] TAP WHEN HEARING DESIGNATED LETTER: 1
4. NAME EACH OF THE THREE ANIMALS SHOWN: 3
8. SERIAL SUBTRACTION OF 7S: 3
11. FOR EACH PAIR OF WORDS, WHAT CATEGORY DO THEY BELONG TO (OUT OF 2): 2
WHAT IS THE TOTAL SCORE (OUT OF 30): 17
10. [FLUENCY] NAME WORDS STARTING WITH DESIGNATED LETTER: 0
VISUOSPATIAL/EXECUTIVE SUBSCORE: 4
13. ORIENTATION SUBSCORE: 2
12. MEMORY INDEX SCORE: 0
9. REPEAT EACH SENTENCE: 0
WHAT LEVEL OF EDUCATION WAS ATTAINED: 0

## 2022-01-01 ASSESSMENT — MIFFLIN-ST. JEOR
SCORE: 1313.56
SCORE: 1276.81
SCORE: 1255.04

## 2022-02-12 PROBLEM — I50.9 CONGESTIVE HEART FAILURE, UNSPECIFIED HF CHRONICITY, UNSPECIFIED HEART FAILURE TYPE (H): Status: ACTIVE | Noted: 2022-01-01

## 2022-02-12 PROBLEM — R91.8 PULMONARY MASS: Status: ACTIVE | Noted: 2022-01-01

## 2022-02-12 NOTE — ED TRIAGE NOTES
Pt presents for evaluation of shortness of breath, fatigue, decreased appetite and weakness for last 4 days. Is vaccinated against COVID and influenza. Denies chest pain. Here with daughter.

## 2022-02-13 NOTE — PHARMACY-ADMISSION MEDICATION HISTORY
Admission medication history interview status for this patient is complete. See [facility] admission navigator for allergy information, prior to admission medications and immunization status.     Medication history interview done, indicate source(s): Patient's daughter  Medication history resources (including written lists, pill bottles, clinic record): [facility] and Care Everywhere  Pharmacy: [facility] in [place]    Changes made to PTA medication list:  Added: None  Changed: None  Reported as Not Taking: None  Removed: None    Actions taken by pharmacist (provider contacted, etc):None     Additional medication history information:None    Medication reconciliation/reorder completed by provider prior to medication history?  No       Prior to Admission medications    Medication Sig Last Dose Taking? Auth Provider   aspirin 81 MG EC tablet Take 81 mg by mouth daily Past Week at Unknown time Yes Reported, Patient   atorvastatin (LIPITOR) 20 MG tablet Take 1 tablet (20 mg) by mouth daily Past Week at Unknown time Yes Kwame Blanton MD   clopidogrel (PLAVIX) 75 MG tablet Take 1 tablet (75 mg) by mouth daily Past Week at Unknown time Yes Kwame Blanton MD   furosemide (LASIX) 20 MG tablet Take 0.5 tablets (10 mg) by mouth daily Past Week at Unknown time Yes Kwame Blanton MD   ibuprofen (ADVIL/MOTRIN) 200 MG tablet Take 400 mg by mouth every 6 hours as needed for mild pain Past Week at Unknown time Yes Unknown, Entered By History   lisinopril (ZESTRIL) 5 MG tablet Take 1 tablet (5 mg) by mouth daily Past Week at Unknown time Yes Kwame Blanton MD   metoprolol succinate ER (TOPROL-XL) 50 MG 24 hr tablet Take 1 tablet (50 mg) by mouth daily Past Week at Unknown time Yes Kwame Blanton MD   multivitamin, therapeutic with minerals (THERA-VIT-M) TABS Take 1 tablet by mouth daily Past Week at Unknown time Yes Unknown, Entered By History   nitroGLYcerin (NITROSTAT) 0.4 MG sublingual tablet Place 1 tablet (0.4 mg) under the tongue every 5  minutes as needed for chest pain  at PRN Yes Rl Wright MD   spironolactone (ALDACTONE) 25 MG tablet Take 0.5 tablets (12.5 mg) by mouth daily Past Week at Unknown time Yes Kwame Blanton MD   tamsulosin (FLOMAX) 0.4 MG capsule Take 1 capsule by mouth daily. Past Week at Unknown time Yes Tess Leigh MD

## 2022-02-13 NOTE — ED NOTES
RECEIVING UNIT ED HANDOFF REVIEW    Above ED Nurse Handoff Report was reviewed: Yes  Reviewed by: Awilda Al RN on February 12, 2022 at 9:56 PM   [facility]  ED Nurse Handoff Report    Deshawn Burrows is a 88 year old male   ED Chief complaint: Shortness of Breath  . ED Diagnosis:   Final diagnoses:   None     Allergies:   Allergies   Allergen Reactions     Penicillins        Code Status:   Activity level - Baseline/Home:  Independent. Activity Level - Current:   Stand by Assist. Lift room needed: No. Bariatric: No   Needed: No   Isolation: No. Infection: Not Applicable.     Vital Signs:   Vitals:    02/12/22 1815 02/12/22 1830 02/12/22 1845 02/12/22 1900   BP: 130/79 128/77 122/71 123/83   Pulse: 82 81 75 71   Resp:       Temp:       TempSrc:       SpO2: 97% 97% 97% 96%   Weight:       Height:           Cardiac Rhythm:  ,      Pain level:    Patient confused: No. Patient Falls Risk: Yes.   Elimination Status: Has voided   Patient Report - Initial Complaint: SOB. Focused Assessment: Pt presents for evaluation of shortness of breath, fatigue, decreased appetite and weakness for last 4 days. Is vaccinated against COVID and influenza. Denies chest pain. Here with daughter.  Tests Performed: labs, CT pending. Abnormal Results: BNP 12642, Ddimer 1.14.   Treatments provided:   Family Comments: family at bedside  OBS brochure/video discussed/provided to patient:  N/A  ED Medications: Medications - No data to display  Drips infusing:  No  For the majority of the shift, the patient's behavior Green. Interventions performed were .    Sepsis treatment initiated: No     Patient tested for COVID 19 prior to admission: YES    ED Nurse Name/Phone Number: KUMAR Gaspar,   7:07 PM

## 2022-02-13 NOTE — PROGRESS NOTES
Hospitalist Medicine Progress Note   [facility]       Deshawn Burrows is a 88 year old gentleman with HFrEF with EF 20%, moderate to severe mitral regurgitation, coronary artery disease, hypertension, hyperlipidemia, BPH, malnutrition who presented on 2/12/2022 with progressively worsening shortness of breath, cough.proBNP 18,115,WBC12.0, D-dimer 1.14.  CT chest showied consolidation LLL suspicious for pneumonia, small to moderate bilateral pleural effusions decreased slightly since prior exam, mild smooth interstitial thickening in both lower lungs suggestive of pulmonary edema, emphysema was started on Lasix 40 mg IV twice daily with resumption of spironolactone, lisinopril and metoprolol and pneumonia was treated with ceftriaxone and azithromycin started 2/12/2022       Date of Admission:  2/12/2022  Assessment & Plan     Acute Exacerbation of HFrEF - EF 20%  Strict I/O  - Daily Weights  - Lasix 40 mg IV BID  - Resume home Spironolactone, lisinopril, metoprolol once confirmed by pharmacy  - Seen by Cardiology at previous hospitalization and recommended outpatient assessment for CRT-D with EP and repeat TTE.  It does not appear the patient has scheduled any f/u with Cardiology    Possible Community Acquired Pneumonia  On ceftriaxone and azithromycin    Failure to Thrive  Severe Malnutrition  - Consult Nutrition  - Poor appetite for past few weeks, per daughter     Mod-to-Severe Mitral Regurgitation  Coronary Artery Disease  - Continue home ASA 81 mg daily, Plavix, metoprolol     Hypertension  - Resume home lisinopril, metoprolol, spironolactone     BPH  - Resume home tamsulosin     Hyperlipidemia  - Resume home atorvastatin      Plan:   Discharge in 1 to 2 days  CBC and BMP in the morning    Diet: 2 Gram Sodium Diet    DVT Prophylaxis: Pneumatic Compression Devices  Faustin Catheter: Not present  Code Status: No CPR- Do NOT Intubate               The patient's care was discussed with the  Patient      Mickey Marx MD  Hospitalist Service  [facility]    ______________________________________________________________________    Interval History     Symptoms   Patient feels that his breathing is better by 25 to 50% as compared to when he came in    Review of Systems:   There is no fever or chest pain    Data reviewed today: I reviewed all medications, new labs and imaging results over the last 24 hours.     Physical Exam   Vital Signs: Temp: 97.4  F (36.3  C) Temp src: Oral BP: 110/68 Pulse: 81   Resp: 16 SpO2: 99 % O2 Device: None (Room air)    Weight: 121 lbs 14.4 oz      GENERAL: Patient is not  in acute distress  HEENT: EOM+,Conjunctiva is clear   NECK: I did not see a Jugular Venous distention  HEART: S1 S2  regular Rate and Rhythm, there is  no murmur,   LUNGS: Respirations are  not laboured, Lungs are  clear to auscultation without Crepitations or Wheezing   ABDOMEN: Soft , there is no tenderness ,Bowel Sounds are  Positive   LOWER LIMBS: no  Pedal Edema  Bilaterally   CNS:  Alert,  Oriented x 3, Moving all the Four Limbs     Data   Recent Labs   Lab 02/13/22  0645 02/12/22  1751   WBC 19.7* 12.0*   HGB 13.0* 13.9   MCV 99 101*    355   INR  --  1.14    142   POTASSIUM 3.6 4.1   CHLORIDE 107 110*   CO2 26 26   BUN 41* 42*   CR 1.20 1.15   ANIONGAP 9 6   FLORES 9.2 9.5   * 112*   ALBUMIN 3.4 3.6   PROTTOTAL 6.7* 7.2   BILITOTAL 1.0 1.1   ALKPHOS 91 100   ALT 42 49   AST 22 27         Recent Results (from the past 24 hour(s))   CT Chest Pulmonary Embolism w Contrast    Narrative    EXAM: CT CHEST PULMONARY EMBOLISM W CONTRAST  LOCATION: [facility]  DATE/TIME: 2/12/2022 7:37 PM    INDICATION: Shortness of breath. Positive D-dimer.  COMPARISON: Chest CT performed 12/9/2021.  TECHNIQUE: CT chest pulmonary angiogram during arterial phase injection of IV contrast. Multiplanar reformats and MIP reconstructions were performed. Dose reduction  techniques were used.   CONTRAST: 52 mL Isovue 370    FINDINGS:   ANGIOGRAM CHEST: Pulmonary arteries are normal caliber and negative for pulmonary emboli. The thoracic aorta is not well opacified. There is mild atherosclerotic calcification of the thoracic aorta. No CT evidence of right heart strain. There is   prominent enlargement of the left ventricle, not significantly changed.    LUNGS AND PLEURA: Small to moderate bilateral pleural effusions have decreased slightly in size. There are scattered calcified granulomas in both lungs. Consolidation in the left lower lobe posteriorly is new since the previous exam, and is suspicious   for pneumonia. Emphysematous changes are again noted throughout both lungs. Smooth interstitial thickening at both lung bases suggests pulmonary edema, not significantly changed. Biapical scarring.    MEDIASTINUM/AXILLAE: No lymphadenopathy. Normal esophagus. No significant pericardial effusion.    CORONARY ARTERY CALCIFICATION: Moderate.    UPPER ABDOMEN: Scattered calcified granulomas in the spleen.    MUSCULOSKELETAL: Degenerative changes in the thoracic spine.      Impression    IMPRESSION:  1.  No evidence for pulmonary embolism.  2.  Consolidation in the left lower lobe is suspicious for pneumonia. Underlying neoplasm is considered less likely, however follow-up is recommended to ensure resolution.  3.  Small to moderate bilateral pleural effusions have decreased slightly since the previous exam.  4.  Mild smooth interstitial thickening in both lower lungs again suggests pulmonary edema.  5.  Emphysema.

## 2022-02-13 NOTE — CONSULTS
NUTRITION ASSESSMENT    REASON FOR NUTRITION CONSULT:  Provider Order  -  anorexia, underweight    Chart reviewed: Pt admitted d/t SOB, fatigue, decreased appetite and weakness x 4 days. BMI indicates patient is clinically underweight. Reviewed previous RD note from 2 years ago- weight loss notable at that time but reportedly intentional. Patient with progressive and accelerating weight loss since June 2021 per chart review.    ASSESSMENT:  Defer complete nutrition assessment - writer is on call/offsite and patient is [place] so phone visit challenging    Orders Placed This Encounter      2 Gram Sodium Diet     NKFA    Will add oral nutrition supplements in the interim- Send Ensure Enlive bid between meals    FOLLOW UP:   Will follow up when patient clically appropriate and as schedule, patient availability allows    Josefina Nelson RD, LD  Pager - 3rd floor/ICU: [phone]  Pager - All other floors: [phone]  Pager - Weekend/holiday: [phone]  Office: [phone]

## 2022-02-13 NOTE — ED PROVIDER NOTES
History     Chief Complaint:  Shortness of Breath     HPI   Deshawn Burrows is a 88 year old male with history of history of HTN, HLD, CAD s/p PCI (2/2020), heart failure with reduced ejection fraction who presents with shortness of breath.  2 weeks increasing shortness of breath and fatigue. Decreased energy. No cough or fever. Was hospitalized in December with similar symptoms. No chest pain or pressure. No leg swelling or edema. No vomiting or diarrhea.  The patient's daughter reports that the patient has lost a tremendous amount of weight over the last 2 months and he has no appetite and does not eat.  Patient reports he just does not feel hungry.  He denies any nausea vomiting diarrhea, abdominal pain.  He denies any dysuria, urinary urgency or frequency.  He notes his urine output has been decreased.  He reports no recent changes in his medicines and states that he has been taking them as prescribed.    ROS:  Review of Systems   Constitutional: Positive for activity change and appetite change. Negative for fatigue and fever.   Respiratory: Positive for shortness of breath.    Cardiovascular: Negative for chest pain and palpitations.   Gastrointestinal: Negative for abdominal pain, diarrhea, nausea and vomiting.   Genitourinary: Positive for decreased urine volume. Negative for difficulty urinating and dysuria.   Skin: Negative for rash.   Neurological: Negative for seizures, syncope and headaches.   Psychiatric/Behavioral: Negative for confusion.   All other systems reviewed and are negative.         Allergies:  Penicillins     Medications:    aspirin 81 MG EC tablet  atorvastatin (LIPITOR) 20 MG tablet  clopidogrel (PLAVIX) 75 MG tablet  furosemide (LASIX) 20 MG tablet  ibuprofen (ADVIL/MOTRIN) 200 MG tablet  lisinopril (ZESTRIL) 5 MG tablet  metoprolol succinate ER (TOPROL-XL) 50 MG 24 hr tablet  multivitamin, therapeutic with minerals (THERA-VIT-M) TABS  nitroGLYcerin (NITROSTAT) 0.4 MG sublingual  tablet  spironolactone (ALDACTONE) 25 MG tablet  tamsulosin (FLOMAX) 0.4 MG capsule        Past Medical History:    Past Medical History:   Diagnosis Date     Acute systolic heart failure (H) 2/25/2020     CAD (coronary artery disease) 2004     Dyspnea on exertion 2/25/2020     Essential hypertension with goal blood pressure less than 140/90 8/10/2016     Hyperlipidemia LDL goal <100 8/10/2016     Ischemic cardiomyopathy 2/25/2020     LBBB (left bundle branch block) 2/25/2020     Patient Active Problem List   Diagnosis     Vasculitis (H)     Essential hypertension with goal blood pressure less than 140/90     Hyperlipidemia LDL goal <100     Myocardial infarction (H)     Coronary artery disease involving native heart, angina presence unspecified, unspecified vessel or lesion type     Dyspnea     LBBB (left bundle branch block)     Coronary artery disease involving native coronary artery of native heart without angina pectoris     Ischemic cardiomyopathy     Acute systolic heart failure (H)     Abnormal cardiovascular stress test     Dyspnea on exertion     Heart failure with reduced ejection fraction, NYHA class II (H)     Pleural effusion     Hypoxia     Acute on chronic congestive heart failure, unspecified heart failure type (H)        Past Surgical History:    Past Surgical History:   Procedure Laterality Date     CV CORONARY ANGIOGRAM N/A 2/28/2020    Procedure: CV CORONARY ANGIOGRAM;  Surgeon: Andres Mann MD;  Location:  HEART CARDIAC CATH LAB     CV LEFT HEART CATH N/A 2/28/2020    Procedure: Left Heart Cath;  Surgeon: Andres Mann MD;  [facility]     CV LEFT VENTRICULOGRAM N/A 2/28/2020    Procedure: Left Ventriculogram;  Surgeon: Andres Mann MD;  [facility]     CV PCI STENT DRUG ELUTING N/A 2/28/2020    Procedure: Percutaneous Coronary Intervention Stent Drug Eluting;  Surgeon: Andres Mann MD;  [facility]  "CATH LAB     CYSTOSCOPY N/A 3/1/2020    Procedure: CYSTOSCOPY;  Surgeon: Rios Gomez MD;  Location: [facility] OR     HEART CATH DRUG ELUTING STENT PLACEMENT  02/28/2020     HERNIA REPAIR  2007    right inguinal hernia repair     STENT, CORONARY, ANTIONE  2004    2 stents        Family History:    Family history is unknown by patient.    Social History:   reports that he has quit smoking. He has never used smokeless tobacco. He reports current alcohol use. He reports that he does not use drugs.  PCP: Tess Leigh     Physical Exam     Patient Vitals for the past 24 hrs:   BP Temp Temp src Pulse Resp SpO2 Height Weight   02/12/22 1739 (!) 138/94 96.8  F (36  C) Temporal 106 20 91 % 1.854 m (6' 1\") 59 kg (130 lb)        Physical Exam  General: Appears frail. Nontoxic. Resting comfortably  Head:  Scalp, face, and head appear normal  Eyes:  Pupils are equal, round, reactive to light     Conjunctivae non-injected and sclerae white  ENT:    The external nose is normal    Pinnae are normal  Neck:  Normal range of motion    There is no rigidity noted    Trachea is in the midline  CV:  Regular rate and rhythm     Normal S1/S2, no S3/S4    No murmur or rub. Radial pulses 2+ bilaterally.  Resp:  Lungs are clear and equal bilaterally  There is no tachypnea    No increased work of breathing    No rales, wheezing, or rhonchi  GI:  Abdomen is soft, no rigidity or guarding    No distension, or mass    No tenderness or rebound tenderness   MS:  Normal muscular tone. Decreased muscle bulk throughout.    Symmetric motor strength    No lower extremity edema  Skin:  No rash or acute skin lesions noted  Neuro: Awake and alert, oriented x3    No facial droop  Speech is normal and fluent  Strength 5/5 and intact. SILT throughout.  Moves all extremities spontaneously  Psych:  Normal affect. Appropriate interactions.      Emergency Department Course   ECG:  ECG obtained at 1746, ECG read at 1757  Normal sinus rhythm  Left bundle " branch block  Abnormal ECG   No significant change as compared to prior, dated 12/11/21.  Rate 94 bpm. CO interval 150 ms. QRS duration 156 ms. QT/QTc 418/522 ms. P-R-T axes 76 68 130.    Imaging:  CT Chest Pulmonary Embolism w Contrast   Final Result   IMPRESSION:   1.  No evidence for pulmonary embolism.   2.  Consolidation in the left lower lobe is suspicious for pneumonia. Underlying neoplasm is considered less likely, however follow-up is recommended to ensure resolution.   3.  Small to moderate bilateral pleural effusions have decreased slightly since the previous exam.   4.  Mild smooth interstitial thickening in both lower lungs again suggests pulmonary edema.   5.  Emphysema.         Report per radiology    Laboratory:  Labs Ordered and Resulted from Time of ED Arrival to Time of ED Departure - No data to display     Procedures   None    Emergency Department Course:             Reviewed:  I reviewed nursing notes, vitals and past medical history    Assessments:   I obtained history and examined the patient as noted above.    I rechecked the patient and explained findings.       Consults:   None    Interventions:   None    Disposition:  Care of the patient was transferred to my colleague Dr. Bates pending CT chest.     Impression & Plan        Covid-19  Deshawn Burrows was evaluated during a global COVID-19 pandemic, which necessitated consideration that the patient might be at risk for infection with the SARS-CoV-2 virus that causes COVID-19.   Applicable protocols for evaluation were followed during the patient's care.   COVID-19 was considered as part of the patient's evaluation. The plan for testing is:  a test was obtained during this visit.    Medical Decision Making:  Deshawn Burrows is a 88 year old male with a past medical history of congestive heart failure among others as noted above presents with failure to thrive, weight loss, fatigue, poor appetite and worsening shortness of breath.   Patient was recently admitted to the hospital for CHF exacerbation and NSTEMI.  Patient denies any recent chest pain.  On my evaluation he is elderly and frail-appearing and quite cachectic.  No significant increased work of breathing, respiratory distress.  No hypoxia.  Patient is afebrile and hemodynamically stable.  A broad differential diagnosis is considered.  Work-up in the emergency department feels elevated D-dimer of 1.14.  CBC with mild leukocytosis.  CMP is unremarkable.  BNP is significantly elevated.  Patient denies any chest pain or other symptoms which would clearly point towards an acute coronary syndrome.  EKG shows chronic left bundle branch block without evidence of acute changes or dysrhythmia.  Given the elevated D-dimer CTA of the chest is planned.  Patient has no significant peripheral edema although CHF exacerbation remains on the differential diagnosis.  The patient was signed out to my partner Dr. Bates pending CT of the chest with ultimate disposition to be determined although I feel the patient will likely require admission given his debilitated status, weight loss, poor oral intake and significant dyspnea.  Patient was signed out in stable condition.    Diagnosis:    ICD-10-CM    1. Congestive heart failure, unspecified HF chronicity, unspecified heart failure type (H)  I50.9    2. Pulmonary mass  R91.8           2/12/2022   Errol Davidson MD Daro, Ryan Clay, MD  02/13/22 1115

## 2022-02-13 NOTE — PLAN OF CARE
"/65 (BP Location: Right arm)   Pulse 94   Temp 97.4  F (36.3  C) (Oral)   Resp 16   Ht 1.854 m (6' 1\")   Wt 55.3 kg (121 lb 14.4 oz)   SpO2 95%   BMI 16.08 kg/m      Vitals stable. Pt alert and oriented x4. Forgetful. Patient should be SBA but refusing to wait for help. Steady but continues to have some generalized weakness. Bed alarm in place for increased safety. Pt reminded to call for help. Lasix given, urinating small amounts frequently. Antibiotics scheduled. No appetite, refused breakfast. Did eat ice cream and sipping an ensure. PT/SW and nutrition following. Pt resting comfortably. Will continue to provide supportive cares.           "

## 2022-02-13 NOTE — H&P
[facility]  History and Physical  Hospitalist - Kamaljit Guerra DO       Date of Admission:  2/12/2022    Chief Complaint   Shortness of breath    History is obtained from the patient, the patient's daughter via phone, the emergency department physician, as well as the electronic medical record.    History of Present Illness   Deshawn Burrows is a 88 year old male with past medical history of HFrEF with EF 20%, moderate to severe mitral regurgitation, coronary artery disease, hypertension, hyperlipidemia, BPH, malnutrition who presented on 2/12/2022 with chief complaint of shortness of breath.  Part of the history was obtained from the patient's daughter via phone.  For the past few weeks the patient has had progressive shortness of breath at home.  It has gotten to the point that he has not been able to get out of bed for the past few days.  The patient's daughter states that she has noticed a nonproductive cough.  The patient denies any chest pain, fevers or chills.  He does note that he has not taken his medications for the past few days, but is unable to tell me why.  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  The patient is confirmed DNR/DNI with the patient's daughter.    In the emergency department the patient found to have a temperature of 96.8  F, pulse 106, blood pressure 138/94, respiratory rate 20, SPO2 91% on room air.  Lab work was remarkable for creatinine 1.15, BUN 42, proBNP 18,115, mild leukocytosis of 12.0, D-dimer 1.14.  The patient had a CT chest showing consolidation in the left lower lobe suspicious for pneumonia, small to moderate bilateral pleural effusions decreased slightly since prior exam, mild smooth interstitial thickening in both lower lungs suggestive of pulmonary edema, emphysema.  The patient was given 1 dose of IV Lasix 60 mg started on ceftriaxone and azithromycin for possible community-acquired  pneumonia.    ASSESSMENT/PLAN    Acute Respiratory Insufficiency  Acute Exacerbation of HFrEF - EF 20%  - Strict I/O  - Daily Weights  - Lasix 40 mg IV BID  - Resume home Spironolactone, lisinopril, metoprolol once confirmed by pharmacy  - Seen by Cardiology at previous hospitalization and recommended outpatient assessment for CRT-D with EP and repeat TTE.  It does not appear the patient has scheduled any f/u with Cardiology    Possible Community Acquired Pneumonia  - Start Ceftriaxone and Azithromycin  - Check procal - if negative, could discontinue abx    COPD/Emphysema  - As noted on CT chest  - Duonebs prn    Failure to Thrive  Severe Malnutrition  - Consult Nutrition  - Poor appetite for past few weeks, per daughter    Mod-to-Severe Mitral Regurgitation  Coronary Artery Disease  - Continue home ASA 81 mg daily, Plavix, metoprolol    Hypertension  - Resume home lisinopril, metoprolol, spironolactone    BPH  - Resume home tamsulosin    Hyperlipidemia  - Resume home atorvastatin    PLAN: Resume home medications as appropriate once confirmed by pharmacy.     DVT Prophylaxis: Pneumatic Compression Devices  Code Status: DNR / DNI  Discharge Plan: Expected Discharge: 2-3 days  Anticipated discharge location: Home with home care vs TCU    Kamaljit Guerra DO    Primary Care Physician   Tess Leigh    -----------------------------------------------------------------------------------------------------------------------------------------------------------------------------------------------------    Past Medical History    I have reviewed this patient's medical history and updated it with pertinent information if needed.   Past Medical History:   Diagnosis Date     Acute systolic heart failure (H) 2/25/2020    Added automatically from request for surgery [id number]     CAD (coronary artery disease) 2004    2 cardiac stents      Dyspnea on exertion 2/25/2020    Added automatically from request for surgery  [id number]     Essential hypertension with goal blood pressure less than 140/90 8/10/2016     Hyperlipidemia LDL goal <100 8/10/2016     Ischemic cardiomyopathy 2/25/2020    Added automatically from request for surgery [id number]     LBBB (left bundle branch block) 2/25/2020       Past Surgical History   I have reviewed this patient's surgical history and updated it with pertinent information if needed.  Past Surgical History:   Procedure Laterality Date     CV CORONARY ANGIOGRAM N/A 2/28/2020    Procedure: CV CORONARY ANGIOGRAM;  Surgeon: Andres Mann MD;  Location: [facility] CARDI[facility] CATH LAB     CV LEFT HEART CATH N/A 2/28/2020    Procedure: Left Heart Cath;  Surgeon: Andres Mann MD;  Location: [facility] C[facility] LAB     CV LEFT VENTRICULOGRAM N/A 2/28/2020    Procedure: Left Ventriculogram;  Surgeon: Andres Mann MD;  Location: [facility] C[facility] LAB     CV PCI STENT DRUG ELUTING N/A 2/28/2020    Procedure: Percutaneous Coronary Intervention Stent Drug Eluting;  Surgeon: Andres Mann MD;  Location:  HEART CARDIAC CATH LAB     CYSTOSCOPY N/A 3/1/2020    Procedure: CYSTOSCOPY;  Surgeon: Rios Gomez MD;  Location: [facility] OR     HEART CATH DRUG ELUTING STENT PLACEMENT  02/28/2020     HERNIA REPAIR  2007    right inguinal hernia repair     STENT, CORONARY, ANTIONE  2004    2 stents       Prior to Admission Medications   Prior to Admission Medications   Prescriptions Last Dose Informant Patient Reported? Taking?   aspirin 81 MG EC tablet   Yes No   Sig: Take 81 mg by mouth daily   atorvastatin (LIPITOR) 20 MG tablet   No No   Sig: Take 1 tablet (20 mg) by mouth daily   clopidogrel (PLAVIX) 75 MG tablet   No No   Sig: Take 1 tablet (75 mg) by mouth daily   furosemide (LASIX) 20 MG tablet   No No   Sig: Take 0.5 tablets (10 mg) by mouth daily   ibuprofen (ADVIL/MOTRIN) 200 MG tablet   Yes No   Sig: Take 400 mg by mouth every 6 hours as needed for mild pain   lisinopril (ZESTRIL) 5 MG  tablet   No No   Sig: Take 1 tablet (5 mg) by mouth daily   metoprolol succinate ER (TOPROL-XL) 50 MG 24 hr tablet   No No   Sig: Take 1 tablet (50 mg) by mouth daily   multivitamin, therapeutic with minerals (THERA-VIT-M) TABS  Self Yes No   Sig: Take 1 tablet by mouth daily   nitroGLYcerin (NITROSTAT) 0.4 MG sublingual tablet   No No   Sig: Place 1 tablet (0.4 mg) under the tongue every 5 minutes as needed for chest pain   spironolactone (ALDACTONE) 25 MG tablet   No No   Sig: Take 0.5 tablets (12.5 mg) by mouth daily   tamsulosin (FLOMAX) 0.4 MG capsule   No No   Sig: Take 1 capsule by mouth daily.      Facility-Administered Medications: None     Allergies   Allergies   Allergen Reactions     Penicillins        Social History   I have reviewed this patient's social history and updated it with pertinent information if needed. Deshawn COBURN Markos  reports that he has quit smoking. He has never used smokeless tobacco. He reports current alcohol use. He reports that he does not use drugs.    Family History   I have reviewed this patient's family history and updated it with pertinent information if needed.   Family History   Family history unknown: Yes     -----------------------------------------------------------------------------------------------------------------------------------------------------------------------------------------------------    Review of Systems   The 10 point Review of Systems is negative other than noted in the HPI or here.     Physical Exam   Temp: 96.8  F (36  C) Temp src: Temporal BP: 123/83 Pulse: 71   Resp: 20 SpO2: 96 % O2 Device: None (Room air)    Vital Signs with Ranges  Temp:  [96.8  F (36  C)] 96.8  F (36  C)  Pulse:  [] 71  Resp:  [20] 20  BP: (122-138)/(71-94) 123/83  SpO2:  [91 %-100 %] 96 %  130 lbs 0 oz    Constitutional: Awake, alert, cooperative, no apparent distress.  Eyes: Conjunctiva and pupils examined and normal.  HEENT: Moist mucous membranes, normal  dentition.  Respiratory: Clear to auscultation bilaterally, no crackles or wheezing.  Cardiovascular: Regular rate and rhythm, normal S1 and S2, and no murmur noted.  GI: Soft, non-distended, non-tender, normal bowel sounds.  Lymph/Hematologic: No anterior cervical or supraclavicular adenopathy.  Skin: No rashes, no cyanosis, no edema.  Musculoskeletal: No joint swelling, erythema or tenderness.  Neurologic: Cranial nerves 2-12 intact, normal strength and sensation.  Psychiatric: Alert, oriented to person, place and time, no obvious anxiety or depression.     Data     Recent Labs   Lab 02/12/22  1751   WBC 12.0*   HGB 13.9   *      INR 1.14      POTASSIUM 4.1   CHLORIDE 110*   CO2 26   BUN 42*   CR 1.15   ANIONGAP 6   FLORES 9.5   *   ALBUMIN 3.6   PROTTOTAL 7.2   BILITOTAL 1.1   ALKPHOS 100   ALT 49   AST 27       Recent Results (from the past 24 hour(s))   CT Chest Pulmonary Embolism w Contrast    Narrative    EXAM: CT CHEST PULMONARY EMBOLISM W CONTRAST  LOCATION: [facility]  DATE/TIME: 2/12/2022 7:37 PM    INDICATION: Shortness of breath. Positive D-dimer.  COMPARISON: Chest CT performed 12/9/2021.  TECHNIQUE: CT chest pulmonary angiogram during arterial phase injection of IV contrast. Multiplanar reformats and MIP reconstructions were performed. Dose reduction techniques were used.   CONTRAST: 52 mL Isovue 370    FINDINGS:   ANGIOGRAM CHEST: Pulmonary arteries are normal caliber and negative for pulmonary emboli. The thoracic aorta is not well opacified. There is mild atherosclerotic calcification of the thoracic aorta. No CT evidence of right heart strain. There is   prominent enlargement of the left ventricle, not significantly changed.    LUNGS AND PLEURA: Small to moderate bilateral pleural effusions have decreased slightly in size. There are scattered calcified granulomas in both lungs. Consolidation in the left lower lobe posteriorly is new since the previous  exam, and is suspicious   for pneumonia. Emphysematous changes are again noted throughout both lungs. Smooth interstitial thickening at both lung bases suggests pulmonary edema, not significantly changed. Biapical scarring.    MEDIASTINUM/AXILLAE: No lymphadenopathy. Normal esophagus. No significant pericardial effusion.    CORONARY ARTERY CALCIFICATION: Moderate.    UPPER ABDOMEN: Scattered calcified granulomas in the spleen.    MUSCULOSKELETAL: Degenerative changes in the thoracic spine.      Impression    IMPRESSION:  1.  No evidence for pulmonary embolism.  2.  Consolidation in the left lower lobe is suspicious for pneumonia. Underlying neoplasm is considered less likely, however follow-up is recommended to ensure resolution.  3.  Small to moderate bilateral pleural effusions have decreased slightly since the previous exam.  4.  Mild smooth interstitial thickening in both lower lungs again suggests pulmonary edema.  5.  Emphysema.

## 2022-02-13 NOTE — CONSULTS
Care Management Initial Consult    General Information  Assessment completed with: Patient,Children,    Type of CM/SW Visit: Initial Assessment    Primary Care Provider verified and updated as needed: Yes   Readmission within the last 30 days: no previous admission in last 30 days      Reason for Consult: care coordination/care conference,discharge planning    Communication Assessment  Patient's communication style: spoken language (English or Bilingual)    Hearing Difficulty or Deaf: yes   Wear Glasses or Blind: no    Cognitive  Cognitive/Neuro/Behavioral: .WDL except  Level of Consciousness: alert  Arousal Level: arouses to voice  Orientation: oriented x 4  Mood/Behavior: calm,cooperative     Speech: logical,slow    Living Environment:   People in home: spouse     Current living Arrangements: house      Able to return to prior arrangements: yes       Family/Social Support:  Care provided by: self  Provides care for: spouse  Marital Status:   Children          Description of Support System: Supportive         Current Resources:   Patient receiving home care services: No     Community Resources: None  Equipment currently used at home: none  Supplies currently used at home: Incontinence Supplies    Employment/Financial:  Employment Status: retired        Financial Concerns: No concerns identified           Lifestyle & Psychosocial Needs:  Social Determinants of Health     Tobacco Use: Medium Risk     Smoking Tobacco Use: Former Smoker     Smokeless Tobacco Use: Never Used   Alcohol Use: Not on file   Financial Resource Strain: Not on file   Food Insecurity: Not on file   Transportation Needs: Not on file   Physical Activity: Not on file   Stress: Not on file   Social Connections: Not on file   Intimate Partner Violence: Not on file   Depression: Not at risk     PHQ-2 Score: 0   Housing Stability: Not on file       Functional Status:  Prior to admission patient needed assistance:    no  Mental Health  Status:  Mental Health Status: No Current Concerns       Chemical Dependency Status:  Chemical Dependency Status: No Current Concerns            Additional Information:  Pt lives at home with his wife who has Dementia . He is her caregiver . They have three Dtgs.  Two live around 1 hour away and do assist with groceries once in awhile.  The other Dtg lives in [place].  They live in a split level home with around 16 steps.  Pt still drives.  He does most of his grocery shopping at "[employer] . They eat mostly frozen meals. Pt would like more fresh vegetables if possible.  I will check to see if he meets criteria for any meal programs through [facility].  Pt does have social security and a pension.  Pt is agreeable to an Elderly Waiver assessment.  I will call [facility] on Monday to schedule this.  Pt doesn't use any DME.  He has no home care either . Pt also does all the Cleaning.  The Pt's wife isn't able to assist with anything due to her Dementia. Pt does manage her medications. His Dtg is taking care of her while he is hospitalized.  Awaiting PT/OT recommendations.  Pt hasn't been to TCU before . He is agreeable to Home care through  or [facility] RN/PT/OT/SW . If TCU recommended, will have to f/u with Pt to see if agreeable.  Dtg given elderly book for additional private pay resources as well.     Care Management Note    Gisel Bar RN BSN   Inpatient Care Coordination  [facility]  [phone]      Awilda Bar, KUMAR

## 2022-02-13 NOTE — PROGRESS NOTES
End of Shift Summary  For vital signs and complete assessments, please see documentation flowsheets.     Pertinent assessments: Pt arrived to unit around 2210. Pt A&O, forgetful, [place]. On RA, denies any pain, LS diminished. Up frequently to void, loose stools this shift. On tele monitoring     Major Shift Events: arrived to unit    Treatment Plan: IV Rocephin, Zithromax     Bedside Nurse: Awilda Al RN    Problem: Activity/Exercise Intolerance  Goal: Patient will tolerate activity/exercise  Outcome: Outcome Met, Continue evaluating goal progress toward completion  Intervention: Progress activity per Cardiac Rehab protocol  Intervention Status  Done  Intervention: Progressive graded ambulation  Intervention Status  Done    Patient ambulated 240 feet with gait belt and cane and assist of one.     Intervention: Collaborate with nursing to ensure ambulation 4-6 times per day  Intervention Status  DoneDone  Intervention: Monitor and document progressive activity response  Intervention Status  Done

## 2022-02-13 NOTE — PLAN OF CARE
Pt's daughter requested that we let the pt sleep and not bother him since pt has not slept well in days.

## 2022-02-14 NOTE — CONSULTS
"CLINICAL NUTRITION SERVICES  -  ASSESSMENT NOTE      Recommendations:   Liberalize diet to no added salt.  Encouraged high calorie/high protein self selection.    Continue Ensure as ordered.       MALNUTRITION:  % Weight Loss:  > 7.5% in 3 months (severe malnutrition)  % Intake:  </= 75% for >/= 1 month (severe malnutrition)  Subcutaneous Fat Loss:  Orbital region moderate depletion and Upper arm region moderate to severe depletion   Muscle Loss:  Temporal region moderate to severe depletion, Clavicle bone region severe depletion and Acromion bone region severe depletion --> did not observe LEs today  Fluid Retention: None documented    Malnutrition Diagnosis: Severe malnutrition  In Context of:  Acute illness or injury with underlying chronic illness or disease        REASON FOR ASSESSMENT  Deshawn Burrows is a 88 year old male seen by Registered Dietitian for Admission Nutrition Risk Screen for positive and Provider Order - \"anorexia, underweight\".      PMH of: CHF, CAD, malnutrition.    Admit 2/2: Acute respiratory failure with CHF exacerbation, possible CAP, COPD, FTT, severe malnutrition    NUTRITION HISTORY  - Information obtained from patient and chart.  - Resides in home setting with wife who has dementia.    - Diet at home: \"Low fat\" verbalized.  He reports low sodium diet is a new recommendation this admit.  - Usual intakes: Meals 1-2/day described.  He is a bit vague and difficult to obtain diet recall or typical intakes from.  He references he really doesn't need help with eating and has no concerns.  He does mention he shops at [employer] but declines to discuss what a typical day of eating looks like for him or what his favorite meals/snacks are.    - Barriers to PO intakes: At first denies decreased appetite though suspected based on NFPE and wt/BMI trends.  It is likely he is meeting <75% needs (or less) for period of months.  After further discussion patient reports he just doesn't feel hungry " "anymore and that this is not a new issue.  He downplays with humor and jokes.    - Use of oral supplements: Had an Ensure sitting at bedside.  States he doesn't take at home and \"can't remember\" if he has tried here or if he likes the flavor.  - Chewing/swallowing issues: Denied.  - Meal preparation/grocery shopping: Patient himself.  In rounds from Care Coordinator it was noted patient may have grocery shopping/meal support from family.  - Allergies: NKFA.      CURRENT NUTRITION ORDERS  Diet Order:     2 gram Na  Ensure BID between meals already ordered    Current Intake/Tolerance:  Limited timeframe since admit.      NUTRITION FOCUSED PHYSICAL ASSESSMENT FOR DIAGNOSING MALNUTRITION)  Yes    Obtained from Chart/Interdisciplinary Team:  - No documentation of PI  - Stooling patterns reviewed    ANTHROPOMETRICS  Height: 6' 1\"  Weight: 117 lbs 1.6 oz  Body mass index is 15.45 kg/m .  Weight Status:  Underweight BMI <18.5  Weight History:  Wt Readings from Last 10 Encounters:   02/14/22 53.1 kg (117 lb 1.6 oz)   12/11/21 59.6 kg (131 lb 8 oz)   06/12/21 61.9 kg (136 lb 7.4 oz)   04/08/21 64 kg (141 lb)   10/29/20 62.6 kg (138 lb)   10/02/20 62.6 kg (138 lb)   09/23/20 62.6 kg (138 lb)   09/22/20 63.5 kg (140 lb)   03/06/20 62.7 kg (138 lb 4.8 oz)   03/02/20 57.6 kg (127 lb)     - Has been diuresed during admit though also appears may have been losing weight for a period of years vs fluid shifts?  17% wt loss over the past year and 11% over the past 2 months, but unclear how much is true wt changes vs fluid shifts.  Overt fat/muscle loss.    LABS  Labs reviewed:  Electrolytes  Potassium (mmol/L)   Date Value   02/13/2022 3.6   02/12/2022 4.1   12/11/2021 3.8   03/06/2020 3.9   03/04/2020 3.8   03/03/2020 3.7    Blood Glucose  Glucose (mg/dL)   Date Value   02/13/2022 115 (H)   02/12/2022 112 (H)   12/11/2021 93   12/10/2021 153 (H)   12/09/2021 119 (H)   03/06/2020 126 (H)   03/04/2020 93   03/03/2020 101 (H) "   03/02/2020 102 (H)   03/02/2020 142 (H)    Inflammatory Markers  CRP Inflammation (mg/L)   Date Value   05/14/2016 3.5     WBC (10e9/L)   Date Value   03/04/2020 6.7   03/03/2020 7.9   03/01/2020 8.7     WBC Count (10e3/uL)   Date Value   02/14/2022 7.2   02/13/2022 19.7 (H)   02/12/2022 12.0 (H)     Albumin (g/dL)   Date Value   02/13/2022 3.4   02/12/2022 3.6   03/01/2020 4.1   07/17/2019 4.0   04/27/2018 4.3      Magnesium (mg/dL)   Date Value   12/11/2021 2.2   12/10/2021 2.3   12/09/2021 2.1   02/27/2020 2.4 (H)   02/25/2020 2.3     Sodium (mmol/L)   Date Value   02/13/2022 142   02/12/2022 142   12/11/2021 142   03/06/2020 138   03/04/2020 138   03/03/2020 141    Renal  Urea Nitrogen (mg/dL)   Date Value   02/13/2022 41 (H)   02/12/2022 42 (H)   12/11/2021 33 (H)   03/06/2020 38 (H)   03/04/2020 33 (H)   03/03/2020 29     Creatinine (mg/dL)   Date Value   02/13/2022 1.20   02/12/2022 1.15   12/11/2021 1.18   03/06/2020 1.11   03/04/2020 0.96   03/03/2020 1.07     Additional  Triglycerides (mg/dL)   Date Value   02/28/2020 36   07/17/2019 179 (H)   04/27/2018 89     Ketones Urine (mg/dL)   Date Value   03/02/2020 Negative        B/P: 87/45, T: 97.6, P: 74, R: 16      MEDICATIONS  Medications reviewed:    aspirin  81 mg Oral Daily     atorvastatin  20 mg Oral Daily     azithromycin  250 mg Oral Daily     cefTRIAXone  1 g Intravenous Q24H     clopidogrel  75 mg Oral Daily     [Held by provider] furosemide  40 mg Intravenous BID     lisinopril  5 mg Oral Daily     metoprolol succinate ER  50 mg Oral Daily     sodium chloride (PF)  3 mL Intracatheter Q8H     [Held by provider] spironolactone  12.5 mg Oral Daily     tamsulosin  0.4 mg Oral Daily             ASSESSED NUTRITION NEEDS PER APPROVED PRACTICE GUIDELINES:    Dosing Weight 53 kg   Estimated Energy Needs: 35-40 Kcal/Kg  Justification: repletion and underweight  Estimated Protein Needs: 1.5-2 g pro/Kg  Justification: Repletion and preservation of lean body  mass  Estimated Fluid Needs: per MD      NUTRITION DIAGNOSIS:  Malnutrition related to acute on chronic decreased appetite/intake and likely cachexia component leading to wt/muscle loss as evidenced by meeting <75% needs or less for period of months with severe wt loss and unclear true vs fluid shift component, overt fat/muscle loss.     NUTRITION INTERVENTIONS  Recommendations / Nutrition Prescription  Liberalize diet to no added salt.  Encouraged high calorie/high protein self selection.    Continue Ensure as ordered.        Implementation  Nutrition education: Provided education on above.  Discussed option of ongoing supplements such as Ensure in the home setting if he is skipping meals or having smaller portions.  Discussed where to purchase.  Attempted to provide combination low sodium/high calorie and protein education though patient declined to discuss specifics.  Provided broad and verbal review but unable to individualize as he doesn't provide specifics.  Discussed with Care Coordinator and LSW during rounds who plan to provide meal resources and connect with family.      Collaboration and Referral of Nutrition care: Discussed POC with team during rounds.      Nutrition Goals  Patient to consume at least 50-75% of meals or supplements TID while admitted.      MONITORING AND EVALUATION:  Progress towards goals will be monitored and evaluated per protocol and Practice Guidelines          Krissy Goncalvse RDN, LD  Clinical Dietitian  3rd floor/ICU: [phone]  All other floors: [phone]  Weekend/holiday: [phone]  Office: [phone]

## 2022-02-14 NOTE — PLAN OF CARE
Physical Therapy: Orders received. Chart reviewed.? Physical Therapy not indicated due to pt discharging prior to assessment.? Defer discharge recommendations to medical team.? Will complete orders.

## 2022-02-14 NOTE — PROGRESS NOTES
Care Management Follow Up    Length of Stay (days): 2    Expected Discharge Date: 02/15/2022     Concerns to be Addressed: [facility]   Patient plan of care discussed at interdisciplinary rounds: Yes    Anticipated Discharge Disposition: Home Care     Anticipated Discharge Services: Meals on Wheels  Anticipated Discharge DME: None    Patient/family educated on Medicare website which has current facility and service quality ratings: yes  Education Provided on the Discharge Plan:  Pt and dtg  Patient/Family in Agreement with the Plan: yes    Referrals Placed by CM/SW: Internal Clinic Care Coordination,Homecare    Additional Information:  I called [facility] [phone] to set Pt up with EW assessment.  It can take up to two weeks for a call back.  They will call Kimberly Reid to arrange. I left a vm with Kimberly.    1410: Pt given CHF action care plan.  Pt does have a scale and is agreeable to weigh himself every am.  He knows when to call his MD.  Pt does use his salt shaker a lot . He is agreeable to try mrs dash and to cut down on his salt.  Pt's Dtcollins Reid will look into [facility] in [place] . They will also explore other options for protein and vegetables.  Pt is agreeable to home care support.  Referral sent to [facility] for RN/PT/SW.  Dtg will provide transport.      Gisel Bar RN BSN   Inpatient Care Coordination  [facility]  [phone]       Awilda Bar, RN

## 2022-02-14 NOTE — PLAN OF CARE
Pertinent assessments: Patient confused, oriented to self only and knows he is at the hospital. Did not call to use the BR, and set off the bed alarm 3 times. Impulsive and quick when gets up. Denies pain. Reported some dyspnea on exertion. LS dim on RA. Blood pressure soft 94/63, MAP 71, HR 84. Patient denies dizziness or light headedness. Ambulated to the BR with SBA. Slept ok tonight.    Major Shift Events .  Treatment Plan: Monitor for hypotension. I&Os. Monitor respiratory status.   Bedside Nurse: Kelly Fitzgerald RN

## 2022-02-14 NOTE — PROGRESS NOTES
Pt up to BR gait steady.  BP noted at 84/46 with a med cuff.  Pt is very skinny.  Rechecked with small cuff 99/52 R and 97/50 on L.  Pt denies any dizziness, chest/shoulder/neck pain.  Pt is cleared for discharge.  IV and tele pack dc'd. Dtr states she and her two sisters will be caring for pt while at home.  Refused PT eval.  Lisbeth Rey RN

## 2022-02-14 NOTE — PLAN OF CARE
Occupational Therapy: Orders received. Chart reviewed and discussed with care team.? Occupational Therapy not indicated due to pt with no ADL and self care concerns. Daughter in room, pt and daughter discussed with this writer no need for IP OT consult or discharge recs.?educated on services needed in OP or community if concerns arise. Verbalized understanding. Defer discharge recommendations to care team.? Will complete orders.

## 2022-02-14 NOTE — DISCHARGE INSTRUCTIONS
[facility] [phone] will call your Daughter Jeanette within 2 weeks to set up an appointment to see if you meet criteria for service.  If you don't hear from them within 2 weeks, please call them.     Your home care referral was sent to [facility]  If you haven't heard from them within the next 24-48 hours,  Please call them at [phone]      [facility] in [place] [address], [place] [address] [phone]

## 2022-02-14 NOTE — PLAN OF CARE
Pertinent assessments: A&Ox4, forgetful. [place]. Soft BP's. Denies pain. Up frequently to void. Bed alarm on. Up SBA. Poor appetite    Major Shift Events: Tele: ST elevations- EKG done, patient asymptomatic, PVC's noted, ST depressions and BBB also noted. MD notified.     Treatment Plan: IV Rocephin, Zithromax, tele     Bedside Nurse: Felicia Helm RN

## 2022-02-15 NOTE — PROGRESS NOTES
Clinic Care Coordination Contact  [facility]: Post-Discharge Note  SITUATION                                                      Admission:    Admission Date: 02/12/22   Reason for Admission: cough and SOB  Discharge:   Discharge Date: 02/14/22  Discharge Diagnosis: Came in with cough shortness of breath and was found to have acute exacerbation of systolic CHF, community-acquired pneumonia    BACKGROUND                                                      Per hospital discharge summary and inpatient provider notes:  Deshawn Burrows is a 88 year old male with past medical history of HFrEF with EF 20%, moderate to severe mitral regurgitation, coronary artery disease, hypertension, hyperlipidemia, BPH, malnutrition who presented on 2/12/2022 with chief complaint of shortness of breath.  Part of the history was obtained from the patient's daughter via phone.  For the past few weeks the patient has had progressive shortness of breath at home.  It has gotten to the point that he has not been able to get out of bed for the past few days.  The patient's daughter states that she has noticed a nonproductive cough.  The patient denies any chest pain, fevers or chills.  He does note that he has not taken his medications for the past few days, but is unable to tell me why.  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  The patient is confirmed DNR/DNI with the patient's daughter.     In the emergency department the patient found to have a temperature of 96.8  F, pulse 106, blood pressure 138/94, respiratory rate 20, SPO2 91% on room air.  Lab work was remarkable for creatinine 1.15, BUN 42, proBNP 18,115, mild leukocytosis of 12.0, D-dimer 1.14.  The patient had a CT chest showing consolidation in the left lower lobe suspicious for pneumonia, small to moderate bilateral pleural effusions decreased slightly since prior exam, mild smooth interstitial thickening in both lower lungs  suggestive of pulmonary edema, emphysema.  The patient was given 1 dose of IV Lasix 60 mg started on ceftriaxone and azithromycin for possible community-acquired pneumonia.    ASSESSMENT      Enrollment  Primary Care Care Coordination Status: Declined    Discharge Assessment  How are your symptoms? (Red Flag symptoms escalate to triage hotline per guidelines): Improved  Do you feel your condition is stable enough to be safe at home until your provider visit?: Yes  Does the patient have their discharge instructions? : Yes  Does the patient have questions regarding their discharge instructions? : No  Were you started on any new medications or were there changes to any of your previous medications? : Yes  Does the patient have all of their medications?: Yes  Do you have questions regarding any of your medications? : No  Do you have all of your needed medical supplies or equipment (DME)?  (i.e. oxygen tank, CPAP, cane, etc.): Yes  Discharge follow-up appointment scheduled within 14 calendar days? : Yes  Discharge Follow Up Appointment Date: 02/25/22  Discharge Follow Up Appointment Scheduled with?: Primary Care Provider              Care Management   Community Health Worker Initial Outreach    CHW Initial Information Gathering:  Referral Source: IP Handoff  Preferred Hospital: [facility]  [phone]  Current living arrangement:: I live in a private home with spouse  Type of residence:: Private home - stairs  Community Resources: Home Care,Meals on Wheels (patient referred to Meals on Wheels)  Supplies used at home:: Incontinence Supplies  Informal Support system:: Children (Patient supports wife with dementia, has three daughters. Two daughters live an hour away, another lives in [place])  Transportation means:: Regular car       Patient accepts CC: No, Enrolled in home care. Patient will be sent Care Coordination introduction letter for future reference.     2-15, CHW:    CHW was able to  connect with the patient, however the patient handed the phone off to daughter Lluvia; introduced self and Care Coordination.    Lluvia stated that a RN from [facility] is coming to the house tomorrow.     CHW encouraged them to reach out to  for any support in the future; Lluvia agreed.     PLAN                                                      Outpatient Plan:  ED follow-up, Meals on Wheels, and [facility].    Future Appointments   Date Time Provider Department Center   2/25/2022  2:20 PM Blaine Rowe MD [facility] [place]         For any urgent concerns, please contact our 24 hour nurse triage line: [phone] ([phone])       PAUL Cohn. Public Health  Community Health Worker  [place] [place] [facility]  Clinic Care Coordination  [phone]

## 2022-02-15 NOTE — LETTER
[facility] CARE COORDINATION  [facility]  February 15, 2022    Deshawn Burrows  [address] [place] [address]      Dear Deshawn,    I am a clinic community health worker who works with Tess Leigh MD at the [facility]. I wanted to introduce myself and provide you with my contact information for you to be able to call me with any questions or concerns. Below is a description of clinic care coordination and how I can further assist you.      The clinic care coordination team is made up of a registered nurse,  and community health worker who understand the health care system. The goal of clinic care coordination is to help you manage your health and improve access to the health care system in the most efficient manner. The team can assist you in meeting your health care goals by providing education, coordinating services, strengthening the communication among your providers and supporting you with any resource needs.    Please feel free to contact me at [phone] with any questions or concerns. We are focused on providing you with the highest-quality healthcare experience possible and that all starts with you.     Sincerely,     PAUL Cohn. Public Health  Community Health Worker  [facility] [place] [facility]  Clinic Care Coordination  [phone]

## 2022-02-17 NOTE — TELEPHONE ENCOUNTER
The Home Care/Assisted Living/Nursing Facility is calling regarding an established patient.  Has the patient seen Home Care in the past or is currently residing in Assisted Living or Nursing Facility? No.     KUMAR Pierre calling from [phone] requesting the following orders that are NOT within the Home Care, Assisted Living or Nursing Home Eval and Treatment standing order and must be ordered by a Licensed Practitioner.    Preferred Call Back Number: [phone]                PT/OT/Speech Therapy and Skilled nursing and respiratory assessment/education    Routing to Licensed Practitioner (Provider) to review request and provide approval or recommendation.    Writer has verified Requestor will send fax to have orders signed.    Marj KNIGHT RN   [facility]

## 2022-02-17 NOTE — TELEPHONE ENCOUNTER
[facility] verbal orders for admission    SKILLED NURSING   2x1wk  1x7wk  4prn  For resipatroy assessment and eduaction    PHYSICAL THERAPY OCCUAPTIONAL THERAPY eval and treat.           Gabby HEATH Best number to call [phone]

## 2022-02-21 NOTE — TELEPHONE ENCOUNTER
Spoke with patient's, [place], per consent. Both her and Deshawn agree to seeing Soledad 2/25/22.

## 2022-02-21 NOTE — TELEPHONE ENCOUNTER
Patients daughter calling and they where supposed to get a order for a  as well. Please advise. Ok to call and rima [phone] Orquidea

## 2022-02-21 NOTE — TELEPHONE ENCOUNTER
I have not seen this patient since 2/11/2020 and had a virtual visit 3/27/2020, almost 2 years ago . Pt has appt to see Dr Rowe on 02/25/22  , ok for SW

## 2022-02-21 NOTE — TELEPHONE ENCOUNTER
Spoke with home care nurse Jodi who states patient is not receiving speech therapy.  The orders are all lumped together PT/OT/Speech/RN visits.  Gave her the approval for PT, OT and nurse visits.      Jodi RN ([phone]) states patient's med list has Atorvastatin on it but patient has none so would like refill please.  MARTINA Franz R.N.

## 2022-02-25 NOTE — PATIENT INSTRUCTIONS
"New prescription for nitroglycerin sent to your pharmacy.     Schedule a \"lab only\" appointment for next week.     Schedule an appointment with Dr Leigh in about six weeks to recheck your lungs and your chest X-ray.   "

## 2022-02-25 NOTE — PROGRESS NOTES
"  Assessment & Plan   (J18.9) Community acquired pneumonia, unspecified laterality  (primary encounter diagnosis)  Comment: Improved. Check labs as advised. Check CXR in about six weeks.   Plan: CBC with platelets          (I50.21) Acute systolic congestive heart failure (H)  (I25.5) Ischemic cardiomyopathy  Comment: Improved with diuresis. Update labs as advised.   Plan: Basic metabolic panel  (Ca, Cl, CO2, Creat,         Gluc, K, Na, BUN)          (I25.10) Coronary artery disease involving native coronary artery of native heart without angina pectoris  Comment: No recent angina. Refilled NTG.   Plan: nitroGLYcerin (NITROSTAT) 0.4 MG sublingual         tablet         (E78.5) Hyperlipidemia LDL goal <100  Comment: Due to update lipids.   Plan: Lipid panel reflex to direct LDL Fasting             Patient Instructions   New prescription for nitroglycerin sent to your pharmacy.     Schedule a \"lab only\" appointment for next week.     Schedule an appointment with Dr Leigh in about six weeks to recheck your lungs and your chest X-ray.       Return in about 6 weeks (around 4/8/2022) for Routine Visit with Dr Leigh.    Blaine Rowe MD,  [facility]    Marion Hess is a 88 year old who presents for the following health issues  accompanied by his daughter, [place]: Hospital follow up.    HPI     Post Discharge Outreach 2/15/2022   Admission Date 2/12/2022   Reason for Admission cough and SOB   Discharge Date 2/14/2022   Discharge Diagnosis Came in with cough shortness of breath and was found to have acute exacerbation of systolic CHF, community-acquired pneumonia   How are you doing now that you are home? -   How are your symptoms? (Red Flag symptoms escalate to triage hotline per guidelines) Improved   Do you feel your condition is stable enough to be safe at home until your provider visit? Yes   Does the patient have their discharge instructions?  Yes   Does the patient have questions " regarding their discharge instructions?  No   Were you started on any new medications or were there changes to any of your previous medications?  Yes   Does the patient have all of their medications? Yes   Do you have questions regarding any of your medications?  No   Do you have all of your needed medical supplies or equipment (DME)?  (i.e. oxygen tank, CPAP, cane, etc.) Yes   Discharge follow-up appointment scheduled within 14 calendar days?  Yes   Discharge Follow Up Appointment Date 2/25/2022   Discharge Follow Up Appointment Scheduled with? Primary Care Provider     Hospital Follow-up Visit:    Hospital/Nursing Home/IP Rehab Facility: [facility]  Date of Admission: 2/12/2022  Date of Discharge: 2/14/2022  Reason(s) for Admission: Acute exacerbation of systolic CHF, community acquired pneumonia      Was your hospitalization related to COVID-19? No   Problems taking medications regularly:  None  Medication changes since discharge: None  Problems adhering to non-medication therapy:  None    Summary of hospitalization:  [facility] discharge summary reviewed  Diagnostic Tests/Treatments reviewed.  Follow up needed: BMP and CBC today, CXR in about 6 weeks.   Other Healthcare Providers Involved in Patient s Care:          None  Update since discharge: stable.     Post Discharge Medication Reconciliation: discharge medications reconciled and changed, per note/orders.  Plan of care communicated with patient and daughter            We reviewed the patient's recent hospitalization.  He presented with worsening cough and shortness of breath.  He was treated for community-acquired pneumonia as well as for an apparent mild exacerbation of congestive heart failure.  He responded to treatment with diuresis and initially parenteral followed by oral antibiotic therapy.    Since discharge the patient's breathing remained stable.  He believes that his cough is improved.  He denies bringing up  "purulent sputum.  No reported fevers or chills.  No ongoing upper respiratory infectious symptoms.    Past medical, family and social histories as well as medications reviewed and updated as needed.    Review of Systems   REVIEW OF SYSTEMS: The following systems have been completely reviewed and are negative except as noted above:   Constitutional, HEENT, respiratory, cardiovascular, and neurologic systems.        Objective    /60   Pulse 101   Temp 97.2  F (36.2  C) (Tympanic)   Resp 16   Ht 1.854 m (6' 1\")   Wt 56.7 kg (125 lb)   SpO2 97%   BMI 16.49 kg/m    Body mass index is 16.49 kg/m .     Physical Exam   GENERAL: healthy, alert and no distress  RESP: lungs clear to auscultation - no rales, rhonchi or wheezes  CV: regular rate and rhythm, normal S1 S2, no S3 or S4, no murmur, click or rub, no peripheral edema and peripheral pulses strong  MS: no gross musculoskeletal defects noted, no edema  NEURO: Normal strength and tone, mentation intact and speech normal  PSYCH: mentation appears normal, affect normal/bright            "

## 2022-02-27 NOTE — TELEPHONE ENCOUNTER
Reason for call:  Other   Patient called regarding (reason for call): call back  Additional comments:   Patient coming in for a f/u on pneumonia. Please call daughter to arrange Xray and possibly add orders to chart. When does the provider want the xray done?       Phone number to reach patient:  Other phone number:    [phone], aev Silverman    Best Time:  any    Can we leave a detailed message on this number?  YES    Travel screening: Not Applicable

## 2022-02-28 NOTE — TELEPHONE ENCOUNTER
Lab scheduled 3/2/22  Appt with primary care provider 4/6/22    CXR approximately 4/10/22 per order placed by Dr. Rowe    Spoke with daughter Andra, she will reschedule appt with Dr. Leigh and will order appt to have CXR before the office visit. MARTINA Franz R.N.

## 2022-03-01 NOTE — TELEPHONE ENCOUNTER
SKILLED NURSING / PHYSICAL THERAPY / OCCUPATIONAL THERAPY/ SPEECH THERAPY orders received via fax. Form in your mailbox to be signed.

## 2022-03-03 NOTE — TELEPHONE ENCOUNTER
Agree to hold BP meds if SBP < 100,   pl also check if he has made appt with his cardiologist, pt last seen cardiology in 04/21 and there was discussion about changing lisinopril to Entresto.

## 2022-03-03 NOTE — TELEPHONE ENCOUNTER
Janet informed of primary care provider's message below.  She will call patient's son to make sure patient schedules an appointment with his cardiologist.

## 2022-03-03 NOTE — TELEPHONE ENCOUNTER
Janet calling from [facility] to report low blood pressure last night when they saw him. 88/48, 86/50, 89/63. She told him to hold blood pressure medications if the top number is under 100. SHe also had him drink some fluids and then it did come up to 90's/60's    Janet [phone]

## 2022-03-07 NOTE — TELEPHONE ENCOUNTER
FMLA received via  by Andra Johnson. POA for patient     Forms in Dr. mail box for review and signature.

## 2022-03-07 NOTE — TELEPHONE ENCOUNTER
Andra was called and informed that Deshawn, her father,  has not seen Dr Leigh for two years. Dr Rowe however has. She was informed that with Dr Rowe tight timeframe, completing this in the time requested may be diffucult.

## 2022-03-07 NOTE — TELEPHONE ENCOUNTER
Daughter called again.  If Dr. Rowe can't fill it out the forms, can she have the discharge provider at the hospital do it?  She would like to hear from someone on this tomorrow.    Irma Grimes [facility]  [phone]

## 2022-03-16 NOTE — TELEPHONE ENCOUNTER
Janet from [facility] ([phone]) calls to report that the patients BP remains low and his daughter is only giving him his Metoprolol.  His Lisinopril and Spirolactone are being held if BP is less [id number]. Does patient need to be seen?  Should he complete;y stop the Lisinopril and Spirolactone?  Please advise.  OK to leave a detailed message.

## 2022-03-28 NOTE — TELEPHONE ENCOUNTER
[facility] Cardiology Refill Guideline reviewed.  Medication meets criteria for refill. Appt is due. Letter is sent to patient.

## 2022-04-11 NOTE — PROGRESS NOTES
Assessment & Plan     Essential hypertension with goal blood pressure less than 140/90  At this time, patient is noted to have a blood pressure of 82/49 while present in the clinic.  Review of his blood pressure monitoring log does show that he has had several similar blood pressure readings at home.  After much discussion, we did elect to discontinue his spironolactone and monitor his blood pressure.  He will continue his metoprolol, lisinopril, and furosemide as currently prescribed.  I did recommend that they contact the clinic within the next 1 to 2 weeks so that we can reassess his blood pressure and repeat a basic metabolic panel to ensure that he has no issues with hypokalemia given the discontinuation of his spironolactone.    Cognitive decline  A referral will be placed to neurology for further evaluation of his memory issues and cognitive decline.  We will follow up on the results of that evaluation once it has been completed.  - Adult Neurology  Referral; Future    Need for COVID-19 vaccine  Covid booster administered      Prescription drug management  30 minutes spent on the date of the encounter doing chart review, history and exam, documentation and further activities per the note       See Patient Instructions    Return in about 2 weeks (around 4/25/2022) for Follow up blood pressure.    Colt Treviño MD  [facility] MARIA A Hess is a 89 year old who presents for the following health issues: establish care.    Patient is an 89-year-old  male who presents to the clinic to discuss issues with memory with his daughter.  Patient has had longstanding issues with loss of memory and cognitive decline.  His daughter would like to have his symptoms evaluated further as she wants to ensure that there are no underlying issues with Alzheimer's or any other type dementia.  There is no reported family history of dementia.  Her other concern is in regards  to his blood pressure.  Patient has a complicated cardiac history, and he is on several medications that include metoprolol succinate 50 mg daily, lisinopril 5 mg daily, Lasix 20 mg daily, and spironolactone 25 mg daily as part of his medication regimen.  He does have a history of hypertension, congestive heart failure, and coronary artery disease.  His blood pressure is monitored closely at home, and his daughter does have a record with her today.  He does have several blood pressure readings that did note his systolic pressure being 90 or less.  His daughter reports that they have been instructed to hold some of his medications if his systolic blood pressure is less than 120.  She is wondering if some of his medications may be adjusted as he does have very frequent blood pressures that are in the 90s.  She would also like for him to receive his second COVID booster.    History of Present Illness       Hypertension: He presents for follow up of hypertension.  He does not check blood pressure  regularly outside of the clinic. Outpatient blood pressures have not been over 140/90. He follows a low salt diet.     Reason for visit:  Medication review, memory issues  Symptom onset:  More than a month  Symptoms include:  Memory issues  Symptom intensity:  Moderate  Symptom progression:  Staying the same  Had these symptoms before:  Yes  Has tried/received treatment for these symptoms:  No  What makes it worse:  No  What makes it better:  No    He eats 2-3 servings of fruits and vegetables daily.He consumes 2 sweetened beverage(s) daily.He exercises with enough effort to increase his heart rate 9 or less minutes per day.  He exercises with enough effort to increase his heart rate 3 or less days per week.   He is taking medications regularly.       Hyperlipidemia Follow-Up      Are you regularly taking any medication or supplement to lower your cholesterol?   Yes- lipitor    Are you having muscle aches or other side effects  "that you think could be caused by your cholesterol lowering medication?  No    Hypertension Follow-up      Do you check your blood pressure regularly outside of the clinic? Yes     Are you following a low salt diet? Yes    Are your blood pressures ever more than 140 on the top number (systolic) OR more   than 90 on the bottom number (diastolic), for example 140/90? No        Review of Systems   Constitutional: Negative.    HENT: Negative.    Respiratory: Negative.    Cardiovascular: Negative.    Gastrointestinal: Negative.    Genitourinary: Negative.    Musculoskeletal: Negative.    Neurological: Positive for dizziness.            Objective    Blood pressure (!) 82/49, pulse 73, temperature 97.9  F (36.6  C), resp. rate 18, height 1.854 m (6' 1\"), weight 58.5 kg (128 lb 14.4 oz), SpO2 100 %.      Physical Exam  Vitals and nursing note reviewed.   Constitutional:       Appearance: Normal appearance.   HENT:      Head: Normocephalic and atraumatic.      Right Ear: Tympanic membrane, ear canal and external ear normal.      Left Ear: Tympanic membrane, ear canal and external ear normal.      Mouth/Throat:      Mouth: Mucous membranes are moist.      Pharynx: Oropharynx is clear.   Eyes:      Extraocular Movements: Extraocular movements intact.      Conjunctiva/sclera: Conjunctivae normal.      Pupils: Pupils are equal, round, and reactive to light.   Cardiovascular:      Rate and Rhythm: Normal rate and regular rhythm.      Pulses: Normal pulses.      Heart sounds: Normal heart sounds.   Pulmonary:      Effort: Pulmonary effort is normal.      Breath sounds: Normal breath sounds.   Abdominal:      General: Abdomen is flat. Bowel sounds are normal.      Palpations: Abdomen is soft.   Skin:     General: Skin is warm.      Capillary Refill: Capillary refill takes less than 2 seconds.   Neurological:      General: No focal deficit present.      Mental Status: He is alert. He is confused.      Comments: Patient's daughter " did most of the speaking for him, and he did asked several times the reason for him being present in the clinic.

## 2022-04-11 NOTE — PATIENT INSTRUCTIONS
Neurology referral for further evaluation of memory issues.    Patient has had some issues with low blood pressure readings.  We will hold spironolactone at this time to see if his blood pressure readings will improve.

## 2022-04-21 NOTE — LETTER
4/21/2022         RE: Deshawn Burrows  [address] [place] [address]        Dear Colleague,    Thank you for referring your patient, Deshawn Burrows, to the [facility] [place]. Please see a copy of my visit note below.    HISTORY OF PRESENT ILLNESS:    Deshawn Burrows is a 89 year old male who is seen as self referral for left hip pain. Patient is accompanied with his daughter today. She reports patient had a fall in the parking lot yesterday.     Present symptoms: Abrasions to the left knee and shin. Patient denies pain in the left hip today, but did express discomfort with sitting and positional changes.   Treatments tried to this point: none   Orthopedic PMH: no prior surgeries.      Past Medical History:   Diagnosis Date     Acute systolic heart failure (H) 2/25/2020    Added automatically from request for surgery [id number]     CAD (coronary artery disease) 2004    2 cardiac stents      Dyspnea on exertion 2/25/2020    Added automatically from request for surgery [id number]     Essential hypertension with goal blood pressure less than 140/90 8/10/2016     Hyperlipidemia LDL goal <100 8/10/2016     Ischemic cardiomyopathy 2/25/2020    Added automatically from request for surgery [id number]     LBBB (left bundle branch block) 2/25/2020       Past Surgical History:   Procedure Laterality Date     CV CORONARY ANGIOGRAM N/A 2/28/2020    Procedure: CV CORONARY ANGIOGRAM;  Surgeon: Andres Mann MD;  Location:  HEART CARDIAC CATH LAB     CV LEFT HEART CATH N/A 2/28/2020    Procedure: Left Heart Cath;  Surgeon: Andres Mann MD;  Location:  HEART CARDIAC CATH LAB     CV LEFT VENTRICULOGRAM N/A 2/28/2020    Procedure: Left Ventriculogram;  Surgeon: Andres Mann MD;  Location:  HEART CARDIAC CATH LAB     CV PCI STENT DRUG ELUTING N/A 2/28/2020    Procedure: Percutaneous Coronary Intervention Stent Drug Eluting;  Surgeon: Andres Mann MD;   Location:  HEART CARDIAC CATH LAB     CYSTOSCOPY N/A 3/1/2020    Procedure: CYSTOSCOPY;  Surgeon: Rios Gomez MD;  Location: [facility] OR     HEART CATH DRUG ELUTING STENT PLACEMENT  02/28/2020     HERNIA REPAIR  2007    right inguinal hernia repair     STENT, CORONARY, ANTIONE  2004    2 stents       Family History   Family history unknown: Yes       Social History     Socioeconomic History     Marital status:      Spouse name: Not on file     Number of children: Not on file     Years of education: Not on file     Highest education level: Not on file   Occupational History     Not on file   Tobacco Use     Smoking status: Former Smoker     Smokeless tobacco: Never Used     Tobacco comment: Quit 30 years ago   Vaping Use     Vaping Use: Never used   Substance and Sexual Activity     Alcohol use: Yes     Alcohol/week: 0.0 standard drinks     Comment: Moderate     Drug use: No     Sexual activity: Not Currently   Other Topics Concern     Parent/sibling w/ CABG, MI or angioplasty before 65F 55M? Not Asked   Social History Narrative     Not on file     Social Determinants of Health     Financial Resource Strain: Not on file   Food Insecurity: Not on file   Transportation Needs: Not on file   Physical Activity: Not on file   Stress: Not on file   Social Connections: Not on file   Intimate Partner Violence: Not on file   Housing Stability: Not on file       Current Outpatient Medications   Medication Sig Dispense Refill     aspirin 81 MG EC tablet Take 81 mg by mouth daily       atorvastatin (LIPITOR) 20 MG tablet Take 1 tablet (20 mg) by mouth daily 90 tablet 0     clopidogrel (PLAVIX) 75 MG tablet TAKE 1 TABLET EVERY DAY 90 tablet 0     furosemide (LASIX) 20 MG tablet Take 1 tablet (20 mg) by mouth daily 45 tablet 0     lisinopril (ZESTRIL) 5 MG tablet TAKE 1 TABLET EVERY DAY 90 tablet 0     metoprolol succinate ER (TOPROL-XL) 50 MG 24 hr tablet Take 1 tablet (50 mg) by mouth daily 90 tablet 3     multivitamin,  therapeutic with minerals (THERA-VIT-M) TABS Take 1 tablet by mouth daily       nitroGLYcerin (NITROSTAT) 0.4 MG sublingual tablet Place 1 tablet (0.4 mg) under the tongue every 5 minutes as needed for chest pain 30 tablet 0       Allergies   Allergen Reactions     Penicillins        REVIEW OF SYSTEMS:  CONSTITUTIONAL:  NEGATIVE for fever, chills, change in weight  INTEGUMENTARY/SKIN:  NEGATIVE for worrisome rashes, moles or lesions  EYES:  NEGATIVE for vision changes or irritation  ENT/MOUTH:  NEGATIVE for ear, mouth and throat problems  RESP:  NEGATIVE for significant cough or SOB  BREAST:  NEGATIVE for masses, tenderness or discharge  CV:  NEGATIVE for chest pain, palpitations or peripheral edema  GI:  NEGATIVE for nausea, abdominal pain, heartburn, or change in bowel habits  :  Negative   MUSCULOSKELETAL:  See HPI above  NEURO:  NEGATIVE for weakness, dizziness or paresthesias  ENDOCRINE:  NEGATIVE for temperature intolerance, skin/hair changes  HEME/ALLERGY/IMMUNE:  NEGATIVE for bleeding problems  PSYCHIATRIC:  NEGATIVE for changes in mood or affect      PHYSICAL EXAM:  There were no vitals taken for this visit.  There is no height or weight on file to calculate BMI.   GENERAL APPEARANCE: healthy, alert and no distress   HEENT: No apparent thyroid megaly. Clear sclera with normal ocular movement  RESPIRATORY: No labored breathing  SKIN: no suspicious lesions or rashes  NEURO: Normal strength and tone, mentation intact and speech normal  VASCULAR: Good pulses, and capillary refill   LYMPH: no lymphadenopathy   PSYCH:  mentation appears normal and affect normal/bright    MUSCULOSKELETAL:  Not in acute distress  He is able to get up from sitting with minimal assistance with his hands  Walk with minimal limp    Left hip range of motion is somewhat limited but no significant pain with internal and external rotation  No palpable pain in the greater trochanter region as well as pelvic rim  He has a flexion  contracture of the knee, left    2 areas of mild superficial abrasions around the knee    Diffuse minimal to mild swelling of the left ankle is noted particular    In terms of motor strength, he has weak dorsiflexion strength of the left ankle where his right ankle dorsiflexion is full  He has grade 3 out of 5 strength on the left ankle so that when he walks he barely clears his toes but left foot does not scrape against the floor.    Plantarflexion, inversion and eversion strength is otherwise intact.       ASSESSMENT:  Abrasion, left knee, superficial  Left hip pain mostly from mild contusion with underlying mild hip DJD  Chronic mild foot drop, left  Nonspecific ankle sprain    PLAN:  The x-ray images of the pelvis, left hip as well as left ankle were visualized.  He has more noticeable DJD changes of the right hip compared to left.  His clinical examination is relatively benign for both the left hip and the left ankle although we did find dorsiflexion weakness of the left ankle which was felt to be an incidental finding.    This might be due to old disc herniation at L4-5 level but he does remember having had any issues in the back.  At the same time, he does have some memory loss issue according to his daughter.    I advised patient's daughter to watch for possible further progression of foot drop on the left which may necessitate using orthotics to support the ankle if that happens.    Topical wound care for the abrasion with a bacitracin and Band-Aid.    Otherwise, follow-up as needed..    Imaging Interpretation:    Chronic bilateral hip DJD, right worse than left  No acute findings of the left ankle.    Rudy Lorenzo MD  Department of Orthopedic Surgery        Disclaimer: This note consists of symbols derived from keyboarding, dictation and/or voice recognition software. As a result, there may be errors in the script that have gone undetected. Please consider this when interpreting information found in this  chart.        Again, thank you for allowing me to participate in the care of your patient.        Sincerely,        Rudy Lorenzo MD

## 2022-04-21 NOTE — PATIENT INSTRUCTIONS
Further observation for the hip and ankle  Activity as tolerated  Continuing monitoring of foot drop on the left.  If the foot does not clear the floor when walking, return to clinic for consideration of ankle-foot orthotics  Bacitracin and Band-Aid for the abrasion areas of the knee  Follow-up as needed

## 2022-04-22 NOTE — TELEPHONE ENCOUNTER
Routing refill request to provider for review/approval because:  Patient needs to be seen because:  Was due in 1-2 weeks for blood pressure follow up and lab work to check for hypokalemia.  No appointment scheduled yet        Medication refused, atorvastatin filled by cardiology and patient is due for an appointment with cardiologist.

## 2022-06-01 NOTE — TELEPHONE ENCOUNTER
Pending Prescriptions:                       Disp   Refills    furosemide (LASIX) 20 MG tablet [Pharmacy*45 tab*2            Sig: TAKE 1 TABLET EVERY DAY    Prescription approved per [facility] Refill Protocol.

## 2022-06-11 NOTE — ED TRIAGE NOTES
"    Pt reports that he was at a  and started feeling light headed and family reports that he \"almost passed out\" Pt family reports that pt got \"hot\" did not actually fall due to sitting down. Pt reports that symptoms have resolved. PT denies CP or SOB, no slurred speech, no facial droop, eqal  bilaterally. PT VSS   "

## 2022-06-11 NOTE — CONSULTS
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   Pt was sitting in [employer] doing a  service when he all of a sudden felt somewhat lightheaded and dizzy.  He kind of slumped forward but did not fall.  He did not lose consciousness.  His daughter caught him and saw that he was somewhat diaphoretic and was breathing heavily.  This lasted about 4 minutes.  They are able to stand him up with assistance and walk to the car and drive to the emergency room.    Here work-up emergency room has been fairly unremarkable.  His blood pressure does seem somewhat soft ranging in the upper 90s to low 100s.  Heart rate was in the 70s and 80s.  Laboratory results showed normal BMP and CBC.  Troponin was negative as well as nonischemic EKG.  Currently the patient is asymptomatic.  I was called to admit the patient under observation for near syncope due to concerns for significant cardiac history.    1.  Near syncope-sounds vasovagal and contributed by soft blood pressures.  Patient recently has had a decline in blood pressure where he was taken off of a couple of his blood pressure medication by his PCP in the last month.  His blood pressure remains in the 90s to low 100s.  I suspect this is likely the cause of his near syncope.  The patient denies any chest pain palpitations or significant shortness of breath.  In the recent weeks he has not had any concerns for angina or dyspnea on exertion.  No significant swelling.  Clinically he does not appear to be in heart  failure and his last echocardiogram performed 6 months ago showed stable EF of 25% with different regional wall motion abnormality.  Current cardiac work-up included negative high-sensitivity troponin with nonischemic EKG.  I offered the patient admission if he is still feeling lightheaded and dizzy after a road test but otherwise if he is amatory and feeling back to baseline I think it is safe for him to discharge home with follow-up with his PCP in few days.  Recommendations:  Discontinue Lasix 20 mg, lisinopril 5 mg for the next several days until seen by Dr. Leigh  Continue Toprol-XL at current dose.  Increase oral fluid intake  Check blood pressures daily, keep a diary for PCP  Return to the hospital if you are having any recurrent episodes of shortness of breath, dizziness, chest pain, syncope.    2. Hx of CAD, CM HFrEF  Follows Dr. Blanton.  Last seen April 2021 with echocardiogram in December 2021.  Last visit he is euvolemic with no concerns of chest pain, angina.  His last coronary angiography was in February 2020 that demonstrated mid LAD in-stent restenosis that was again stented at that time.  However the degree of cardiac dysfunction was thought to be out of proportion to the burden of coronary disease so cardiac MRI was recommended.  However he never followed up with the cardiac MRI as he had been overwhelmed by his wife's own medical health.  By all means he has been fairly stable from a cardiac standpoint.  He has not had any significant weight changes, no shortness of breath no dyspnea on exertion no angina.  Plan:  Suspect he is slightly overmedicated with soft BP.   See recs above.            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   Pt was  sitting in [employer] doing a  service when he all of a sudden felt somewhat lightheaded and dizzy.  He kind of slumped forward but did not fall.  He did not lose consciousness.  His daughter caught him and saw that he was somewhat diaphoretic and was breathing heavily.  This lasted about 4 minutes.  They are able to stand him up with assistance and walk to the car and drive to the emergency room.              Past Medical History:     Past Medical History:   Diagnosis Date     Acute systolic heart failure (H) 2020    Added automatically from request for surgery [id number]     CAD (coronary artery disease) 2004    2 cardiac stents      Dyspnea on exertion 2020    Added automatically from request for surgery [id number]     Essential hypertension with goal blood pressure less than 140/90 8/10/2016     Hyperlipidemia LDL goal <100 8/10/2016     Ischemic cardiomyopathy 2020    Added automatically from request for surgery [id number]     LBBB (left bundle branch block) 2020               Past Surgical History:     Past Surgical History:   Procedure Laterality Date     CV CORONARY ANGIOGRAM N/A 2020    Procedure: CV CORONARY ANGIOGRAM;  Surgeon: Andres Mann MD;  Location:  HEART CARDIAC CATH LAB     CV LEFT HEART CATH N/A 2020    Procedure: Left Heart Cath;  Surgeon: Andres Mann MD;  Location:  HEART CARDIAC CATH LAB     CV LEFT VENTRICULOGRAM N/A 2020    Procedure: Left Ventriculogram;  Surgeon: Andres Mann MD;  Location:  HEART CARDIAC CATH LAB     CV PCI STENT DRUG ELUTING N/A 2020    Procedure: Percutaneous Coronary Intervention Stent Drug Eluting;  Surgeon: Andres Mann MD;  Location:  HEART CARDIAC CATH LAB     CYSTOSCOPY N/A 3/1/2020    Procedure: CYSTOSCOPY;  Surgeon: Rios Gomez MD;  Location:  OR     HEART CATH DRUG ELUTING STENT PLACEMENT  2020     HERNIA REPAIR  2007    right inguinal hernia repair     STENT,  CORONARY, ANTIONE  2004    2 stents                 Social History:     Social History     Tobacco Use     Smoking status: Former Smoker     Smokeless tobacco: Never Used     Tobacco comment: Quit 30 years ago   Vaping Use     Vaping Use: Never used   Substance Use Topics     Alcohol use: Yes     Alcohol/week: 0.0 standard drinks     Comment: Moderate     Drug use: No                 Family History:   Family Hx fully reviewed and is non contributory to this admission.           Allergies:     Allergies   Allergen Reactions     Penicillins              Medications:     Prior to Admission medications    Medication Sig Last Dose Taking? Auth Provider   aspirin 81 MG EC tablet Take 81 mg by mouth daily   Reported, Patient   atorvastatin (LIPITOR) 20 MG tablet Take 1 tablet (20 mg) by mouth daily   Colt Treviño MD   clopidogrel (PLAVIX) 75 MG tablet TAKE 1 TABLET EVERY DAY   Colt Treviño MD   furosemide (LASIX) 20 MG tablet TAKE 1 TABLET EVERY DAY   Colt Treviño MD   lisinopril (ZESTRIL) 5 MG tablet TAKE 1 TABLET EVERY DAY   Colt Treviño MD   metoprolol succinate ER (TOPROL-XL) 50 MG 24 hr tablet Take 1 tablet (50 mg) by mouth daily Appointment required for further refills   Kwame Blanton MD   multivitamin, therapeutic with minerals (THERA-VIT-M) TABS Take 1 tablet by mouth daily   Unknown, Entered By History   nitroGLYcerin (NITROSTAT) 0.4 MG sublingual tablet Place 1 tablet (0.4 mg) under the tongue every 5 minutes as needed for chest pain   Blaine Rowe MD               Review of Systems:   A comprehensive greater than 10 system review of systems was carried out.  Pertinent positives and negatives are noted above.  Otherwise negative for contributory info.            Physical Exam:   Vitals were reviewed  Blood pressure 114/59, pulse 81, temperature 97.6  F (36.4  C), temperature source Temporal, resp. rate 23, SpO2 100 %.  Exam:    GENERAL:  Comfortable. Frail   PSYCH: pleasant,  oriented, No acute distress.  HEENT:  PERRLA. Normal conjunctiva, normal hearing, nasal mucosa and Oropharynx are normal.  NECK:  Supple, no neck vein distention, adenopathy or bruits, normal thyroid.  HEART:  Normal S1, S2 with no murmur, no pericardial rub, S3 or S4.  LUNGS:  Clear to auscultation, normal Respiratory effort.  ABDOMEN:  Soft, no hepatosplenomegaly, normal bowel sounds.  EXTREMITIES:  No pedal edema, +2 pulses bilateral and equal.  SKIN:  Dry to touch, No rash, wound or ulcerations.  NEUROLOGIC:  Nonfocal with normal cranial nerve and motor power and sensation.            Data:   Past 24 hours labs, studies, and imaging were reviewed.       Lab Results   Component Value Date     06/11/2022     03/02/2022     02/14/2022     03/06/2020     03/04/2020     03/03/2020    Lab Results   Component Value Date    CHLORIDE 104 06/11/2022    CHLORIDE 103 03/02/2022    CHLORIDE 108 02/14/2022    CHLORIDE 105 03/06/2020    CHLORIDE 107 03/04/2020    CHLORIDE 108 03/03/2020    Lab Results   Component Value Date    BUN 30 06/11/2022    BUN 41 03/02/2022    BUN 53 02/14/2022    BUN 38 03/06/2020    BUN 33 03/04/2020    BUN 29 03/03/2020      Lab Results   Component Value Date    POTASSIUM 4.2 06/11/2022    POTASSIUM 5.2 03/02/2022    POTASSIUM 3.4 02/14/2022    POTASSIUM 3.9 03/06/2020    POTASSIUM 3.8 03/04/2020    POTASSIUM 3.7 03/03/2020    Lab Results   Component Value Date    CO2 30 06/11/2022    CO2 28 03/02/2022    CO2 28 02/14/2022    CO2 29 03/06/2020    CO2 27 03/04/2020    CO2 28 03/03/2020    Lab Results   Component Value Date    CR 1.03 06/11/2022    CR 1.25 03/02/2022    CR 1.29 02/14/2022    CR 1.11 03/06/2020    CR 0.96 03/04/2020    CR 1.07 03/03/2020        Josefina Hassan PA-C

## 2022-06-11 NOTE — ED PROVIDER NOTES
History     Chief Complaint:  Dizziness      HPI   Deshawn Burrows is a 89 year old male who presents with a near syncopal episode while at a  this morning. Around 1140 patient was sitting and listening to the service when he became weak and dizzy, warm and diaphoretic, and his daughter noted that he was breathing heavy.  He leaned forward and was about to pass out but daughter caught him and sat him back up and he never lost consciousness.  Pt denies any chest pain, SOB, neck or arm pain, back or abd pain.  No n/v/d.  No recent fevers, cough, or other illnesses.  No bloody or melanotic stools.  Denies HA, facial droop, focal weakness or numbness.  His symptoms resolved en route and he was able to ambulate into the ED.  He does have cardiac hx including two stents and ischemic cardiomyopathy with CHF.      Review of Systems   Constitutional: Positive for diaphoresis and fatigue.   Neurological: Positive for dizziness and weakness.   All other systems reviewed and are negative.        Allergies:  Penicillins    Medications:    aspirin 81 MG EC tablet  atorvastatin (LIPITOR) 20 MG tablet  clopidogrel (PLAVIX) 75 MG tablet  furosemide (LASIX) 20 MG tablet  lisinopril (ZESTRIL) 5 MG tablet  metoprolol succinate ER (TOPROL-XL) 50 MG 24 hr tablet  multivitamin, therapeutic with minerals (THERA-VIT-M) TABS  nitroGLYcerin (NITROSTAT) 0.4 MG sublingual tablet         Past Medical History:   Past Surgical History:     Past Medical History:   Diagnosis Date     Acute systolic heart failure (H) 2020     CAD (coronary artery disease) 2004     Dyspnea on exertion 2020     Essential hypertension with goal blood pressure less than 140/90 8/10/2016     Hyperlipidemia LDL goal <100 8/10/2016     Ischemic cardiomyopathy 2020     LBBB (left bundle branch block) 2020    Past Surgical History:   Procedure Laterality Date     CV CORONARY ANGIOGRAM N/A 2020    Procedure: CV CORONARY ANGIOGRAM;  Surgeon:  Andres Mann MD;  Location:  HEART CARDIAC CATH LAB     CV LEFT HEART CATH N/A 2/28/2020    Procedure: Left Heart Cath;  Surgeon: Andres Mann MD;  Location:  HEART CARDIAC CATH LAB     CV LEFT VENTRICULOGRAM N/A 2/28/2020    Procedure: Left Ventriculogram;  Surgeon: Andres Mann MD;  Location:  HEART CARDIAC CATH LAB     CV PCI STENT DRUG ELUTING N/A 2/28/2020    Procedure: Percutaneous Coronary Intervention Stent Drug Eluting;  Surgeon: Andres Mann MD;  Location:  HEART CARDIAC CATH LAB     CYSTOSCOPY N/A 3/1/2020    Procedure: CYSTOSCOPY;  Surgeon: Rios Gomez MD;  Location: [facility] OR     HEART CATH DRUG ELUTING STENT PLACEMENT  02/28/2020     HERNIA REPAIR  2007    right inguinal hernia repair     STENT, CORONARY, ANTIONE  2004    2 stents      Patient Active Problem List    Diagnosis Date Noted     Pulmonary mass 02/12/2022     Priority: Medium     Congestive heart failure, unspecified HF chronicity, unspecified heart failure type (H) 02/12/2022     Priority: Medium     Pleural effusion 12/09/2021     Priority: Medium     Hypoxia 12/09/2021     Priority: Medium     Acute on chronic congestive heart failure, unspecified heart failure type (H) 12/09/2021     Priority: Medium     Heart failure with reduced ejection fraction, NYHA class II (H) 03/01/2020     Priority: Medium     Dyspnea 02/25/2020     Priority: Medium     LBBB (left bundle branch block) 02/25/2020     Priority: Medium     Coronary artery disease involving native coronary artery of native heart without angina pectoris 02/25/2020     Priority: Medium     Added automatically from request for surgery [id number]       Ischemic cardiomyopathy 02/25/2020     Priority: Medium     Added automatically from request for surgery [id number]       Acute systolic heart failure (H) 02/25/2020     Priority: Medium     Added automatically from request for surgery [id number]       Abnormal cardiovascular stress test 02/25/2020      Priority: Medium     Added automatically from request for surgery [id number]       Dyspnea on exertion 02/25/2020     Priority: Medium     Added automatically from request for surgery [id number]       Coronary artery disease involving native heart, angina presence unspecified, unspecified vessel or lesion type 02/07/2017     Priority: Medium     2 stents in 2004, unclear where stents are located or where procedure was done  Replacing diagnoses that were inactivated after the 10/1/2021 regulatory import.       Myocardial infarction (H)      Priority: Medium     2 cardiac stents        Essential hypertension with goal blood pressure less than 140/90 08/10/2016     Priority: Medium     Hyperlipidemia LDL goal <100 08/10/2016     Priority: Medium     Vasculitis (H) 05/13/2016     Priority: Medium          Family History  Family History   Family history unknown: Yes       Social History:  PCP: No primary care provider on file.  Presents to the ED with daughter by private vehicle    Physical Exam     Patient Vitals for the past 24 hrs:   BP Temp Temp src Pulse Resp SpO2   06/11/22 1515 118/55 -- -- 80 14 100 %   06/11/22 1500 114/59 -- -- 81 23 100 %   06/11/22 1445 106/54 -- -- 71 12 100 %   06/11/22 1430 107/55 -- -- 79 -- 100 %   06/11/22 1415 110/52 -- -- 71 13 100 %   06/11/22 1400 105/60 -- -- 84 18 100 %   06/11/22 1345 96/71 -- -- 80 12 100 %   06/11/22 1330 105/63 -- -- 61 -- 100 %   06/11/22 1300 112/59 -- -- 76 19 100 %   06/11/22 1245 99/58 -- -- 75 23 100 %   06/11/22 1230 104/63 -- -- -- -- --   06/11/22 1206 102/61 97.6  F (36.4  C) Temporal 78 18 98 %       Physical Exam  Vitals and nursing note reviewed.   Constitutional:       General: He is not in acute distress.     Appearance: Normal appearance. He is not ill-appearing.      Comments: Elderly and frail appearing   HENT:      Head: Normocephalic and atraumatic.      Right Ear: External ear normal.      Left Ear: External ear normal.      Nose: Nose normal.    Eyes:      Extraocular Movements: Extraocular movements intact.      Conjunctiva/sclera: Conjunctivae normal.      Pupils: Pupils are equal, round, and reactive to light.   Cardiovascular:      Rate and Rhythm: Normal rate and regular rhythm.      Heart sounds: No murmur heard.  Pulmonary:      Effort: Pulmonary effort is normal. No respiratory distress.      Breath sounds: No wheezing, rhonchi or rales.   Abdominal:      General: Abdomen is flat. There is no distension.      Palpations: Abdomen is soft.      Tenderness: There is no abdominal tenderness. There is no guarding or rebound.   Musculoskeletal:         General: No swelling or deformity.      Cervical back: Normal range of motion and neck supple.   Skin:     General: Skin is warm and dry.      Findings: No rash.   Neurological:      Mental Status: He is alert and oriented to person, place, and time.      Cranial Nerves: Cranial nerves are intact. No dysarthria or facial asymmetry.      Sensory: Sensation is intact.      Motor: No weakness.   Psychiatric:         Behavior: Behavior normal.           Emergency Department Course     ECG results from 06/11/22   EKG 12-lead, tracing only     Value    Systolic Blood Pressure     Diastolic Blood Pressure     Ventricular Rate 66    Atrial Rate 66    RI Interval 186    QRS Duration 108        QTc 463    P Axis 74    R AXIS 73    T Axis 252    Interpretation ECG      Sinus rhythm with occasional Premature ventricular complexes  Incomplete left bundle branch block  Left ventricular hypertrophy with repolarization abnormality  Abnormal ECG  When compared with ECG of 13-FEB-2022 17:01,  Incomplete left bundle branch block has replaced Left bundle branch block         Imaging:  No orders to display       Laboratory:  Labs Ordered and Resulted from Time of ED Arrival to Time of ED Departure   ROUTINE UA WITH MICROSCOPIC REFLEX TO CULTURE - Abnormal       Result Value    Color Urine Yellow      Appearance Urine  Clear      Glucose Urine Negative      Bilirubin Urine Negative      Ketones Urine Negative      Specific Gravity Urine 1.014      Blood Urine Negative      pH Urine 5.0      Protein Albumin Urine Negative      Urobilinogen Urine Normal      Nitrite Urine Negative      Leukocyte Esterase Urine Negative      Mucus Urine Present (*)     RBC Urine <1      WBC Urine 1      Hyaline Casts Urine 7 (*)    CBC WITH PLATELETS AND DIFFERENTIAL - Abnormal    WBC Count 7.9      RBC Count 4.04 (*)     Hemoglobin 13.1 (*)     Hematocrit 41.0       (*)     MCH 32.4      MCHC 32.0      RDW 12.2      Platelet Count 221      % Neutrophils 68      % Lymphocytes 22      % Monocytes 8      % Eosinophils 1      % Basophils 1      % Immature Granulocytes 0      NRBCs per 100 WBC 0      Absolute Neutrophils 5.3      Absolute Lymphocytes 1.8      Absolute Monocytes 0.7      Absolute Eosinophils 0.0      Absolute Basophils 0.0      Absolute Immature Granulocytes 0.0      Absolute NRBCs 0.0     BASIC METABOLIC PANEL - Normal    Sodium 141      Potassium 4.2      Chloride 104      Carbon Dioxide (CO2) 30      Anion Gap 7      Urea Nitrogen 30      Creatinine 1.03      Calcium 8.8      Glucose 99      GFR Estimate 69     TROPONIN I - Normal    Troponin I High Sensitivity 15     TROPONIN I - Normal    Troponin I High Sensitivity 18           Emergency Department Course:           Reviewed:  I reviewed nursing notes, vitals, past history and care everywhere      Interventions:  Medications - No data to display    Disposition:  The patient was discharged to home.    Impression & Plan      Medical Decision Makin yo M with near syncopal episode.  DDx includes vasovagal episode, ACS, arrhythmia.  Not with change in position so doubt orthostatic episode or hypovolemia.  Likely vasovagal but does have sig cardiac history and was tachypneic and diaphoretic.  Will check cardiac w/u and likely rec admit for observation and further cardiac  monitoring and eval.      1435  D/w Josefina Hassan, TRENA with the Obs service.  She will come down and assess the patient and decide with patient/family re: dispo.      1450  Josefina Hassan came to ED and evaluated patient.  They have agreed to plan to ambulate and if not dizzy can be discharged home.  I will repeat a troponin to ensure not sig change from arrival.      Covid-19  Deshawn Burrows was evaluated during a global COVID-19 pandemic, which necessitated consideration that the patient might be at risk for infection with the SARS-CoV-2 virus that causes COVID-19.  Applicable protocols for evaluation were followed during the patient's care. COVID-19 was considered as part of the patient's evaluation.       Diagnosis:    ICD-10-CM    1. Near syncope  R55        Discharge Medications:  Discharge Medication List as of 6/11/2022  3:57 PM                 Demetri Hoover MD  06/11/22 [id number]

## 2022-06-14 NOTE — TELEPHONE ENCOUNTER
Routed to provider to review if patient can be worked in for hospital follow-up appointment.    Mari Gates RN  [facility]

## 2022-06-14 NOTE — TELEPHONE ENCOUNTER
Reason for call:  Other   Patient called regarding (reason for call): appointment  Additional comments: Needs to schedule ED follow up with Kamila, declined to schedule with another available provider. Pt went to [facility] ED 6/11, feeling short of breath, faint and dizzy. Needs follow up 7-10 days. Please call daughter to schedule    Phone number to reach patient:  Home number on file [phone] (home)    Best Time:  any    Can we leave a detailed message on this number?  NO    Travel screening: Negative

## 2022-06-15 NOTE — TELEPHONE ENCOUNTER
Contacted [employer] and offered appointment time on 6/20 at 2pm. She thinks this will work, but needs to check her calendar when she gets home. [employer] requests we call her back with the appointment information and she would call back if this will not work.   Left voice message for Orqu[employer] with appointment information and scheduled appointment.   Appointments in Next Year    Jun 20, 2022  2:00 PM  (Arrive by 1:45 PM)  ED/Hospital Follow Up with Colt Treviño MD  [facility] ([facility] ) [phone]   Aug 31, 2022 11:00 AM  (Arrive by 10:45 AM)  New Visit with Heladio Hatch MD  [facility] ([facility] ) [phone]         Mari Gates RN  [facility]

## 2022-06-20 PROBLEM — R09.02 HYPOXIA: Status: RESOLVED | Noted: 2021-12-09 | Resolved: 2022-01-01

## 2022-06-20 PROBLEM — J90 PLEURAL EFFUSION: Status: RESOLVED | Noted: 2021-12-09 | Resolved: 2022-01-01

## 2022-06-20 PROBLEM — R06.00 DYSPNEA: Status: RESOLVED | Noted: 2020-02-25 | Resolved: 2022-01-01

## 2022-06-20 PROBLEM — R06.09 DYSPNEA ON EXERTION: Status: RESOLVED | Noted: 2020-02-25 | Resolved: 2022-01-01

## 2022-06-20 PROBLEM — I50.21 ACUTE SYSTOLIC HEART FAILURE (H): Status: RESOLVED | Noted: 2020-02-25 | Resolved: 2022-01-01

## 2022-06-20 NOTE — PROGRESS NOTES
"  Assessment & Plan     Lump in chest  At this time, patient does have a palpable lesion located at the inferior portion of his sternum.  It does not cause him any type of discomfort when this lesion is palpated.  He does appear to be consistent with his xiphoid process.  Reassurance was provided to the patient today.  He had no further questions or concerns in this regard.  Again, patient did not wish to discuss his recent ER visit, but he did state that his symptoms have fully resolved.  Patient had no other questions or concerns.      Review of external notes as documented elsewhere in note  20 minutes spent on the date of the encounter doing chart review, history and exam, documentation and further activities per the note       See Patient Instructions    Return in about 3 months (around 9/20/2022) for Routine preventive.    Colt Treviño MD  [facility]    Marion Hess is a 89 year old, presenting for the following health issues: ED follow up, dizziness.    ER F/U      Patient is an 89-year-old  male who presents to the clinic with a chief complaint of a lump on his chest.  He did have a recent ER visit on June 11, 2022 after experiencing a near syncopal episode.  Patient did have blood work and an EKG performed with no abnormalities noted.  Patient's dizziness did resolve while in the emergency department, and he was discharged home.  Patient states that he has had no further issues with dizziness.  He does not wish to discuss his recent ER visit, as he states that he is \"here for something else.\"  He is concerned about the presence of a small lump located on his chest.  Patient states that he has noticed this lesion over the past several months.  It does not cause him any type of discomfort or any other symptoms.  This lesion has not changed in shape or size since he first noticed it.  Patient is curious as to what it could be.       Hypertension " Follow-up      Do you check your blood pressure regularly outside of the clinic? No     Are you following a low salt diet? Yes    Are your blood pressures ever more than 140 on the top number (systolic) OR more   than 90 on the bottom number (diastolic), for example 140/90? No      How many servings of fruits and vegetables do you eat daily?  2-3    On average, how many sweetened beverages do you drink each day (Examples: soda, juice, sweet tea, etc.  Do NOT count diet or artificially sweetened beverages)?   0    How many days per week do you exercise enough to make your heart beat faster? 3 or less    How many minutes a day do you exercise enough to make your heart beat faster? 9 or less    How many days per week do you miss taking your medication? 0        Review of Systems   Constitutional: Negative.    HENT: Negative.    Respiratory: Negative.    Cardiovascular: Negative.    Gastrointestinal: Negative.    Musculoskeletal: Negative.    Neurological: Negative.             Objective    Blood pressure 114/66, pulse 76, temperature 98.2  F (36.8  C), resp. rate 18, weight 60.3 kg (133 lb), SpO2 98 %.      Physical Exam  Constitutional:       General: He is not in acute distress.     Appearance: He is well-developed.   HENT:      Right Ear: Tympanic membrane and external ear normal.      Left Ear: Tympanic membrane and external ear normal.      Nose: Nose normal.      Mouth/Throat:      Mouth: No oral lesions.      Pharynx: No oropharyngeal exudate.   Eyes:      General:         Right eye: No discharge.         Left eye: No discharge.      Conjunctiva/sclera: Conjunctivae normal.      Pupils: Pupils are equal, round, and reactive to light.   Neck:      Thyroid: No thyromegaly.      Trachea: No tracheal deviation.   Cardiovascular:      Rate and Rhythm: Normal rate and regular rhythm.      Pulses: Normal pulses.      Heart sounds: Normal heart sounds, S1 normal and S2 normal. No murmur heard.    No S3 or S4 sounds.    Pulmonary:      Effort: Pulmonary effort is normal. No respiratory distress.      Breath sounds: Normal breath sounds. No wheezing or rales.   Chest:      Chest wall: Deformity present.          Comments: Bony protuberance noted at inferior portion of sternum.  Consistent with xiphoid process.  Abdominal:      General: Bowel sounds are normal.      Palpations: Abdomen is soft. There is no mass.      Tenderness: There is no abdominal tenderness.   Musculoskeletal:         General: No deformity.      Cervical back: Neck supple.   Lymphadenopathy:      Cervical: No cervical adenopathy.   Skin:     General: Skin is warm and dry.      Findings: No lesion or rash.   Neurological:      Mental Status: He is alert and oriented to person, place, and time.      Motor: No abnormal muscle tone.      Deep Tendon Reflexes: Reflexes are normal and symmetric.   Psychiatric:         Speech: Speech normal.                .  ..

## 2022-07-22 NOTE — TELEPHONE ENCOUNTER
Received refill request for:  Metoprolol  Last OV was: 4/8/21 Dr. Blanton  Labs/EKG: N/a  F/U scheduled: No follow up scheduled. Overdue orders for 5/2021 with [facility] and 1/2022 with MD. Pt admitted 12/2021, Dr. Blanton saw inpatient, ordered follow up for 12/2021 post hospitalization, but this was never done either. Pt has been sent letters 5/2021, 8/2021, and 2/2022.  Pharmacy: [facility] Refill Guideline reviewed.  Medication does not meet criteria for refill due to over due for follow up.  Messaged to providers team for further review.     Areli Brown RN, BSN  07/22/22 at 12:29 PM

## 2022-07-26 NOTE — TELEPHONE ENCOUNTER
All placed to number on file (went to  of daughter Nettie). Message left regarding need for follow up to fill medications with call back number for questions. Letter sent with information of inability to refill medications at this time due to lack of follow up sent.   JASON Sheth RN, BSN. 07/26/22

## 2022-08-30 NOTE — PROGRESS NOTES
INITIAL NEUROLOGY CONSULTATION    DATE OF VISIT: 8/31/2022  CLINIC LOCATION: [facility]  MRN: [id number]  PATIENT NAME: Deshawn Burrows  YOB: 1933    REASON FOR VISIT: No chief complaint on file.    HISTORY OF PRESENT ILLNESS:                                                    Mr. Deshawn Burrows is 89 year old right handed male patient with past medical history of hypertension, hyperlipidemia, myocardial infarction, ischemic cardiomyopathy and congestive heart failure, who was seen today for memory loss/cognitive decline.  The patient is accompanied by his ***.    Per patient's report, ***.    According to patient's daughter, progressive cognitive decline started several years ago and became significant in November 2021.    Laboratory valuation from June 2022 includes unremarkable BMP, slightly low hemoglobin of 13.1 with MCV of 102, and unrevealing urinalysis.  Latest TSH from 2018 was normal (2.67).  I do not see previously checked folate or vitamin B12 levels.    No prior brain imaging.    No additional useful information is available in Care Everywhere, which was reviewed.  PAST MEDICAL/SURGICAL HISTORY:                                                    I personally reviewed patient's past medical and surgical history with the patient at today's visit.  MEDICATIONS:                                                    I personally reviewed patient's medications and allergies with the patient at today's visit.  ALLERGIES:                                                      Allergies   Allergen Reactions     Penicillins      EXAM:                                                    VITAL SIGNS:   There were no vitals taken for this visit.  Nigel Cognitive Assessment:          Nigel Cognitive Assessment Score:   /30.     General: pt is in NAD, cooperative.  Skin: normal turgor, moist mucous membranes, no lesions/rashes noticed.  HEENT: ATNC, EOMI, PERRL, white sclera,  normal conjunctiva, no nystagmus or ptosis. No carotid bruits bilaterally.  Respiratory: lung sounds clear to auscultation bilaterally, no crackles, wheezes, rhonchi. Symmetric lung excursion, no accessory respiratory muscle use.  Cardiovascular: normal S1/S2, no murmurs/rubs/gallops.   Abdomen: Not distended.  : deferred.    Neurological:  Mental: alert, follows commands, MoCA as per above, no aphasia or dysarthria. Fund of knowledge is {MYAPPROPRIATE:[id number]}  Cranial Nerves:  CN II: visual acuity - able to accurately count fingers with each eye. Visual fields intact, fundi: discs sharp, no papilledema and normal vessels bilaterally.  CN III, IV, VI: EOM intact, pupils equal and reactive  CN V: facial sensation nl  CN VII: face symmetric, no facial droop  CN VIII: hearing bilaterally reduced  CN IX: palate elevation symmetric, uvula at midline  CN XI SCM normal, shoulder shrug nl  CN XII: tongue midline  Motor: Strength: 5/5 in all major groups of all extremities. Normal tone. No abnormal movements. No pronator drift b/l.  Reflexes: Triceps, biceps, brachioradialis, patellar, and achilles reflexes normal and symmetric. No clonus noted. Toes are down-going b/l.   Sensory: light touch, pinprick, and vibration intact. Romberg: negative.  Coordination: FNF and heel-shin tests intact b/l.   Gait:  Normal, able to tandem walk *** without difficulty.  DATA:   LABS/EEG/IMAGING/OTHER STUDIES: I reviewed pertinent medical records, as detailed in the history of present illness.  ASSESSMENT AND PLAN:      ASSESSMENT: Deshawn Burrows is a 89 year old male patient with listed above past medical history, who presents with memory loss.    We had a detailed discussion with the patient regarding his presenting complaints.  The neurological exam today is ***.    DIAGNOSES:  No diagnosis found.  PLAN: There are no Patient Instructions on file for this visit.    Total Time: *** minutes spent on the date of the encounter doing  chart review, history and exam, documentation and further activities per the note.    Heladio Hatch MD  [facility] Neurology  (Chart documentation was completed in part with Dragon voice-recognition software. Even though reviewed, some grammatical, spelling, and word errors may remain.)

## 2022-08-31 NOTE — PROGRESS NOTES
"Deshawn Burrows is a 89 year old male who presents for:  Chief Complaint   Patient presents with     Consult     Memory Concerns         Initial Vitals:  /68 (BP Location: Left arm, Patient Position: Sitting, Cuff Size: Adult Regular)   Pulse 69   Ht 1.854 m (6' 1\")   Wt 60.1 kg (132 lb 6.4 oz)   SpO2 100%   BMI 17.47 kg/m   Estimated body mass index is 17.47 kg/m  as calculated from the following:    Height as of this encounter: 1.854 m (6' 1\").    Weight as of this encounter: 60.1 kg (132 lb 6.4 oz).. Body surface area is 1.76 meters squared. BP completed using cuff size: small regular    Visit Facilitator Comments: MoCA 1=  17/30    Gabby Trevizo  "

## 2022-08-31 NOTE — PATIENT INSTRUCTIONS
AFTER VISIT SUMMARY (AVS):    At today's visit we thoroughly discussed various diagnostic possibilities for your symptoms, necessary evaluation, and the plan, which includes:  Orders Placed This Encounter   Procedures    MR Brain w/o Contrast    Methylmalonic Acid    Vitamin B12    Vitamin B6    Vitamin B1 whole blood    TSH with free T4 reflex    Folate    Occupational Therapy Referral     No new medications.    Stay physically and mentally active with particular emphasis on daily mentally stimulating activities of your choice (such as crosswords, puzzles, sudoku, etc.), stretching exercises, walking, and healthy eating.     Additional recommendations after the work-up.    Next follow-up appointment is in the next 4-6 weeks or earlier if needed.    Please do not hesitate to call me with any questions or concerns.    Thanks.

## 2022-08-31 NOTE — LETTER
"    8/31/2022         RE: Deshawn Burrows  [address] [place] [address]        Dear Colleague,    Thank you for referring your patient, Deshawn Burrows, to the [facility]. Please see a copy of my visit note below.    INITIAL NEUROLOGY CONSULTATION    DATE OF VISIT: 8/31/2022  CLINIC LOCATION: [facility]  MRN: [id number]  PATIENT NAME: Deshawn Burrows  YOB: 1933    REASON FOR VISIT:   Chief Complaint   Patient presents with     Consult     Memory Concerns      HISTORY OF PRESENT ILLNESS:                                                    Mr. Deshawn Burrows is 89 year old right handed male patient with past medical history of hypertension, hyperlipidemia, myocardial infarction, ischemic cardiomyopathy and congestive heart failure, who was seen today for memory loss/cognitive decline.  The patient is accompanied by his daughter.    Per patient's report, he is not sure why he is here.  He feels that his memory is \"fair, not a problem\".  He mentions that he is \"79-year-old\" that might play a role, but feels that his health overall is good.  He has no concerns.    According to patient's daughter, progressive cognitive decline started several years ago and became significant in November 2021 after his hospitalization for pneumonia.  His daughter lives in [place] and was previously visiting him every 4 months, but temporarily moved to [place] in November 2021.  She feels that short-term is preferentially affected.  No word finding difficulties, but the patient tends to repeat himself.  He misplaces his belongings and has trouble with calendar.  Daughter took over medication administration and finances.  The patient continues to drive within 3-mile range (basically goes to [employer] and [place]).  Does not get lost.  No car accidents or tickets.  No other safety concerns.  Daughter does all laundry, cleaning, and cooking.  He also " "has 2 of 16-ounce beers every day for the last several years.  No prior history of brain injury, seizures, CNS infections/strokes.    Upon review of patient's wife's notes from [facility], his MoCA was 25/30 with five points lost in memory in October 2018.    Laboratory evaluation from June 2022 includes unremarkable BMP, slightly low hemoglobin of 13.1 with MCV of 102, and unrevealing urinalysis.  Latest TSH from 2018 was normal (2.67).  I do not see previously checked folate or vitamin B12 levels.    No prior brain imaging.    No additional useful information is available in Care Everywhere, which was reviewed.  PAST MEDICAL/SURGICAL HISTORY:                                                    I personally reviewed patient's past medical and surgical history with the patient at today's visit.  MEDICATIONS:                                                    I personally reviewed patient's medications and allergies with the patient at today's visit.  ALLERGIES:                                                      Allergies   Allergen Reactions     Penicillins      EXAM:                                                    VITAL SIGNS:   /68 (BP Location: Left arm, Patient Position: Sitting, Cuff Size: Adult Regular)   Pulse 69   Ht 1.854 m (6' 1\")   Wt 60.1 kg (132 lb 6.4 oz)   SpO2 100%   BMI 17.47 kg/m    Nigel Cognitive Assessment:    Ellsworth Cognitive Assessment (MOCA)  Visuospatial/Executive : 4  Naming: 3  Attention - Digits: 2  Attention - Letters: 1  Attention - Subtraction: 3  Language - Repeat: 0  Language - Fluency : 0  Abstraction: 2  Delayed Recall: 0  Orientation: 2  Education: 0  MOCA Score: 17  Administered by: : Gabby CAMPOS     Ellsworth Cognitive Assessment Score:  MOCA Score: 17/30.     General: pt is in NAD, cooperative.  Skin: normal turgor, moist mucous membranes, no lesions/rashes noticed.  HEENT: ATNC, EOMI, PERRL, white sclera, normal conjunctiva, no nystagmus or " ptosis. No carotid bruits bilaterally.  Respiratory: lung sounds clear to auscultation bilaterally, no crackles, wheezes, rhonchi. Symmetric lung excursion, no accessory respiratory muscle use.  Cardiovascular: normal S1/S2, no murmurs/rubs/gallops.   Abdomen: Not distended.  : deferred.    Neurological:  Mental: alert, follows commands, MoCA as per above, no aphasia or dysarthria. Fund of knowledge is diminished for age.  Cranial Nerves:  CN II: visual acuity - able to accurately count fingers with each eye. Visual fields intact, fundi: Technically difficult exam bilaterally.  CN III, IV, VI: EOM intact, pupils equal and reactive  CN V: facial sensation nl  CN VII: face symmetric, no facial droop  CN VIII: hearing bilaterally reduced  CN IX: palate elevation symmetric, uvula at midline  CN XI SCM normal, shoulder shrug nl  CN XII: tongue midline  Motor: Strength: 5/5 in all major groups of all extremities. Normal tone. No abnormal movements. No pronator drift b/l.  Reflexes: Triceps, biceps, brachioradialis, patellar, and achilles reflexes normal and symmetric. No clonus noted. Toes are down-going b/l.   Sensory: light touch, pinprick, and vibration intact. Romberg: negative.  Coordination: FNF and heel-shin tests intact b/l.   Gait:  Normal casual walk, but has mild difficulty with tandem.  DATA:   LABS/EEG/IMAGING/OTHER STUDIES: I reviewed pertinent medical records, as detailed in the history of present illness.  ASSESSMENT AND PLAN:      ASSESSMENT: Deshawn Burrows is a 89 year old male patient with listed above past medical history, who presents with memory loss.    We had a detailed discussion with the patient and his daughter regarding his presenting complaints.  The neurological exam today is non-focal.  MoCA today is 17/30, consistent with mild dementia.  There is noticeable decline compared to his baseline evaluation from 2018 when it was 25/30.  The pattern is suggestive of Alzheimer's disease.   Additional differential includes vitamin deficiencies, thyroid dysfunction, and structural or vascular brain lesions.  For further diagnostic clarification, I ordered brain MRI without contrast, screening labs, and CPT.  I do not feel that neuropsychology evaluation would be helpful in his management at this point.    DIAGNOSES:    ICD-10-CM    1. Cognitive decline  R41.89 Adult Neurology  Referral   2. Abnormal findings on diagnostic imaging of other specified body structures   R93.89 TSH with free T4 reflex     PLAN: At today's visit we thoroughly discussed various diagnostic possibilities for patient's symptoms, necessary evaluation, and the plan, which includes:  Orders Placed This Encounter   Procedures     MR Brain w/o Contrast     Methylmalonic Acid     Vitamin B12     Vitamin B6     Vitamin B1 whole blood     TSH with free T4 reflex     Folate     Occupational Therapy Referral     No new medications.    I advised the patient to stay physically and mentally active with particular emphasis on daily mentally stimulating activities of  his choice (such as crosswords, puzzles, sudoku, etc.), stretching exercises, walking, and healthy eating.     Additional recommendations after the work-up.    Next follow-up appointment is in the next 4-6 weeks or earlier if needed.    Total Time: 71 minutes spent on the date of the encounter doing chart review, history and exam, documentation and further activities per the note.    Heladio Hatch MD  [facility] Neurology  (Chart documentation was completed in part with Dragon voice-recognition software. Even though reviewed, some grammatical, spelling, and word errors may remain.)          Deshawn Burrows is a 89 year old male who presents for:  Chief Complaint   Patient presents with     Consult     Memory Concerns         Initial Vitals:  /68 (BP Location: Left arm, Patient Position: Sitting, Cuff Size: Adult Regular)   Pulse 69   Ht 1.854 m  "(6' 1\")   Wt 60.1 kg (132 lb 6.4 oz)   SpO2 100%   BMI 17.47 kg/m   Estimated body mass index is 17.47 kg/m  as calculated from the following:    Height as of this encounter: 1.854 m (6' 1\").    Weight as of this encounter: 60.1 kg (132 lb 6.4 oz).. Body surface area is 1.76 meters squared. BP completed using cuff size: small regular    Visit Facilitator Comments: MoCA 1=  17/30    Gabby Trevizo      Again, thank you for allowing me to participate in the care of your patient.        Sincerely,        Heladio Hatch MD    "

## 2022-08-31 NOTE — PROGRESS NOTES
"INITIAL NEUROLOGY CONSULTATION    DATE OF VISIT: 8/31/2022  CLINIC LOCATION: [facility]  MRN: [id number]  PATIENT NAME: Deshawn Burrows  YOB: 1933    REASON FOR VISIT:   Chief Complaint   Patient presents with     Consult     Memory Concerns      HISTORY OF PRESENT ILLNESS:                                                    Mr. Deshawn Burrows is 89 year old right handed male patient with past medical history of hypertension, hyperlipidemia, myocardial infarction, ischemic cardiomyopathy and congestive heart failure, who was seen today for memory loss/cognitive decline.  The patient is accompanied by his daughter.    Per patient's report, he is not sure why he is here.  He feels that his memory is \"fair, not a problem\".  He mentions that he is \"79-year-old\" that might play a role, but feels that his health overall is good.  He has no concerns.    According to patient's daughter, progressive cognitive decline started several years ago and became significant in November 2021 after his hospitalization for pneumonia.  His daughter lives in [place] and was previously visiting him every 4 months, but temporarily moved to [place] in November 2021.  She feels that short-term is preferentially affected.  No word finding difficulties, but the patient tends to repeat himself.  He misplaces his belongings and has trouble with calendar.  Daughter took over medication administration and finances.  The patient continues to drive within 3-mile range (basically goes to [place] [employer]).  Does not get lost.  No car accidents or tickets.  No other safety concerns.  Daughter does all laundry, cleaning, and cooking.  He also has 2 of 16-ounce beers every day for the last several years.  No prior history of brain injury, seizures, CNS infections/strokes.    Upon review of patient's wife's notes from [facility], his MoCA was 25/30 with five points lost in memory in October " "2018.    Laboratory evaluation from June 2022 includes unremarkable BMP, slightly low hemoglobin of 13.1 with MCV of 102, and unrevealing urinalysis.  Latest TSH from 2018 was normal (2.67).  I do not see previously checked folate or vitamin B12 levels.    No prior brain imaging.    No additional useful information is available in Care Everywhere, which was reviewed.  PAST MEDICAL/SURGICAL HISTORY:                                                    I personally reviewed patient's past medical and surgical history with the patient at today's visit.  MEDICATIONS:                                                    I personally reviewed patient's medications and allergies with the patient at today's visit.  ALLERGIES:                                                      Allergies   Allergen Reactions     Penicillins      EXAM:                                                    VITAL SIGNS:   /68 (BP Location: Left arm, Patient Position: Sitting, Cuff Size: Adult Regular)   Pulse 69   Ht 1.854 m (6' 1\")   Wt 60.1 kg (132 lb 6.4 oz)   SpO2 100%   BMI 17.47 kg/m    Nigel Cognitive Assessment:    Nigel Cognitive Assessment (MOCA)  Visuospatial/Executive : 4  Naming: 3  Attention - Digits: 2  Attention - Letters: 1  Attention - Subtraction: 3  Language - Repeat: 0  Language - Fluency : 0  Abstraction: 2  Delayed Recall: 0  Orientation: 2  Education: 0  MOCA Score: 17  Administered by: : Gabby CAMPOS     Nigel Cognitive Assessment Score:  MOCA Score: 17/30.     General: pt is in NAD, cooperative.  Skin: normal turgor, moist mucous membranes, no lesions/rashes noticed.  HEENT: ATNC, EOMI, PERRL, white sclera, normal conjunctiva, no nystagmus or ptosis. No carotid bruits bilaterally.  Respiratory: lung sounds clear to auscultation bilaterally, no crackles, wheezes, rhonchi. Symmetric lung excursion, no accessory respiratory muscle use.  Cardiovascular: normal S1/S2, no murmurs/rubs/gallops.   Abdomen: Not " distended.  : deferred.    Neurological:  Mental: alert, follows commands, MoCA as per above, no aphasia or dysarthria. Fund of knowledge is diminished for age.  Cranial Nerves:  CN II: visual acuity - able to accurately count fingers with each eye. Visual fields intact, fundi: Technically difficult exam bilaterally.  CN III, IV, VI: EOM intact, pupils equal and reactive  CN V: facial sensation nl  CN VII: face symmetric, no facial droop  CN VIII: hearing bilaterally reduced  CN IX: palate elevation symmetric, uvula at midline  CN XI SCM normal, shoulder shrug nl  CN XII: tongue midline  Motor: Strength: 5/5 in all major groups of all extremities. Normal tone. No abnormal movements. No pronator drift b/l.  Reflexes: Triceps, biceps, brachioradialis, patellar, and achilles reflexes normal and symmetric. No clonus noted. Toes are down-going b/l.   Sensory: light touch, pinprick, and vibration intact. Romberg: negative.  Coordination: FNF and heel-shin tests intact b/l.   Gait:  Normal casual walk, but has mild difficulty with tandem.  DATA:   LABS/EEG/IMAGING/OTHER STUDIES: I reviewed pertinent medical records, as detailed in the history of present illness.  ASSESSMENT AND PLAN:      ASSESSMENT: Deshawn Burrows is a 89 year old male patient with listed above past medical history, who presents with memory loss.    We had a detailed discussion with the patient and his daughter regarding his presenting complaints.  The neurological exam today is non-focal.  MoCA today is 17/30, consistent with mild dementia.  There is noticeable decline compared to his baseline evaluation from 2018 when it was 25/30.  The pattern is suggestive of Alzheimer's disease.  Additional differential includes vitamin deficiencies, thyroid dysfunction, and structural or vascular brain lesions.  For further diagnostic clarification, I ordered brain MRI without contrast, screening labs, and CPT.  I do not feel that neuropsychology evaluation  would be helpful in his management at this point.    DIAGNOSES:    ICD-10-CM    1. Cognitive decline  R41.89 Adult Neurology  Referral   2. Abnormal findings on diagnostic imaging of other specified body structures   R93.89 TSH with free T4 reflex     PLAN: At today's visit we thoroughly discussed various diagnostic possibilities for patient's symptoms, necessary evaluation, and the plan, which includes:  Orders Placed This Encounter   Procedures     MR Brain w/o Contrast     Methylmalonic Acid     Vitamin B12     Vitamin B6     Vitamin B1 whole blood     TSH with free T4 reflex     Folate     Occupational Therapy Referral     No new medications.    I advised the patient to stay physically and mentally active with particular emphasis on daily mentally stimulating activities of  his choice (such as crosswords, puzzles, sudoku, etc.), stretching exercises, walking, and healthy eating.     Additional recommendations after the work-up.    Next follow-up appointment is in the next 4-6 weeks or earlier if needed.    Total Time: 71 minutes spent on the date of the encounter doing chart review, history and exam, documentation and further activities per the note.    Heladio Hatch MD  [facility] Neurology  (Chart documentation was completed in part with Dragon voice-recognition software. Even though reviewed, some grammatical, spelling, and word errors may remain.)

## 2022-09-07 NOTE — PROGRESS NOTES
Cardiology Clinic Progress Note:     September 7, 2022   Patient Name: Deshawn Burrows  Patient MRN: [id number]     Consult indication: CHF    HPI:    I had the opportunity to see patient Deshawn Burrows in cardiology clinic for a follow up visit. Patient is followed by our colleague [facility] with Primary Care.     As you know, Mr. 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      The patient has a remote history of coronary artery disease with stenting in the 1990s in [place].  He was admitted to [facility] 02/2020 for chest pain and dyspnea.  He was seen in consultation by my colleague, Dr. Palmer.  TTE was done which demonstrated LVEF 24% with mildly reduced RV function.  He was also noted to have moderate mitral regurgitation.  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  LVEF was 21%.        He underwent coronary angiography on 02/28/2020 that demonstrated a mid LAD in-stent restenosis that was stented at that time.  The degree of cardiac dysfunction was thought to be out of proportion to the burden of coronary artery disease and it was recommended that a cardiac MRI be considered for further evaluation of his cardiomyopathy.      Subsequently, he returned to the hospital with dyspnea and was seen by my colleague, Dr. Cabral.  His clinical presentation at that time seemed most consistent with urinary  retention, rather than decompensated heart failure.  It seems that his symptoms at that time are likely chronic in nature and did not seem consistent with an acute decompensation of heart failure.  He was discharged with an indwelling Faustin catheter and was able to void with this in place, which did result in improvement in his symptoms.     He was seen in Cardiology Clinic with my colleague, Aric Alexander NP, on 03/06/2020.  At that time, he had been doing well and no changes were warranted to his cardiac regimen.  It was recommended the patient undergo a cardiac MRI and followup with Dr. Palmer for reassessment.      Unfortunately, it seems that the patient had been lost to followup as he did not undergo the cardiac MRI that was ordered nor the visit with Dr. Palmer as had been recommended.       I had seen him in follow-up in 4/8/2021.  At that time, we discussed options for further evaluation management.  We reiterated our recommendation for reassessment with a cardiac MRI.  He also recommended close follow-up with cardiology in 1 month.  For unclear reasons, these were not scheduled either.  Patient has not been seen in cardiology clinic since then.    Patient is accompanied today by his daughter, who lives in [place] but is now living with the patient to help with healthcare needs.  Patient's wife is also chronically ill.  Apparently patient has been having worsening memory issues as well, which likely is the reason for the loss to follow-up.    We spent a great deal of time today reviewing his overall cardiac history, much of which was new to the patient's daughter.  Fortunately, patient reports that overall he feels quite well.  He denies any chest pain, chest pressure, abnormal shortness of breath.  Weight has been stable at around 130 pounds.  He denies symptoms of orthopnea/PND or abnormal lower extremity swelling.  He has been walking in the morning for exercise, albeit at a slower pace, without issue.    He  is a retired , and lives independently with his wife, though now his daughter also lives with them.     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 NYHA II. LVEF 24% on TTE 02/2020.  He has known coronary artery disease; however, it was thought that the burden of coronary artery disease was out of proportion to the degree of LV dysfunction.    Currently, Mr. Burrows seems euvolemic and denies symptoms concerning for decompensated heart failure.    # Coronary artery disease with remote history of PCI, status post repeat coronary angiography 02/2020 with ROSENDO to mid LAD in-stent restenosis.  # Sporadic follow up with cardiology, he has missed clinic appointments and has not undergone a cardiac MRI that was ordered over a year ago, likely due to memory impairment.  # Hypertension, stable.  # Dyslipidemia.  LDL at goal of less than 70.   # Chronic LBBB     -Discussed options for further evaluation management with the patient and his daughter.  Reviewed his cardiac history including severe cardiomyopathy.  Fortunately, patient seems to be doing well without symptoms concerning for angina or decompensated heart failure.  - Repeat TTE.  Discussed that generally would recommend CRT therapy with severe LV dysfunction and LBBB, versus CRT-D, discussed referral to cardiology EP.  Functional capacity seems to be limited primarily by musculoskeletal issues.  Patient and daughter are hesitant regarding invasive procedures, and like to consider this further.  - Discussed goals of care, offered referral to Palliative care, they will consider this further  - For now, recommend continuing current regimen of clopidogrel, atorvastatin, aspirin, metoprolol succinate.  Previously we discussed dual antiplatelet therapy, since it has been over a year following PCI.  Patient denied any bleeding/bruising  issues, and would like to continue this, understanding risk of bleeding.  - Patient does have issues with orthostatic hypotension symptoms, so we will keep furosemide as a as needed for weight gain as well as symptoms suggesting fluid overload  - Follow-up with PCP regarding memory impairment  - Follow-up in 6 months with cardiology [facility], and with me in 1 year, or sooner as needed    Thank you for allowing our team to participate in the care of Deshawn Burrows.  Please do not hesitate to call or page me with any questions or concerns.    Sincerely,     Kwame Blanton MD, [facility]  Cardiology  Text Page   September 7, 2022    Voice recognition software utilized.     Total time spent on this encounter: 45 minutes, providing care in this encounter including, but not limited to, reviewing prior medical records, laboratory data, imaging studies, diagnostic studies, procedure notes, formulating an assessment and plan, recommendations, discussion and counseling with patient face to face, dictation.    Past Medical History:     Past Medical History:   Diagnosis Date     Acute systolic heart failure (H) 2/25/2020    Added automatically from request for surgery [id number]     CAD (coronary artery disease) 2004    2 cardiac stents      Dyspnea on exertion 2/25/2020    Added automatically from request for surgery [id number]     Essential hypertension with goal blood pressure less than 140/90 8/10/2016     Hyperlipidemia LDL goal <100 8/10/2016     Ischemic cardiomyopathy 2/25/2020    Added automatically from request for surgery [id number]     LBBB (left bundle branch block) 2/25/2020        Past Surgical History:   Past Surgical History:   Procedure Laterality Date     CV CORONARY ANGIOGRAM N/A 2/28/2020    Procedure: CV CORONARY ANGIOGRAM;  Surgeon: Andres Mann MD;  Location: [facility] CARDIAC CATH LAB     CV LEFT HEART CATH N/A 2/28/2020    Procedure: Left Heart Cath;  Surgeon: Andres Mann MD;  Location:  [facility] HEART CARDIAC CATH LAB     CV LEFT VENTRICULOGRAM N/A 2/28/2020    Procedure: Left Ventriculogram;  Surgeon: Andres Mann MD;  Location: [facility] HEART CARDIAC CATH LAB     CV PCI STENT DRUG ELUTING N/A 2/28/2020    Procedure: Percutaneous Coronary Intervention Stent Drug Eluting;  Surgeon: Andres Mann MD;  Location:  HEART CARDIAC CATH LAB     CYSTOSCOPY N/A 3/1/2020    Procedure: CYSTOSCOPY;  Surgeon: Rios Gomez MD;  Location: [facility] OR     HEART CATH DRUG ELUTING STENT PLACEMENT  02/28/2020     HERNIA REPAIR  2007    right inguinal hernia repair     STENT, CORONARY, ANTIONE  2004    2 stents       Medications (outpatient):  Current Outpatient Medications   Medication Sig Dispense Refill     aspirin 81 MG EC tablet Take 81 mg by mouth daily       atorvastatin (LIPITOR) 20 MG tablet Take 1 tablet (20 mg) by mouth daily 90 tablet 0     clopidogrel (PLAVIX) 75 MG tablet TAKE 1 TABLET EVERY DAY 90 tablet 1     metoprolol succinate ER (TOPROL-XL) 50 MG 24 hr tablet Take 1 tablet (50 mg) by mouth daily Appointment required for further refills 90 tablet 0     multivitamin, therapeutic with minerals (THERA-VIT-M) TABS Take 1 tablet by mouth daily       nitroGLYcerin (NITROSTAT) 0.4 MG sublingual tablet Place 1 tablet (0.4 mg) under the tongue every 5 minutes as needed for chest pain 30 tablet 0     tamsulosin (FLOMAX) 0.4 MG capsule Take 0.4 mg by mouth daily       furosemide (LASIX) 20 MG tablet TAKE 1 TABLET EVERY DAY (Patient not taking: Reported on 9/7/2022) 45 tablet 2     lisinopril (ZESTRIL) 5 MG tablet TAKE 1 TABLET EVERY DAY (Patient not taking: Reported on 9/7/2022) 90 tablet 1     tamsulosin (FLOMAX) 0.4 MG capsule          Allergies:  Allergies   Allergen Reactions     Penicillins        Social History:   History   Drug Use No      History   Smoking Status     Former Smoker   Smokeless Tobacco     Never Used     Comment: Quit 30 years ago     Social History    Substance and Sexual  "Activity      Alcohol use: Yes        Alcohol/week: 0.0 standard drinks        Comment: Moderate       Family History:  Family History   Family history unknown: Yes       Review of Systems:   A complete review of systems was negative except as mentioned in the History of Present Illness.     Objective & Physical Exam:  /70   Pulse 79   Ht 1.854 m (6' 1\")   Wt 59.8 kg (131 lb 14.4 oz)   SpO2 98%   BMI 17.40 kg/m    Wt Readings from Last 2 Encounters:   09/07/22 59.8 kg (131 lb 14.4 oz)   08/31/22 60.1 kg (132 lb 6.4 oz)     Body mass index is 17.4 kg/m .   Body surface area is 1.76 meters squared.    Constitutional: appears stated age, in no apparent distress, appears to be well nourished  Head: normocephalic, atraumatic  Neck: supple, trachea midline, no bruit bilaterally   Pulmonary: clear to auscultation bilaterally  Cardiovascular: JVP normal, regular rate, regular rhythm, normal S1 and S2, no S3, S4, no murmur appreciated, no lower extremity edema  Gastrointestinal: no guarding, non-rigid   Neurologic: awake, alert, moves all extremities  Skin: no jaundice, warm on limited exam  Psychiatric: affect is normal, answers questions appropriately, oriented to self and place    Data reviewed:  Lab Results   Component Value Date    WBC 7.9 06/11/2022    WBC 6.7 03/04/2020    RBC 4.04 (L) 06/11/2022    RBC 4.09 (L) 03/04/2020    HGB 13.1 (L) 06/11/2022    HGB 13.0 (L) 03/04/2020    HCT 41.0 06/11/2022    HCT 40.0 03/04/2020     (H) 06/11/2022    MCV 98 03/04/2020    MCH 32.4 06/11/2022    MCH 31.8 03/04/2020    MCHC 32.0 06/11/2022    MCHC 32.5 03/04/2020    RDW 12.2 06/11/2022    RDW 12.8 03/04/2020     06/11/2022     03/04/2020     Sodium   Date Value Ref Range Status   06/11/2022 141 133 - 144 mmol/L Final   03/06/2020 138 133 - 144 mmol/L Final     Potassium   Date Value Ref Range Status   06/11/2022 4.2 3.4 - 5.3 mmol/L Final   03/06/2020 3.9 3.4 - 5.3 mmol/L Final     Chloride   Date " Value Ref Range Status   06/11/2022 104 94 - 109 mmol/L Final   03/06/2020 105 94 - 109 mmol/L Final     Carbon Dioxide   Date Value Ref Range Status   03/06/2020 29 20 - 32 mmol/L Final     Carbon Dioxide (CO2)   Date Value Ref Range Status   06/11/2022 30 20 - 32 mmol/L Final     Anion Gap   Date Value Ref Range Status   06/11/2022 7 3 - 14 mmol/L Final   03/06/2020 4 3 - 14 mmol/L Final     Glucose   Date Value Ref Range Status   06/11/2022 99 70 - 99 mg/dL Final   03/06/2020 126 (H) 70 - 99 mg/dL Final     Urea Nitrogen   Date Value Ref Range Status   06/11/2022 30 7 - 30 mg/dL Final   03/06/2020 38 (H) 7 - 30 mg/dL Final     Creatinine   Date Value Ref Range Status   06/11/2022 1.03 0.66 - 1.25 mg/dL Final   03/06/2020 1.11 0.66 - 1.25 mg/dL Final     GFR Estimate   Date Value Ref Range Status   06/11/2022 69 >60 mL/min/1.73m2 Final     Comment:     Effective December 21, 2021 eGFRcr in adults is calculated using the 2021 CKD-EPI creatinine equation which includes age and gender (Jackelyn et al., NEJ, DOI: 10.1056/AJXYpm0671368)   03/06/2020 59 (L) >60 mL/min/[1.73_m2] Final     Comment:     Non  GFR Calc  Starting 12/18/2018, serum creatinine based estimated GFR (eGFR) will be   calculated using the Chronic Kidney Disease Epidemiology Collaboration   (CKD-EPI) equation.       Calcium   Date Value Ref Range Status   06/11/2022 8.8 8.5 - 10.1 mg/dL Final   03/06/2020 9.1 8.5 - 10.1 mg/dL Final     Bilirubin Total   Date Value Ref Range Status   02/13/2022 1.0 0.2 - 1.3 mg/dL Final   03/01/2020 0.6 0.2 - 1.3 mg/dL Final     Alkaline Phosphatase   Date Value Ref Range Status   02/13/2022 91 40 - 150 U/L Final   03/01/2020 76 40 - 150 U/L Final     ALT   Date Value Ref Range Status   02/13/2022 42 0 - 70 U/L Final   03/01/2020 49 0 - 70 U/L Final     AST   Date Value Ref Range Status   02/13/2022 22 0 - 45 U/L Final   03/01/2020 39 0 - 45 U/L Final     Recent Labs   Lab Test 03/02/22  1208  02/28/20  1641   CHOL 112 89   HDL 45 34*   LDL 45 48   TRIG 111 36

## 2022-09-07 NOTE — PATIENT INSTRUCTIONS
September 7, 2022    Thank you for allowing our Cardiology team to participate in your care.     Please note the following changes to your heart treatment plan:     Medication changes:   - take furosemide 20mg once a day as needed for fluid overload, weight gain of 3lb/day or 5lb/week    Tests to be done:  - TTE (heart ultrasound)    Follow up:  - Follow up in 6 months with cardiology [facility] and with me in 1 year, or sooner as needed.  - Consider cardiology EP for CRT/CRT-D    Please contact our team at [phone] or [phone] for any questions or concerns.   For scheduling, please call [phone].  If you are having a medical emergency, please call [phone].     Sincerely,    Kwame Blanton MD, FACC  Cardiology    [facility] [place]

## 2022-09-07 NOTE — LETTER
9/7/2022    [facility] [address] [place] [address]    RE: Deshawn Watsonallan       Dear Colleague,     I had the pleasure of seeing Deshawn Burrows in the [facility].      Cardiology Clinic Progress Note:     September 7, 2022   Patient Name: Deshawn Burrows  Patient MRN: [id number]     Consult indication: CHF    HPI:    I had the opportunity to see patient Deshawn Burrows in cardiology clinic for a follow up visit. Patient is followed by our colleague [facility] with Primary Care.     As you know, Mr. 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      The patient has a remote history of coronary artery disease with stenting in the 1990s in [place].  He was admitted to [facility] 02/2020 for chest pain and dyspnea.  He was seen in consultation by my colleague, Dr. Palmer.  TTE was done which demonstrated LVEF 24% with mildly reduced RV function.  He was also noted to have moderate mitral regurgitation.  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  LVEF was 21%.        He underwent coronary angiography on 02/28/2020 that demonstrated a mid LAD in-stent restenosis that was stented at that time.  The degree of cardiac dysfunction was thought to be out of proportion to the burden of coronary artery disease and it was recommended that a cardiac MRI be  considered for further evaluation of his cardiomyopathy.      Subsequently, he returned to the hospital with dyspnea and was seen by my colleague, Dr. Cabral.  His clinical presentation at that time seemed most consistent with urinary retention, rather than decompensated heart failure.  It seems that his symptoms at that time are likely chronic in nature and did not seem consistent with an acute decompensation of heart failure.  He was discharged with an indwelling Faustin catheter and was able to void with this in place, which did result in improvement in his symptoms.     He was seen in Cardiology Clinic with my colleague, Aric Alexander NP, on 03/06/2020.  At that time, he had been doing well and no changes were warranted to his cardiac regimen.  It was recommended the patient undergo a cardiac MRI and followup with Dr. Palmer for reassessment.      Unfortunately, it seems that the patient had been lost to followup as he did not undergo the cardiac MRI that was ordered nor the visit with Dr. Palmer as had been recommended.       I had seen him in follow-up in 4/8/2021.  At that time, we discussed options for further evaluation management.  We reiterated our recommendation for reassessment with a cardiac MRI.  He also recommended close follow-up with cardiology in 1 month.  For unclear reasons, these were not scheduled either.  Patient has not been seen in cardiology clinic since then.    Patient is accompanied today by his daughter, who lives in [place] but is now living with the patient to help with healthcare needs.  Patient's wife is also chronically ill.  Apparently patient has been having worsening memory issues as well, which likely is the reason for the loss to follow-up.    We spent a great deal of time today reviewing his overall cardiac history, much of which was new to the patient's daughter.  Fortunately, patient reports that overall he feels quite well.  He denies any chest pain, chest pressure, abnormal  shortness of breath.  Weight has been stable at around 130 pounds.  He denies symptoms of orthopnea/PND or abnormal lower extremity swelling.  He has been walking in the morning for exercise, albeit at a slower pace, without issue.    He is a retired , and lives independently with his wife, though now his daughter also lives with them.     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 NYHA II. LVEF 24% on TTE 02/2020.  He has known coronary artery disease; however, it was thought that the burden of coronary artery disease was out of proportion to the degree of LV dysfunction.    Currently, Mr. Burrows seems euvolemic and denies symptoms concerning for decompensated heart failure.    # Coronary artery disease with remote history of PCI, status post repeat coronary angiography 02/2020 with ROSENDO to mid LAD in-stent restenosis.  # Sporadic follow up with cardiology, he has missed clinic appointments and has not undergone a cardiac MRI that was ordered over a year ago, likely due to memory impairment.  # Hypertension, stable.  # Dyslipidemia.  LDL at goal of less than 70.   # Chronic LBBB     -Discussed options for further evaluation management with the patient and his daughter.  Reviewed his cardiac history including severe cardiomyopathy.  Fortunately, patient seems to be doing well without symptoms concerning for angina or decompensated heart failure.  - Repeat TTE.  Discussed that generally would recommend CRT therapy with severe LV dysfunction and LBBB, versus CRT-D, discussed referral to cardiology EP.  Functional capacity seems to be limited primarily by musculoskeletal issues.  Patient and daughter are hesitant regarding invasive procedures, and like to consider this further.  - Discussed goals of care, offered referral to Palliative care, they will consider this further  - For now,  recommend continuing current regimen of clopidogrel, atorvastatin, aspirin, metoprolol succinate.  Previously we discussed dual antiplatelet therapy, since it has been over a year following PCI.  Patient denied any bleeding/bruising issues, and would like to continue this, understanding risk of bleeding.  - Patient does have issues with orthostatic hypotension symptoms, so we will keep furosemide as a as needed for weight gain as well as symptoms suggesting fluid overload  - Follow-up with PCP regarding memory impairment  - Follow-up in 6 months with cardiology [facility], and with me in 1 year, or sooner as needed    Thank you for allowing our team to participate in the care of Deshawn Burrows.  Please do not hesitate to call or page me with any questions or concerns.    Sincerely,     Kwame Blanton MD, [facility]  Cardiology  Text Page   September 7, 2022    Voice recognition software utilized.     Total time spent on this encounter: 45 minutes, providing care in this encounter including, but not limited to, reviewing prior medical records, laboratory data, imaging studies, diagnostic studies, procedure notes, formulating an assessment and plan, recommendations, discussion and counseling with patient face to face, dictation.    Past Medical History:     Past Medical History:   Diagnosis Date     Acute systolic heart failure (H) 2/25/2020    Added automatically from request for surgery [id number]     CAD (coronary artery disease) 2004    2 cardiac stents      Dyspnea on exertion 2/25/2020    Added automatically from request for surgery [id number]     Essential hypertension with goal blood pressure less than 140/90 8/10/2016     Hyperlipidemia LDL goal <100 8/10/2016     Ischemic cardiomyopathy 2/25/2020    Added automatically from request for surgery [id number]     LBBB (left bundle branch block) 2/25/2020        Past Surgical History:   Past Surgical History:   Procedure Laterality Date     CV CORONARY ANGIOGRAM N/A  2/28/2020    Procedure: CV CORONARY ANGIOGRAM;  Surgeon: Andres Mann MD;  Location: [facility]     CV LEFT HEART CATH N/A 2/28/2020    Procedure: Left Heart Cath;  Surgeon: Andres Mann MD;  Location: [facility]     CV LEFT VENTRICULOGRAM N/A 2/28/2020    Procedure: Left Ventriculogram;  Surgeon: Andres Mann MD;  Location: [facility]     CV PCI STENT DRUG ELUTING N/A 2/28/2020    Procedure: Percutaneous Coronary Intervention Stent Drug Eluting;  Surgeon: Andres Mann MD;  Location: [facility]     CYSTOSCOPY N/A 3/1/2020    Procedure: CYSTOSCOPY;  Surgeon: Rios Gomez MD;  Location: [facility] OR     HEART CATH DRUG ELUTING STENT PLACEMENT  02/28/2020     HERNIA REPAIR  2007    right inguinal hernia repair     STENT, CORONARY, ANTIONE  2004    2 stents       Medications (outpatient):  Current Outpatient Medications   Medication Sig Dispense Refill     aspirin 81 MG EC tablet Take 81 mg by mouth daily       atorvastatin (LIPITOR) 20 MG tablet Take 1 tablet (20 mg) by mouth daily 90 tablet 0     clopidogrel (PLAVIX) 75 MG tablet TAKE 1 TABLET EVERY DAY 90 tablet 1     metoprolol succinate ER (TOPROL-XL) 50 MG 24 hr tablet Take 1 tablet (50 mg) by mouth daily Appointment required for further refills 90 tablet 0     multivitamin, therapeutic with minerals (THERA-VIT-M) TABS Take 1 tablet by mouth daily       nitroGLYcerin (NITROSTAT) 0.4 MG sublingual tablet Place 1 tablet (0.4 mg) under the tongue every 5 minutes as needed for chest pain 30 tablet 0     tamsulosin (FLOMAX) 0.4 MG capsule Take 0.4 mg by mouth daily       furosemide (LASIX) 20 MG tablet TAKE 1 TABLET EVERY DAY (Patient not taking: Reported on 9/7/2022) 45 tablet 2     lisinopril (ZESTRIL) 5 MG tablet TAKE 1 TABLET EVERY DAY (Patient not taking: Reported on 9/7/2022) 90 tablet 1     tamsulosin (FLOMAX) 0.4 MG capsule          Allergies:  Allergies   Allergen  "Reactions     Penicillins        Social History:   History   Drug Use No      History   Smoking Status     Former Smoker   Smokeless Tobacco     Never Used     Comment: Quit 30 years ago     Social History    Substance and Sexual Activity      Alcohol use: Yes        Alcohol/week: 0.0 standard drinks        Comment: Moderate       Family History:  Family History   Family history unknown: Yes       Review of Systems:   A complete review of systems was negative except as mentioned in the History of Present Illness.     Objective & Physical Exam:  /70   Pulse 79   Ht 1.854 m (6' 1\")   Wt 59.8 kg (131 lb 14.4 oz)   SpO2 98%   BMI 17.40 kg/m    Wt Readings from Last 2 Encounters:   09/07/22 59.8 kg (131 lb 14.4 oz)   08/31/22 60.1 kg (132 lb 6.4 oz)     Body mass index is 17.4 kg/m .   Body surface area is 1.76 meters squared.    Constitutional: appears stated age, in no apparent distress, appears to be well nourished  Head: normocephalic, atraumatic  Neck: supple, trachea midline, no bruit bilaterally   Pulmonary: clear to auscultation bilaterally  Cardiovascular: JVP normal, regular rate, regular rhythm, normal S1 and S2, no S3, S4, no murmur appreciated, no lower extremity edema  Gastrointestinal: no guarding, non-rigid   Neurologic: awake, alert, moves all extremities  Skin: no jaundice, warm on limited exam  Psychiatric: affect is normal, answers questions appropriately, oriented to self and place    Data reviewed:  Lab Results   Component Value Date    WBC 7.9 06/11/2022    WBC 6.7 03/04/2020    RBC 4.04 (L) 06/11/2022    RBC 4.09 (L) 03/04/2020    HGB 13.1 (L) 06/11/2022    HGB 13.0 (L) 03/04/2020    HCT 41.0 06/11/2022    HCT 40.0 03/04/2020     (H) 06/11/2022    MCV 98 03/04/2020    MCH 32.4 06/11/2022    MCH 31.8 03/04/2020    MCHC 32.0 06/11/2022    MCHC 32.5 03/04/2020    RDW 12.2 06/11/2022    RDW 12.8 03/04/2020     06/11/2022     03/04/2020     Sodium   Date Value Ref Range " Status   06/11/2022 141 133 - 144 mmol/L Final   03/06/2020 138 133 - 144 mmol/L Final     Potassium   Date Value Ref Range Status   06/11/2022 4.2 3.4 - 5.3 mmol/L Final   03/06/2020 3.9 3.4 - 5.3 mmol/L Final     Chloride   Date Value Ref Range Status   06/11/2022 104 94 - 109 mmol/L Final   03/06/2020 105 94 - 109 mmol/L Final     Carbon Dioxide   Date Value Ref Range Status   03/06/2020 29 20 - 32 mmol/L Final     Carbon Dioxide (CO2)   Date Value Ref Range Status   06/11/2022 30 20 - 32 mmol/L Final     Anion Gap   Date Value Ref Range Status   06/11/2022 7 3 - 14 mmol/L Final   03/06/2020 4 3 - 14 mmol/L Final     Glucose   Date Value Ref Range Status   06/11/2022 99 70 - 99 mg/dL Final   03/06/2020 126 (H) 70 - 99 mg/dL Final     Urea Nitrogen   Date Value Ref Range Status   06/11/2022 30 7 - 30 mg/dL Final   03/06/2020 38 (H) 7 - 30 mg/dL Final     Creatinine   Date Value Ref Range Status   06/11/2022 1.03 0.66 - 1.25 mg/dL Final   03/06/2020 1.11 0.66 - 1.25 mg/dL Final     GFR Estimate   Date Value Ref Range Status   06/11/2022 69 >60 mL/min/1.73m2 Final     Comment:     Effective December 21, 2021 eGFRcr in adults is calculated using the 2021 CKD-EPI creatinine equation which includes age and gender (Jackelyn et al., NEJM, DOI: 10.1056/ULDQom1213396)   03/06/2020 59 (L) >60 mL/min/[1.73_m2] Final     Comment:     Non  GFR Calc  Starting 12/18/2018, serum creatinine based estimated GFR (eGFR) will be   calculated using the Chronic Kidney Disease Epidemiology Collaboration   (CKD-EPI) equation.       Calcium   Date Value Ref Range Status   06/11/2022 8.8 8.5 - 10.1 mg/dL Final   03/06/2020 9.1 8.5 - 10.1 mg/dL Final     Bilirubin Total   Date Value Ref Range Status   02/13/2022 1.0 0.2 - 1.3 mg/dL Final   03/01/2020 0.6 0.2 - 1.3 mg/dL Final     Alkaline Phosphatase   Date Value Ref Range Status   02/13/2022 91 40 - 150 U/L Final   03/01/2020 76 40 - 150 U/L Final     ALT   Date Value Ref Range  Status   02/13/2022 42 0 - 70 U/L Final   03/01/2020 49 0 - 70 U/L Final     AST   Date Value Ref Range Status   02/13/2022 22 0 - 45 U/L Final   03/01/2020 39 0 - 45 U/L Final     Recent Labs   Lab Test 03/02/22  1208 02/28/20  1641   CHOL 112 89   HDL 45 34*   LDL 45 48   TRIG 111 36        Thank you for allowing me to participate in the care of your patient.      Sincerely,     Kwame Blanton MD     [facility]  cc:   Referred Self,

## 2022-09-23 NOTE — RESULT ENCOUNTER NOTE
Results reviewed, overall similar to prior, LV function remains severely reduced. Generally would want to consider CRT vs CRTD however I know they were quite hesitant about invasive procedures, we even talked about Palliative care, so if they decide they want to learn more about this then please have them contact us for an EP referral, otherwise follow up as planned thanks!

## 2022-09-26 NOTE — TELEPHONE ENCOUNTER
Call placed to pt to review results and recommendations.     Interpretation Summary     1. The left ventricle is moderately dilated. There is normal left ventricular  wall thickness. Left ventricular systolic function is severely reduced. The  visual ejection fraction is 20-25%. Left ventricular diastolic function is  abnormal. Septal motion is consistent with conduction abnormality.  2. The right ventricle is normal size. The right ventricular systolic function  is normal.  3. The left atrium is mild to moderately dilated.  4. Moderate mitral regurgitation.  5. No pericardial effusion.  6. In comparison to the previous report dated 12/09/2021, the findings are  similar.    ----- Message from Kwame Blanton MD sent at 9/23/2022  9:13 AM CDT -----  Results reviewed, overall similar to prior, LV function remains severely reduced. Generally would want to consider CRT vs CRTD however I know they were quite hesitant about invasive procedures, we even talked about Palliative care, so if they decide they want to learn more about this then please have them contact us for an EP referral, otherwise follow up as planned thanks!      Call placed to pt to review. Pt declined OV with EP at this time for CRT consideration . Pt reports he will plan to continue with medical management at this time. Pt requests a copy of the results be mailed to him . Will send letter. Questions answered. Pt verbalized understanding and will reach out should he change his decision.   JASON Sheth RN, BSN. 09/26/22 2:55 PM

## 2022-10-24 NOTE — DISCHARGE INSTRUCTIONS
There were some abnormalities on your chest xray that could have been present for a while, or could represent atypical pneumonia or fluid on your lung. As your symptoms have improved and you are not hypoxic here even when walking, the cause of your symptoms and the cxr findings are not clear. You can take an antibiotic in case it represents pneumonia. This seems less likely to represent acute worsening of heart failure but you should be reassessed by your cardiologist. Return to the ED with worsening.

## 2022-10-24 NOTE — ED NOTES
"ED Triage Provider Note  [facility] EMERGENCY DEPT  Encounter Date: Oct 24, 2022    History:  Chief Complaint   Patient presents with     Shortness of Breath     Deshawn Burrows is a 89 year old male who presents to the ED with dyspnea x 24 hours. Hx cardiomyopathy. Denies cough/fever. Denies chest pain. Denies new leg swelling. Worse when up and moving around. Has been checking o2 at home and has been 92-95%.     Review of Systems:  See above.     Exam:  /74   Pulse 86   Temp 97.4  F (36.3  C) (Temporal)   Resp 20   Ht 1.854 m (6' 1\")   Wt 53.1 kg (117 lb)   SpO2 95%   BMI 15.44 kg/m    General: No acute distress. Appears stated age.   Cardio: normal rate, extremities well perfused  Resp: Normal work of breathing, grossly normal respiratory rate. No wheezes. Possible crackles on right.   Neuro: Alert. Facial movement grossly symmetric. Grossly intact strength.     Medical Decision Making:  Patient arriving to the ED with problem as above. A medical screening exam was performed. Initial differential diagnosis includes but not limited to CHF, ACS, PNA, Pleural effusion, and more. .    Labs/CXR/EKG initiated from Triage. The patient is most appropriate to be immediately roomed.       Thierno Taveras MD  10/24/2022 at 11:51 AM     Thierno Taveras MD  10/24/22 1152    "

## 2022-10-24 NOTE — ED PROVIDER NOTES
History   Chief Complaint:  Shortness of Breath       HPI   Deshawn Burrows is a 89 year old male with history of hypertension, myocardial infarction, and ischemic cardiomyopathy who presents with shortness of breath that started yesterday night. The patient's daughter states this morning she found the patient very short of breath in bed. His daughter reports that his shortness of breath is exacerbated when up and moving around even for short distances. . The patient's daughter states the patient has been eating normally but doesn't intake a lot of fluids. The patient denies chest pain, dizziness, syncope, leg swelling, urinary problems, diarrhea, recent fevers or infections, smoking, and COPD.       9/20/22 Echocardiogram    1. The left ventricle is moderately dilated. There is normal left ventricular  wall thickness. Left ventricular systolic function is severely reduced. The  visual ejection fraction is 20-25%. Left ventricular diastolic function is  abnormal. Septal motion is consistent with conduction abnormality.  2. The right ventricle is normal size. The right ventricular systolic function  is normal.  3. The left atrium is mild to moderately dilated.  4. Moderate mitral regurgitation.  5. No pericardial effusion.  6. In comparison to the previous report dated 12/09/2021, the findings are  similar.      Review of Systems   Constitutional: Negative for fever.   Respiratory: Positive for shortness of breath.    Cardiovascular: Negative for chest pain and leg swelling.   Gastrointestinal: Negative for diarrhea.   Genitourinary: Negative for difficulty urinating.   Neurological: Negative for dizziness and syncope.   All other systems reviewed and are negative.      Allergies:  Penicillins    Medications:  Lipitor  Plavix  Lasix  Toprol-Xl  Nitrostat  Flomax  Aldactone    Past Medical History:     Hypertension  MI  CAD  LBBB  Ischemic cardiomyopathy  CHF  Pulmonary mass     Past Surgical History:     CV  "coronary angiogram  CV left heart cath  CV left ventriculogram  CV PCI stent drug eluting  Cystoscopy  Hernia repair     Social History:  The patient presents to the ED with his daughter.  The patient arrived by private vehicle.   PCP: Tanya Otero [facility]     Physical Exam     Patient Vitals for the past 24 hrs:   BP Temp Temp src Pulse Resp SpO2 Height Weight   10/24/22 1430 -- -- -- -- -- 94 % -- --   10/24/22 1345 -- -- -- 72 8 100 % -- --   10/24/22 1215 111/71 -- -- 83 -- 98 % -- --   10/24/22 1200 -- -- -- -- -- (!) 87 % -- --   10/24/22 1153 -- -- -- -- -- (!) 87 % -- --   10/24/22 1109 128/74 97.4  F (36.3  C) Temporal 86 20 95 % 1.854 m (6' 1\") 53.1 kg (117 lb)       Physical Exam  General: Thin elderly male sitting upright, no respiratory distress  Eyes: PERRL, Conjunctive within normal limits.  No scleral icterus.  ENT: Moist mucous membranes, oropharynx clear.   CV: Normal S1S2.  Unable to appreciate murmur, rub or gallop.  Regular rate and rhythm  Resp: Clear to auscultation bilaterally, no wheezes, rales or rhonchi. Normal respiratory effort.  GI: Abdomen is soft, nontender and nondistended.  MSK: No edema. Nontender. Normal active range of motion.  Skin: Warm and dry.   Neuro: Alert and oriented. Responds appropriately to all questions and commands. No focal findings appreciated. Normal muscle tone.  Psych: Normal mood and affect. Pleasant.    Emergency Department Course   ECG  ECG taken at 1212, ECG read at 1213  Normal sinus rhythm  Left bundle branch block  Abnormal ECG   Rate 85 bpm. WI interval 200 ms. QRS duration 168 ms. QT/QTc 450/535 ms. P-R-T axes 80 47 121.         Imaging:  Chest XR,  PA & LAT   Preliminary Result   IMPRESSION: Increased bilateral ill-defined pulmonary opacities maybe   edema versus atypical pneumonia. Normal cardiac silhouette. Small   bibasilar pleural fluid appears stable.        Report per radiology    Laboratory:  Labs Ordered and Resulted from Time of " ED Arrival to Time of ED Departure   BASIC METABOLIC PANEL - Abnormal       Result Value    Sodium 138      Potassium 4.8      Chloride 101      Carbon Dioxide (CO2) 27      Anion Gap 10      Urea Nitrogen 30.7 (*)     Creatinine 1.17      Calcium 9.4      Glucose 106 (*)     GFR Estimate 60 (*)    TROPONIN T, HIGH SENSITIVITY - Abnormal    Troponin T, High Sensitivity 23 (*)    NT PROBNP INPATIENT - Abnormal    N terminal Pro BNP Inpatient 4,718 (*)    CBC WITH PLATELETS AND DIFFERENTIAL - Abnormal    WBC Count 9.7      RBC Count 4.00 (*)     Hemoglobin 12.7 (*)     Hematocrit 39.5 (*)     MCV 99      MCH 31.8      MCHC 32.2      RDW 13.6      Platelet Count 209      % Neutrophils 81      % Lymphocytes 12      % Monocytes 7      % Eosinophils 0      % Basophils 0      % Immature Granulocytes 0      NRBCs per 100 WBC 0      Absolute Neutrophils 7.9      Absolute Lymphocytes 1.1      Absolute Monocytes 0.6      Absolute Eosinophils 0.0      Absolute Basophils 0.0      Absolute Immature Granulocytes 0.0      Absolute NRBCs 0.0     TROPONIN T, HIGH SENSITIVITY - Abnormal    Troponin T, High Sensitivity 23 (*)    INFLUENZA A/B & SARS-COV2 PCR MULTIPLEX - Normal    Influenza A PCR Negative      Influenza B PCR Negative      RSV PCR Negative      SARS CoV2 PCR Negative            Emergency Department Course:         Reviewed:  I reviewed nursing notes, vitals, past medical history and Care Everywhere    Assessments:  1211 I obtained history and examined the patient as noted above.  He is currently denying any symptoms.   The patient is ambulated with pulse ox.  He does not desaturate or become significantly shortness of breath.  1351 I rechecked the patient.  He continues to deny any symptoms.  He notes he felt well on ambulation.  1453 I rechecked the patient and explained findings and discussed discharge.    Disposition:  The patient was discharged to home.     Impression & Plan     Medical Decision Making:  Deshawn  Markos is an 89-year-old male with a history of ischemic cardiomyopathy and low EF who presents emergency department with shortness of breath that was increased from baseline beginning last night into today.  He currently is asymptomatic.  He had brief record of hypoxia while here which was not r reproduced throughout the rest of his emergency department stay even with ambulation.  There is no dysrhythmia noted on ECG on telemetry here.  Cannot exclude during these events that he was, but he does not describe a sensation of palpitations.  He does have opacities on chest x-ray today, no clinical evidence otherwise of pneumonia, but cannot exclude atypical organism causing mild infection.  Doxycycline was discussed with the patient and he was agreeable to its use.  At this time he is not appear to be in congestive heart failure exacerbation.  He does not appear volume overloaded.  He had no chest pain and ACS seems unlikely with serial troponins trending flat.  Overall, given lack of current symptoms it seems reasonable to discharge him home with expedited outpatient follow-up with Dr. Blanton's cardiologist and his PCP as well.  Recommended calling both clinics tomorrow to schedule follow-up within days.  Return immediate to the emergency department with worsening.  The patient and his daughter were desiring to go home and this seems reasonable.  All questions were answered prior to discharge.      Diagnosis:    ICD-10-CM    1. Dyspnea, unspecified type  R06.00       2. Opacities of both lungs present on chest x-ray  R91.8           Discharge Medications:  New Prescriptions    DOXYCYCLINE HYCLATE (VIBRAMYCIN) 100 MG CAPSULE    Take 1 capsule (100 mg) by mouth 2 times daily for 7 days       Scribe Disclosure:  I, Jeancarlos Ronquillo, am serving as a scribe at 12:11 PM on 10/24/2022 to document services personally performed by Kayla Gonzalez MD, based on my observations and the provider's statements to me.           Kayla Gonzalez MD  10/25/22 8173

## 2022-10-24 NOTE — ED TRIAGE NOTES
"Pt c/o shortness of breath since last night, daughter found him in bed this morning, \"very short of breath\". Pt has a cardiac hx, denies chest pain. No peripheral edema noted, pt denies swelling or weight gain. Short of breath even at rest.      Triage Assessment     Row Name 10/24/22 1111       Triage Assessment (Adult)    Airway WDL WDL              "

## 2022-10-25 NOTE — TELEPHONE ENCOUNTER
Left message for daughter Andra for more information about patient's Urgent visit today with  Kailey.  Meme Summers RN on 10/25/2022 at 12:17 PM

## 2022-10-25 NOTE — TELEPHONE ENCOUNTER
[facility] Call Center    Phone Message    May a detailed message be left on voicemail: yes     Reason for Call: Appointment Intake    Referring Provider Name: ER   Diagnosis and/or Symptoms: SOB, Ischemic cardiomyopathy  Same Day appt    Action Taken: Message routed to:  [facility] ([facility] cardio    Travel Screening: Not Applicable     Thank you!  [facility]

## 2022-10-25 NOTE — TELEPHONE ENCOUNTER
Andra returned call and states her father does not have any worse symptoms since his ED visit yesterday.  He has had 2 doses of antibiotics. Reviewed the chart and ED concerned about CHF symptoms.  She tries to weigh him everyday unfortunately there are days he is already dressed.  She will monitor his weight and if any symptoms or concerns change before an appt with Dr. Blanton on 11-1-22.  Cancelled appt for today and updated June.  Meme Summers RN on 10/25/2022 at 2:39 PM

## 2022-10-26 NOTE — DISCHARGE SUMMARY
[facility]  Hospitalist Discharge Summary      Date of Admission:  2/12/2022  Date of Discharge:  2/14/2022  2:33 PM  Discharging Provider: Mickey Marx MD  Discharge Service: Hospitalist Service    Discharge Diagnoses   Acute Exacerbation of HFrEF - EF 20%  Possible Community Acquired Pneumonia  Failure to Thrive  Severe Malnutrition  Hypertension  BPH  Hypercholesteremia    Follow-ups Needed After Discharge   Follow-up Appointments     Follow-up and recommended labs and tests       Follow up with primary care provider, Tess Leigh, within 7   days for hospital follow- up.  The following labs/tests are recommended:   CBC and BMP when following up with primary care physician    Need to check chest x-ray in 6 to 8 weeks to document resolution of   pneumonia.              Discharge Disposition   Discharged to home  Condition at discharge: Stable       Hospital Course   Deshawn Burrows is a 88 year old gentleman with HFrEF with EF 20%, moderate to severe mitral regurgitation, coronary artery disease, hypertension, hyperlipidemia, BPH, malnutrition who presented on 2/12/2022 with progressively worsening shortness of breath, cough.proBNP 18,115,WBC12.0, D-dimer 1.14.  CT chest showied consolidation LLL suspicious for pneumonia, small to moderate bilateral pleural effusions decreased slightly since prior exam, mild smooth interstitial thickening in both lower lungs suggestive of pulmonary edema, emphysema was started on Lasix 40 mg IV twice daily with resumption of spironolactone, lisinopril and metoprolol and pneumonia was treated with ceftriaxone and azithromycin started 2/12/2022 which will be continued as cefdinir and azithromycin as outpatient.  I did tell the patient to come back to the hospital if his breathing starts getting worse, if he has fever or if he gets confused.     Consultations This Hospital Stay   NUTRITION SERVICES ADULT IP CONSULT  CARE MANAGEMENT / SOCIAL  WORK IP CONSULT  PHYSICAL THERAPY ADULT IP CONSULT  OCCUPATIONAL THERAPY ADULT IP CONSULT    Code Status   No CPR- Do NOT Intubate    Time Spent on this Encounter   I, Mickey Marx MD, personally saw the patient today and spent greater than 30 minutes discharging this patient.       Mickey Marx MD  [place] [address] [facility]  [address] [facility] [address]  Phone: [phone]  Fax: [phone]  ______________________________________________________________________    Physical Exam   Vital Signs: Temp: 97.6  F (36.4  C) Temp src: Axillary BP: 99/52 (recheck small cuff.) Pulse: 74   Resp: 16 SpO2: 99 % O2 Device: None (Room air)    Weight: 117 lbs 1.6 oz  GENERAL: Patient is not  in acute distress  HEENT: EOM+,Conjunctiva is clear   NECK: I did not see a Jugular Venous distention  HEART: S1 S2  regular Rate and Rhythm, there is  no murmur,   LUNGS: Respirations are  not laboured, Lungs are  clear to auscultation without Crepitations or Wheezing   ABDOMEN: Soft , there is no tenderness ,Bowel Sounds are  Positive   LOWER LIMBS: no  Pedal Edema  Bilaterally   CNS:  Alert,  Oriented x 3, Moving all the Four Limbs        Primary Care Physician   Tess Leigh    Discharge Orders      Care Coordination Referral      Reason for your hospital stay    Came in with cough shortness of breath and was found to have acute exacerbation of systolic CHF, community-acquired pneumonia     Follow-up and recommended labs and tests     Follow up with primary care provider, Tess Leigh, within 7 days for hospital follow- up.  The following labs/tests are recommended: CBC and BMP when following up with primary care physician    Need to check chest x-ray in 6 to 8 weeks to document resolution of pneumonia.     Activity    Your activity upon discharge: activity as tolerated     Monitor and record    Check blood pressures 2 times daily and if the blood pressure is less than 100  hold spironolactone     When to contact your care team    There is worsening shortness of breath, increase in weight by more than 3 pounds in 1 day or 5 pounds 1 week or if there is fever or confusion     Diet    Follow this diet upon discharge: Orders Placed This Encounter      Snacks/Supplements Adult: Ensure Enlive; Between Meals      No Added Salt 3 Gram Sodium       Significant Results and Procedures   Most Recent 3 CBC's:Recent Labs   Lab Test 02/14/22  0713 02/13/22  0645 02/12/22  1751   WBC 7.2 19.7* 12.0*   HGB 12.2* 13.0* 13.9   MCV 97 99 101*    294 355     Most Recent 3 BMP's:Recent Labs   Lab Test 02/14/22  0746 02/14/22  0713 02/13/22  0645 02/12/22  1751   NA  --  141 142 142   POTASSIUM 3.4 3.5 3.6 4.1   CHLORIDE  --  108 107 110*   CO2  --  28 26 26   BUN  --  53* 41* 42*   CR  --  1.29* 1.20 1.15   ANIONGAP  --  5 9 6   FLORES  --  9.0 9.2 9.5   GLC  --  108* 115* 112*     Most Recent 2 LFT's:Recent Labs   Lab Test 02/13/22  0645 02/12/22  1751   AST 22 27   ALT 42 49   ALKPHOS 91 100   BILITOTAL 1.0 1.1     Most Recent 3 INR's:Recent Labs   Lab Test 02/12/22  1751 02/25/20  0654   INR 1.14 1.01     Most Recent Urinalysis:Recent Labs   Lab Test 03/02/20  0530   COLOR Red   APPEARANCE Cloudy   URINEGLC Negative   URINEBILI Negative   URINEKETONE Negative   SG 1.016   UBLD Large*   URINEPH 5.5   PROTEIN 70*   NITRITE Negative   LEUKEST Negative   RBCU >182*   WBCU 0   ,   Results for orders placed or performed during the hospital encounter of 02/12/22   CT Chest Pulmonary Embolism w Contrast    Narrative    EXAM: CT CHEST PULMONARY EMBOLISM W CONTRAST  LOCATION: [facility]  DATE/TIME: 2/12/2022 7:37 PM    INDICATION: Shortness of breath. Positive D-dimer.  COMPARISON: Chest CT performed 12/9/2021.  TECHNIQUE: CT chest pulmonary angiogram during arterial phase injection of IV contrast. Multiplanar reformats and MIP reconstructions were performed. Dose reduction techniques  were used.   CONTRAST: 52 mL Isovue 370    FINDINGS:   ANGIOGRAM CHEST: Pulmonary arteries are normal caliber and negative for pulmonary emboli. The thoracic aorta is not well opacified. There is mild atherosclerotic calcification of the thoracic aorta. No CT evidence of right heart strain. There is   prominent enlargement of the left ventricle, not significantly changed.    LUNGS AND PLEURA: Small to moderate bilateral pleural effusions have decreased slightly in size. There are scattered calcified granulomas in both lungs. Consolidation in the left lower lobe posteriorly is new since the previous exam, and is suspicious   for pneumonia. Emphysematous changes are again noted throughout both lungs. Smooth interstitial thickening at both lung bases suggests pulmonary edema, not significantly changed. Biapical scarring.    MEDIASTINUM/AXILLAE: No lymphadenopathy. Normal esophagus. No significant pericardial effusion.    CORONARY ARTERY CALCIFICATION: Moderate.    UPPER ABDOMEN: Scattered calcified granulomas in the spleen.    MUSCULOSKELETAL: Degenerative changes in the thoracic spine.      Impression    IMPRESSION:  1.  No evidence for pulmonary embolism.  2.  Consolidation in the left lower lobe is suspicious for pneumonia. Underlying neoplasm is considered less likely, however follow-up is recommended to ensure resolution.  3.  Small to moderate bilateral pleural effusions have decreased slightly since the previous exam.  4.  Mild smooth interstitial thickening in both lower lungs again suggests pulmonary edema.  5.  Emphysema.       Discharge Medications   Current Discharge Medication List      START taking these medications    Details   azithromycin (ZITHROMAX) 250 MG tablet Take 1 tablet (250 mg) by mouth daily for 3 days  Qty: 3 tablet, Refills: 0    Associated Diagnoses: Community acquired pneumonia, unspecified laterality      cefdinir (OMNICEF) 300 MG capsule Take 1 capsule (300 mg) by mouth 2 times daily  for 5 days  Qty: 10 capsule, Refills: 0    Associated Diagnoses: Community acquired pneumonia, unspecified laterality         CONTINUE these medications which have CHANGED    Details   furosemide (LASIX) 20 MG tablet Take 1 tablet (20 mg) by mouth daily  Qty: 45 tablet, Refills: 0    Associated Diagnoses: Acute systolic congestive heart failure (H)         CONTINUE these medications which have NOT CHANGED    Details   aspirin 81 MG EC tablet Take 81 mg by mouth daily      atorvastatin (LIPITOR) 20 MG tablet Take 1 tablet (20 mg) by mouth daily  Qty: 90 tablet, Refills: 3    Associated Diagnoses: Coronary artery disease involving native heart, angina presence unspecified, unspecified vessel or lesion type      clopidogrel (PLAVIX) 75 MG tablet Take 1 tablet (75 mg) by mouth daily  Qty: 90 tablet, Refills: 3    Associated Diagnoses: Coronary artery disease involving native coronary artery of native heart without angina pectoris      lisinopril (ZESTRIL) 5 MG tablet Take 1 tablet (5 mg) by mouth daily  Qty: 90 tablet, Refills: 3    Associated Diagnoses: Ischemic cardiomyopathy      metoprolol succinate ER (TOPROL-XL) 50 MG 24 hr tablet Take 1 tablet (50 mg) by mouth daily  Qty: 90 tablet, Refills: 3    Associated Diagnoses: Coronary artery disease involving native heart, angina presence unspecified, unspecified vessel or lesion type; Essential hypertension with goal blood pressure less than 140/90      multivitamin, therapeutic with minerals (THERA-VIT-M) TABS Take 1 tablet by mouth daily      nitroGLYcerin (NITROSTAT) 0.4 MG sublingual tablet Place 1 tablet (0.4 mg) under the tongue every 5 minutes as needed for chest pain  Qty: 30 tablet, Refills: 0    Associated Diagnoses: Coronary artery disease involving native coronary artery of native heart without angina pectoris      spironolactone (ALDACTONE) 25 MG tablet Take 0.5 tablets (12.5 mg) by mouth daily  Qty: 45 tablet, Refills: 4    Associated Diagnoses: Ischemic  cardiomyopathy      tamsulosin (FLOMAX) 0.4 MG capsule Take 1 capsule by mouth daily.  Qty: 30 capsule, Refills: 0    Associated Diagnoses: Urinary retention         STOP taking these medications       ibuprofen (ADVIL/MOTRIN) 200 MG tablet Comments:   Reason for Stopping:             Allergies   Allergies   Allergen Reactions     Penicillins       Rhofade Counseling: Rhofade is a topical medication which can decrease superficial blood flow where applied. Side effects are uncommon and include stinging, redness and allergic reactions.

## 2022-11-01 NOTE — PATIENT INSTRUCTIONS
November 1, 2022    Thank you for allowing our Cardiology team to participate in your care.     Please note the following changes to your heart treatment plan:     Medication changes:   - none    Tests to be done:  - none    Follow up:  - Follow up with Palliative Care, or sooner as needed.      For scheduling, please call [phone].    Please contact our team at [phone] (Gabby HEATH) or [phone] for any questions or concerns.     If you are having a medical emergency, please call [phone].     Sincerely,    Kwame Blanton MD, FACC  Cardiology    [facility] [place]

## 2022-11-01 NOTE — LETTER
11/1/2022    Colt Treviño MD  [address] [facility] [address]    RE: Deshawn Burrows       Dear Colleague,     I had the pleasure of seeing Deshawn Burrows in the [facility].      Cardiology Clinic Progress Note:    November 1, 2022   Patient Name: Deshawn Burrows  Patient MRN: [id number]     Consult indication: CHF    HPI:    I had the opportunity to see patient Deshwan Burrows in cardiology clinic for a follow up visit. Patient is followed by our colleague Colt Treviño MD with Primary Care.     As you know, Mr. 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      The patient has a remote history of coronary artery disease with stenting in the 1990s in [place].  He was admitted to [facility] 02/2020 for chest pain and dyspnea.  He was seen in consultation by my colleague, Dr. Palmer.  TTE was done which demonstrated LVEF 24% with mildly reduced RV function.  He was also noted to have moderate mitral regurgitation.  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  LVEF was 21%.        He underwent coronary angiography on 02/28/2020 that demonstrated a mid LAD in-stent restenosis that was stented at that time.  The degree of cardiac dysfunction was thought to be out of proportion to the burden of coronary artery disease and it was recommended that a cardiac MRI be considered for further  evaluation of his cardiomyopathy.      Subsequently, he returned to the hospital with dyspnea and was seen by my colleague, Dr. Cabral.  His clinical presentation at that time seemed most consistent with urinary retention, rather than decompensated heart failure.  It seems that his symptoms at that time are likely chronic in nature and did not seem consistent with an acute decompensation of heart failure.  He was discharged with an indwelling Faustin catheter and was able to void with this in place, which did result in improvement in his symptoms.      He was seen in Cardiology Clinic with my colleague, Aric Alexander NP, on 03/06/2020.  At that time, he had been doing well and no changes were warranted to his cardiac regimen.  It was recommended the patient undergo a cardiac MRI and followup with Dr. Palmer for reassessment.      Unfortunately, it seems that the patient had been lost to followup as he did not undergo the cardiac MRI that was ordered nor the visit with Dr. Palmer as had been recommended.        I had seen him in follow-up in 4/8/2021.  At that time, we discussed options for further evaluation management.  We reiterated our recommendation for reassessment with a cardiac MRI.  He also recommended close follow-up with cardiology in 1 month.  For unclear reasons, these were not scheduled either.  Patient has not been seen in cardiology clinic since then.     Patient is accompanied today by his daughter, who lives in [place] but is now living with the patient to help with healthcare needs.  Patient's wife is also chronically ill.  Apparently patient has been having worsening memory issues as well, which likely is the reason for the loss to follow-up.    I had seen him in follow up on 9/7/2022. At that time we reviewed goals of care, offered Palliative care referral, they wanted to consider this further.  Repeat TTE 9/20/2022 was similar to before, LVEF 20-25%. He was seen in the ED 10/24/2022 with dyspnea, notably  his daughter gave him furosemide earlier that day.  In the ED, he was stable, and overall presentation was not felt to be related to heart failure. He improved spontaneously and went home without intervention.    Since then he has been stable.  Denies any chest pain, dyspnea out of the ordinary.  Weight continues to trend down due to lack of appetite. He has not needed furosemide regularly.      He is a retired , and lives independently with his wife, though now his daughter also lives with them.    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 NYHA II. LVEF 24% on TTE 02/2020.  He has known coronary artery disease; however, it was thought that the burden of coronary artery disease was out of proportion to the degree of LV dysfunction.    Currently, Mr. Burrows seems euvolemic and denies symptoms concerning for decompensated heart failure.    # Coronary artery disease with remote history of PCI, status post repeat coronary angiography 02/2020 with ROSENDO to mid LAD in-stent restenosis.  # Sporadic follow up with cardiology, he has missed clinic appointments and has not undergone a cardiac MRI that was ordered over a year ago, likely due to memory impairment.  # Hypertension, stable.  # Dyslipidemia.  LDL at goal of less than 70.   # Chronic LBBB     -Discussed options for further evaluation management with the patient and his daughter.  Reviewed his cardiac history including severe cardiomyopathy.  Fortunately, patient seems to be doing well without symptoms concerning for angina or decompensated heart failure.  - Reviewed CRT, seems to be a suboptimal candidate for CRT-D given advanced age, cognitive decline.  Pt and daughter would like to avoid invasive procedures.  - We discussed Palliative care again, and they are interested in this, I believe he would be would be an appropriate  candidate for home hospice given general decline with his advanced HF. It does not seem that he would want to return to the hospital if he does decompensate again, though would benefit from a goals of care discussion.   - continue current regimen of aspirin, clopidogrel, atorvastatin, metoprolol succinate, furosemide as needed.  Previously we discussed dual antiplatelet therapy, since it has been over a year following PCI.  Patient denied any bleeding/bruising issues, and would like to continue this, understanding risk of bleeding.  - Follow up with Palliative care     Thank you for allowing our team to participate in the care of Deshawn Burrows.  Please do not hesitate to call or page me with any questions or concerns.    Sincerely,     Kwame Blanton MD, [facility]  Cardiology  Text Page   November 1, 2022      Voice recognition software utilized.     Total time spent on this encounter: 25 minutes, providing care in this encounter including, but not limited to, reviewing prior medical records, laboratory data, imaging studies, diagnostic studies, procedure notes, formulating an assessment and plan, recommendations, discussion and counseling with patient face to face, dictation.    Past Medical History:   The ASCVD Risk score (Philippe DK, et al., 2019) failed to calculate for the following reasons:    The 2019 ASCVD risk score is only valid for ages 40 to 79    The patient has a prior MI or stroke diagnosis  Past Medical History:   Diagnosis Date     Acute systolic heart failure (H) 2/25/2020    Added automatically from request for surgery [id number]     CAD (coronary artery disease) 2004    2 cardiac stents      Dyspnea on exertion 2/25/2020    Added automatically from request for surgery [id number]     Essential hypertension with goal blood pressure less than 140/90 8/10/2016     Hyperlipidemia LDL goal <100 8/10/2016     Ischemic cardiomyopathy 2/25/2020    Added automatically from request for surgery  [id number]     LBBB (left bundle branch block) 2/25/2020        Past Surgical History:   Past Surgical History:   Procedure Laterality Date     CV CORONARY ANGIOGRAM N/A 2/28/2020    Procedure: CV CORONARY ANGIOGRAM;  Surgeon: Andres Mann MD;  Location:  HEART CARDIAC CATH LAB     CV LEFT HEART CATH N/A 2/28/2020    Procedure: Left Heart Cath;  Surgeon: Andres Mann MD;  Location:  HEART CARDIAC CATH LAB     CV LEFT VENTRICULOGRAM N/A 2/28/2020    Procedure: Left Ventriculogram;  Surgeon: Andres Mann MD;  Location: [facility] HEART CARDIAC CATH LAB     CV PCI STENT DRUG ELUTING N/A 2/28/2020    Procedure: Percutaneous Coronary Intervention Stent Drug Eluting;  Surgeon: Andres Mann MD;  Location: [facility] HEART CARDIAC CATH LAB     CYSTOSCOPY N/A 3/1/2020    Procedure: CYSTOSCOPY;  Surgeon: Rios Gomez MD;  Location: [facility] OR     HEART CATH DRUG ELUTING STENT PLACEMENT  02/28/2020     HERNIA REPAIR  2007    right inguinal hernia repair     STENT, CORONARY, ANTIONE  2004    2 stents       Medications (outpatient):  Current Outpatient Medications   Medication Sig Dispense Refill     aspirin 81 MG EC tablet Take 81 mg by mouth daily       atorvastatin (LIPITOR) 20 MG tablet Take 1 tablet (20 mg) by mouth daily 90 tablet 3     clopidogrel (PLAVIX) 75 MG tablet TAKE 1 TABLET EVERY DAY 90 tablet 1     furosemide (LASIX) 20 MG tablet Take 1 tablet (20 mg) by mouth daily as needed (for weight gain of 3lb/day or 5lb or more week) 60 tablet 3     metoprolol succinate ER (TOPROL XL) 50 MG 24 hr tablet Take 1 tablet (50 mg) by mouth daily Appointment required for further refills 90 tablet 0     multivitamin, therapeutic with minerals (THERA-VIT-M) TABS Take 1 tablet by mouth daily       nitroGLYcerin (NITROSTAT) 0.4 MG sublingual tablet Place 1 tablet (0.4 mg) under the tongue every 5 minutes as needed for chest pain 30 tablet 0     tamsulosin (FLOMAX) 0.4 MG capsule          Allergies:  Allergies  "  Allergen Reactions     Penicillins        Social History:   History   Drug Use No      History   Smoking Status     Former   Smokeless Tobacco     Never     Comment: Quit 30 years ago     Social History    Substance and Sexual Activity      Alcohol use: Yes        Comment: occasional beer       Family History:  Family History   Family history unknown: Yes       Review of Systems:   A complete review of systems was negative except as mentioned in the History of Present Illness.     Objective & Physical Exam:  BP 98/54 (BP Location: Right arm, Patient Position: Sitting, Cuff Size: Adult Small)   Pulse 68   Ht 1.854 m (6' 1\")   Wt 56.7 kg (125 lb)   BMI 16.49 kg/m    Wt Readings from Last 2 Encounters:   11/01/22 56.7 kg (125 lb)   10/24/22 53.1 kg (117 lb)     Body mass index is 16.49 kg/m .   Body surface area is 1.71 meters squared.    Constitutional: appears stated age, in no apparent distress, cachectic, gaunt  Head: normocephalic, atraumatic  Neck: supple, trachea midline, no bruit bilaterally   Pulmonary: clear to auscultation bilaterally, no wheezes, no rales, no increased work of breathing  Cardiovascular: JVP normal, regular rate, regular rhythm, 2/6 DEVI at the RUSB, no lower extremity edema  Gastrointestinal: no guarding, non-rigid   Neurologic: awake, alert, moves all extremities  Skin: no jaundice, warm on limited exam  Psychiatric: affect is normal, answers questions appropriately, oriented to self and place    Data reviewed:  Lab Results   Component Value Date    WBC 9.7 10/24/2022    WBC 6.7 03/04/2020    RBC 4.00 (L) 10/24/2022    RBC 4.09 (L) 03/04/2020    HGB 12.7 (L) 10/24/2022    HGB 13.0 (L) 03/04/2020    HCT 39.5 (L) 10/24/2022    HCT 40.0 03/04/2020    MCV 99 10/24/2022    MCV 98 03/04/2020    MCH 31.8 10/24/2022    MCH 31.8 03/04/2020    MCHC 32.2 10/24/2022    MCHC 32.5 03/04/2020    RDW 13.6 10/24/2022    RDW 12.8 03/04/2020     10/24/2022     03/04/2020     Sodium   Date " Value Ref Range Status   10/24/2022 138 136 - 145 mmol/L Final   03/06/2020 138 133 - 144 mmol/L Final     Potassium   Date Value Ref Range Status   10/24/2022 4.8 3.4 - 5.3 mmol/L Final   06/11/2022 4.2 3.4 - 5.3 mmol/L Final   03/06/2020 3.9 3.4 - 5.3 mmol/L Final     Chloride   Date Value Ref Range Status   10/24/2022 101 98 - 107 mmol/L Final   06/11/2022 104 94 - 109 mmol/L Final   03/06/2020 105 94 - 109 mmol/L Final     Carbon Dioxide   Date Value Ref Range Status   03/06/2020 29 20 - 32 mmol/L Final     Carbon Dioxide (CO2)   Date Value Ref Range Status   10/24/2022 27 22 - 29 mmol/L Final   06/11/2022 30 20 - 32 mmol/L Final     Anion Gap   Date Value Ref Range Status   10/24/2022 10 7 - 15 mmol/L Final   06/11/2022 7 3 - 14 mmol/L Final   03/06/2020 4 3 - 14 mmol/L Final     Glucose   Date Value Ref Range Status   10/24/2022 106 (H) 70 - 99 mg/dL Final   06/11/2022 99 70 - 99 mg/dL Final   03/06/2020 126 (H) 70 - 99 mg/dL Final     Urea Nitrogen   Date Value Ref Range Status   10/24/2022 30.7 (H) 8.0 - 23.0 mg/dL Final   06/11/2022 30 7 - 30 mg/dL Final   03/06/2020 38 (H) 7 - 30 mg/dL Final     Creatinine   Date Value Ref Range Status   10/24/2022 1.17 0.67 - 1.17 mg/dL Final   03/06/2020 1.11 0.66 - 1.25 mg/dL Final     GFR Estimate   Date Value Ref Range Status   10/24/2022 60 (L) >60 mL/min/1.73m2 Final     Comment:     Effective December 21, 2021 eGFRcr in adults is calculated using the 2021 CKD-EPI creatinine equation which includes age and gender (Jackelyn de al., NEJM, DOI: 10.1056/ODKBvh4078447)   03/06/2020 59 (L) >60 mL/min/[1.73_m2] Final     Comment:     Non  GFR Calc  Starting 12/18/2018, serum creatinine based estimated GFR (eGFR) will be   calculated using the Chronic Kidney Disease Epidemiology Collaboration   (CKD-EPI) equation.       Calcium   Date Value Ref Range Status   10/24/2022 9.4 8.8 - 10.2 mg/dL Final   03/06/2020 9.1 8.5 - 10.1 mg/dL Final     Bilirubin Total    Date Value Ref Range Status   2022 1.0 0.2 - 1.3 mg/dL Final   2020 0.6 0.2 - 1.3 mg/dL Final     Alkaline Phosphatase   Date Value Ref Range Status   2022 91 40 - 150 U/L Final   2020 76 40 - 150 U/L Final     ALT   Date Value Ref Range Status   2022 42 0 - 70 U/L Final   2020 49 0 - 70 U/L Final     AST   Date Value Ref Range Status   2022 22 0 - 45 U/L Final   2020 39 0 - 45 U/L Final     Recent Labs   Lab Test 22  1208 20  1641   CHOL 112 89   HDL 45 34*   LDL 45 48   TRIG 111 36      No results found for: A1C     Recent Results (from the past 4320 hour(s))   Echocardiogram Complete   Result Value    LVEF  20-25%    Narrative    [id number]  [id number]  [id number]  [id number]^JEFF^SHARA^CHETNA     [facility]  Echocardiography Laboratory  [address] [place] [address]     Name: JAYDE RAMOS  MRN: [id number]  : 1933  Study Date: 2022 12:45 PM  Age: 89 yrs  Gender: Male  Patient Location: [facility]  Reason For Study: Ischemic cardiomyopathy, Nonrheumatic mitral valve  regurgitation  Ordering Physician: SHARA AGUILAR  Referring Physician: LORY Otero  Performed By: Reji Felix RDCS     BSA: 1.8 m2  Height: 73 in  Weight: 131 lb  HR: 78  BP: 145/75 mmHg  ______________________________________________________________________________  Procedure  Complete Echo Adult.  ______________________________________________________________________________  Interpretation Summary     1. The left ventricle is moderately dilated. There is normal left ventricular  wall thickness. Left ventricular systolic function is severely reduced. The  visual ejection fraction is 20-25%. Left ventricular diastolic function is  abnormal. Septal motion is consistent with conduction abnormality.  2. The right ventricle is normal size. The right ventricular systolic function  is normal.  3. The left atrium is mild to moderately dilated.  4. Moderate mitral  regurgitation.  5. No pericardial effusion.  6. In comparison to the previous report dated 12/09/2021, the findings are  similar.  ______________________________________________________________________________  Left Ventricle  The left ventricle is moderately dilated. There is normal left ventricular  wall thickness. Left ventricular systolic function is severely reduced. The  visual ejection fraction is 20-25%. Left ventricular diastolic function is  abnormal. Septal motion is consistent with conduction abnormality.     Right Ventricle  The right ventricle is normal size. The right ventricular systolic function is  normal.     Atria  The left atrium is mild to moderately dilated. Right atrial size is normal.  There is no color Doppler evidence of an atrial shunt.     Mitral Valve  There is moderate (2+) mitral regurgitation.     Tricuspid Valve  There is trace tricuspid regurgitation. Right ventricular systolic pressure  could not be approximated due to inadequate tricuspid regurgitation.     Aortic Valve  There is mild trileaflet aortic sclerosis. There is mild (1+) aortic  regurgitation. No aortic stenosis is present.     Pulmonic Valve  There is no pulmonic valvular stenosis.     Vessels  The aortic root is normal size. The inferior vena cava is normal.     Pericardium  There is no pericardial effusion.     Rhythm  The rhythm was sinus with wide QRS.  ______________________________________________________________________________  MMode/2D Measurements & Calculations  IVSd: 0.97 cm     LVIDd: 6.7 cm  LVIDs: 5.6 cm  LVPWd: 0.75 cm  FS: 17.0 %  LV mass(C)d: 246.7 grams  LV mass(C)dI: 137.2 grams/m2  Ao root diam: 3.6 cm  LA dimension: 3.8 cm  LA/Ao: 1.1  LVOT diam: 2.4 cm  LVOT area: 4.6 cm2  LA Volume Index (BP): 39.6 ml/m2  RWT: 0.22     Doppler Measurements & Calculations  MV E max anthony: 79.9 cm/sec  MV A max anthony: 105.8 cm/sec  MV E/A: 0.76     MV dec slope: 520.6 cm/sec2  MV dec time: 0.15 sec  AI P1/2t: 476.6  msec  LV V1 max P.4 mmHg  LV V1 max: 105.3 cm/sec  LV V1 VTI: 20.7 cm  SV(LVOT): 94.4 ml  SI(LVOT): 52.5 ml/m2  E/E': 17.8  Peak E' Ori: 4.5 cm/sec     ______________________________________________________________________________  Report approved by: Nam Grover 2022 02:25 PM                Thank you for allowing me to participate in the care of your patient.      Sincerely,     Kwame Blanton MD     [facility]  cc:   No referring provider defined for this encounter.

## 2022-11-02 NOTE — PROGRESS NOTES
"   11/02/22 1400   Quick Adds   Quick Adds Certification   Type of Visit Initial Outpatient Occupational Therapy Evaluation   General Information   Start Of Care Date 11/02/22   Referring Physician Heladio Hatch MD   Orders Evaluate and treat as indicated  (\"Eval and treat. Request CPT evaluation\")   Orders Date 09/29/22   Medical Diagnosis Cognitive Decline   Onset of Illness/Injury or Date of Surgery 09/29/22  (date of order used)   Precautions/Limitations No known precautions/limitations   Surgical/Medical History Reviewed Yes   Additional Occupational Profile Info/Pertinent History of Current Problem Deshawn Burrows is a pleasant 89 year old, right hand dominant male patient who presents to outpatient OT for further cognitive evaluation. PMH includes hypertension, hyperlipidemia, myocardial infarction, ischemic cardiomyopathy and congestive heart failure. Per neurology note from 8/31/22, pt feels that his memory is \"fair, not a problem\".  He mentions that he is \"79-year-old\" that might play a role, but feels that his health overall is good.  He has no concerns.According to patient's daughter, progressive cognitive decline started several years ago and became significant in November 2021 after his hospitalization for pneumonia.  His daughter lives in [place] and was previously visiting him every 4 months, but temporarily moved to [place] in November 2021.  She feels that short-term is preferentially affected.  No word finding difficulties, but the patient tends to repeat himself.  He misplaces his belongings and has trouble with calendar.  Daughter took over medication administration and finances.  The patient continues to drive within 3-mile range (basically goes to [employer] and [place]).  Does not get lost.  No car accidents or tickets.  No other safety concerns.  Daughter does all laundry, cleaning, and cooking.  He also has 2 of 16-ounce beers every day for the last several years.  No " prior history of brain injury, seizures, CNS infections/strokes. Upon review of patient's wife's notes from [facility], his MoCA was 25/30 with five points lost in memory in October 2018. MoCA score on 8/31/22 visit was 17/30.   Comments/Observations Pt is accompanied to today's appointment by his daughter, Andra. Pt is hard of hearing but is able to listen and respond appropriately with OTR wearing clear window face mask this date.   Role/Living Environment   Current Community Support Family/friend caregiver   Patient role/Employment history Retired  ()   Community/Avocational Activities Reports he enjoys spending time working with electronics, shooting at the [employer], and reading.   Current Living Environment House  (multiple levels)   Primary Bathroom Location/Comments Main floor   Primary Bathroom Set Up/Equipment Shower stall   Prior Level - Transfers Independent   Prior Level - Ambulation Independent   Prior Level - ADLS Independent   Prior Responsibilities - IADL Meal Preparation;Yardwork;Medication management;Finances;Driving   Prior Level Comments IND with ADLs and some IADLs. Reports his spouse used to complete many of the household management tasks as she was a stay-at-home mom for years.   Role/Living Environment Comments Daughter, Andra, lives in [place] and has been living in [place] since February to provide caregiving for both of her parents. Daughter is completing most of the IADLs in the household. She is an OT as well (lymphedema).   Patient/family Goals Statement Pt shares he does not know why he is here today. Daughter's goal is for patient to complete cognitive assessment.   Pain   Patient currently in pain No   Fall Risk Screen   Fall screen completed by OT   Have you fallen 2 or more times in the past year? No   Have you fallen and had an injury in the past year? No   Is patient a fall risk? No   Fall screen comments End of Feb/March fell once in the living room  "after just getting out of the hospital. No significant injuries.   Abuse Screen (yes response referral indicated)   Feels Unsafe at Home or Work/School no   Feels Threatened by Someone no   Does Anyone Try to Keep You From Having Contact with Others or Doing Things Outside Your Home? no   Physical Signs of Abuse Present no   Cognitive Status Examination   Orientation Person   Level of Consciousness Alert   Follows Commands and Answers Questions 75% of the time;Able to follow single-step instructions  (hard of hearing)   Personal Safety and Judgment Intact   Memory Impaired   Attention Selective attention impaired, difficulty ignoring irrelevant stimuli;Alternating attention impaired, difficulty shifting between tasks;Divided attention impaired, difficulty with simultaneous tasks   Organization/Problem Solving Problem solving impaired   Executive Function Initiation impaired;Working memory impaired, decreased storage of information for performing tasks;Cognitive flexibility impaired;Planning ability impaired   Cognitive Comment Reports he has noticed changes in his STM. Daughter reports that it is difficult for him to remember things outside of his normal routine, such as today's appointment, whereas [place] (at the same time every week) is easier for him to remember. Denies word-finding difficulty. Denies needing additional time to process information. Daughter reports that the pt \"doesn't remember as well as he used to.\" Notes that the pt does well around the house and running short errands, such as to [employer], but she completes all the daily activities. The pt's spouse was as stay at home mom and the patient never had to do any of the activities. Administered CPT this date with pt scoring 4.6 / 5.6 - see separate CPT note for details.   Visual Perception   Visual Perception Wears glasses  (For reading)   Visual Perception Comments Denies changes in vision.   Sensation   Sensation Comments Denies n/t.   Range of " "Motion (ROM)   ROM Comments BUEs WFLs per observation of performance.   Strength   Strength Comments Denies any significant changes in UEs and LEs.   Hand Strength   Hand Dominance Right   Balance   Balance Comments Denies concerns with balance or walking.   Functional Mobility   Ambulation IND   Transfer Skill   Level of [place]: Transfers independent   Bathing   Level of [place] - Bathing independent   Upper Body Dressing   Level of [place]: Dress Upper Body independent   Lower Body Dressing   Level of [place]: Dress Lower Body independent   Toileting   Level of [place]: Toilet independent   Grooming   Level of [place]: Grooming independent   Eating/Self-Feeding   Level of [place]: Eating independent   Activity Tolerance   Activity Tolerance Reports overall \"good\" energy. Does not exercise regularly.   Instrumental Activities of Daily Living Assessment   IADL Assessment/Observations Medication management: receives help from his daughter with managing medications. Daughter sets up pillbox and physically hands medications to the patient when it is time to take them.   IADL Quick Adds Meal Planning/Preparation;Home/Financial/Management;Communication/Computer Use;Community Mobility   Meal Planning/Preparation Patient's daughter does all of the main cooking around the home. The patient will make himself toast in the morning. Feels he could make himself food if needed. He uses the microwave to warm foods. Could use the stove, doesn't ever really use the oven.   Home/Financial Management Does not assist with cleaning or laundry at home. Pt reports that him and his spouse used to share the bill-paying but now daughter Orquidea has been completing bills.   Community Mobility Patient reports he drives himself. Reports he mostly self-restricts himself to about 30-40 miles from home. Reports he has not become lost while driving. Daughter reports that she has ridden with the patient and has " concerns he may get lost. Otherwise daughter reports feeling responably confident when he was driving.   Planned Therapy Interventions   Planned Therapy Interventions ADL training;IADL training;Cognitive skills;Cognitive performance testing;Self care/Home management   Adult OT Eval Goals   OT Eval Goals (Adult) 1    OT Goal 1   Goal Identifier Memory/Cognition   Goal Description Patient and family to verbalize understanding of   Cognitive Performance Test results and identify 3 strategies to increase patient's safety and independence in the home and community setting.   Target Date 11/02/22   Date Met 11/02/22   Clinical Impression   Criteria for Skilled Therapeutic Interventions Met Yes, treatment indicated   OT Diagnosis Decreased safety and IND with ADLs/IADLs   Influenced by the following impairments decreased activity tolerance, hearing, memory, attention, problem-solving/reasoning, planning/organization   Assessment of Occupational Performance 5 or more Performance Deficits   Identified Performance Deficits household management, financial management, medication management, meal preparation, driving, leisure/hobbies, socialization   Clinical Decision Making (Complexity) Moderate complexity   Therapy Frequency 1x/week   Predicted Duration of Therapy Intervention (days/wks) 1 week (one time evaluation and treatment only)   Risks and Benefits of Treatment have been explained. Yes   Patient, Family & other staff in agreement with plan of care Yes   Clinical Impression Comments Pt will benefit from one-time only skilled OT services to promote safety and IND with ADLs/IADLs   Education Assessment   Barriers To Learning Hearing;Cognitive   Preferred Learning Style Listening;Reading;Demonstration;Pictures/video   Therapy Certification   Certification date from 11/02/22   Certification date to 11/02/22   Certification I certify the need for these services furnished under this plan of treatment and while under my care.   (Physician co-signature of this document indicates review and certification of the therapy plan)   Total Evaluation Time   OT Pam, Moderate Complexity Minutes (47232) 25     Thank you for the referral of this patient.  If you have any questions regarding the information in this report, please feel free to contact me per the information provided below.      Amber Baig MA, OTR/L  Occupational Therapist  [address] [address]  Clinic Fax:  [phone]  Clinic Phone: [phone]

## 2022-11-02 NOTE — PROGRESS NOTES
Cognitive Performance Test    SUMMARY OF TEST:    The Cognitive Performance Test (CPT) is a standardized performance-based assessment to measure working memory/executive function processing capacities that underlie functional performance. Subtasks include common basic and instrumental activities of daily living (ADL/IADL) which are rated based on the manner in which patients respond to task demands of varying complexity. The total CPT score describes a level of functioning that indicates how information is processed, implications for functional activities, potential safety risks and a recommended level of supervision or assist based on cognitive function. The highest total score on this test is in the range of 5.6 to 5.8.    DATE OF TESTIN22    RESULTS OF TESTING:                                                                                         CPT Subtest Results    MEDBOX: 4.5 / 6 SHOP/GLOVES: 4.0 / 6 PHONE: 6.0 / 6   WASH:  5.0 / 5 TRAVEL: N/A TOAST: 4.0 / 5   DRESS: 4.0 / 5   TOTAL CPT SCORE:  27.5 / 33     Average CPT Score  4.6 / 5.6    INTERPRETATION OF TEST RESULTS:    Based on the Cognitive Performance Test, this patient scored at CPT Level 4.6.  See CPT Levels reference below.    Summary of functional cognitive status:   Medbox: Places 0 of 4 medications correctly initially. With specific cue, is able to correct Fluidia. With specific cues, unable to correct other medications.    Shop: Does not check the wallet prior to selection. Initially chooses $9.59 bel. Requires belt exchange done for him. Pays exact change.    Wash: Requires repetition of directions then sequences appropriately.    Toast: Requires repetition of directions then asks for outlet. Needs repeat of initial directions after setup of toaster then sequences appropriately.    Phone: Locates category section of the phone book, locates number for retail paint, dials without difficulty, and asks the price question.    Dress:   "Selects two coats: insulated rain coat and thin rain coat for \"outer layer.\" Also selects umbrella when cued back to the closet.    Factors affecting performance:  Impaired hearing  New or complex medical issue    Recommendations:    Assist for ADL/IADL: Finances, Driving, and Medication management  Supervision for ADL/IADL:  Meal preparation and Shopping  Supervision in living setting:  Daily checks                                                       TIME ADMINISTERING TEST: 38 min    TIME FOR INTERPRETATION AND PREPARATION OF REPORT: 15 min    TOTAL TIME: 53 min      CPT Levels Reference:    Patient's Average CPT Score:  4.6                                                                                                                                                  Individual scores range along a continuum as outlined below.  In addition to cognitive status, other factors may affect safety in a home environment.  Please refer to specific recommendations for this patient.    ___5.6-5.8  Normal functioning (absence of cognitive-functional disability).  Independent in managing personal affairs, monitors and directs own behavior.  Uses complex information to carry out daily activities with safety and accuracy.    Proficient with instrumental activities of daily living (IADL) and learning new activity.  Problems are anticipated, errors are avoided, and consequences of actions are considered.      ___5.0   Mild cognitive-functional disability; deficits in working memory and executive thought processes. Difficulty using complex information. Problems may be observed with recent memory, judgment, reasoning and planning ahead. May be impulsive or have difficulty anticipating consequences.  Safety:  May require assistance to plan ahead; or to manage complex medication schedules, appointments or finances.  Hazardous activities may need to be monitored or limited.  ADL:  Mild functional decline.  Able to complete basic " "self-care and routine household tasks.  May have difficulty with complex daily tasks such as reading, writing, meal preparation, shopping or driving.   Learns through hands on teaching. Self-centered behavior or difficulty considering the needs of others may be seen related to trouble seeing the  whole picture\". Can appear disorganized or uninhibited.    _X_4.5  Mild to moderate cognitive-functional disability. Significant deficits in working memory and executive thought processes. Judgment, reasoning and planning show obvious impairment.  Distractible with inability to shift attention/actions given competing stimuli.  Difficulty with problem solving and managing details. Complex daily tasks performed with inconsistency, difficulty, or error.     Safety:  Medications should be monitored, stove use may require supervision, and driving ability may be affected.  Impaired safety awareness with inability to anticipate potential problems.  May not recognize or respond to emergent situations. Requires frequent check-in support.   ADL:  Mild difficulty with simple everyday self-care tasks. Benefits from structured, routine activity.  Will likely need reminders to complete tasks outside of the routine. Requires assistance with planning and IADL tasks like shopping and finances. Learns concrete tasks through repetition, but performance may not generalize. Tends to be impulsive with poor insight. Self centered behavior or inability to consider the needs of others is common.    ___4.0  Moderate cognitive-functional disability; abstract to concrete thought processes. Working memory and executive function impairments are obvious. Difficulty with planning and problem solving.  Behavior is goal-directed, but unable to follow multi-step directions, is easily distracted, and may not recognize mistakes.  Inability to anticipate hazards or understand precautions.  Safety:  Recommend 24-hour supervision for safety. Supervision needed " for medication management and for hazardous activities. May not be able to follow a restricted diet. Can get lost in unfamiliar surroundings. Generally, persons functioning at level 4 should not be driving.   ADL:  Some decline in quality or frequency of ADL.  [place] enhanced by use of a routine, simple concrete directions, and caregiver set-up of needed items. Complex tasks such as money or home management typically requires assistance.  Relies heavily on vision to guide behavior; will ignore objects/hazards not in plain sight and can be distracted by irrelevant objects. Often has poor insight.  Able to carry out social conversation and may verbally  cover  for deficits leading caregivers to believe they are capable of functioning independently.       ___3.5  Moderate cognitive-functional disability; increased cues needed for task completion. Aware of concrete task steps but needs prompting or cues to initiate and complete simple tasks. Attention span is limited, simple directions may need to be repeated, and re-focus to a topic or task may be required.  Safety:  24-hour supervision required for safety and for assistance with daily tasks. Assistance required with medications, and access to medication should be limited. Meals, nutrition and dietary restrictions need to be monitored.  All hazardous activities should be restricted or supervised. Should not drive. Prone to wandering and can become lost.  ADL:  Moderate functional decline. Familiar tasks usually requires set-up of supplies and directions to complete steps. May need objects handed to them for task initiation. Function best with a set schedule in familiar surroundings with familiar people. All complex tasks must be done by others. Vocabulary is diminished and speech often unfocused.     Amber Baig, OTR/L

## 2022-11-02 NOTE — PROGRESS NOTES
[facility]          OUTPATIENT OCCUPATIONAL THERAPY  EVALUATION  PLAN OF TREATMENT FOR OUTPATIENT REHABILITATION  (COMPLETE FOR INITIAL CLAIMS ONLY)  Patient's Last Name, First Name, M.I.  YOB: 1933  Deshawn Burrows                        Provider's Name  [facility] Medical Record No.  [id number]                               Onset Date:     09/29/22 (date of order used)   Start of Care Date:     11/02/22   Type:     ___PT   _X_OT   ___SLP Medical Diagnosis:     Cognitive Decline                          OT Diagnosis:     Decreased safety and IND with ADLs/IADLs Visits from SOC:  1   _________________________________________________________________________________  Plan of Treatment/Functional Goals:  ADL training, IADL training, Cognitive skills, Cognitive performance testing, Self care/Home management                    Goals  Goal Identifier: Memory/Cognition  Goal Description: Patient and family to verbalize understanding of   Cognitive Performance Test results and identify 3 strategies to increase patient's safety and independence in the home and community setting.  Target Date: 11/02/22  Date Met: 11/02/22                                      Therapy Frequency: 1x/week     Predicted Duration of Therapy Intervention (days/wks): 1 week (one time evaluation and treatment only)  JOSEFA Storm          I CERTIFY THE NEED FOR THESE SERVICES FURNISHED UNDER        THIS PLAN OF TREATMENT AND WHILE UNDER MY CARE     (Physician co-signature of this document indicates review and certification of the therapy plan).               ,    Certification date from: 11/02/22, Certification date to: 11/02/22               Referring Physician: Heladio Hatch MD     Initial Assessment        See Epic Evaluation      Start Of Care Date:  11/02/22

## 2022-11-03 NOTE — PROGRESS NOTES
Cardiology Clinic Progress Note:    November 1, 2022   Patient Name: Deshawn Burrows  Patient MRN: [id number]     Consult indication: CHF    HPI:    I had the opportunity to see patient Deshawn Burrows in cardiology clinic for a follow up visit. Patient is followed by our colleague Colt Treviño MD with Primary Care.     As you know, Mr. 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      The patient has a remote history of coronary artery disease with stenting in the 1990s in [place].  He was admitted to [facility] 02/2020 for chest pain and dyspnea.  He was seen in consultation by my colleague, Dr. Palmer.  TTE was done which demonstrated LVEF 24% with mildly reduced RV function.  He was also noted to have moderate mitral regurgitation.  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  LVEF was 21%.        He underwent coronary angiography on 02/28/2020 that demonstrated a mid LAD in-stent restenosis that was stented at that time.  The degree of cardiac dysfunction was thought to be out of proportion to the burden of coronary artery disease and it was recommended that a cardiac MRI be considered for further evaluation of his cardiomyopathy.      Subsequently, he returned to the hospital with dyspnea and was seen by my colleague, Dr. Cabral.  His clinical presentation at that time seemed most consistent with urinary retention,  rather than decompensated heart failure.  It seems that his symptoms at that time are likely chronic in nature and did not seem consistent with an acute decompensation of heart failure.  He was discharged with an indwelling Faustin catheter and was able to void with this in place, which did result in improvement in his symptoms.      He was seen in Cardiology Clinic with my colleague, Aric Alexander NP, on 03/06/2020.  At that time, he had been doing well and no changes were warranted to his cardiac regimen.  It was recommended the patient undergo a cardiac MRI and followup with Dr. Palmer for reassessment.      Unfortunately, it seems that the patient had been lost to followup as he did not undergo the cardiac MRI that was ordered nor the visit with Dr. Palmer as had been recommended.        I had seen him in follow-up in 4/8/2021.  At that time, we discussed options for further evaluation management.  We reiterated our recommendation for reassessment with a cardiac MRI.  He also recommended close follow-up with cardiology in 1 month.  For unclear reasons, these were not scheduled either.  Patient has not been seen in cardiology clinic since then.     Patient is accompanied today by his daughter, who lives in [place] but is now living with the patient to help with healthcare needs.  Patient's wife is also chronically ill.  Apparently patient has been having worsening memory issues as well, which likely is the reason for the loss to follow-up.    I had seen him in follow up on 9/7/2022. At that time we reviewed goals of care, offered Palliative care referral, they wanted to consider this further.  Repeat TTE 9/20/2022 was similar to before, LVEF 20-25%. He was seen in the ED 10/24/2022 with dyspnea, notably his daughter gave him furosemide earlier that day.  In the ED, he was stable, and overall presentation was not felt to be related to heart failure. He improved spontaneously and went home without intervention.    Since  then he has been stable.  Denies any chest pain, dyspnea out of the ordinary.  Weight continues to trend down due to lack of appetite. He has not needed furosemide regularly.      He is a retired , and lives independently with his wife, though now his daughter also lives with them.    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 NYHA II. LVEF 24% on TTE 02/2020.  He has known coronary artery disease; however, it was thought that the burden of coronary artery disease was out of proportion to the degree of LV dysfunction.    Currently, Mr. Burrows seems euvolemic and denies symptoms concerning for decompensated heart failure.    # Coronary artery disease with remote history of PCI, status post repeat coronary angiography 02/2020 with ROSENDO to mid LAD in-stent restenosis.  # Sporadic follow up with cardiology, he has missed clinic appointments and has not undergone a cardiac MRI that was ordered over a year ago, likely due to memory impairment.  # Hypertension, stable.  # Dyslipidemia.  LDL at goal of less than 70.   # Chronic LBBB     -Discussed options for further evaluation management with the patient and his daughter.  Reviewed his cardiac history including severe cardiomyopathy.  Fortunately, patient seems to be doing well without symptoms concerning for angina or decompensated heart failure.  - Reviewed CRT, seems to be a suboptimal candidate for CRT-D given advanced age, cognitive decline.  Pt and daughter would like to avoid invasive procedures.  - We discussed Palliative care again, and they are interested in this, I believe he would be would be an appropriate candidate for home hospice given general decline with his advanced HF. It does not seem that he would want to return to the hospital if he does decompensate again, though would benefit from a goals of care discussion.   -  continue current regimen of aspirin, clopidogrel, atorvastatin, metoprolol succinate, furosemide as needed.  Previously we discussed dual antiplatelet therapy, since it has been over a year following PCI.  Patient denied any bleeding/bruising issues, and would like to continue this, understanding risk of bleeding.  - Follow up with Palliative care     Thank you for allowing our team to participate in the care of Deshawn Burrows.  Please do not hesitate to call or page me with any questions or concerns.    Sincerely,     Kwame Blanton MD, [facility]  Cardiology  Text Page   November 1, 2022      Voice recognition software utilized.     Total time spent on this encounter: 25 minutes, providing care in this encounter including, but not limited to, reviewing prior medical records, laboratory data, imaging studies, diagnostic studies, procedure notes, formulating an assessment and plan, recommendations, discussion and counseling with patient face to face, dictation.    Past Medical History:   The ASCVD Risk score (Philippe DK, et al., 2019) failed to calculate for the following reasons:    The 2019 ASCVD risk score is only valid for ages 40 to 79    The patient has a prior MI or stroke diagnosis  Past Medical History:   Diagnosis Date     Acute systolic heart failure (H) 2/25/2020    Added automatically from request for surgery [id number]     CAD (coronary artery disease) 2004    2 cardiac stents      Dyspnea on exertion 2/25/2020    Added automatically from request for surgery [id number]     Essential hypertension with goal blood pressure less than 140/90 8/10/2016     Hyperlipidemia LDL goal <100 8/10/2016     Ischemic cardiomyopathy 2/25/2020    Added automatically from request for surgery [id number]     LBBB (left bundle branch block) 2/25/2020        Past Surgical History:   Past Surgical History:   Procedure Laterality Date     CV CORONARY ANGIOGRAM N/A 2/28/2020    Procedure: CV CORONARY ANGIOGRAM;  Surgeon:  Andres Mann MD;  Location: [facility]     CV LEFT HEART CATH N/A 2/28/2020    Procedure: Left Heart Cath;  Surgeon: Andres Mann MD;  Location: [facility]     CV LEFT VENTRICULOGRAM N/A 2/28/2020    Procedure: Left Ventriculogram;  Surgeon: Andres Mann MD;  Location: [facility]     CV PCI STENT DRUG ELUTING N/A 2/28/2020    Procedure: Percutaneous Coronary Intervention Stent Drug Eluting;  Surgeon: Andres Mann MD;  Location:  HEART CARDIAC CATH LAB     CYSTOSCOPY N/A 3/1/2020    Procedure: CYSTOSCOPY;  Surgeon: Rios Gomez MD;  Location: [facility] OR     HEART CATH DRUG ELUTING STENT PLACEMENT  02/28/2020     HERNIA REPAIR  2007    right inguinal hernia repair     STENT, CORONARY, ANTIONE  2004    2 stents       Medications (outpatient):  Current Outpatient Medications   Medication Sig Dispense Refill     aspirin 81 MG EC tablet Take 81 mg by mouth daily       atorvastatin (LIPITOR) 20 MG tablet Take 1 tablet (20 mg) by mouth daily 90 tablet 3     clopidogrel (PLAVIX) 75 MG tablet TAKE 1 TABLET EVERY DAY 90 tablet 1     furosemide (LASIX) 20 MG tablet Take 1 tablet (20 mg) by mouth daily as needed (for weight gain of 3lb/day or 5lb or more week) 60 tablet 3     metoprolol succinate ER (TOPROL XL) 50 MG 24 hr tablet Take 1 tablet (50 mg) by mouth daily Appointment required for further refills 90 tablet 0     multivitamin, therapeutic with minerals (THERA-VIT-M) TABS Take 1 tablet by mouth daily       nitroGLYcerin (NITROSTAT) 0.4 MG sublingual tablet Place 1 tablet (0.4 mg) under the tongue every 5 minutes as needed for chest pain 30 tablet 0     tamsulosin (FLOMAX) 0.4 MG capsule          Allergies:  Allergies   Allergen Reactions     Penicillins        Social History:   History   Drug Use No      History   Smoking Status     Former   Smokeless Tobacco     Never     Comment: Quit 30 years ago     Social History    Substance and Sexual  "Activity      Alcohol use: Yes        Comment: occasional beer       Family History:  Family History   Family history unknown: Yes       Review of Systems:   A complete review of systems was negative except as mentioned in the History of Present Illness.     Objective & Physical Exam:  BP 98/54 (BP Location: Right arm, Patient Position: Sitting, Cuff Size: Adult Small)   Pulse 68   Ht 1.854 m (6' 1\")   Wt 56.7 kg (125 lb)   BMI 16.49 kg/m    Wt Readings from Last 2 Encounters:   11/01/22 56.7 kg (125 lb)   10/24/22 53.1 kg (117 lb)     Body mass index is 16.49 kg/m .   Body surface area is 1.71 meters squared.    Constitutional: appears stated age, in no apparent distress, cachectic, gaunt  Head: normocephalic, atraumatic  Neck: supple, trachea midline, no bruit bilaterally   Pulmonary: clear to auscultation bilaterally, no wheezes, no rales, no increased work of breathing  Cardiovascular: JVP normal, regular rate, regular rhythm, 2/6 DEVI at the RUSB, no lower extremity edema  Gastrointestinal: no guarding, non-rigid   Neurologic: awake, alert, moves all extremities  Skin: no jaundice, warm on limited exam  Psychiatric: affect is normal, answers questions appropriately, oriented to self and place    Data reviewed:  Lab Results   Component Value Date    WBC 9.7 10/24/2022    WBC 6.7 03/04/2020    RBC 4.00 (L) 10/24/2022    RBC 4.09 (L) 03/04/2020    HGB 12.7 (L) 10/24/2022    HGB 13.0 (L) 03/04/2020    HCT 39.5 (L) 10/24/2022    HCT 40.0 03/04/2020    MCV 99 10/24/2022    MCV 98 03/04/2020    MCH 31.8 10/24/2022    MCH 31.8 03/04/2020    MCHC 32.2 10/24/2022    MCHC 32.5 03/04/2020    RDW 13.6 10/24/2022    RDW 12.8 03/04/2020     10/24/2022     03/04/2020     Sodium   Date Value Ref Range Status   10/24/2022 138 136 - 145 mmol/L Final   03/06/2020 138 133 - 144 mmol/L Final     Potassium   Date Value Ref Range Status   10/24/2022 4.8 3.4 - 5.3 mmol/L Final   06/11/2022 4.2 3.4 - 5.3 mmol/L Final "   03/06/2020 3.9 3.4 - 5.3 mmol/L Final     Chloride   Date Value Ref Range Status   10/24/2022 101 98 - 107 mmol/L Final   06/11/2022 104 94 - 109 mmol/L Final   03/06/2020 105 94 - 109 mmol/L Final     Carbon Dioxide   Date Value Ref Range Status   03/06/2020 29 20 - 32 mmol/L Final     Carbon Dioxide (CO2)   Date Value Ref Range Status   10/24/2022 27 22 - 29 mmol/L Final   06/11/2022 30 20 - 32 mmol/L Final     Anion Gap   Date Value Ref Range Status   10/24/2022 10 7 - 15 mmol/L Final   06/11/2022 7 3 - 14 mmol/L Final   03/06/2020 4 3 - 14 mmol/L Final     Glucose   Date Value Ref Range Status   10/24/2022 106 (H) 70 - 99 mg/dL Final   06/11/2022 99 70 - 99 mg/dL Final   03/06/2020 126 (H) 70 - 99 mg/dL Final     Urea Nitrogen   Date Value Ref Range Status   10/24/2022 30.7 (H) 8.0 - 23.0 mg/dL Final   06/11/2022 30 7 - 30 mg/dL Final   03/06/2020 38 (H) 7 - 30 mg/dL Final     Creatinine   Date Value Ref Range Status   10/24/2022 1.17 0.67 - 1.17 mg/dL Final   03/06/2020 1.11 0.66 - 1.25 mg/dL Final     GFR Estimate   Date Value Ref Range Status   10/24/2022 60 (L) >60 mL/min/1.73m2 Final     Comment:     Effective December 21, 2021 eGFRcr in adults is calculated using the 2021 CKD-EPI creatinine equation which includes age and gender (Jackelyn et al., NEJM, DOI: 10.1056/VZOQmh0950104)   03/06/2020 59 (L) >60 mL/min/[1.73_m2] Final     Comment:     Non  GFR Calc  Starting 12/18/2018, serum creatinine based estimated GFR (eGFR) will be   calculated using the Chronic Kidney Disease Epidemiology Collaboration   (CKD-EPI) equation.       Calcium   Date Value Ref Range Status   10/24/2022 9.4 8.8 - 10.2 mg/dL Final   03/06/2020 9.1 8.5 - 10.1 mg/dL Final     Bilirubin Total   Date Value Ref Range Status   02/13/2022 1.0 0.2 - 1.3 mg/dL Final   03/01/2020 0.6 0.2 - 1.3 mg/dL Final     Alkaline Phosphatase   Date Value Ref Range Status   02/13/2022 91 40 - 150 U/L Final   03/01/2020 76 40 - 150 U/L Final      ALT   Date Value Ref Range Status   2022 42 0 - 70 U/L Final   2020 49 0 - 70 U/L Final     AST   Date Value Ref Range Status   2022 22 0 - 45 U/L Final   2020 39 0 - 45 U/L Final     Recent Labs   Lab Test 22  1208 20  1641   CHOL 112 89   HDL 45 34*   LDL 45 48   TRIG 111 36      No results found for: A1C     Recent Results (from the past 4320 hour(s))   Echocardiogram Complete   Result Value    LVEF  20-25%    Narrative    [id number]  [id number]  [id number]  [id number]^JEFF^SHARA^CHETNA     [facility]  Echocardiography Laboratory  [address] [place] [address]     Name: JAYDE RAMOS  MRN: [id number]  : 1933  Study Date: 2022 12:45 PM  Age: 89 yrs  Gender: Male  Patient Location: [facility]  Reason For Study: Ischemic cardiomyopathy, Nonrheumatic mitral valve  regurgitation  Ordering Physician: SHARA AGUILAR  Referring Physician: LORY Otero  Performed By: Reji Felix RDCS     BSA: 1.8 m2  Height: 73 in  Weight: 131 lb  HR: 78  BP: 145/75 mmHg  ______________________________________________________________________________  Procedure  Complete Echo Adult.  ______________________________________________________________________________  Interpretation Summary     1. The left ventricle is moderately dilated. There is normal left ventricular  wall thickness. Left ventricular systolic function is severely reduced. The  visual ejection fraction is 20-25%. Left ventricular diastolic function is  abnormal. Septal motion is consistent with conduction abnormality.  2. The right ventricle is normal size. The right ventricular systolic function  is normal.  3. The left atrium is mild to moderately dilated.  4. Moderate mitral regurgitation.  5. No pericardial effusion.  6. In comparison to the previous report dated 2021, the findings are  similar.  ______________________________________________________________________________  Left Ventricle  The left  ventricle is moderately dilated. There is normal left ventricular  wall thickness. Left ventricular systolic function is severely reduced. The  visual ejection fraction is 20-25%. Left ventricular diastolic function is  abnormal. Septal motion is consistent with conduction abnormality.     Right Ventricle  The right ventricle is normal size. The right ventricular systolic function is  normal.     Atria  The left atrium is mild to moderately dilated. Right atrial size is normal.  There is no color Doppler evidence of an atrial shunt.     Mitral Valve  There is moderate (2+) mitral regurgitation.     Tricuspid Valve  There is trace tricuspid regurgitation. Right ventricular systolic pressure  could not be approximated due to inadequate tricuspid regurgitation.     Aortic Valve  There is mild trileaflet aortic sclerosis. There is mild (1+) aortic  regurgitation. No aortic stenosis is present.     Pulmonic Valve  There is no pulmonic valvular stenosis.     Vessels  The aortic root is normal size. The inferior vena cava is normal.     Pericardium  There is no pericardial effusion.     Rhythm  The rhythm was sinus with wide QRS.  ______________________________________________________________________________  MMode/2D Measurements & Calculations  IVSd: 0.97 cm     LVIDd: 6.7 cm  LVIDs: 5.6 cm  LVPWd: 0.75 cm  FS: 17.0 %  LV mass(C)d: 246.7 grams  LV mass(C)dI: 137.2 grams/m2  Ao root diam: 3.6 cm  LA dimension: 3.8 cm  LA/Ao: 1.1  LVOT diam: 2.4 cm  LVOT area: 4.6 cm2  LA Volume Index (BP): 39.6 ml/m2  RWT: 0.22     Doppler Measurements & Calculations  MV E max ori: 79.9 cm/sec  MV A max ori: 105.8 cm/sec  MV E/A: 0.76     MV dec slope: 520.6 cm/sec2  MV dec time: 0.15 sec  AI P1/2t: 476.6 msec  LV V1 max P.4 mmHg  LV V1 max: 105.3 cm/sec  LV V1 VTI: 20.7 cm  SV(LVOT): 94.4 ml  SI(LVOT): 52.5 ml/m2  E/E': 17.8  Peak E' Ori: 4.5 cm/sec      ______________________________________________________________________________  Report approved by: Nam Grover 09/20/2022 02:25 PM

## 2022-11-07 PROBLEM — J96.01 ACUTE RESPIRATORY FAILURE WITH HYPOXIA (H): Status: ACTIVE | Noted: 2022-01-01

## 2022-11-07 PROBLEM — I50.9 ACUTE ON CHRONIC CONGESTIVE HEART FAILURE, UNSPECIFIED HEART FAILURE TYPE (H): Status: ACTIVE | Noted: 2021-12-09

## 2022-11-07 NOTE — PROGRESS NOTES
11/07/22 1600   Appointment Info   Signing Clinician's Name / Credentials (OT) Cony Butts OTR/L   Living Environment   People in Home child(juliocesar), adult;spouse   Current Living Arrangements house   Home Accessibility stairs to enter home;stairs within home   Number of Stairs, Main Entrance 2   Stair Railings, Main Entrance none   Number of Stairs, Within Home, Primary six   Stair Railings, Within Home, Primary railings safe and in good condition   Transportation Anticipated family or friend will provide   Living Environment Comments pt has walk in shower or tubshower with tubshower bench. Pt has standard height toilets   Self-Care   Usual Activity Tolerance good   Current Activity Tolerance moderate   Equipment Currently Used at Home none   Fall history within last six months no   Activity/Exercise/Self-Care Comment pt is mod I with ADLs prior   Instrumental Activities of Daily Living (IADL)   IADL Comments pt recieves assist with IADLs from dtr   General Information   Onset of Illness/Injury or Date of Surgery 11/07/22   Referring Physician Josefina Hassan PA-C   Patient/Family Therapy Goal Statement (OT) return home with assist of wife and dtr   Additional Occupational Profile Info/Pertinent History of Current Problem per chart: Deshawn Burrows is a 89 year old male with a past medical history significant for hypertension, hyperlipidemia coronary disease status post PCI (2/20), history of systolic heart failure with reduced ejection fraction due to mixed ischemic and nonischemic cardiomyopathy and a chronic left bundle branch block who presents with worsening shortness of breath over the last week and a half.   General Observations and Info pt close to functional baseline, however does not use O2 and is on 3L at this time   Cognitive Status Examination   Cognitive Status Comments no cog impairments noted   Pain Assessment   Patient Currently in Pain No   Transfers   Transfers sit-stand transfer    Sit-Stand Transfer   Sit-Stand [place] (Transfers) supervision   Balance   Balance Assessment no deficits were identified   Activities of Daily Living   BADL Assessment/Intervention lower body dressing   Lower Body Dressing Assessment/Training   [place] Level (Lower Body Dressing) independent   Clinical Impression   Criteria for Skilled Therapeutic Interventions Met (OT) Yes, treatment indicated   OT Diagnosis weakness   OT Problem List-Impairments impacting ADL activity tolerance impaired   Assessment of Occupational Performance 1-3 Performance Deficits   Identified Performance Deficits functional mobility at long distance   Planned Therapy Interventions (OT) ADL retraining;progressive activity/exercise   Clinical Decision Making Complexity (OT) low complexity   Anticipated Equipment Needs Upon Discharge (OT)   (shower chair)   Risk & Benefits of therapy have been explained evaluation/treatment results reviewed;care plan/treatment goals reviewed;risks/benefits reviewed;current/potential barriers reviewed;participants voiced agreement with care plan;participants included;patient;daughter   Clinical Impression Comments pt is close to functional baseline and family does not feel further OT is warranted.   OT Total Evaluation Time   OT Eval, Low Complexity Minutes (15324) 10   OT Goals   Therapy Frequency (OT) One time eval and treatment   OT Predicted Duration/Target Date for Goal Attainment 11/07/22   OT Goals Lower Body Dressing;OT Goal 1   OT: Lower Body Dressing Independent;Goal Met   OT: Goal 1 pt will verbalize understanding of energy conservation tips and techniques- goal met   Interventions   Interventions Quick Adds Self-Care/Home Management   Self-Care/Home Management   Self-Care/Home Mgmt/ADL, Compensatory, Meal Prep Minutes (73490) 10   Symptoms Noted During/After Treatment (Meal Preparation/Planning Training) shortness of breath   Treatment Detail/Skilled Intervention Pt is SBA with ambulating  in room to help with oxygen tubing. Pt does become short of breath but O2 sats stay within normal range on 3L of O2 nasal cannula. Pt is educated in deep breathing techniques to help with shortness of breath; pt is able to follow OT deep breathing. Pt is educated on energy conservation tips and techniques such as sitting in shower or planning out days to take break. Pt feels he implements energy conservation tips and techqniues in his day. Pt has dtr who lives with him and assists with IADLs; dtr is OT and she has no further concerns and feels pt is close to baseline.   OT Discharge Planning   OT Plan d/c from OT   OT Discharge Recommendation (DC Rec) home with assist   OT Rationale for DC Rec pt is close to functional baseline; recommending assist with IADLs from dtr and wife as well as assist with showering. Recommend shower chair at home.   OT Brief overview of current status SBA with mobility in room due to oxygen tubing   Total Session Time   Timed Code Treatment Minutes 10   Total Session Time (sum of timed and untimed services) 20

## 2022-11-07 NOTE — PLAN OF CARE
Pertinent assessments: Pt Aox4, SBA to bathroom, bed alarm on, patient does not call appropriately. 95% on 3L. RR 28. Cont pulse ox and tele. Strict I/Os.   Major Shift Events: admitted to unit. 20mg IV lasix given,   Treatment Plan: O2, Lasix, daily weights, strict I/Os, PT/OT/Palliative consults    Jessenia Taylor RN on 11/7/2022 at 6:12 PM

## 2022-11-07 NOTE — CONSULTS
"[facility]  Palliative Care Consultation   Text Page    Assessment & Plan   Deshawn Burrows is a 89 year old male who was admitted on 11/7/2022.     Recommendations:  1. Goals of Care- No CPR- Do NOT Intubate - Restorative care.  Hospitalization goals discussed with patient and his daughter/healthcare agent Trisha \"Andra\" Elizabeth.  Decisional Capacity- Questionable. Patient has an advance directive dated 2/24/2022.  If patient becomes unable to demonstrate decisional capacity, his daughter Trisha Johnson is the primary Health Care Agent.  Alternate is his son-in-law Deshawn Johnson.  POLST - Complete indicating the patient's request for SELECTIVE TREATMENT.     2. Dyspnea - Much improved after two doses of IV Lasix. Patient denies dyspnea while on oxygen at 3 liters nasal cannula.    3. Generalized weakness - Therapy input would be appreciated.     4. Spiritual Care  Oriented to Spiritual Health as part of Palliative Care team. Spiritual Health Services declined at this time.  Spiritual Background: [place]    5. Care Planning  SW consultation placed for dismissal planning. .  Medications for discharge - none from a palliative perspective.    Medical Decision Making and Goals of Care:  Discussed on November 7, 2022 with BLUE Butterfield CNS:     Met with Deshawn as he was sitting up in a chair. He introduced his daughter Andra at bedside, who explained \"This is the person Dr. Blanton wanted you to see\". I explained my role in symptom management and Deshawn acknowledged his breathing was much better compared to this morning when he was brought by ambulance. He explained that he lives with his wife (Eryn), daughter (Andra) and son-in-law (Deshawn) and their black dachshund dog, named \"Varun\". I inquired about the future and his daughter provided a copy of his Advanced Directives (which I have emailed to [employer] to be placed in Epic). Deshawn explained that he would like to " "continue to see his Cardiologist Kwame Blanton MD. His daughter explained Deshawn's wife, Eryn has been in and out of the hospital and TCU, \"It's been a busy year for every one.\" His daughter agreed with the plan to consider rehospitalization and we completed a POLST indicating Deshawn's request for No CPR- Do NOT Intubate and SELECTIVE TREATMENT.     Thank you for involving us in the patient's care.     Taisha Cornell MS, RN, CNS, APRN, ACHPN, FAACVPR  Pain and Palliative Care  Pager [phone]  Office [phone]     Time Spent on this Encounter   Total unit/floor time 2:15 PM until 3:40 PM minutes, time consisted of the following, examination of the patient, reviewing the record and completing documentation. >50% of time spent in counseling and coordination of care, Bedside Nurse Jessenia Taylor RN and Hospitalist Josefina Hassan PA-C.  Time spend counseling with patient and family consisted of the following topics, goals of care, care planning for discharge and symptom management.    Understanding of disease process:   This has been discussed with the patient and his daughter acknowledged the progressive terminal trajectory.    History of Present Illness   History is obtained from the patient, electronic health record and patient's daughter    Deshawn Burrows is a 89 year old male who was admitted earlier this morning     Past Medical History   I have reviewed this patient's medical history and updated it with pertinent information if needed.   Past Medical History:   Diagnosis Date     Acute systolic heart failure (H) 2/25/2020    Added automatically from request for surgery [id number]     CAD (coronary artery disease) 2004    2 cardiac stents      Dyspnea on exertion 2/25/2020    Added automatically from request for surgery [id number]     Essential hypertension with goal blood pressure less than 140/90 8/10/2016     Hyperlipidemia LDL goal <100 8/10/2016     Ischemic cardiomyopathy 2/25/2020    Added automatically " from request for surgery [id number]     LBBB (left bundle branch block) 2/25/2020       Past Surgical History   I have reviewed this patient's surgical history and updated it with pertinent information if needed.  Past Surgical History:   Procedure Laterality Date     CV CORONARY ANGIOGRAM N/A 2/28/2020    Procedure: CV CORONARY ANGIOGRAM;  Surgeon: Andres Mann MD;  Location: [facility]     CV LEFT HEART CATH N/A 2/28/2020    Procedure: Left Heart Cath;  Surgeon: Andres Mann MD;  Location: [facility]     CV LEFT VENTRICULOGRAM N/A 2/28/2020    Procedure: Left Ventriculogram;  Surgeon: Andres Mann MD;  Location: [facility]     CV PCI STENT DRUG ELUTING N/A 2/28/2020    Procedure: Percutaneous Coronary Intervention Stent Drug Eluting;  Surgeon: Andres Mann MD;  Location: [facility]     CYSTOSCOPY N/A 3/1/2020    Procedure: CYSTOSCOPY;  Surgeon: Rios Gomez MD;  Location: [facility] OR     HEART CATH DRUG ELUTING STENT PLACEMENT  02/28/2020     HERNIA REPAIR  2007    right inguinal hernia repair     STENT, CORONARY, ANTIONE  2004    2 stents       Prior to Admission Medications   Prior to Admission Medications   Prescriptions Last Dose Informant Patient Reported? Taking?   aspirin 81 MG EC tablet 11/6/2022 at PM  Yes Yes   Sig: Take 81 mg by mouth daily   atorvastatin (LIPITOR) 20 MG tablet 11/6/2022 at PM  No Yes   Sig: Take 1 tablet (20 mg) by mouth daily   clopidogrel (PLAVIX) 75 MG tablet 11/6/2022 at AM  No Yes   Sig: TAKE 1 TABLET EVERY DAY   furosemide (LASIX) 20 MG tablet More than a month  No Yes   Sig: Take 1 tablet (20 mg) by mouth daily as needed (for weight gain of 3lb/day or 5lb or more week)   metoprolol succinate ER (TOPROL XL) 50 MG 24 hr tablet 11/6/2022 at PM  No Yes   Sig: Take 1 tablet (50 mg) by mouth daily Appointment required for further refills   multivitamin, therapeutic with minerals (THERA-VIT-M) TABS  11/6/2022 at AM Self Yes Yes   Sig: Take 1 tablet by mouth daily   nitroGLYcerin (NITROSTAT) 0.4 MG sublingual tablet   No Yes   Sig: Place 1 tablet (0.4 mg) under the tongue every 5 minutes as needed for chest pain   tamsulosin (FLOMAX) 0.4 MG capsule 11/6/2022 at AM  Yes Yes   Sig: Take 0.4 mg by mouth daily      Facility-Administered Medications: None     Allergies   Allergies   Allergen Reactions     Penicillins        Social History        Living situation: Lives with his wife, daughter and son-in-law        Support system: Wife, three adult daughters       Occupation: Retired  History of substance use/abuse:  reports that he has quit smoking. He has never used smokeless tobacco.  reports current alcohol use.    Family History   I have reviewed this patient's family history and updated it with pertinent information if needed.   Family History   Family history unknown: Yes       Review of Systems   Palliative Symptom Review (0=no symptom/no concern, 1=mild, 2=moderate, 3=severe):      Pain: 0-none      Fatigue: 1-mild      Nausea: 0-none      Constipation: 0-none      Diarrhea: 0-none      Depressive Symptoms: 0-none      Anxiety: 0-none      Drowsiness: 1-mild      Poor Appetite: 1-mild      Shortness of Breath: 1-mild      Insomnia: 0-none        Physical Exam   Temp:  [96.6  F (35.9  C)-97.6  F (36.4  C)] 96.6  F (35.9  C)  Pulse:  [] 98  Resp:  [26] 26  BP: (122-138)/() 126/94  SpO2:  [94 %-99 %] 95 %  0 lbs 0 oz  Exam:  GEN:  Elderly, cachetic and frail  male. Alert, hard of hearing, but responds appropriately when questions are asked loudly, oriented x 3, appears fatigued.  HEENT:  Normocephalic/atraumatic, no scleral icterus, no nasal discharge, mouth moist.  CV:  RRR, S1, S2; no murmurs or other irregularities noted.  +3 DP/PT pulses bilaterally; no edema BLE.  RESP:  Bilateral crackles and diminished at bases. Symmetric chest rise on inhalation noted.  Normal respiratory  effort.  ABD:  Rounded, soft, non-tender/non-distended.  +BS  EXT:  CMS intact x 4.     M/S:   Denies pain with palpation of extremities and spine.    SKIN:  Warm and dry to touch, fingers are cool to the tough.   NEURO: Symmetric strength +5/5.  Sensation to touch intact all extremities.   There is no area of allodynia or hyperesthesia.  PAIN BEHAVIOR: Cooperative  Psych:  Normal affect.  Calm, cooperative, conversant appropriately.    Data   Results for orders placed or performed during the hospital encounter of 11/07/22   Chest XR,  PA & LAT     Status: None    Narrative    CHEST TWO VIEWS November 7, 2022 8:01 AM     HISTORY: Dyspnea.    COMPARISON: Chest x-ray on 10/24/2022 and chest CT on 2/12/2022.      Impression    IMPRESSION: AP and lateral views of the chest were obtained.  Cardiomediastinal silhouette is within normal limits. Diffuse hazy  pulmonary opacities predominantly in the right lung and left upper  lobe, slightly increased as compared to 10/24/2022 exam,  indeterminate, could be infectious. Small bilateral pleural effusions  and associated basilar atelectasis/consolidation. No significant  pneumothorax.    GUERA MUÑOZ MD         SYSTEM ID:  [id number]   CT Chest Pulmonary Embolism w Contrast     Status: None    Narrative    CT CHEST PULMONARY EMBOLISM WITH CONTRAST November 7, 2022 9:38 AM    HISTORY: Dyspnea.    TECHNIQUE: Scans obtained from the apices through the diaphragm with  IV contrast. 52mL Isovue-370 IV injected. Radiation dose for this scan  was reduced using automated exposure control, adjustment of the mA  and/or kV according to patient size, or iterative reconstruction  technique. 2D and 3D MIP reconstructions were performed by the CT  technologist    COMPARISON: Chest CT on 2/12/2022.    FINDINGS:  Chest/mediastinum: Poor opacification of the distal subsegmental  pulmonary arteries which limits detail assessment for pulmonary  embolism. Mild cardiomegaly. No significant  pericardial effusion.  Coronary artery stents and mild atherosclerotic vascular calcification  of the coronary arteries. Few mildly prominent mediastinal lymph  nodes, likely reactive.    Lungs and pleura: Moderate right and small to moderate left pleural  effusions and associated basilar atelectasis/consolidation. Mild  diffuse pulmonary haziness, new as compared to 2/12/2022 exam,  indeterminate, could be infectious or represent pulmonary edema.  Biapical scarring. Upper lobes predominant emphysematous changes.  Multiple pulmonary nodules including a 6 mm right middle lobe nodule  (series 7 image 179), new as compared to 2/12/2021 exam,  indeterminate.    Upper abdomen: Limited evaluation of the upper abdomen due to lack of  coverage and timing of contrast. 5 mm stone in the interpolar region  of the left kidney, not significantly changed as compared to June 12, 2022. Multiple calcified splenic granulomas. 4.3 cm right renal cyst.  Reflux of contrast into the IVC and hepatic veins, suggesting right  heart dysfunction.    Bones and soft tissue: Multilevel degenerative changes of the spine.  No suspicious osseous lesion.      Impression    IMPRESSION:   1. Poor opacification of the distal/subsegmental pulmonary arteries  which limits detail assessment of pulmonary embolism. Otherwise, no  convincing evidence of pulmonary embolism.  2. Moderate right and small-to-moderate left pleural effusions and  associated basilar atelectasis/consolidation.  3. Mild diffuse pulmonary haziness, new as compared to 2/12/2022 exam,  indeterminate, could be infectious or represent pulmonary edema.  4. Reflux of contrast into the IVC and hepatic veins, suggesting right  heart dysfunction.  5. Biapical scarring and upper lobes predominant emphysematous  changes.  6. Multiple pulmonary nodules including a 6 mm right middle lobe  nodule, new as compared to 2/12/2022 exam, indeterminate, could be  infectious less likely neoplastic.  Recommend follow-up exam with  dedicated chest CT in six months to assess for interval  change/resolution.    GUERA MUÑOZ MD         SYSTEM ID:  [id number]   Extra Tube ([place] Draw)     Status: None    Narrative    The following orders were created for panel order Extra Tube ([place] Draw).  Procedure                               Abnormality         Status                     ---------                               -----------         ------                     Extra Blue Top Tube[[id number]]                              Final result               Extra Red Top Tube[[id number]]                               Final result               Extra Green Top (Lithium...[[id number]]                      Final result               Extra Purple Top Tube[[id number]]                            Final result               Extra Green Top (Lithium...[[id number]]                      Final result                 Please view results for these tests on the individual orders.   Comprehensive metabolic panel     Status: Abnormal   Result Value Ref Range    Sodium 144 136 - 145 mmol/L    Potassium 5.2 3.4 - 5.3 mmol/L    Chloride 106 98 - 107 mmol/L    Carbon Dioxide (CO2) 25 22 - 29 mmol/L    Anion Gap 13 7 - 15 mmol/L    Urea Nitrogen 51.4 (H) 8.0 - 23.0 mg/dL    Creatinine 1.16 0.67 - 1.17 mg/dL    Calcium 9.7 8.8 - 10.2 mg/dL    Glucose 161 (H) 70 - 99 mg/dL    Alkaline Phosphatase 86 40 - 129 U/L    AST 40 10 - 50 U/L    ALT 51 (H) 10 - 50 U/L    Protein Total 7.5 6.4 - 8.3 g/dL    Albumin 4.2 3.5 - 5.2 g/dL    Bilirubin Total 1.3 (H) <=1.2 mg/dL    GFR Estimate 60 (L) >60 mL/min/1.73m2   Troponin T, High Sensitivity     Status: Abnormal   Result Value Ref Range    Troponin T, High Sensitivity 31 (H) <=22 ng/L   Nt probnp inpatient (BNP)     Status: Abnormal   Result Value Ref Range    N terminal Pro BNP Inpatient 9,274 (H) 0 - 1,800 pg/mL   Symptomatic; Unknown Influenza A/B & SARS-CoV2 (COVID-19) Virus PCR Multiplex  Nasopharyngeal     Status: Normal    Specimen: Nasopharyngeal; Swab   Result Value Ref Range    Influenza A PCR Negative Negative    Influenza B PCR Negative Negative    RSV PCR Negative Negative    SARS CoV2 PCR Negative Negative    Narrative    Testing was performed using the Xpert Xpress CoV2/Flu/RSV Assay on the Cepheid GeneXpert Instrument. This test should be ordered for the detection of SARS-CoV-2 and influenza viruses in individuals who meet clinical and/or epidemiological criteria. Test performance is unknown in asymptomatic patients. This test is for in vitro diagnostic use under the FDA EUA for laboratories certified under CLIA to perform high or moderate complexity testing. This test has not been FDA cleared or approved. A negative result does not rule out the presence of PCR inhibitors in the specimen or target RNA in concentration below the limit of detection for the assay. If only one viral target is positive but coinfection with multiple targets is suspected, the sample should be re-tested with another FDA cleared, approved, or authorized test, if coinfection would change clinical management. This test was validated by the [facility] [employer]. These laboratories are certified under the Clinical Laboratory Improvement Amendments of 1988 (CLIA-88) as qualified to perform high complexity laboratory testing.   CBC with platelets and differential     Status: Abnormal   Result Value Ref Range    WBC Count 11.0 4.0 - 11.0 10e3/uL    RBC Count 4.55 4.40 - 5.90 10e6/uL    Hemoglobin 14.4 13.3 - 17.7 g/dL    Hematocrit 46.1 40.0 - 53.0 %     (H) 78 - 100 fL    MCH 31.6 26.5 - 33.0 pg    MCHC 31.2 (L) 31.5 - 36.5 g/dL    RDW 14.2 10.0 - 15.0 %    Platelet Count 239 150 - 450 10e3/uL    % Neutrophils 82 %    % Lymphocytes 11 %    % Monocytes 7 %    % Eosinophils 0 %    % Basophils 0 %    % Immature Granulocytes 0 %    NRBCs per 100 WBC 0 <1 /100    Absolute Neutrophils 9.0 (H) 1.6 - 8.3 10e3/uL     Absolute Lymphocytes 1.2 0.8 - 5.3 10e3/uL    Absolute Monocytes 0.7 0.0 - 1.3 10e3/uL    Absolute Eosinophils 0.0 0.0 - 0.7 10e3/uL    Absolute Basophils 0.0 0.0 - 0.2 10e3/uL    Absolute Immature Granulocytes 0.0 <=0.4 10e3/uL    Absolute NRBCs 0.0 10e3/uL   Extra Blue Top Tube     Status: None   Result Value Ref Range    Hold Specimen JIC    Extra Red Top Tube     Status: None   Result Value Ref Range    Hold Specimen JIC    Extra Green Top (Lithium Heparin) Tube     Status: None   Result Value Ref Range    Hold Specimen JIC    Extra Purple Top Tube     Status: None   Result Value Ref Range    Hold Specimen JIC    Extra Green Top (Lithium Heparin) ON ICE     Status: None   Result Value Ref Range    Hold Specimen JIC    D dimer quantitative     Status: Abnormal   Result Value Ref Range    D-Dimer Quantitative 0.56 (H) 0.00 - 0.50 ug/mL FEU    Narrative    This D-dimer assay is intended for use in conjunction with a clinical pretest probability assessment model to exclude pulmonary embolism (PE) and deep venous thrombosis (DVT) in outpatients suspected of PE or DVT. The cut-off value is 0.50 ug/mL FEU.   Troponin T, High Sensitivity (now)     Status: Abnormal   Result Value Ref Range    Troponin T, High Sensitivity 34 (H) <=22 ng/L   Procalcitonin     Status: Abnormal   Result Value Ref Range    Procalcitonin 0.07 (H) <0.05 ng/mL   Creatinine     Status: Normal   Result Value Ref Range    Creatinine 1.10 0.67 - 1.17 mg/dL    GFR Estimate 64 >60 mL/min/1.73m2   EKG 12-lead, tracing only     Status: None (Preliminary result)   Result Value Ref Range    Systolic Blood Pressure  mmHg    Diastolic Blood Pressure  mmHg    Ventricular Rate 82 BPM    Atrial Rate 82 BPM    TX Interval 166 ms    QRS Duration 166 ms     ms    QTc 539 ms    P Axis 78 degrees    R AXIS 68 degrees    T Axis 93 degrees    Interpretation ECG       Sinus rhythm with occasional Premature ventricular complexes  Left bundle branch  block  Abnormal ECG  When compared with ECG of 24-OCT-2022 12:14,  Premature ventricular complexes are now Present  Nonspecific T wave abnormality, worse in Inferior leads     CBC + differential     Status: Abnormal    Narrative    The following orders were created for panel order CBC + differential.  Procedure                               Abnormality         Status                     ---------                               -----------         ------                     CBC with platelets and d...[[id number]]  Abnormal            Final result                 Please view results for these tests on the individual orders.

## 2022-11-07 NOTE — H&P
[facility]  Admission History and Physical Examination    NAME: Deshawn Burrows   : 3/8/1933   MRN: [id number]     Date of Admission:  2022    Assessment & Plan   Deshawn Burrows is a 89 year old male with a past medical history significant for hypertension, hyperlipidemia coronary disease status post PCI (), history of systolic heart failure with reduced ejection fraction due to mixed ischemic and nonischemic cardiomyopathy and a chronic left bundle branch block who presents with worsening shortness of breath over the last week and a half.    Patient recently has been seen by cardiology with a follow-up in  with an echo that revealed EF of 20 to 25%.  She was then seen in the emergency room on  with dyspnea which responded well with additional dose of Lasix.  He just saw Dr. Blanton a week ago (), and at that time he did not have salo heart failure exacerbation but felt that he would not be a good candidate for pacemaker in addition it seems like the family is approaching a more conservative approach by discussing possible palliative care as an outpatient.  He is maintained on as needed Lasix as he has not been able to tolerate consistent daily Lasix in the past due to hypotension.    On arrival to the emergency room, he was placed on 4 L of O2 for a documented O2 sat of 88 at home.  He denies any increased swelling in fact he has lost about 5 pounds since his last weight about a week ago.  He does have crackles at the lung bases and appears to be tachypneic on exam.  CT scan of the chest show moderate right and small left pleural effusion with associated bibasilar atelectasis/consolidation and diffuse pulmonary haziness which could be infectious versus pulmonary edema.  He was given 20 mg of IV lasix, O2 turned down to 3L (still 98%) and recommended for admission for Acute CHF exacerbation.     #Acute hypoxic respiratory failure due to acute exacerbation of  "systolic HF  #Mildly elevated troponin likely due to CHF exacerbation rather than ACS  Suspect his SOB is likely due to systolic HF, CT of the chest does suggest Pulm Edema, there also appears to be reflux of contrast into the IVC and hepatic veins suggestive of right heart dysfunction though recent echo did not reveal any evidence of right ventricular dysfunction.  -He has no chest pain or recent complaints of angina that would suggest ACS as cause of his acute exacerbation  -Less suspicious that he has concomitant PNA with minimal pro calcitonin, no fever/cough or leukocytosis.  Plan:  -Admit to inpatient status, continue as needed O2  -We will give him very gentle IV diuresis of 20 mg IV twice daily for 3 doses total, then reassess fluid status  -Continue daily weights and strict I's and O's  -Continue beta-blocker  -Troponins are pretty flat, no need to trend  -Would not repeat echocardiogram at this time as he just had one at the end of September  -PT OT eval as well social work to assess for discharge needs    #Coronary disease remote history of PCI status post repeat coronary angiography in February 2020 with ROSENDO to mid LAD in-stent restenosis that was again stented  -Previously had been recommended getting a cardiac MRI back in 2020 but never followed up suspect related to cognitive decline  -Most recent visit with cardiologist family expresses a more conservative approach with palliative care  -We will continue PTA medication including aspirin Plavix statin beta-blocker for now  -We will request palliative care consult for [facility] discussion     #Hx of urinary retention   -  No recent exacerbation   -  Cont Flomax    #Dec appetite/>5 lbs weight loss in 1 week  -Patient appears to be quite frail and slender, family endorses decreased appetite and not eating much  -Request nutrition consult  -We will also discussed with palliative care as patient might be appropriate for hospice    #Incidental finding  \"Multiple " "pulmonary nodules including a 6 mm right middle lobe  nodule, new as compared to 2/12/2022 exam, indeterminate, could be  infectious less likely neoplastic. Recommend follow-up exam with  dedicated chest CT in six months to assess for interval  Change/resolution.\"  - Possible repeat CT scan pending [facility] discussion               # Drug Induced Platelet Defect: home medication list includes an antiplatelet medication    # Acute systolic heart failure: last echo with EF <40% and receiving IV diuretics    # Cachexia: Estimated body mass index is 16.49 kg/m  as calculated from the following:    Height as of 11/1/22: 1.854 m (6' 1\").    Weight as of 11/1/22: 56.7 kg (125 lb).           Awaiting formal pharmacy reconciliation to resume home medications.     DVT Prophylaxis: Enoxaparin (Lovenox) SQ  Code Status: DNR / DNI  COVID PCR TESTING STATUS: Negative    Family updated with plan of care: D/w dtr at bedside        Expected Discharge Date: 11/09/2022               NICOLÁS KiranC    Primary Care Physician   Colt Treviño    Chief Complaint   SOB    History is obtained from the patient    Discussed with Dr. Gonzalez in the ED, full chart review including lab work, imaging, and vital signs were reviewed.     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    Patient recently has been seen by cardiology with a follow-up in September 22 with an echo that revealed EF of 20 to 25%.  She was then seen in the emergency room on October 24 with dyspnea which responded well with additional dose of Lasix.  He just saw Dr. Blanton a week ago (11/1), and at " that time he did not have salo heart failure exacerbation but felt that he would not be a good candidate for pacemaker in addition it seems like the family is approaching a more conservative approach by discussing possible palliative care as an outpatient.  He is maintained on as needed Lasix as he has not been able to tolerate consistent daily Lasix in the past due to hypotension.    On arrival to the emergency room, he was placed on 4 L of O2 for a documented O2 sat of 88 at home.  He denies any increased swelling in fact he has lost about 5 pounds since his last weight about a week ago.  He does have crackles at the lung bases and appears to be tachypneic on exam.  CT scan of the chest show moderate right and small left pleural effusion with associated bibasilar atelectasis/consolidation and diffuse pulmonary haziness which could be infectious versus pulmonary edema.  He was given 20 mg of IV lasix, O2 turned down to 3L (still 98%) and recommended for admission for acute CHF exacerbation.      Past Medical History    I have reviewed this patient's medical history and updated it with pertinent information if needed.   Past Medical History:   Diagnosis Date     Acute systolic heart failure (H) 2/25/2020    Added automatically from request for surgery [id number]     CAD (coronary artery disease) 2004    2 cardiac stents      Dyspnea on exertion 2/25/2020    Added automatically from request for surgery [id number]     Essential hypertension with goal blood pressure less than 140/90 8/10/2016     Hyperlipidemia LDL goal <100 8/10/2016     Ischemic cardiomyopathy 2/25/2020    Added automatically from request for surgery [id number]     LBBB (left bundle branch block) 2/25/2020       Past Surgical History   I have reviewed this patient's surgical history and updated it with pertinent information if needed.  Past Surgical History:   Procedure Laterality Date     CV CORONARY ANGIOGRAM N/A 2/28/2020    Procedure: CV CORONARY  ANGIOGRAM;  Surgeon: Andres Mann MD;  Location: [facility]     CV LEFT HEART CATH N/A 2/28/2020    Procedure: Left Heart Cath;  Surgeon: Andres Mann MD;  Location: [facility]     CV LEFT VENTRICULOGRAM N/A 2/28/2020    Procedure: Left Ventriculogram;  Surgeon: Andres Mann MD;  Location: [facility]     CV PCI STENT DRUG ELUTING N/A 2/28/2020    Procedure: Percutaneous Coronary Intervention Stent Drug Eluting;  Surgeon: Andres Mann MD;  Location: [facility]     CYSTOSCOPY N/A 3/1/2020    Procedure: CYSTOSCOPY;  Surgeon: Rios Gomez MD;  Location: [facility] OR     HEART CATH DRUG ELUTING STENT PLACEMENT  02/28/2020     HERNIA REPAIR  2007    right inguinal hernia repair     STENT, CORONARY, ANTIONE  2004    2 stents       Prior to Admission Medications   Prior to Admission Medications   Prescriptions Last Dose Informant Patient Reported? Taking?   aspirin 81 MG EC tablet 11/6/2022 at PM  Yes Yes   Sig: Take 81 mg by mouth daily   atorvastatin (LIPITOR) 20 MG tablet 11/6/2022 at PM  No Yes   Sig: Take 1 tablet (20 mg) by mouth daily   clopidogrel (PLAVIX) 75 MG tablet 11/6/2022 at AM  No Yes   Sig: TAKE 1 TABLET EVERY DAY   furosemide (LASIX) 20 MG tablet More than a month  No Yes   Sig: Take 1 tablet (20 mg) by mouth daily as needed (for weight gain of 3lb/day or 5lb or more week)   metoprolol succinate ER (TOPROL XL) 50 MG 24 hr tablet 11/6/2022 at PM  No Yes   Sig: Take 1 tablet (50 mg) by mouth daily Appointment required for further refills   multivitamin, therapeutic with minerals (THERA-VIT-M) TABS 11/6/2022 at AM Self Yes Yes   Sig: Take 1 tablet by mouth daily   nitroGLYcerin (NITROSTAT) 0.4 MG sublingual tablet   No Yes   Sig: Place 1 tablet (0.4 mg) under the tongue every 5 minutes as needed for chest pain   tamsulosin (FLOMAX) 0.4 MG capsule 11/6/2022 at AM  Yes Yes   Sig: Take 0.4 mg by mouth daily       Facility-Administered Medications: None     Allergies   Allergies   Allergen Reactions     Penicillins        Social History   I have reviewed this patient's social history and updated it with pertinent information if needed. Deshawn Burrows  reports that he has quit smoking. He has never used smokeless tobacco. He reports current alcohol use. He reports that he does not use drugs.    Family History   I have reviewed this patient's family history and updated it with pertinent information if needed.   Family History   Family history unknown: Yes       Review of Systems   The 10 point Review of Systems is negative other than noted in the HPI or here.     Physical Exam   Temp: (!) 96.6  F (35.9  C) Temp src: Axillary BP: (!) 126/94 Pulse: 98   Resp: 26 SpO2: 95 % O2 Device: Oxymask Oxygen Delivery: 3 LPM  Vital Signs with Ranges  Temp:  [96.6  F (35.9  C)-97.6  F (36.4  C)] 96.6  F (35.9  C)  Pulse:  [] 98  Resp:  [26] 26  BP: (122-138)/() 126/94  SpO2:  [94 %-99 %] 95 %  0 lbs 0 oz    Constitutional: Awake, alert,  no apparent distress. cachetic and frail   Eyes: Conjunctiva and pupils examined and normal.  HEENT: dry mucous membranes, normal dentition.  Respiratory: Dec Bs at the bases, mild crackles bilaterally, no crackles or wheezing.  Cardiovascular: Regular rate and rhythm, normal S1 and S2, and no murmur noted.  GI: Soft, non-distended, non-tender, bowel sounds present. No rebound tenderness or guarding.  Lymph/Hematologic: No anterior cervical or supraclavicular adenopathy.  Skin: No rashes, no cyanosis, no edema.  Musculoskeletal: No deformities noted.  No erythema or tenderness. Moving all extremities.  Neurologic: No focal deficits noted. Speech is clear. Coordination and strength grossly normal.   Psychiatric: Appropriate affect.    Data   Data reviewed today:      EKG: NSR with PVC  Imaging:   Recent Results (from the past 24 hour(s))   Chest XR,  PA & LAT    Narrative    CHEST TWO VIEWS  November 7, 2022 8:01 AM     HISTORY: Dyspnea.    COMPARISON: Chest x-ray on 10/24/2022 and chest CT on 2/12/2022.      Impression    IMPRESSION: AP and lateral views of the chest were obtained.  Cardiomediastinal silhouette is within normal limits. Diffuse hazy  pulmonary opacities predominantly in the right lung and left upper  lobe, slightly increased as compared to 10/24/2022 exam,  indeterminate, could be infectious. Small bilateral pleural effusions  and associated basilar atelectasis/consolidation. No significant  pneumothorax.    GUERA MUÑOZ MD         SYSTEM ID:  [id number]   CT Chest Pulmonary Embolism w Contrast    Narrative    CT CHEST PULMONARY EMBOLISM WITH CONTRAST November 7, 2022 9:38 AM    HISTORY: Dyspnea.    TECHNIQUE: Scans obtained from the apices through the diaphragm with  IV contrast. 52mL [id number]-370 IV injected. Radiation dose for this scan  was reduced using automated exposure control, adjustment of the mA  and/or kV according to patient size, or iterative reconstruction  technique. 2D and 3D MIP reconstructions were performed by the CT  technologist    COMPARISON: Chest CT on 2/12/2022.    FINDINGS:  Chest/mediastinum: Poor opacification of the distal subsegmental  pulmonary arteries which limits detail assessment for pulmonary  embolism. Mild cardiomegaly. No significant pericardial effusion.  Coronary artery stents and mild atherosclerotic vascular calcification  of the coronary arteries. Few mildly prominent mediastinal lymph  nodes, likely reactive.    Lungs and pleura: Moderate right and small to moderate left pleural  effusions and associated basilar atelectasis/consolidation. Mild  diffuse pulmonary haziness, new as compared to 2/12/2022 exam,  indeterminate, could be infectious or represent pulmonary edema.  Biapical scarring. Upper lobes predominant emphysematous changes.  Multiple pulmonary nodules including a 6 mm right middle lobe nodule  (series 7 image 179), new as  compared to 2/12/2021 exam,  indeterminate.    Upper abdomen: Limited evaluation of the upper abdomen due to lack of  coverage and timing of contrast. 5 mm stone in the interpolar region  of the left kidney, not significantly changed as compared to June 12, 2022. Multiple calcified splenic granulomas. 4.3 cm right renal cyst.  Reflux of contrast into the IVC and hepatic veins, suggesting right  heart dysfunction.    Bones and soft tissue: Multilevel degenerative changes of the spine.  No suspicious osseous lesion.      Impression    IMPRESSION:   1. Poor opacification of the distal/subsegmental pulmonary arteries  which limits detail assessment of pulmonary embolism. Otherwise, no  convincing evidence of pulmonary embolism.  2. Moderate right and small-to-moderate left pleural effusions and  associated basilar atelectasis/consolidation.  3. Mild diffuse pulmonary haziness, new as compared to 2/12/2022 exam,  indeterminate, could be infectious or represent pulmonary edema.  4. Reflux of contrast into the IVC and hepatic veins, suggesting right  heart dysfunction.  5. Biapical scarring and upper lobes predominant emphysematous  changes.  6. Multiple pulmonary nodules including a 6 mm right middle lobe  nodule, new as compared to 2/12/2022 exam, indeterminate, could be  infectious less likely neoplastic. Recommend follow-up exam with  dedicated chest CT in six months to assess for interval  change/resolution.    GUERA MUÑOZ MD         SYSTEM ID:  [id number]       Recent Labs   Lab 11/07/22  0715   WBC 11.0   HGB 14.4   *         POTASSIUM 5.2   CHLORIDE 106   CO2 25   BUN 51.4*   CR 1.16   ANIONGAP 13   FLORES 9.7   *   ALBUMIN 4.2   PROTTOTAL 7.5   BILITOTAL 1.3*   ALKPHOS 86   ALT 51*   AST 40           Josefina Hassan PA-C  [facility]  November 7, 2022  Securely message with the Vocera Web Console (learn more here)  Text page via [employer]

## 2022-11-07 NOTE — PLAN OF CARE
Occupational Therapy Discharge Summary    Reason for therapy discharge:    Patient/family request discontinuation of services.    Progress towards therapy goal(s). See goals on Care Plan in [facility] electronic health record for goal details.  Goals met    Therapy recommendation(s):    No further therapy is recommended. Recommend shower chair for home and assist with showers and IADLs.

## 2022-11-07 NOTE — ED PROVIDER NOTES
History     Chief Complaint:  Breathing Problem      HPI   Deshawn Burrows is a 89 year old male who has a history of acute systolic heart failure, CAD, hypertension, ischemic cardiomyopathy, MI, takes plavix, and presents with shortness of breath. The patient started to have some shortness of breath yesterday, but it worsened this morning at around 0430 this morning. EMS stated that he was 88 percent on RA, and was given 4 L of O2 while en route to the ED. He is not normally on oxygen at home. He denies fever, chills, chest pain, abdominal pain, vomiting, diarrhea, leg swelling, dizziness, lightheadedness, appetite issues, or issues with urination. He has had a productive cough with phlegm (unable to tell color).    Review of Systems   Constitutional: Negative for appetite change, chills and fever.   Respiratory: Positive for cough and shortness of breath.    Cardiovascular: Negative for chest pain and leg swelling.   Gastrointestinal: Negative for diarrhea, nausea and vomiting.   Genitourinary: Negative for decreased urine volume, difficulty urinating, hematuria and urgency.   Neurological: Negative for dizziness and light-headedness.   All other systems reviewed and are negative.    Allergies:  Penicillins    Medications:    aspirin   atorvastatin   clopidogrel   furosemide   metoprolol succinate ER   nitroglycerin   tamsulosin      Past Medical History:    Acute systolic heart failure  CAD  Dyspnea on exertion  Hypertension  Hyperlipidemia  Ischemic cardiomyopathy  LBBB  Myocardial infarction    Past Surgical History:    Coronary angiogram  Left heart cath  PCI stent drug eluting  hernia repair    Social History:  Presents via EMS. Nonsmoker. Lives with family (daughter providing in home care at this time).     Physical Exam     Patient Vitals for the past 24 hrs:   BP Temp Temp src Pulse Resp SpO2   11/07/22 0845 -- -- -- -- -- 94 %   11/07/22 0830 130/83 -- -- 89 -- 96 %   11/07/22 0815 -- -- -- -- -- 98  %   11/07/22 0745 -- -- -- 93 -- 97 %   11/07/22 0730 (!) 127/90 -- -- 105 -- 98 %   11/07/22 0706 128/88 97.6  F (36.4  C) Temporal 94 26 97 %       Physical Exam  General: Frail-appearing elderly male, sitting upright  Eyes: PERRL, Conjunctive within normal limits.  No scleral icterus.  ENT: Moist mucous membranes, oropharynx clear.   CV: Normal S1S2, no murmur, rub or gallop. Regular rate and rhythm  Resp: Diminished at the right lung base.  Crackles at the left lung base.  Otherwise clear.  Increased work of breathing.  Speaks in full short sentences.   GI: Abdomen is soft, nontender and nondistended. No palpable masses. No rebound or guarding.  MSK: Generalized muscular atrophy.  No edema. Nontender. Normal active range of motion.  Skin: Warm and dry. No rashes or lesions or ecchymoses on visible skin.  Neuro: Alert and oriented. Responds appropriately to all questions and commands. No focal findings appreciated. Normal muscle tone.  Psych: Normal mood and affect. Pleasant.    Emergency Department Course   ECG:  ECG taken at 0723, ECG read at 0730  Sinus rhythm with occasional PVC  Left bundle branch block  Abnormal ECG   Rate 82 bpm. UT interval 166 ms. QRS duration 166 ms. QT/QTc 462/539 ms. P-R-T axes 78 68 93.     Imaging:  CT Chest Pulmonary Embolism w Contrast   Final Result   IMPRESSION:    1. Poor opacification of the distal/subsegmental pulmonary arteries   which limits detail assessment of pulmonary embolism. Otherwise, no   convincing evidence of pulmonary embolism.   2. Moderate right and small-to-moderate left pleural effusions and   associated basilar atelectasis/consolidation.   3. Mild diffuse pulmonary haziness, new as compared to 2/12/2022 exam,   indeterminate, could be infectious or represent pulmonary edema.   4. Reflux of contrast into the IVC and hepatic veins, suggesting right   heart dysfunction.   5. Biapical scarring and upper lobes predominant emphysematous   changes.   6. Multiple  pulmonary nodules including a 6 mm right middle lobe   nodule, new as compared to 2/12/2022 exam, indeterminate, could be   infectious less likely neoplastic. Recommend follow-up exam with   dedicated chest CT in six months to assess for interval   change/resolution.      GUERA MUÑOZ MD            SYSTEM ID:  [id number]      Chest XR,  PA & LAT   Final Result   IMPRESSION: AP and lateral views of the chest were obtained.   Cardiomediastinal silhouette is within normal limits. Diffuse hazy   pulmonary opacities predominantly in the right lung and left upper   lobe, slightly increased as compared to 10/24/2022 exam,   indeterminate, could be infectious. Small bilateral pleural effusions   and associated basilar atelectasis/consolidation. No significant   pneumothorax.      GUERA MUÑOZ MD            SYSTEM ID:  [id number]        Report per radiology    Laboratory:  Labs Ordered and Resulted from Time of ED Arrival to Time of ED Departure   COMPREHENSIVE METABOLIC PANEL - Abnormal       Result Value    Sodium 144      Potassium 5.2      Chloride 106      Carbon Dioxide (CO2) 25      Anion Gap 13      Urea Nitrogen 51.4 (*)     Creatinine 1.16      Calcium 9.7      Glucose 161 (*)     Alkaline Phosphatase 86      AST 40      ALT 51 (*)     Protein Total 7.5      Albumin 4.2      Bilirubin Total 1.3 (*)     GFR Estimate 60 (*)    TROPONIN T, HIGH SENSITIVITY - Abnormal    Troponin T, High Sensitivity 31 (*)    NT PROBNP INPATIENT - Abnormal    N terminal Pro BNP Inpatient 9,274 (*)    CBC WITH PLATELETS AND DIFFERENTIAL - Abnormal    WBC Count 11.0      RBC Count 4.55      Hemoglobin 14.4      Hematocrit 46.1       (*)     MCH 31.6      MCHC 31.2 (*)     RDW 14.2      Platelet Count 239      % Neutrophils 82      % Lymphocytes 11      % Monocytes 7      % Eosinophils 0      % Basophils 0      % Immature Granulocytes 0      NRBCs per 100 WBC 0      Absolute Neutrophils 9.0 (*)     Absolute Lymphocytes 1.2       Absolute Monocytes 0.7      Absolute Eosinophils 0.0      Absolute Basophils 0.0      Absolute Immature Granulocytes 0.0      Absolute NRBCs 0.0     D DIMER QUANTITATIVE - Abnormal    D-Dimer Quantitative 0.56 (*)    TROPONIN T, HIGH SENSITIVITY - Abnormal    Troponin T, High Sensitivity 34 (*)    INFLUENZA A/B & SARS-COV2 PCR MULTIPLEX - Normal    Influenza A PCR Negative      Influenza B PCR Negative      RSV PCR Negative      SARS CoV2 PCR Negative       Emergency Department Course:    Reviewed:  I reviewed nursing notes, vitals, past medical history and Care Everywhere    Assessments:  0703 I obtained history and examined the patient as noted above.   0832 I rechecked the patient and explained findings. He denies any new concerns. No change in symptoms.    Consults:   1103 I spoke with Josefina Hassan PA-C for Dr. Sosa of the Hospitalist service to discuss the patient's findings, presentation, and plan of care.    Interventions:  0859 Furosemide 20 mg IV    Disposition:  The patient was admitted to the hospital under the care of Dr. Sosa     Impression & Plan      Medical Decision Making:  Deshawn Burrows is a 89 year old male with a PMH of congestive heart failure not on diuretics who presents for evaluation of shortness of breath progressively worsening since yesterday noted to be hypoxic by EMS on arrival at his home.  He was maintained on oxygen initially via oxymask and then nasal cannula here.  He did not require BiPAP or invasive ventilation.  I considered a broad differential of their dyspnea including CHF exacerbation, PE, dissection, hemothorax, pleural effusion, pneumonia, acute coronary syndrome, reactive airway disease, bronchitis, upper airway obstruction, foreign body, etc.  Given the history and exam plus laboratory findings I feel congestive heart failure is the most likely etiology.  The patient received IV diuretics in ED, initially small dose given his sensitivity to it in the past  regarding dizziness.  May need higher dosing.  Will admit at this time for further care and ongoing supplemental oxygen as needed.    Diagnosis:    ICD-10-CM    1. Acute on chronic congestive heart failure, unspecified heart failure type (H)  I50.9       2. Acute respiratory failure with hypoxia (H)  J96.01         Scribe Disclosure:  Nazario DUCKWORTH, am serving as a scribe at 7:04 AM on 11/7/2022 to document services personally performed by Kayla Gonzalez MD based on my observations and the provider's statements to me.      Kayla Gonzalez MD  11/07/22 1201

## 2022-11-07 NOTE — ED NOTES
[facility]  ED Nurse Handoff Report    Deshawn Burrows is a 89 year old male   ED Chief complaint: Breathing Problem  . ED Diagnosis:   Final diagnoses:   Acute on chronic congestive heart failure, unspecified heart failure type (H)   Acute respiratory failure with hypoxia (H)     Allergies:   Allergies   Allergen Reactions     Penicillins        Code Status: DNR / DNI  Activity level - Baseline/Home:  Stand by Assist. Activity Level - Current:   Assist X 1. Lift room needed: No. Bariatric: No   Needed: No   Isolation: No. Infection: Not Applicable.     Vital Signs:   Vitals:    11/07/22 1045 11/07/22 1100 11/07/22 1115 11/07/22 1130   BP:       Pulse:       Resp:       Temp:       TempSrc:       SpO2: 97% 99% 96% 98%       Cardiac Rhythm:  ,      Pain level:    Patient confused: No. Patient Falls Risk: Yes.   Elimination Status: Has voided   Patient Report - Initial Complaint: SOB, weakness. Focused Assessment: Tachypnea, generalized weakness, wet cough   Tests Performed:   Labs Ordered and Resulted from Time of ED Arrival to Time of ED Departure   COMPREHENSIVE METABOLIC PANEL - Abnormal       Result Value    Sodium 144      Potassium 5.2      Chloride 106      Carbon Dioxide (CO2) 25      Anion Gap 13      Urea Nitrogen 51.4 (*)     Creatinine 1.16      Calcium 9.7      Glucose 161 (*)     Alkaline Phosphatase 86      AST 40      ALT 51 (*)     Protein Total 7.5      Albumin 4.2      Bilirubin Total 1.3 (*)     GFR Estimate 60 (*)    TROPONIN T, HIGH SENSITIVITY - Abnormal    Troponin T, High Sensitivity 31 (*)    NT PROBNP INPATIENT - Abnormal    N terminal Pro BNP Inpatient 9,274 (*)    CBC WITH PLATELETS AND DIFFERENTIAL - Abnormal    WBC Count 11.0      RBC Count 4.55      Hemoglobin 14.4      Hematocrit 46.1       (*)     MCH 31.6      MCHC 31.2 (*)     RDW 14.2      Platelet Count 239      % Neutrophils 82      % Lymphocytes 11      % Monocytes 7      % Eosinophils 0       % Basophils 0      % Immature Granulocytes 0      NRBCs per 100 WBC 0      Absolute Neutrophils 9.0 (*)     Absolute Lymphocytes 1.2      Absolute Monocytes 0.7      Absolute Eosinophils 0.0      Absolute Basophils 0.0      Absolute Immature Granulocytes 0.0      Absolute NRBCs 0.0     D DIMER QUANTITATIVE - Abnormal    D-Dimer Quantitative 0.56 (*)    TROPONIN T, HIGH SENSITIVITY - Abnormal    Troponin T, High Sensitivity 34 (*)    INFLUENZA A/B & SARS-COV2 PCR MULTIPLEX - Normal    Influenza A PCR Negative      Influenza B PCR Negative      RSV PCR Negative      SARS CoV2 PCR Negative       CT Chest Pulmonary Embolism w Contrast   Final Result   IMPRESSION:    1. Poor opacification of the distal/subsegmental pulmonary arteries   which limits detail assessment of pulmonary embolism. Otherwise, no   convincing evidence of pulmonary embolism.   2. Moderate right and small-to-moderate left pleural effusions and   associated basilar atelectasis/consolidation.   3. Mild diffuse pulmonary haziness, new as compared to 2/12/2022 exam,   indeterminate, could be infectious or represent pulmonary edema.   4. Reflux of contrast into the IVC and hepatic veins, suggesting right   heart dysfunction.   5. Biapical scarring and upper lobes predominant emphysematous   changes.   6. Multiple pulmonary nodules including a 6 mm right middle lobe   nodule, new as compared to 2/12/2022 exam, indeterminate, could be   infectious less likely neoplastic. Recommend follow-up exam with   dedicated chest CT in six months to assess for interval   change/resolution.      GUERA MUÑOZ MD            SYSTEM ID:  [id number]      Chest XR,  PA & LAT   Final Result   IMPRESSION: AP and lateral views of the chest were obtained.   Cardiomediastinal silhouette is within normal limits. Diffuse hazy   pulmonary opacities predominantly in the right lung and left upper   lobe, slightly increased as compared to 10/24/2022 exam,   indeterminate, could be  infectious. Small bilateral pleural effusions   and associated basilar atelectasis/consolidation. No significant   pneumothorax.      GUERA MUÑOZ MD            SYSTEM ID:  [id number]        . Abnormal Results: See above.   Treatments provided: Diagnostics, Lasix  Family Comments: Daughter at bedside  OBS brochure/video discussed/provided to patient:  N/A  ED Medications:   Medications   furosemide (LASIX) injection 20 mg (20 mg Intravenous Given 11/7/22 0859)   iopamidol (ISOVUE-370) solution 52 mL (52 mLs Intravenous Given 11/7/22 0937)   sodium chloride (PF) 0.9% PF flush 73 mL (73 mLs Intravenous Given 11/7/22 0936)     Drips infusing:  No  For the majority of the shift, the patient's behavior Green. Interventions performed were N/A.    Sepsis treatment initiated: No     Patient tested for COVID 19 prior to admission: YES    ED Nurse Name/Phone Number: Bryan Rojas RN,   11:39 AM    RECEIVING UNIT ED HANDOFF REVIEW    Above ED Nurse Handoff Report was reviewed: Yes  Reviewed by: Jessenia Taylor RN on November 7, 2022 at 12:13 PM

## 2022-11-07 NOTE — PHARMACY-ADMISSION MEDICATION HISTORY
Admission medication history interview status for this patient is complete. See [facility] admission navigator for allergy information, prior to admission medications and immunization status.     Medication history interview done, indicate source(s): Patient and Family  Medication history resources (including written lists, pill bottles, clinic record):Epic list, fill history, pt's list.  Pharmacy: [facility], [facility]    Changes made to PTA medication list:  Added: none  Changed: none  Reported as Not Taking: none  Removed: none    Actions taken by pharmacist (provider contacted, etc):None     Additional medication history information:None    Medication reconciliation/reorder completed by provider prior to medication history?  Y   (Y/N)       Prior to Admission medications    Medication Sig Last Dose Taking? Auth Provider Long Term End Date   aspirin 81 MG EC tablet Take 81 mg by mouth daily 11/6/2022 at PM Yes Reported, Patient     atorvastatin (LIPITOR) 20 MG tablet Take 1 tablet (20 mg) by mouth daily 11/6/2022 at PM Yes Kwame Blanton MD Yes    clopidogrel (PLAVIX) 75 MG tablet TAKE 1 TABLET EVERY DAY 11/6/2022 at AM Yes Colt Treviño MD Yes    furosemide (LASIX) 20 MG tablet Take 1 tablet (20 mg) by mouth daily as needed (for weight gain of 3lb/day or 5lb or more week) More than a month Yes Kwame Blanton MD Yes    metoprolol succinate ER (TOPROL XL) 50 MG 24 hr tablet Take 1 tablet (50 mg) by mouth daily Appointment required for further refills 11/6/2022 at PM Yes Kwame Blanton MD Yes    multivitamin, therapeutic with minerals (THERA-VIT-M) TABS Take 1 tablet by mouth daily 11/6/2022 at AM Yes Unknown, Entered By History     nitroGLYcerin (NITROSTAT) 0.4 MG sublingual tablet Place 1 tablet (0.4 mg) under the tongue every 5 minutes as needed for chest pain  Yes Blaine Rowe MD Yes    tamsulosin (FLOMAX) 0.4 MG capsule Take 0.4 mg by mouth daily 11/6/2022 at AM Yes Unknown, Entered By History

## 2022-11-08 NOTE — CONSULTS
Care Management Discharge Note    Discharge Date: 11/09/2022       Discharge Disposition: Home    Discharge Services:      Discharge DME:      Discharge Transportation: family or friend will provide    Private pay costs discussed: Not applicable    Handoff Referral Completed: Yes    Additional Information:  Case Management has been consulted for CHF discharge planning. Patient was admitted with Congestive Heart Failure with URR <20%. Per chart review, patient is from home with spouse and daughter. No anticipated discharge needs at this time. CHF teaching to be completed by bedside RN. Will continue to follow therapy recommendations and other discipline recommendations if additional needs arise. Currently OT is recommending home with family assistance.  CORE clinic appointments have been established and patient has identified that he has a scale and does daily weights at home.  Pt identified as a Service Bundle #3. No needs or assessment needed at this time. Please consult CM/SW  if discharge needs should arise.    Addendum 1630:  PT eval recommending home with home PT. Met with patient and daughter Nettie about recommendations. Patient initially not interested but agreed to have referral sent. They have used [facility] in the past. Referral sent and will wait to see if they have PT availability.     Will follow up tomorrow wit daughter and patient    Nataly Harper RN BSN OCN  Care Coordinator  [facility]  [phone]

## 2022-11-08 NOTE — CONSULTS
[facility]- C.O.R.E[facility] Clinic    Received CORE Clinic Consult from Dr. Hassan.    Reviewed Deshawn's chart and note they are admitted for complaints of shortness of breath.  Dx: acute on ch systolic heart failure. Echocardiogram on 9/20/22 shows LVEF 20-25%.  This is their first admission(s) in the past year for concerns of heart failure.    Given above information, Deshawn meets criteria for CORE Clinic as patients EF is =/<40%.     Met with Deshawn and daughter Andra at bedside to discuss CORE Clinic consult request.  Patient/family confirm they plan to enroll in CORE Clinic.    Patient's primary insurance:  Medicare    Pt reports the following HF symptoms prior to admission:  Shortness of breath    Living situation:  With wife (Eryn) of 46 years.  Dtr Andra lives with pt 3 months at a time.  She travels back and forth from [place] where she lives.     Med set up:  daughter    Scale available at home:  Yes, weighs daily    Barriers to HF follow-up:  Mentation/cognition, transportation, hard of hearing     Remote Monitoring:  consider HRS enrollment at upcoming CORE office visit    Medication review: Patient is not on both Entresto and a SGLT2 Inhibitor (Jardiance, Farxiga, Invokana), Pharmacy Liaison Consult placed for coverage review.     CM/HF education topics we reviewed:  1. Low sodium  2. Fluid Restriction  3. Daily weights  4. Symptoms of HF to be reported to CORE Team.    Education materials provided:    1. Low sodium food and drink handout  2. Low sodium food product examples  3. Already prepared low salt meal delivery services  4. HF stoplight tool  5. Guide to HF booklet      I have arranged follow up appt w/ first available CORE [facility].  Explained would ideally like pt to be seen post hospitalization within the first 3-5 days, however due to availability unable to get in for office visit for 2 weeks.  Pt does have PCP appt w/ Dr. Treviño on 11/16/22.      Orders placed per Dr. Blanton (primary  cardiologist).     Discussed with pt and dtr possible option to enroll in [employer] [place] meal assistance (free low sodium meal delivery program).  Will confirm pt lives within delivery zone and will assist pt in filling out application at CORE Enrollment office visit.     Daughter shares that pt and his wife recently have in home services which include caregivers who also help with household tasks.      Instructed Deshawn and dtr Andra to call [facility] CORE Team at [phone] with questions/concerns prior to this visit.  Specifically instructed them to call RN with weight gain greater than 2 lbs overnight, and/or 5 lbs in a week, and/or worsening SOB/edema.     Future Appointments   Date Time Provider Department Center   11/8/2022 12:45 PM Liz Gomes, PT [facility] [place] [facility]   11/23/2022  9:45 AM RU LAB [facility] [place] [facility]   11/23/2022 10:50 AM [facility]   11/30/2022 10:30 AM David Still APRN CNP [facility]   12/7/2022 11:30 AM Colt Treviño MD [facility]   2/20/2023  1:30 PM Matt Alexander, NP [facility] CL[facility]       Deshawn and Andra both voice understanding and deny further questions or concerns at this time.      Please call with any further questions.    Jodi Mcdonald, RN BSN   [facility] [place], [place]  C.O.R.E. Clinic Care Coordinator  [phone]

## 2022-11-08 NOTE — CONSULTS
Patient has Medicare through [employer].    Entresto/Jardiance  --Upon receipt of RX, Discharge Pharmacy can provide 1 mo free Entresto and 14 days Jardiance.  --Patient will pay 100% of the first $480 in drugs costs ($466 remains and will added to the first fill)  --Subsequent fills will be $47/mo each.    Farxiga is non-formulary and more expensive than Jardiance.    2023 coverage:  --Patient will pay 100% of the first $300 in drugs costs.  This will be added to the first fill in 2023.  --Subsequent fills will be $47/mo each.  --When total drug costs exceed $4,660, price will increase to a 25% coinsurance, equivalent to $153/mo for Jardiance, and $170/mo for Entresto.    Misty Buckley  Pharmacy Technician/Liaison, Discharge Pharmacy   [phone] (voice or text)  [email or web address]

## 2022-11-08 NOTE — PROGRESS NOTES
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     Patient recently has been seen by cardiology with a follow-up in September 22 with an echo that revealed EF of 20 to 25%.  She was then seen in the emergency room on October 24 with dyspnea which responded well with additional dose of Lasix.  He just saw Dr. Blanton a week ago (11/1), and at that time he did not have salo heart failure exacerbation but felt that he would not be a good candidate for pacemaker in addition it seems like the family is approaching a more conservative approach by discussing possible palliative care as an outpatient.  He is maintained on as needed Lasix as he has not been able to tolerate consistent daily Lasix in the past due to hypotension.     On arrival to the emergency room, he was placed on 4 L of O2 for a documented O2 sat of 88 at home.  He denies any increased swelling in fact he has lost about 5 pounds since his last weight about a week ago.  He does have crackles at the lung bases and appears to be tachypneic on exam.  CT scan of the chest show moderate right and small left pleural effusion with associated bibasilar atelectasis/consolidation and diffuse pulmonary haziness which could be infectious versus pulmonary edema.  He was given 20 mg of IV lasix, O2 turned down to 3L (still 98%) and recommended for admission for Acute CHF exacerbation.      #Acute hypoxic respiratory failure due to acute exacerbation of systolic HF  #Mildly elevated troponin likely  "due to CHF exacerbation rather than ACS  Suspect his SOB is likely due to systolic HF, CT of the chest does suggest Pulm Edema, there also appears to be reflux of contrast into the IVC and hepatic veins suggestive of right heart dysfunction though recent echo did not reveal any evidence of right ventricular dysfunction.  -He has no chest pain or recent complaints of angina that would suggest ACS as cause of his acute exacerbation  -Less suspicious that he has concomitant PNA with minimal pro calcitonin, no fever/cough or leukocytosis.  Plan:  -Admit to inpatient status, continue as needed O2  -We will give him very gentle IV diuresis of 20 mg IV twice daily for 3 doses total,  -Continue daily weights and strict I's and O's  -Continue beta-blocker  -Troponins are pretty flat, no need to trend  -Would not repeat echocardiogram at this time as he just had one at the end of September  -PT OT eval as well social work to assess for discharge needs     #Coronary disease remote history of PCI status post repeat coronary angiography in February 2020 with ROSENDO to mid LAD in-stent restenosis that was again stented  -Previously had been recommended getting a cardiac MRI back in 2020 but never followed up suspect related to cognitive decline  -Most recent visit with cardiologist family expresses a more conservative approach with palliative care  -We will continue PTA medication including aspirin Plavix statin beta-blocker for now  -We will request palliative care consult for [facility] discussion      #Hx of urinary retention   -  No recent exacerbation   -  Cont Flomax     #Dec appetite/>5 lbs weight loss in 1 week  -Patient appears to be quite frail and slender, family endorses decreased appetite and not eating much  -Request nutrition consult  -We will also discussed with palliative care as patient might be appropriate for hospice     #Incidental finding  \"Multiple pulmonary nodules including a 6 mm right middle lobe  nodule, new as " "compared to 2/12/2022 exam, indeterminate, could be  infectious less likely neoplastic. Recommend follow-up exam with  dedicated chest CT in six months to assess for interval  Change/resolution.\"  - Possible repeat CT scan pending [facility] discussion                # Drug Induced Platelet Defect: home medication list includes an antiplatelet medication    # Acute systolic heart failure: last echo with EF <40% and receiving IV diuretics    # Cachexia: Estimated body mass index is 16.49 kg/m  as calculated from the following:       Diet: Low Saturated Fat Na <2400 mg    DVT Prophylaxis: Pneumatic Compression Devices  Faustin Catheter: Not present  Central Lines: None  Cardiac Monitoring: ACTIVE order. Indication: Acute decompensated heart failure (48 hours)  Code Status: No CPR- Do NOT Intubate      Disposition Plan       home 11/9.     The patient's care was discussed with the Bedside Nurse and Patient.    Donna Sosa MD  Hospitalist Service  [facility]  Securely message with the Vocera Web Console (learn more here)  Text page via [employer] Paging/Directory         Clinically Significant Risk Factors Present on Admission         # Hypernatremia: Highest Na = 146 mmol/L (Ref range: 136-145) in last 2 days, will monitor as appropriate        # Drug Induced Platelet Defect: home medication list includes an antiplatelet medication    # Acute systolic heart failure: last echo with EF <40% and receiving IV diuretics    # Cachexia: Estimated body mass index is 16 kg/m  as calculated from the following:    Height as of 11/1/22: 1.854 m (6' 1\").    Weight as of this encounter: 55 kg (121 lb 4.8 oz).           ______________________________________________________________________    Interval History     SOB us better. No fevers/chills/cough.    Data reviewed today: I reviewed all medications, new labs and imaging results over the last 24 hours. I personally reviewed no images or EKG's today.    Physical Exam "   Vital Signs: Temp: 97.5  F (36.4  C) Temp src: Oral BP: 106/60 Pulse: 72   Resp: 24 SpO2: 98 % O2 Device: None (Room air) Oxygen Delivery: 3 LPM  Weight: 121 lbs 4.8 oz    Gen - Alert, awake, in NAD.  Lungs - CTA B with some diminished BS in bases.  Heart - RR,S1+S2 nml, no m/g/r.  Abd - soft, NT, ND, + BS.  Ext - no edema.    Data   Recent Labs   Lab 11/08/22  0751 11/07/22  1004 11/07/22  0715   WBC 11.4*  --  11.0   HGB 12.4*  --  14.4   MCV 99  --  101*     --  239   *  --  144   POTASSIUM 4.0  --  5.2   CHLORIDE 108*  --  106   CO2 26  --  25   BUN 57.9*  --  51.4*   CR 1.27* 1.10 1.16   ANIONGAP 12  --  13   FLORES 9.5  --  9.7   *  --  161*   ALBUMIN  --   --  4.2   PROTTOTAL  --   --  7.5   BILITOTAL  --   --  1.3*   ALKPHOS  --   --  86   ALT  --   --  51*   AST  --   --  40     Recent Results (from the past 24 hour(s))   CT Chest Pulmonary Embolism w Contrast    Narrative    CT CHEST PULMONARY EMBOLISM WITH CONTRAST November 7, 2022 9:38 AM    HISTORY: Dyspnea.    TECHNIQUE: Scans obtained from the apices through the diaphragm with  IV contrast. 52mL Isovue-370 IV injected. Radiation dose for this scan  was reduced using automated exposure control, adjustment of the mA  and/or kV according to patient size, or iterative reconstruction  technique. 2D and 3D MIP reconstructions were performed by the CT  technologist    COMPARISON: Chest CT on 2/12/2022.    FINDINGS:  Chest/mediastinum: Poor opacification of the distal subsegmental  pulmonary arteries which limits detail assessment for pulmonary  embolism. Mild cardiomegaly. No significant pericardial effusion.  Coronary artery stents and mild atherosclerotic vascular calcification  of the coronary arteries. Few mildly prominent mediastinal lymph  nodes, likely reactive.    Lungs and pleura: Moderate right and small to moderate left pleural  effusions and associated basilar atelectasis/consolidation. Mild  diffuse pulmonary haziness, new as  compared to 2/12/2022 exam,  indeterminate, could be infectious or represent pulmonary edema.  Biapical scarring. Upper lobes predominant emphysematous changes.  Multiple pulmonary nodules including a 6 mm right middle lobe nodule  (series 7 image 179), new as compared to 2/12/2021 exam,  indeterminate.    Upper abdomen: Limited evaluation of the upper abdomen due to lack of  coverage and timing of contrast. 5 mm stone in the interpolar region  of the left kidney, not significantly changed as compared to June 12, 2022. Multiple calcified splenic granulomas. 4.3 cm right renal cyst.  Reflux of contrast into the IVC and hepatic veins, suggesting right  heart dysfunction.    Bones and soft tissue: Multilevel degenerative changes of the spine.  No suspicious osseous lesion.      Impression    IMPRESSION:   1. Poor opacification of the distal/subsegmental pulmonary arteries  which limits detail assessment of pulmonary embolism. Otherwise, no  convincing evidence of pulmonary embolism.  2. Moderate right and small-to-moderate left pleural effusions and  associated basilar atelectasis/consolidation.  3. Mild diffuse pulmonary haziness, new as compared to 2/12/2022 exam,  indeterminate, could be infectious or represent pulmonary edema.  4. Reflux of contrast into the IVC and hepatic veins, suggesting right  heart dysfunction.  5. Biapical scarring and upper lobes predominant emphysematous  changes.  6. Multiple pulmonary nodules including a 6 mm right middle lobe  nodule, new as compared to 2/12/2022 exam, indeterminate, could be  infectious less likely neoplastic. Recommend follow-up exam with  dedicated chest CT in six months to assess for interval  change/resolution.    GUERA MUÑOZ MD         SYSTEM ID:  [id number]     Medications       aspirin  81 mg Oral QPM     atorvastatin  20 mg Oral QPM     clopidogrel  75 mg Oral Daily     metoprolol succinate ER  50 mg Oral QPM     senna-docusate  1 tablet Oral BID    Or      senna-docusate  2 tablet Oral BID     tamsulosin  0.4 mg Oral Daily

## 2022-11-08 NOTE — PLAN OF CARE
Pertinent assessments: Pt Aox4 but confused this shift. Up SBA to bathroom, bed alarm on, patient does not call appropriately. VSS, on RA. On tele. Strict I/Os. Denied pain. Resting comfortably.     Major Shift Events: Tele reports patient had 5 beat run of vtach this shift.     Treatment Plan: Daily weights, strict I/Os, O2 PRN. PT/OT/Palliative consults. Discharge 11/9    Jessenia Taylor RN on 11/8/2022 at 5:06 PM

## 2022-11-08 NOTE — DISCHARGE INSTRUCTIONS
Please call [facility] ([facility]) with any concerns:  Monday-Friday 8:00 AM to 4:30 PM   [facility] Nurse Line: [phone]  After hours:  [phone]    -- Please start weighing self daily and write in wt log chart to bring into your cardiology/CORE clinic follow up appts.   -- Please bring in current medication bottles to cardiology/CORE appts for review.   -- Call RN with weight gain greater than 2 lbs overnight, and/or 5 lbs in a week, and/or worsening shortness of breath/edema.       Your labs are NOT FASTING on 11/23/22 @ 9:45 am.  OK to eat.        Your home care referral was sent to [facility] ([place])  If you haven't heard from them within the next 24-48 hours,  Please call them at [phone]

## 2022-11-08 NOTE — CONSULTS
CLINICAL NUTRITION SERVICES  -  ASSESSMENT NOTE      Recommendations:   - Slight diet liberalization to 3 gram Na in light of poor PO intakes/severe malnutrition.  - Vanilla Ensure w/ breakfast, per patient and daughter's request.   - Nutrition-related POC per MD teams based on goals of care.     MALNUTRITION:  % Weight Loss: Unable to determine d/t fluid shifts masking true trends though daughter feels patient has lost 5# from dry wt w  % Intake:  </= 50% for >/= 1 month (severe malnutrition)  Subcutaneous Fat Loss:  Orbital region moderate to severe depletion and Upper arm region severe depletion  Muscle Loss:  Temporal region severe depletion, Clavicle bone region severe depletion, Acromion bone region severe depletion, Patellar region severe depletion, Anterior thigh region severe depletion and Posterior calf region severe depletion  Fluid Retention: None documented    Malnutrition Diagnosis: Severe malnutrition  In Context of:  Chronic illness or disease          REASON FOR ASSESSMENT  Deshawn Burrows is a 89 year old male seen by Registered Dietitian for Provider Order - Nutrition Education - 2 gram Na diet.    PMH of: CHF (EF <40%), CAD.    Admit 2/2: Decreased appetite, cachexia, acute respiratory failure with CHF exacerbation.     NUTRITION HISTORY  - Information obtained from chart, mostly daughter in room, some information obtained from patient himself though limited.    - Known to this writer and service during 2/2022 admit.  Met severe malnutrition criteria at that time with decreased appetite/poor PO intakes for months, concern for weight loss further masked by fluid shifts, and overt fat/muscle loss.  Noted to downplay nutritional status with humor and also received oral supplements at that time.    - Diet at home: Daughter helps with cooking and already following no added salt diet, but main concern is that Deshawn has had an overall lack of appetite for months, with a recent decline in the past  "~month.  He is nowhere near reaching the 2 gram Na limit recommended as daughter describes he is eating about half of normal recently, on top of what was already a decline.    - Usual intakes: Half sandwich, small portion of potato salad, etc.  Aims for at least 1 high protein oral supplement (likes vanilla) in the AM.    - Barriers to PO intakes: Lack of appetite, also describes a degree of taste changes.  No nausea, abdominal pain, early satiety, denies issues with chewing/swallowing.  - Allergies: NKFA.      CURRENT NUTRITION ORDERS  Diet Order:     Cardiac    Current Intake/Tolerance:  Limited timeframe since admit, 25% intake documentation in flowsheet thus far.  Had an ok breakfast which was still in room.  Daughter reports this is the most she has seen him eat at a meal for weeks.       Obtained from Chart/Interdisciplinary Team:  - Palliative care team following and noted \"restorative care\"  - No documentation of PI  - Stooling patterns reviewed    ANTHROPOMETRICS  Height: 6' 1\"  Weight: 121 lbs 4.8 oz  Body mass index is 16 kg/m .  Weight Status:  Underweight BMI <18.5  Weight History:  Wt Readings from Last 10 Encounters:   11/08/22 55 kg (121 lb 4.8 oz)   11/01/22 56.7 kg (125 lb)   10/24/22 53.1 kg (117 lb)   09/07/22 59.8 kg (131 lb 14.4 oz)   08/31/22 60.1 kg (132 lb 6.4 oz)   06/20/22 60.3 kg (133 lb)   04/21/22 60.8 kg (134 lb)   04/11/22 58.5 kg (128 lb 14.4 oz)   02/25/22 56.7 kg (125 lb)   02/14/22 53.1 kg (117 lb 1.6 oz)     - No current documentation of edema and unclear how much of of above trends were impacted by fluid as seems 117-120's may be more reflective of dry wt.  Nonetheless, patient has been underweight for a period of months.    - Daughter feels patient has lost 5# from dry body weight in the past month, which would be clinically significant.     LABS  Labs reviewed.      MEDICATIONS  Medications reviewed.      ASSESSED NUTRITION NEEDS PER APPROVED PRACTICE GUIDELINES:    Dosing " Weight 53 kg - dry wt?  Estimated Energy Needs: 30-35+ Kcal/Kg  Justification: repletion and underweight  Estimated Protein Needs: 1.5-2+ g pro/Kg  Justification: Repletion and preservation of lean body mass  Estimated Fluid Needs: per MD      NUTRITION DIAGNOSIS:  Malnutrition related to chronically decreased appetite/intake with likely further/?acute decline as evidenced by meeting <50% needs over the past month per daughter, <75% for period of months to maintain low BMI, overt fat/muscle loss.     NUTRITION INTERVENTIONS  Recommendations / Nutrition Prescription  See above.      Implementation  Nutrition education: Provided education on diet liberalization, high calorie/high protein focus as able and w/in no added salt diet, Ensure w/ breakfast.  Declined need for handouts as some already provided and daughter already following low sodium, doesn't feel patient is anywhere near 2 gram Na restriction daily.     Medical Food Supplement: As above.    Collaboration and Referral of Nutrition care: Discussed POC with team during rounds.     Nutrition Goals  Patient to consume at least 50% of meals/supplements TID while acutely admitted vs nutrition-related goals of care per MD.       MONITORING AND EVALUATION:  Progress towards goals will be monitored and evaluated per protocol and Practice Guidelines          Krissy Goncalves RDN, LD  Clinical Dietitian  3rd floor/ICU: [phone]  All other floors: [phone]  Weekend/holiday: [phone]  Office: [phone]

## 2022-11-08 NOTE — PLAN OF CARE
Pertinent assessments: Pt AOx4, Up SBA to bathroom, bed alarm on, patient does not call appropriately. VSS, on RA. On tele. Strict I/Os. Denied pain. Slept well.  Major Shift Events: none  Treatment Plan: O2, Lasix, daily weights, strict I/Os, PT/OT/Palliative consults    Bedside Nurse: Shamika Hanson RN

## 2022-11-09 NOTE — PROGRESS NOTES
Care Management Discharge Note    Discharge Date: 11/09/2022       Discharge Disposition: Home Care    Discharge Services:      Discharge DME:      Discharge Transportation: family or friend will provide    Private pay costs discussed: Not applicable    Patient/family educated on Medicare website which has current facility and service quality ratings: yes    Education Provided on the Discharge Plan:  yes  Persons Notified of Discharge Plans: patient and daughter  Patient/Family in Agreement with the Plan:  yes    Handoff Referral Completed: Yes    Additional Information:  Patient medically ready to go. Orders for home PT on discharge. [facility] has accepted and AVS updated. Notified patient and daughter. Daughter will provide transportation.    Nataly Harper RN BSN OCN  Care Coordinator  [facility]  [phone]

## 2022-11-09 NOTE — PROGRESS NOTES
[facility]   Due to a high volume of referrals [facility] has requested this patient's home care episode be transferred to their deferral partner [facility]. [phone].  Care team notified.

## 2022-11-09 NOTE — PLAN OF CARE
All discharge instructions reviewed with patient and daughter Andra. Additional informational packet and education regarding CHF provided. All questions and concerns addressed. Pt discharged with all belongings. Patient to transport home via daughter in personal vehicle. Patient will be brought to vehicle via wheelchair.  Jessenai Taylor RN on 11/9/2022 at 11:16 AM

## 2022-11-09 NOTE — DISCHARGE SUMMARY
[facility]  Hospitalist Discharge Summary      Date of Admission:  11/7/2022  Date of Discharge:  11/9/2022  Discharging Provider: Marco Fraser MD, MD  Discharge Service: Hospitalist Service    Discharge Diagnoses   Acute hypoxic respiratory failure secondary to acute on chronic congestive heart failure with reduced EF in exacerbation  Detectable troponin secondary to #1 with type II AMI rather than ACS  History of CAD with prior PCI in 2020 with ROSENDO to mid LAD  History of urinary retention on Flomax  Incidental finding of pulmonary nodules  Severe malnutrition in context of chronic illness or disease        Follow-ups Needed After Discharge   Follow-up Appointments     Add follow up information to the AVS prior to printing      Follow-up and recommended labs and tests       Follow up with primary care provider, Colt Treviño, within 7   days for hospital follow- up.    Follow-up with core clinic as scheduled.             Unresulted Labs Ordered in the Past 30 Days of this Admission     No orders found from 10/8/2022 to 11/8/2022.          Discharge Disposition   Discharged to home  Condition at discharge: Stable      Hospital Course      I assume service care today.  Seen and examined.  Chart reviewed.  I met this gentleman while he was sleeping but easily aroused with minimal verbal stimuli.  He is not requiring any oxygen support.  He is oriented, following simple verbal instructions and endorses no complaints except for not much of sleep here in the hospital.  He is not hungry but not having any abdominal pain or nausea or vomiting.  He mentioned that he is hopeful for home discharge today.  Reviewed his chart and stated plans for hospital discharge today.  He responded well with IV diureses.  Demonstrated weight loss trend.  Was taken off and discontinued on oxygen support.  Seen by palliative care services and appreciate input regarding discussion of goals of care.  Will  change home medications with Lasix 20 mg once daily compared to prior instructions of as-needed basis.  Needs to follow-up with cardiology, corporately can PCP.    I will refer you to excerpts of my colleagues prior progress notes as listed below for other details of his hospital stay.    Deshawn Burrows is a 89 year old male with a past medical history significant for hypertension, hyperlipidemia coronary disease status post PCI (2/20), history of systolic heart failure with reduced ejection fraction due to mixed ischemic and nonischemic cardiomyopathy and a chronic left bundle branch block who presents with worsening shortness of breath over the last week and a half.     Patient recently has been seen by cardiology with a follow-up in September 22 with an echo that revealed EF of 20 to 25%.  She was then seen in the emergency room on October 24 with dyspnea which responded well with additional dose of Lasix.  He just saw Dr. Blanton a week ago (11/1), and at that time he did not have salo heart failure exacerbation but felt that he would not be a good candidate for pacemaker in addition it seems like the family is approaching a more conservative approach by discussing possible palliative care as an outpatient.  He is maintained on as needed Lasix as he has not been able to tolerate consistent daily Lasix in the past due to hypotension.     On arrival to the emergency room, he was placed on 4 L of O2 for a documented O2 sat of 88 at home.  He denies any increased swelling in fact he has lost about 5 pounds since his last weight about a week ago.  He does have crackles at the lung bases and appears to be tachypneic on exam.  CT scan of the chest show moderate right and small left pleural effusion with associated bibasilar atelectasis/consolidation and diffuse pulmonary haziness which could be infectious versus pulmonary edema.  He was given 20 mg of IV lasix, O2 turned down to 3L (still 98%) and recommended for  admission for Acute CHF exacerbation.      #Acute hypoxic respiratory failure due to acute exacerbation of systolic HF  #Mildly elevated troponin likely due to CHF exacerbation rather than ACS  Suspect his SOB is likely due to systolic HF, CT of the chest does suggest Pulm Edema, there also appears to be reflux of contrast into the IVC and hepatic veins suggestive of right heart dysfunction though recent echo did not reveal any evidence of right ventricular dysfunction.  -He has no chest pain or recent complaints of angina that would suggest ACS as cause of his acute exacerbation  -Less suspicious that he has concomitant PNA with minimal pro calcitonin, no fever/cough or leukocytosis.  Plan:  -Admit to inpatient status, continue as needed O2  -We will give him very gentle IV diuresis of 20 mg IV twice daily for 3 doses total,  -Continue daily weights and strict I's and O's  -Continue beta-blocker  -Troponins are pretty flat, no need to trend  -Would not repeat echocardiogram at this time as he just had one at the end of September  -PT OT eval as well social work to assess for discharge needs     #Coronary disease remote history of PCI status post repeat coronary angiography in February 2020 with ROSENDO to mid LAD in-stent restenosis that was again stented  -Previously had been recommended getting a cardiac MRI back in 2020 but never followed up suspect related to cognitive decline  -Most recent visit with cardiologist family expresses a more conservative approach with palliative care  -We will continue PTA medication including aspirin Plavix statin beta-blocker for now  -We will request palliative care consult for [facility] discussion      #Hx of urinary retention   -  No recent exacerbation   -  Cont Flomax     #Dec appetite/>5 lbs weight loss in 1 week  -Patient appears to be quite frail and slender, family endorses decreased appetite and not eating much  -Request nutrition consult  -We will also discussed with palliative  "care as patient might be appropriate for hospice     #Incidental finding  \"Multiple pulmonary nodules including a 6 mm right middle lobe  nodule, new as compared to 2/12/2022 exam, indeterminate, could be  infectious less likely neoplastic. Recommend follow-up exam with  dedicated chest CT in six months to assess for interval  Change/resolution.\"  - Possible repeat CT scan pending [facility] discussion                # Drug Induced Platelet Defect: home medication list includes an antiplatelet medication    # Acute systolic heart failure: last echo with EF <40% and receiving IV diuretics    # Cachexia: Estimated body mass index is 16.49 kg/m  as calculated from the following:      Consultations This Hospital Stay   CORE CLINIC EVALUATION IP CONSULT  NUTRITION SERVICES ADULT IP CONSULT  CARE MANAGEMENT / SOCIAL WORK IP CONSULT  CARE MANAGEMENT / SOCIAL WORK IP CONSULT  OCCUPATIONAL THERAPY ADULT IP CONSULT  PHYSICAL THERAPY ADULT IP CONSULT  PALLIATIVE CARE ADULT IP CONSULT  CARE MANAGEMENT / SOCIAL WORK IP CONSULT  PHARMACY LIAISON FOR MEDICATION COVERAGE CONSULT    Code Status   No CPR- Do NOT Intubate    Time Spent on this Encounter   I, Marco Fraser MD, MD, personally saw the patient today and spent greater than 30 minutes discharging this patient.       Marco Fraser MD, MD  [place] [address] [facility]  [address] [place] [address]  Phone: [phone]  Fax: [phone]  ______________________________________________________________________    Physical Exam   Vital Signs: Temp: 97.5  F (36.4  C) Temp src: Oral BP: 106/67 Pulse: 78   Resp: 24 SpO2: 91 % O2 Device: None (Room air)    Weight: 117 lbs 0 oz  HEENT; Atraumatic, normocephalic, pinkish conjuctiva, pupils bilateral reactive cachectic, bitemporal wasting    Skin: warm and moist, no rashes prominent rib cage  Lungs: equal chest expansion, clear to auscultation, no wheezes, no stridor, no crackles,     Heart: normal " rate, normal rhythm, no rubs or gallops.   Abdomen: normal bowel sounds, no tenderness, no peritoneal signs, no guarding  Extremities: no deformities, no edema   Neuro; follow commands, alert and oriented x3, spontaneous speech, coherent, moves all extremities spontaneously  Psych; no hallucination, euthymic mood, not agitated           Primary Care Physician   Colt Treviño    Discharge Orders      Basic metabolic panel     N terminal pro BNP outpatient     Follow-Up with Cardiology- Core      Primary Care - Care Coordination Referral      Follow-Up with Cardiology      Home Care Referral      Reason for your hospital stay    Deshawn was admitted in the hospital for acute hypoxic respiratory failure secondary to underlying congestive heart failure with reduced EF in exacerbation     Follow-up and recommended labs and tests     Follow up with primary care provider, Colt Treviño, within 7 days for hospital follow- up.    Follow-up with core clinic as scheduled.     Activity    Your activity upon discharge: activity as tolerated     Monitor and record    Weigh yourself every morning     Discharge Follow Up - with Primary Care clinic within 3-5 days (RN to schedule prior to d/c for HE/[facility] primary care     Add follow up information to the AVS prior to printing     No CPR- Do NOT Intubate     Diet    Follow this diet upon discharge: Regular       Significant Results and Procedures   Most Recent 3 CBC's:Recent Labs   Lab Test 11/08/22  0751 11/07/22  0715 10/24/22  1201   WBC 11.4* 11.0 9.7   HGB 12.4* 14.4 12.7*   MCV 99 101* 99    239 209     Most Recent 3 BMP's:Recent Labs   Lab Test 11/09/22  0711 11/08/22  0751 11/07/22  1004 11/07/22  0715 10/24/22  1201   NA  --  146*  --  144 138   POTASSIUM 3.9 4.0  --  5.2 4.8   CHLORIDE  --  108*  --  106 101   CO2  --  26  --  25 27   BUN  --  57.9*  --  51.4* 30.7*   CR 1.25* 1.27* 1.10 1.16 1.17   ANIONGAP  --  12  --  13 10   FLORES  --  9.5  --  9.7  9.4   GLC  --  102*  --  161* 106*     Most Recent 2 LFT's:Recent Labs   Lab Test 11/07/22  0715 02/13/22  0645   AST 40 22   ALT 51* 42   ALKPHOS 86 91   BILITOTAL 1.3* 1.0     Most Recent 3 Troponin's:Recent Labs   Lab Test 03/03/20  1330 03/03/20  0632 03/02/20  1839 03/02/20  0016 03/01/20  1805 03/01/20  1803 02/25/20  0705   TROPI 0.082* 0.104* 0.093*   < >  --    < >  --    TROPONIN  --   --   --   --  0.09*  --  0.01    < > = values in this interval not displayed.     Most Recent 3 BNP's:Recent Labs   Lab Test 11/07/22  0715 10/24/22  1201 02/12/22  1751   NTBNPI 9,274* 4,718* 18,115*     Most Recent Hemoglobin A1c:No lab results found.  Most Recent 6 glucoses:Recent Labs   Lab Test 11/08/22  0751 11/07/22  0715 10/24/22  1201 06/11/22  1231 03/02/22  1208 02/14/22  0713   * 161* 106* 99 142* 108*     Most Recent Urinalysis:Recent Labs   Lab Test 06/11/22  1250   COLOR Yellow   APPEARANCE Clear   URINEGLC Negative   URINEBILI Negative   URINEKETONE Negative   SG 1.014   UBLD Negative   URINEPH 5.0   PROTEIN Negative   NITRITE Negative   LEUKEST Negative   RBCU <1   WBCU 1     Most Recent ABG:No lab results found.  Most Recent ESR & CRP:Recent Labs   Lab Test 05/14/16  0837 05/13/16  1823   SED  --  7   CRP 3.5  --    ,   Results for orders placed or performed during the hospital encounter of 11/07/22   Chest XR,  PA & LAT    Narrative    CHEST TWO VIEWS November 7, 2022 8:01 AM     HISTORY: Dyspnea.    COMPARISON: Chest x-ray on 10/24/2022 and chest CT on 2/12/2022.      Impression    IMPRESSION: AP and lateral views of the chest were obtained.  Cardiomediastinal silhouette is within normal limits. Diffuse hazy  pulmonary opacities predominantly in the right lung and left upper  lobe, slightly increased as compared to 10/24/2022 exam,  indeterminate, could be infectious. Small bilateral pleural effusions  and associated basilar atelectasis/consolidation. No significant  pneumothorax.    AHMAD  MD TONI         SYSTEM ID:  [id number]   CT Chest Pulmonary Embolism w Contrast    Narrative    CT CHEST PULMONARY EMBOLISM WITH CONTRAST November 7, 2022 9:38 AM    HISTORY: Dyspnea.    TECHNIQUE: Scans obtained from the apices through the diaphragm with  IV contrast. 52mL Isovue-370 IV injected. Radiation dose for this scan  was reduced using automated exposure control, adjustment of the mA  and/or kV according to patient size, or iterative reconstruction  technique. 2D and 3D MIP reconstructions were performed by the CT  technologist    COMPARISON: Chest CT on 2/12/2022.    FINDINGS:  Chest/mediastinum: Poor opacification of the distal subsegmental  pulmonary arteries which limits detail assessment for pulmonary  embolism. Mild cardiomegaly. No significant pericardial effusion.  Coronary artery stents and mild atherosclerotic vascular calcification  of the coronary arteries. Few mildly prominent mediastinal lymph  nodes, likely reactive.    Lungs and pleura: Moderate right and small to moderate left pleural  effusions and associated basilar atelectasis/consolidation. Mild  diffuse pulmonary haziness, new as compared to 2/12/2022 exam,  indeterminate, could be infectious or represent pulmonary edema.  Biapical scarring. Upper lobes predominant emphysematous changes.  Multiple pulmonary nodules including a 6 mm right middle lobe nodule  (series 7 image 179), new as compared to 2/12/2021 exam,  indeterminate.    Upper abdomen: Limited evaluation of the upper abdomen due to lack of  coverage and timing of contrast. 5 mm stone in the interpolar region  of the left kidney, not significantly changed as compared to June 12, 2022. Multiple calcified splenic granulomas. 4.3 cm right renal cyst.  Reflux of contrast into the IVC and hepatic veins, suggesting right  heart dysfunction.    Bones and soft tissue: Multilevel degenerative changes of the spine.  No suspicious osseous lesion.      Impression    IMPRESSION:    1. Poor opacification of the distal/subsegmental pulmonary arteries  which limits detail assessment of pulmonary embolism. Otherwise, no  convincing evidence of pulmonary embolism.  2. Moderate right and small-to-moderate left pleural effusions and  associated basilar atelectasis/consolidation.  3. Mild diffuse pulmonary haziness, new as compared to 2/12/2022 exam,  indeterminate, could be infectious or represent pulmonary edema.  4. Reflux of contrast into the IVC and hepatic veins, suggesting right  heart dysfunction.  5. Biapical scarring and upper lobes predominant emphysematous  changes.  6. Multiple pulmonary nodules including a 6 mm right middle lobe  nodule, new as compared to 2/12/2022 exam, indeterminate, could be  infectious less likely neoplastic. Recommend follow-up exam with  dedicated chest CT in six months to assess for interval  change/resolution.    GUERA MUÑOZ MD         SYSTEM ID:  [id number]       Discharge Medications   Current Discharge Medication List      CONTINUE these medications which have CHANGED    Details   furosemide (LASIX) 20 MG tablet Take 1 tablet (20 mg) by mouth daily as needed (for weight gain of 3lb/day or 5lb or more week)  Qty: 30 tablet, Refills: 0    Associated Diagnoses: Ischemic cardiomyopathy         CONTINUE these medications which have NOT CHANGED    Details   aspirin 81 MG EC tablet Take 81 mg by mouth daily      atorvastatin (LIPITOR) 20 MG tablet Take 1 tablet (20 mg) by mouth daily  Qty: 90 tablet, Refills: 3    Associated Diagnoses: Coronary artery disease involving native coronary artery of native heart without angina pectoris      clopidogrel (PLAVIX) 75 MG tablet TAKE 1 TABLET EVERY DAY  Qty: 90 tablet, Refills: 1    Associated Diagnoses: Coronary artery disease involving native coronary artery of native heart without angina pectoris      metoprolol succinate ER (TOPROL XL) 50 MG 24 hr tablet Take 1 tablet (50 mg) by mouth daily Appointment required for  further refills  Qty: 90 tablet, Refills: 0    Associated Diagnoses: Essential hypertension with goal blood pressure less than 140/90      multivitamin, therapeutic with minerals (THERA-VIT-M) TABS Take 1 tablet by mouth daily      nitroGLYcerin (NITROSTAT) 0.4 MG sublingual tablet Place 1 tablet (0.4 mg) under the tongue every 5 minutes as needed for chest pain  Qty: 30 tablet, Refills: 0    Associated Diagnoses: Coronary artery disease involving native coronary artery of native heart without angina pectoris      tamsulosin (FLOMAX) 0.4 MG capsule Take 0.4 mg by mouth daily           Allergies   Allergies   Allergen Reactions     Penicillins

## 2022-11-09 NOTE — PLAN OF CARE
[id number]    Neuro: A/O x3, forgetful. Slightly confused. Alarms in place  Cardiac: denies chest pain.   Resp: on room air. Mild dyspnea on exertion, SpO2 high 90s  GI: voids without difficulty/any issues  : denies n/v, poor appetite  Skin: no pertinent skin issues  Activity: Up SBA to bathroom  Pain: denies any pain     Plan: discharge tomorrow

## 2022-11-09 NOTE — PROGRESS NOTES
11/08/22 1430   Appointment Info   Signing Clinician's Name / Credentials (PT) Liz Gomes PT   Living Environment   People in Home child(juliocesar), adult;spouse   Current Living Arrangements house   Home Accessibility stairs to enter home;stairs within home   Number of Stairs, Main Entrance 2   Stair Railings, Main Entrance none   Number of Stairs, Within Home, Primary six   Stair Railings, Within Home, Primary railings safe and in good condition   Transportation Anticipated family or friend will provide   Self-Care   Usual Activity Tolerance good   Current Activity Tolerance moderate   Equipment Currently Used at Home walker, rolling   Fall history within last six months no   Activity/Exercise/Self-Care Comment mod indep with mobility and ADLs   General Information   Onset of Illness/Injury or Date of Surgery 11/06/22   Referring Physician Josefina JAMES   Pertinent History of Current Problem (include personal factors and/or comorbidities that impact the POC) 89 year old male with a past medical history significant for hypertension, hyperlipidemia coronary disease status post PCI (2/20), history of systolic heart failure with reduced ejection fraction due to mixed ischemic and nonischemic cardiomyopathy and a chronic left bundle branch block who presents with worsening shortness of breath over the last week and a half.   Existing Precautions/Restrictions fall   Cognition   Affect/Mental Status (Cognition) WFL   Orientation Status (Cognition) oriented x 3   Follows Commands (Cognition) WFL   Range of Motion (ROM)   Range of Motion ROM is WFL   Strength (Manual Muscle Testing)   Strength (Manual Muscle Testing) Deficits observed during functional mobility   Strength Comments B HF 4/5 remaining LE strength WFL generally deconditionned with dec activity tolerance   Bed Mobility   Comment, (Bed Mobility) struggling to come to EOB, seeking for therapist to help him up, coming to EOB w CGA   Transfers    Comment, (Transfers) sit to stand w/ CGA and WW   Gait/Stairs (Locomotion)   [place] (Gait) contact guard   Assistive Device (Gait) walker, front-wheeled   Distance in Feet (Required for LE Total Joints) 10   Pattern (Gait) step-through   Deviations/Abnormal Patterns (Gait) fariha decreased;gait speed decreased   Balance   Balance Comments impaired from baseline using WW at home   Clinical Impression   Criteria for Skilled Therapeutic Intervention Yes, treatment indicated   PT Diagnosis (PT) difficulty walking   Influenced by the following impairments impaired gait/balance, strengthening   Functional limitations due to impairments impaired mobility   Clinical Presentation (PT Evaluation Complexity) Stable/Uncomplicated   Clinical Presentation Rationale clinical judgement   Clinical Decision Making (Complexity) low complexity   Planned Therapy Interventions (PT) bed mobility training;gait training;stair training;strengthening   Risk & Benefits of therapy have been explained evaluation/treatment results reviewed;care plan/treatment goals reviewed;risks/benefits reviewed   PT Discharge Planning   PT Discharge Recommendation (DC Rec) home with assist;home with home care physical therapy   PT Rationale for DC Rec Pt below baseline for inde to mod indep w mobility will benefit from home PT for home safety eval, assist from family on stairs due to balance deficit and supervision with mobility.   PT Brief overview of current status CGA w bed mobility, SBA with gait

## 2022-11-09 NOTE — PLAN OF CARE
End of Shift Summary  For vital signs and complete assessments, please see documentation flowsheets.     Pertinent assessments: A&Ox4, confused/forgetful at times. VSS on room air, afebrile. Denies pain and SOB. Up SBA. Bed alarm on for safety, pt does not call appropriately.     Major Shift Events: 9 beat/5 beat/6 beat Vtach per tele tech   Treatment Plan: Daily weights, strict I/Os, O2 PRN. PT/OT/Palliative consults. Discharge 11/9  Bedside Nurse: Radha Orozco RN

## 2022-11-10 NOTE — PROGRESS NOTES
[facility]    Background: Transitional Care Management program identified per system criteria and reviewed by [facility] team for possible outreach.    Assessment: Upon chart review, CCR Team member will not proceed with patient outreach related to this episode of Transitional Care Management program due to reason below:    Patient has active communication with a nurse, provider or care team for reason of post-hospital follow up plan.  Outreach call by CCR team not indicated to minimize duplicative efforts.     Plan: Transitional Care Management episode addressed appropriately per reason noted above.      APRIL Hawkins  [facility]    *Connected Care Resource Team does NOT follow patient ongoing. Referrals are identified based on internal discharge reports and the outreach is to ensure patient has an understanding of their discharge instructions.

## 2022-11-10 NOTE — TELEPHONE ENCOUNTER
Patient was admitted to [facility]    PMH: CHF    Echo showed EF of 20-25%    Pt was started on Lasix at time of discharge.    Called patient to discuss any post hospital d/c questions he may have, review medication changes, and confirm f/u appts. Patient denied any questions regarding new medications or changes to PTA medications.     Patient denied any SOB, chest pain, or light headedness.    RN confirmed with patient that he has an appt scheduled on 11/23/22 with MATILDE Jodi Felix at our [facility].    Patient advised to call clinic with any cardiac related questions or concerns prior to this matilde't. Patient verbalized understanding and agreed with plan.   Darcy Guzman RN on 11/10/2022 at 9:18 AM

## 2022-11-17 NOTE — TELEPHONE ENCOUNTER
Home Health Care plan of care 11- received via fax     Forms in  mail box for review and signature.

## 2022-11-23 NOTE — PROGRESS NOTES
"  Cardiology Clinic Progress Note    Deshawn Burrows MRN# [id number]   YOB: 1933 Age: 89 year old     Primary cardiologist: Dr. Blanton         Assessment and Plan     In summary, Deshawn Burrows presents today for a CORE clinic enrollment visit.     1. Recent admission for acute on chronic HFrEF. Currently appears well-compensated on no diuretic therapy.   2. Chronic severe, mixed CMP, with stable EF ~25% on 9/2022 TTE. On Toprol 50 mg daily alone for GDMT, given frailty and marginal blood pressure.   3. CAD s/p PCI to LAD in 2020. Remains on DAPT. Denies angina.   4. DNR/DNI, recently declined hospice.     Plan:  - Last episode came on quite quickly, so we're going to try and prevent that from happening again with a scheduled diuretic regimen. Change PRN furosemide to 3x weekly. If he develops lightheadedness with this, Trisha will reduce it to twice weekly dosing.   - Continue overall conservative management with medications aimed at symptom control.   - Open arms meal application provided. Increase Ensure shakes to twice weekly.     Follow-up:  - With me in 4 weeks. Will plan to back down beta blocker therapy and get him on some vasodilator therapy at that time.     Gabby Felix PA-C  [facility]         History of Presenting Illness     Deshawn Burrows is a pleasant 89 year old patient with a pertinent history of the following -   1.  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  2.  CAD, with remote PCI to the LAD, undergoing LAD PCI for ISR in 2020.  3.  Chronic left bundle branch block  4.  Progressive memory impairment, elderly frailty.   Social - he lives with his wife (Eryn, who also has memory issues), daughter (Andra) and son-in-law (Deshawn) and their black dachshund dog, named \"Varun\".     In brief, this patient has followed with Dr. Blanton since 2020, after he was admitted to [facility] with acute on " "chronic heart failure.  His follow-up has been intermittent, per notes probably owing to the patient's poor memory.  A cardiac MRI had been ordered at one point but the patient failed to complete it.  Overall, a conservative approach to his management has been pursued.    He most recently saw Dr. Blanton on November 1. Echo showed stable findings, with an EF of 20-25%, and moderate MR. At that time they discussed how he was a suboptimal candidate for ICD or CRT-D given his advanced age, cognitive decline, and frailty.  Palliative care was discussed.     Unfortunately, he was then admitted for a couple days on November 7 with progressively worsening dyspnea, found to be hypoxic with bilateral pleural effusions, diuresed with IV Lasix, and maintained on PRN furosemide 20 mg. His other meds were kept the same. Palliative care saw him in consultation, and he was changed to DNR/DNI, but requested to return to the hospital if needed.    Today, Deshawn returns to clinic with his daughter Andra saying he feels well. States his breathing feels back to normal, but he's a very poor historian. Andra states she notices significant improvement, but still notices intermittent dyspnea but nothing with consistency. Patient denies chest pain, orthopnea, edema, claudication, palpitations, near syncope or syncope. Labs today show normal renal function with a proBNP of 3600, which is down from 9000 in the hospital. He hasn't taken any lasix since leaving the hospital. Takes one ensure shake with his AM pills. BP is marginal. Weight is stable ~120 lbs.         Review of Systems     12-pt ROS is negative except for as noted in the HPI.          Physical Exam     Vitals: /66 (BP Location: Right arm, Patient Position: Sitting, Cuff Size: Adult Regular)   Pulse 76   Ht 1.854 m (6' 1\")   Wt 54.7 kg (120 lb 9.6 oz)   SpO2 99%   BMI 15.91 kg/m    Wt Readings from Last 10 Encounters:   11/23/22 54.7 kg (120 lb 9.6 oz)   11/09/22 53.1 kg (117 " lb)   11/01/22 56.7 kg (125 lb)   10/24/22 53.1 kg (117 lb)   09/07/22 59.8 kg (131 lb 14.4 oz)   08/31/22 60.1 kg (132 lb 6.4 oz)   06/20/22 60.3 kg (133 lb)   04/21/22 60.8 kg (134 lb)   04/11/22 58.5 kg (128 lb 14.4 oz)   02/25/22 56.7 kg (125 lb)       Constitutional:  Patient is pleasant, alert, cooperative, and in NAD.  HEENT:  NCAT. PERRLA. EOM's intact.   Neck:  CVP appears normal. No carotid bruits.   Pulmonary: Normal respiratory effort. CTAB.   Cardiac: RRR, normal S1/S2, no S3/S4, no murmur or rub.   Abdomen:  Non-tender abdomen, no hepatosplenomegaly appreciated.   Vascular: Pulses in the upper and lower extremities are 2+ and equal bilaterally.  Extremities: No edema, erythema, cyanosis or tenderness appreciated.  Skin:  No rashes or lesions appreciated.   Neurological:  No gross motor or sensory deficits.   Psych: Appropriate affect.          Data   Labs reviewed:  Recent Labs   Lab Test 11/23/22  0953 10/19/22  0907 03/02/22  1208 02/28/20  1641 07/17/19  1412 05/15/18  1556   LDL  --   --  45 48 43  --    HDL  --   --  45 34* 30*  --    NHDL  --   --  67 55 79  --    CHOL  --   --  112 89 109  --    TRIG  --   --  111 36 179*  --    TSH  --  6.80*  --   --   --  2.67   NTBNP 3,660*  --   --   --   --   --        Lab Results   Component Value Date    WBC 11.4 (H) 11/08/2022    WBC 6.7 03/04/2020    RBC 3.87 (L) 11/08/2022    RBC 4.09 (L) 03/04/2020    HGB 12.4 (L) 11/08/2022    HGB 13.0 (L) 03/04/2020    HCT 38.3 (L) 11/08/2022    HCT 40.0 03/04/2020    MCV 99 11/08/2022    MCV 98 03/04/2020    MCH 32.0 11/08/2022    MCH 31.8 03/04/2020    MCHC 32.4 11/08/2022    MCHC 32.5 03/04/2020    RDW 14.2 11/08/2022    RDW 12.8 03/04/2020     11/08/2022     03/04/2020       Lab Results   Component Value Date     11/23/2022     03/06/2020    POTASSIUM 4.1 11/23/2022    POTASSIUM 4.2 06/11/2022    POTASSIUM 3.9 03/06/2020    CHLORIDE 104 11/23/2022    CHLORIDE 104 06/11/2022    CHLORIDE  105 03/06/2020    CO2 28 11/23/2022    CO2 30 06/11/2022    CO2 29 03/06/2020    ANIONGAP 11 11/23/2022    ANIONGAP 7 06/11/2022    ANIONGAP 4 03/06/2020     (H) 11/23/2022    GLC 99 06/11/2022     (H) 03/06/2020    BUN 37.8 (H) 11/23/2022    BUN 30 06/11/2022    BUN 38 (H) 03/06/2020    CR 1.09 11/23/2022    CR 1.11 03/06/2020    GFRESTIMATED 65 11/23/2022    GFRESTIMATED 59 (L) 03/06/2020    GFRESTBLACK 69 03/06/2020    FLORES 9.7 11/23/2022    FLORES 9.1 03/06/2020      Lab Results   Component Value Date    AST 40 11/07/2022    AST 39 03/01/2020    ALT 51 (H) 11/07/2022    ALT 49 03/01/2020       No results found for: A1C    Lab Results   Component Value Date    INR 1.14 02/12/2022    INR 1.01 02/25/2020    INR 1.28 (H) 09/24/2007            Problem List     Patient Active Problem List   Diagnosis     Essential hypertension with goal blood pressure less than 140/90     Hyperlipidemia LDL goal <100     Myocardial infarction (H)     Coronary artery disease involving native heart, angina presence unspecified, unspecified vessel or lesion type     LBBB (left bundle branch block)     Coronary artery disease involving native coronary artery of native heart without angina pectoris     Ischemic cardiomyopathy     Abnormal cardiovascular stress test     Heart failure with reduced ejection fraction, NYHA class II (H)     Acute on chronic congestive heart failure, unspecified heart failure type (H)     Pulmonary mass     Congestive heart failure, unspecified HF chronicity, unspecified heart failure type (H)     Acute respiratory failure with hypoxia (H)            Medications     Current Outpatient Medications   Medication Sig Dispense Refill     aspirin 81 MG EC tablet Take 81 mg by mouth daily       atorvastatin (LIPITOR) 20 MG tablet Take 1 tablet (20 mg) by mouth daily 90 tablet 3     clopidogrel (PLAVIX) 75 MG tablet TAKE 1 TABLET EVERY DAY 90 tablet 1     furosemide (LASIX) 20 MG tablet Take 1 tablet (20 mg)  by mouth daily as needed (for weight gain of 3lb/day or 5lb or more week) 30 tablet 0     metoprolol succinate ER (TOPROL XL) 50 MG 24 hr tablet Take 1 tablet (50 mg) by mouth daily Appointment required for further refills 90 tablet 0     multivitamin, therapeutic with minerals (THERA-VIT-M) TABS Take 1 tablet by mouth daily       nitroGLYcerin (NITROSTAT) 0.4 MG sublingual tablet Place 1 tablet (0.4 mg) under the tongue every 5 minutes as needed for chest pain 30 tablet 0     tamsulosin (FLOMAX) 0.4 MG capsule Take 1 capsule (0.4 mg) by mouth daily 90 capsule 0            Past Medical History     Past Medical History:   Diagnosis Date     Acute systolic heart failure (H) 2/25/2020    Added automatically from request for surgery [id number]     CAD (coronary artery disease) 2004    2 cardiac stents      Dyspnea on exertion 2/25/2020    Added automatically from request for surgery [id number]     Essential hypertension with goal blood pressure less than 140/90 8/10/2016     Hyperlipidemia LDL goal <100 8/10/2016     Ischemic cardiomyopathy 2/25/2020    Added automatically from request for surgery [id number]     LBBB (left bundle branch block) 2/25/2020     Past Surgical History:   Procedure Laterality Date     CV CORONARY ANGIOGRAM N/A 2/28/2020    Procedure: CV CORONARY ANGIOGRAM;  Surgeon: Andres Mann MD;  Location:  HEART CARDIAC CATH LAB     CV LEFT HEART CATH N/A 2/28/2020    Procedure: Left Heart Cath;  Surgeon: Andres Mann MD;  Location:  HEART CARDIAC CATH LAB     CV LEFT VENTRICULOGRAM N/A 2/28/2020    Procedure: Left Ventriculogram;  Surgeon: Andres Mann MD;  Location:  HEART CARDIAC CATH LAB     CV PCI STENT DRUG ELUTING N/A 2/28/2020    Procedure: Percutaneous Coronary Intervention Stent Drug Eluting;  Surgeon: Andres Mann MD;  Location:  HEART CARDIAC CATH LAB     CYSTOSCOPY N/A 3/1/2020    Procedure: CYSTOSCOPY;  Surgeon: Rios Gomez MD;  Location:  OR      HEART CATH DRUG ELUTING STENT PLACEMENT  02/28/2020     HERNIA REPAIR  2007    right inguinal hernia repair     STENT, CORONARY, ANTIONE  2004    2 stents     Family History   Family history unknown: Yes     Social History     Socioeconomic History     Marital status:      Spouse name: Not on file     Number of children: Not on file     Years of education: Not on file     Highest education level: Not on file   Occupational History     Not on file   Tobacco Use     Smoking status: Former     Smokeless tobacco: Never     Tobacco comments:     Quit 30 years ago   Vaping Use     Vaping Use: Never used   Substance and Sexual Activity     Alcohol use: Yes     Comment: occasional beer     Drug use: No     Sexual activity: Not Currently   Other Topics Concern     Parent/sibling w/ CABG, MI or angioplasty before 65F 55M? Not Asked   Social History Narrative     Not on file     Social Determinants of Health     Financial Resource Strain: Not on file   Food Insecurity: Not on file   Transportation Needs: Not on file   Physical Activity: Not on file   Stress: Not on file   Social Connections: Not on file   Intimate Partner Violence: Not on file   Housing Stability: Not on file            Allergies   Penicillins    Today's clinic visit entailed:  Review of the result(s) of each unique test - BMP, proBNP, echocardiogram  Ordering of each unique test  Prescription drug management  I spent a total of 50 minutes on the day of the visit.   Time spent doing chart review, history and exam, documentation and further activities per the note  Provider  Link to MDM Help Grid     The level of medical decision making during this visit was of high complexity.

## 2022-11-23 NOTE — LETTER
11/23/2022    Colt Treviño MD  [address] [facility] [address]    RE: Deshawn Burrows       Dear Colleague,     I had the pleasure of seeing Deshawn Burrows in the [facility].    Cardiology Clinic Progress Note    Deshawn Burrows MRN# [id number]   YOB: 1933 Age: 89 year old     Primary cardiologist: Dr. Blanton         Assessment and Plan     In summary, Deshawn Burrows presents today for a CORE clinic enrollment visit.     1. Recent admission for acute on chronic HFrEF. Currently appears well-compensated on no diuretic therapy.   2. Chronic severe, mixed CMP, with stable EF ~25% on 9/2022 TTE. On Toprol 50 mg daily alone for GDMT, given frailty and marginal blood pressure.   3. CAD s/p PCI to LAD in 2020. Remains on DAPT. Denies angina.   4. DNR/DNI, recently declined hospice.     Plan:  - Last episode came on quite quickly, so we're going to try and prevent that from happening again with a scheduled diuretic regimen. Change PRN furosemide to 3x weekly. If he develops lightheadedness with this, Trisha will reduce it to twice weekly dosing.   - Continue overall conservative management with medications aimed at symptom control.   - Open arms meal application provided. Increase Ensure shakes to twice weekly.     Follow-up:  - With me in 4 weeks. Will plan to back down beta blocker therapy and get him on some vasodilator therapy at that time.     Gabby Felix PA-C  [facility]         History of Presenting Illness     Deshawn Burrows is a pleasant 89 year old patient with a pertinent history of the following -   1.  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  2.  CAD, with remote PCI to the LAD, undergoing LAD PCI for ISR in 2020.  3.  Chronic left bundle branch block  4.  Progressive memory impairment, elderly frailty.   Social - he lives with his wife (Eryn, who also has memory  "issues), daughter (Andra) and son-in-law (Deshawn) and their black dachshund dog, named \"Varun\".     In brief, this patient has followed with Dr. Blanton since 2020, after he was admitted to [facility] with acute on chronic heart failure.  His follow-up has been intermittent, per notes probably owing to the patient's poor memory.  A cardiac MRI had been ordered at one point but the patient failed to complete it.  Overall, a conservative approach to his management has been pursued.    He most recently saw Dr. Blanton on November 1. Echo showed stable findings, with an EF of 20-25%, and moderate MR. At that time they discussed how he was a suboptimal candidate for ICD or CRT-D given his advanced age, cognitive decline, and frailty.  Palliative care was discussed.     Unfortunately, he was then admitted for a couple days on November 7 with progressively worsening dyspnea, found to be hypoxic with bilateral pleural effusions, diuresed with IV Lasix, and maintained on PRN furosemide 20 mg. His other meds were kept the same. Palliative care saw him in consultation, and he was changed to DNR/DNI, but requested to return to the hospital if needed.    Today, Deshawn returns to clinic with his daughter Andra saying he feels well. States his breathing feels back to normal, but he's a very poor historian. Andra states she notices significant improvement, but still notices intermittent dyspnea but nothing with consistency. Patient denies chest pain, orthopnea, edema, claudication, palpitations, near syncope or syncope. Labs today show normal renal function with a proBNP of 3600, which is down from 9000 in the hospital. He hasn't taken any lasix since leaving the hospital. Takes one ensure shake with his AM pills. BP is marginal. Weight is stable ~120 lbs.         Review of Systems     12-pt ROS is negative except for as noted in the HPI.          Physical Exam     Vitals: /66 (BP Location: Right arm, Patient Position: " "Sitting, Cuff Size: Adult Regular)   Pulse 76   Ht 1.854 m (6' 1\")   Wt 54.7 kg (120 lb 9.6 oz)   SpO2 99%   BMI 15.91 kg/m    Wt Readings from Last 10 Encounters:   11/23/22 54.7 kg (120 lb 9.6 oz)   11/09/22 53.1 kg (117 lb)   11/01/22 56.7 kg (125 lb)   10/24/22 53.1 kg (117 lb)   09/07/22 59.8 kg (131 lb 14.4 oz)   08/31/22 60.1 kg (132 lb 6.4 oz)   06/20/22 60.3 kg (133 lb)   04/21/22 60.8 kg (134 lb)   04/11/22 58.5 kg (128 lb 14.4 oz)   02/25/22 56.7 kg (125 lb)       Constitutional:  Patient is pleasant, alert, cooperative, and in NAD.  HEENT:  NCAT. PERRLA. EOM's intact.   Neck:  CVP appears normal. No carotid bruits.   Pulmonary: Normal respiratory effort. CTAB.   Cardiac: RRR, normal S1/S2, no S3/S4, no murmur or rub.   Abdomen:  Non-tender abdomen, no hepatosplenomegaly appreciated.   Vascular: Pulses in the upper and lower extremities are 2+ and equal bilaterally.  Extremities: No edema, erythema, cyanosis or tenderness appreciated.  Skin:  No rashes or lesions appreciated.   Neurological:  No gross motor or sensory deficits.   Psych: Appropriate affect.          Data   Labs reviewed:  Recent Labs   Lab Test 11/23/22  0953 10/19/22  0907 03/02/22  1208 02/28/20  1641 07/17/19  1412 05/15/18  1556   LDL  --   --  45 48 43  --    HDL  --   --  45 34* 30*  --    NHDL  --   --  67 55 79  --    CHOL  --   --  112 89 109  --    TRIG  --   --  111 36 179*  --    TSH  --  6.80*  --   --   --  2.67   NTBNP 3,660*  --   --   --   --   --        Lab Results   Component Value Date    WBC 11.4 (H) 11/08/2022    WBC 6.7 03/04/2020    RBC 3.87 (L) 11/08/2022    RBC 4.09 (L) 03/04/2020    HGB 12.4 (L) 11/08/2022    HGB 13.0 (L) 03/04/2020    HCT 38.3 (L) 11/08/2022    HCT 40.0 03/04/2020    MCV 99 11/08/2022    MCV 98 03/04/2020    MCH 32.0 11/08/2022    MCH 31.8 03/04/2020    MCHC 32.4 11/08/2022    MCHC 32.5 03/04/2020    RDW 14.2 11/08/2022    RDW 12.8 03/04/2020     11/08/2022     03/04/2020 "       Lab Results   Component Value Date     11/23/2022     03/06/2020    POTASSIUM 4.1 11/23/2022    POTASSIUM 4.2 06/11/2022    POTASSIUM 3.9 03/06/2020    CHLORIDE 104 11/23/2022    CHLORIDE 104 06/11/2022    CHLORIDE 105 03/06/2020    CO2 28 11/23/2022    CO2 30 06/11/2022    CO2 29 03/06/2020    ANIONGAP 11 11/23/2022    ANIONGAP 7 06/11/2022    ANIONGAP 4 03/06/2020     (H) 11/23/2022    GLC 99 06/11/2022     (H) 03/06/2020    BUN 37.8 (H) 11/23/2022    BUN 30 06/11/2022    BUN 38 (H) 03/06/2020    CR 1.09 11/23/2022    CR 1.11 03/06/2020    GFRESTIMATED 65 11/23/2022    GFRESTIMATED 59 (L) 03/06/2020    GFRESTBLACK 69 03/06/2020    FLORES 9.7 11/23/2022    FLORES 9.1 03/06/2020      Lab Results   Component Value Date    AST 40 11/07/2022    AST 39 03/01/2020    ALT 51 (H) 11/07/2022    ALT 49 03/01/2020       No results found for: A1C    Lab Results   Component Value Date    INR 1.14 02/12/2022    INR 1.01 02/25/2020    INR 1.28 (H) 09/24/2007            Problem List     Patient Active Problem List   Diagnosis     Essential hypertension with goal blood pressure less than 140/90     Hyperlipidemia LDL goal <100     Myocardial infarction (H)     Coronary artery disease involving native heart, angina presence unspecified, unspecified vessel or lesion type     LBBB (left bundle branch block)     Coronary artery disease involving native coronary artery of native heart without angina pectoris     Ischemic cardiomyopathy     Abnormal cardiovascular stress test     Heart failure with reduced ejection fraction, NYHA class II (H)     Acute on chronic congestive heart failure, unspecified heart failure type (H)     Pulmonary mass     Congestive heart failure, unspecified HF chronicity, unspecified heart failure type (H)     Acute respiratory failure with hypoxia (H)            Medications     Current Outpatient Medications   Medication Sig Dispense Refill     aspirin 81 MG EC tablet Take 81 mg by  mouth daily       atorvastatin (LIPITOR) 20 MG tablet Take 1 tablet (20 mg) by mouth daily 90 tablet 3     clopidogrel (PLAVIX) 75 MG tablet TAKE 1 TABLET EVERY DAY 90 tablet 1     furosemide (LASIX) 20 MG tablet Take 1 tablet (20 mg) by mouth daily as needed (for weight gain of 3lb/day or 5lb or more week) 30 tablet 0     metoprolol succinate ER (TOPROL XL) 50 MG 24 hr tablet Take 1 tablet (50 mg) by mouth daily Appointment required for further refills 90 tablet 0     multivitamin, therapeutic with minerals (THERA-VIT-M) TABS Take 1 tablet by mouth daily       nitroGLYcerin (NITROSTAT) 0.4 MG sublingual tablet Place 1 tablet (0.4 mg) under the tongue every 5 minutes as needed for chest pain 30 tablet 0     tamsulosin (FLOMAX) 0.4 MG capsule Take 1 capsule (0.4 mg) by mouth daily 90 capsule 0            Past Medical History     Past Medical History:   Diagnosis Date     Acute systolic heart failure (H) 2/25/2020    Added automatically from request for surgery [id number]     CAD (coronary artery disease) 2004    2 cardiac stents      Dyspnea on exertion 2/25/2020    Added automatically from request for surgery [id number]     Essential hypertension with goal blood pressure less than 140/90 8/10/2016     Hyperlipidemia LDL goal <100 8/10/2016     Ischemic cardiomyopathy 2/25/2020    Added automatically from request for surgery [id number]     LBBB (left bundle branch block) 2/25/2020     Past Surgical History:   Procedure Laterality Date     CV CORONARY ANGIOGRAM N/A 2/28/2020    Procedure: CV CORONARY ANGIOGRAM;  Surgeon: Andres Mann MD;  Location:  HEART CARDIAC CATH LAB     CV LEFT HEART CATH N/A 2/28/2020    Procedure: Left Heart Cath;  Surgeon: Andres Mann MD;  Location:  HEART CARDIAC CATH LAB     CV LEFT VENTRICULOGRAM N/A 2/28/2020    Procedure: Left Ventriculogram;  Surgeon: Andres Mann MD;  Location:  HEART CARDIAC CATH LAB     CV PCI STENT DRUG ELUTING N/A 2/28/2020    Procedure:  Percutaneous Coronary Intervention Stent Drug Eluting;  Surgeon: Andres Mann MD;  Location:  HEART CARDIAC CATH LAB     CYSTOSCOPY N/A 3/1/2020    Procedure: CYSTOSCOPY;  Surgeon: Rios Gomez MD;  Location: [facility] OR     HEART CATH DRUG ELUTING STENT PLACEMENT  02/28/2020     HERNIA REPAIR  2007    right inguinal hernia repair     STENT, CORONARY, ANTIONE  2004    2 stents     Family History   Family history unknown: Yes     Social History     Socioeconomic History     Marital status:      Spouse name: Not on file     Number of children: Not on file     Years of education: Not on file     Highest education level: Not on file   Occupational History     Not on file   Tobacco Use     Smoking status: Former     Smokeless tobacco: Never     Tobacco comments:     Quit 30 years ago   Vaping Use     Vaping Use: Never used   Substance and Sexual Activity     Alcohol use: Yes     Comment: occasional beer     Drug use: No     Sexual activity: Not Currently   Other Topics Concern     Parent/sibling w/ CABG, MI or angioplasty before 65F 55M? Not Asked   Social History Narrative     Not on file     Social Determinants of Health     Financial Resource Strain: Not on file   Food Insecurity: Not on file   Transportation Needs: Not on file   Physical Activity: Not on file   Stress: Not on file   Social Connections: Not on file   Intimate Partner Violence: Not on file   Housing Stability: Not on file            Allergies   Penicillins    Today's clinic visit entailed:  Review of the result(s) of each unique test - BMP, proBNP, echocardiogram  Ordering of each unique test  Prescription drug management  I spent a total of 50 minutes on the day of the visit.   Time spent doing chart review, history and exam, documentation and further activities per the note  Provider  Link to [facility] Help Grid     The level of medical decision making during this visit was of high complexity.    Thank you for allowing me to participate  in the care of your patient.      Sincerely,     Gabby Felix PA-C     [facility]    cc:   Kwame Blanton MD  [address], [address]  [place] [address]

## 2022-11-23 NOTE — PATIENT INSTRUCTIONS
Today, we discussed the following:   Last episode came on quite quickly, so we're going to try and prevent that from happening again with a scheduled diuretic regimen. Change furosemide to 3x weekly. If he develops lightheadedness with this, will reduce it to twice weekly dosing.   Increase ensure shakes to twice daily.   [employer] meal delivery application.  Return to see me in four weeks with labs prior.    Please, remember to continue the followin.  Weigh yourself daily. Call if your weight is up > than 2 pounds in one day, or 5 pounds in one week; if you feel more short of breath or have worsening swelling in your legs or abdomen.  2.  Eat a low sodium diet (less than 2,000mg or 2g daily). If you eat less salt, you will retain less fluid.   3.  Avoid alcohol, as this can worsen heart failure.   4.  Avoid NSAIDs as able (For example, Ibuprofen / aleve / advil / naprosen / diclofenac).    Please call [facility] nurse for any questions or concerns Mon-Fri 8am-4pm:   [phone]  For concerns after hours:   [phone] option 2   Scheduling phone number:   [phone]    Thank you for visiting with us today.   Gabby Felix PA-C  ______________________________________________________________

## 2022-11-23 NOTE — PROGRESS NOTES
[facility]    [employer] [place] ([facility]) information and application given to dtr Andra.  Discussed w/ her low sodium meal program assistance.  She would like to review and decide at later date.  She confirms will fill out and give back if she decides would be a good program for him.  She currently does cooking for pt and has been making low sodium meals already.      Jodi Mcdonald RN BSN   [facility]- [place], [place]  [facility] Care Coordinator  11/23/22, 11:34 AM

## 2022-12-07 NOTE — TELEPHONE ENCOUNTER
[facility] * plan of care  #[id number] received via fax     Forms in  mail box for review and signature.

## 2022-12-20 NOTE — TELEPHONE ENCOUNTER
[facility] * plan of care 11- POC#[id number] order received via fax    Forms in Dr. mail box for review and signature.

## 2022-12-29 NOTE — TELEPHONE ENCOUNTER
Scheduling - please call Deshawn to make appt for NON fasting labs (BMP and NT pro BNP)  for his appt 1/3 with ALPHONSE Ayoub.     Thanks!    Jocelyn Pittman, DAVIDN, RN 3:38 PM 12/29/22    Future Appointments   Date Time Provider Department Center   1/3/2023 10:50 AM [facility]   2/20/2023  1:30 PM Matt Alexander, NP [facility] PSA CLIN

## 2022-12-30 NOTE — TELEPHONE ENCOUNTER
[facility]    Lab appointment made in [place] for BMP and NT pro BNP.    Jacy Jack, RN, BSN  [facility]- [place], [place]  [facility] Care Coordinator  December 30, 2022 8:22 AM      Future Appointments   Date Time Provider Department Center   1/2/2023  2:30 PM EA LAB [facility] EA   1/3/2023 10:50 AM [facility]   2/20/2023  1:30 PM [facility]

## 2023-01-01 ENCOUNTER — TELEPHONE (OUTPATIENT)
Dept: CARDIOLOGY | Facility: CLINIC | Age: 88
End: 2023-01-01
Payer: MEDICARE

## 2023-01-01 ENCOUNTER — LAB (OUTPATIENT)
Dept: LAB | Facility: CLINIC | Age: 88
End: 2023-01-01
Payer: MEDICARE

## 2023-01-01 ENCOUNTER — OFFICE VISIT (OUTPATIENT)
Dept: CARDIOLOGY | Facility: CLINIC | Age: 88
End: 2023-01-01
Attending: INTERNAL MEDICINE
Payer: MEDICARE

## 2023-01-01 ENCOUNTER — TELEPHONE (OUTPATIENT)
Dept: INTERNAL MEDICINE | Facility: CLINIC | Age: 88
End: 2023-01-01
Payer: MEDICARE

## 2023-01-01 ENCOUNTER — TELEPHONE (OUTPATIENT)
Dept: INTERNAL MEDICINE | Facility: CLINIC | Age: 88
End: 2023-01-01

## 2023-01-01 VITALS
HEART RATE: 64 BPM | SYSTOLIC BLOOD PRESSURE: 118 MMHG | HEIGHT: 73 IN | DIASTOLIC BLOOD PRESSURE: 78 MMHG | WEIGHT: 117.7 LBS | BODY MASS INDEX: 15.6 KG/M2 | OXYGEN SATURATION: 99 %

## 2023-01-01 DIAGNOSIS — R06.02 SOB (SHORTNESS OF BREATH): ICD-10-CM

## 2023-01-01 DIAGNOSIS — I50.9 CONGESTIVE HEART FAILURE, UNSPECIFIED HF CHRONICITY, UNSPECIFIED HEART FAILURE TYPE (H): ICD-10-CM

## 2023-01-01 DIAGNOSIS — Z53.9 DIAGNOSIS NOT YET DEFINED: Primary | ICD-10-CM

## 2023-01-01 DIAGNOSIS — I50.21 ACUTE HFREF (HEART FAILURE WITH REDUCED EJECTION FRACTION) (H): ICD-10-CM

## 2023-01-01 DIAGNOSIS — I50.9 CONGESTIVE HEART FAILURE, UNSPECIFIED HF CHRONICITY, UNSPECIFIED HEART FAILURE TYPE (H): Primary | ICD-10-CM

## 2023-01-01 LAB
ANION GAP SERPL CALCULATED.3IONS-SCNC: 13 MMOL/L (ref 7–15)
ANION GAP SERPL CALCULATED.3IONS-SCNC: 9 MMOL/L (ref 7–15)
BUN SERPL-MCNC: 30.2 MG/DL (ref 8–23)
BUN SERPL-MCNC: 45.9 MG/DL (ref 8–23)
CALCIUM SERPL-MCNC: 10.1 MG/DL (ref 8.8–10.2)
CALCIUM SERPL-MCNC: 9.4 MG/DL (ref 8.8–10.2)
CHLORIDE SERPL-SCNC: 104 MMOL/L (ref 98–107)
CHLORIDE SERPL-SCNC: 104 MMOL/L (ref 98–107)
CREAT SERPL-MCNC: 0.98 MG/DL (ref 0.67–1.17)
CREAT SERPL-MCNC: 1.18 MG/DL (ref 0.67–1.17)
DEPRECATED HCO3 PLAS-SCNC: 26 MMOL/L (ref 22–29)
DEPRECATED HCO3 PLAS-SCNC: 29 MMOL/L (ref 22–29)
GFR SERPL CREATININE-BSD FRML MDRD: 59 ML/MIN/1.73M2
GFR SERPL CREATININE-BSD FRML MDRD: 74 ML/MIN/1.73M2
GLUCOSE SERPL-MCNC: 124 MG/DL (ref 70–99)
GLUCOSE SERPL-MCNC: 94 MG/DL (ref 70–99)
NT-PROBNP SERPL-MCNC: 5024 PG/ML (ref 0–1800)
NT-PROBNP SERPL-MCNC: 7593 PG/ML (ref 0–1800)
POTASSIUM SERPL-SCNC: 4.4 MMOL/L (ref 3.4–5.3)
POTASSIUM SERPL-SCNC: 4.4 MMOL/L (ref 3.4–5.3)
SODIUM SERPL-SCNC: 142 MMOL/L (ref 136–145)
SODIUM SERPL-SCNC: 143 MMOL/L (ref 136–145)

## 2023-01-01 PROCEDURE — 83880 ASSAY OF NATRIURETIC PEPTIDE: CPT | Performed by: INTERNAL MEDICINE

## 2023-01-01 PROCEDURE — G0180 MD CERTIFICATION HHA PATIENT: HCPCS | Performed by: INTERNAL MEDICINE

## 2023-01-01 PROCEDURE — 36415 COLL VENOUS BLD VENIPUNCTURE: CPT | Performed by: INTERNAL MEDICINE

## 2023-01-01 PROCEDURE — 99214 OFFICE O/P EST MOD 30 MIN: CPT | Performed by: INTERNAL MEDICINE

## 2023-01-01 PROCEDURE — 80048 BASIC METABOLIC PNL TOTAL CA: CPT | Performed by: INTERNAL MEDICINE

## 2023-01-01 RX ORDER — FUROSEMIDE 20 MG
20 TABLET ORAL DAILY
Qty: 90 TABLET | Refills: 3 | Status: SHIPPED | OUTPATIENT
Start: 2023-01-01

## 2023-01-05 NOTE — TELEPHONE ENCOUNTER
[facility] * plan of care 11- received via fax     Forms in  mail box for review and signature.

## 2023-01-16 NOTE — TELEPHONE ENCOUNTER
Follow up with myself or PCP as soon as able, with labs (BMP, proBNP).  ED in interim if his symptoms worsen.

## 2023-01-16 NOTE — TELEPHONE ENCOUNTER
Health Call Center    Phone Message    May a detailed message be left on voicemail: yes     Reason for Call: Symptoms or Concerns     If patient has red-flag symptoms, warm transfer to triage line    Current symptom or concern: shortness of breath, giving furosemide (LASIX) 20 MG tablet two times per week.  Andra is wondering if increasing to three times or more.  He has no appetite, and may be losing some weight.  Andra has trouble getting him to remember to weigh himself.  On 1/13 Bp was 98/75 pulse was 87 and Oxygen was 99, Andra gave him a tablet and then it was 106/77 and 119/76.  Please call her back to discuss    Symptoms have been present for:  10 day(s)    Has patient previously been seen for this? No    By :     Date:     Are there any new or worsening symptoms? No      Action Taken: Message routed to:  Other: Cardiology    Travel Screening: Not Applicable

## 2023-01-16 NOTE — TELEPHONE ENCOUNTER
"Called Andra back with recommendations per Gabby: \"Follow up with myself or PCP as soon as able, with labs (BMP, proBNP).  ED in interim if his symptoms worsen\"    No appts available with Gabby this week. Dr Blanton has an opening this Friday, 1/20. Andra will take appt on 1/20. She will take him to ER if breathing or other symptoms worsen.     Orders placed      Scheduling - Please make appt for Deshawn on 1/20 at 11:45 AM for non fasting labs and with Dr Blanton at 1245 per ALPHONSE Ayoub.   Holds are in place    Thank you!     Future Appointments   Date Time Provider Department Center   2/6/2023  1:00 PM RU LAB [facility] [place] RID   2/14/2023 10:10 AM [facility]   2/20/2023  1:30 PM [facility]       "

## 2023-01-16 NOTE — TELEPHONE ENCOUNTER
"Returned Andra's call (on CTC).   She has noticed that over the last few days he is more dyspneic, more so with activity. He is not eating much as he has no appetite. He has no swelling or bloating.   She did give him 20 mg furosemide today. She had been giving him 20 mg twice a week until \"maybe 2-3 weeks ago. I did bump it up to 3 times a week.\" She states \"one day last week I gave him 40 mg because he was so short of breath. That did help with his breathing.\"     She does not have his weights with her. She states \"he has to be less than 120 lbs, I would say around 116 lbs.\"     Meds:  Furosemide 20 mg Three times a week. - Last had 1/16/2023    Last appt 11/23/2022 with Gabby Isidro, PAC:   Weight was 120 lbs. Denies dyspnea, daughter notices intermittent. Change PRN furosemide to 3x weekly. If he develops lightheadedness with this, Trisha will reduce it to twice weekly dosing.     She is wondering \"if this means his heart failure is getting worse and he is closer to dying. Like does this mean he has a month left?\"     I did offer her appt with Gabby 1/17, she is not able to come as she has another appt at that time.  Declines Kim appts.     Update to Gabby      Future Appointments   Date Time Provider Department Center   2/6/2023  1:00 PM [facility]   2/14/2023 10:10 AM Gabby Felix PA-C [facility] PSA CLIN   2/20/2023  1:30 PM Matt Alexander, NP [facility] PSA CLIN       Jocelyn Pittman, CONRAD, RN 12:50 PM 01/16/23          "

## 2023-01-17 NOTE — TELEPHONE ENCOUNTER
Closing encounter as appts are made    Future Appointments   Date Time Provider Department Center   1/20/2023 11:45 AM [facility]   1/20/2023 12:45 PM Kwame Blanton MD [facility] PSA CLIN   2/6/2023  1:00 PM RU LAB [facility] [place] RID   2/14/2023 10:10 AM Gabby Felix PA-C [facility] PSA CLIN   2/20/2023  1:30 PM Matt Alexander NP [facility] PSA CLIN     CONRAD Albarran, RN 12:09 PM 01/17/23

## 2023-01-20 NOTE — PROGRESS NOTES
Cardiology Clinic Progress Note:    January 20, 2023   Patient Name: Deshawn Burrows  Patient MRN: [id number]     Consult indication: CHF    HPI:    I had the opportunity to see patient Deshawn Burrows in cardiology clinic for a follow up visit. Patient is followed by our colleague Colt Treviño MD with Primary Care.     As you know, Mr. 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      The patient has a remote history of coronary artery disease with stenting in the 1990s in [place].  He was admitted to [facility] 02/2020 for chest pain and dyspnea.  He was seen in consultation by my colleague, Dr. Palmer.  TTE was done which demonstrated LVEF 24% with mildly reduced RV function.  He was also noted to have moderate mitral regurgitation.  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  LVEF was 21%.        He underwent coronary angiography on 02/28/2020 that demonstrated a mid LAD in-stent restenosis that was stented at that time.  The degree of cardiac dysfunction was thought to be out of proportion to the burden of coronary artery disease and it was recommended that a cardiac MRI be considered for further evaluation of his cardiomyopathy.     He was seen in Cardiology Clinic with my colleague, Aric Alexander NP, on 03/06/2020.  At that time, he had been doing well and no changes were warranted to his cardiac regimen.  It was  recommended the patient undergo a cardiac MRI and followup with Dr. Palmer for reassessment.      Unfortunately, it seems that the patient had been lost to followup as he did not undergo the cardiac MRI that was ordered nor the visit with Dr. Palmer as had been recommended.        I had seen him in follow-up in 4/8/2021.  At that time, we discussed options for further evaluation management.  We reiterated our recommendation for reassessment with a cardiac MRI.  He also recommended close follow-up with cardiology in 1 month.  For unclear reasons, these were not scheduled either.      I had seen him on 11/1/2022.  At that time, he was accompanied by his daughter, who is visiting from [place].  She has been here now for almost a year.  We discussed option of CRT, seem to be a suboptimal candidate for CRT-D given advanced age, cognitive decline.  Patient and daughter would like to avoid invasive procedures.  We also referred him to Palliative care.    Unfortunately following this he was hospitalized 11/7/2022 to 11/9/2022 with decompensated heart failure.  He was seen by Palliative care while inpatient, and was changed to DNR/DNI, however was still interested in restorative cares.  He has since been followed by my colleague JACOB Ayoub.    Unfortunately, patient continues to have dyspnea on exertion, though weight has been decreasing.  Patient is accompanied by his daughter today again, who reports that patient has a very poor appetite, and generally does not eat much at all.  Patient also has been experiencing symptoms consistent with orthopnea/PND, though no chest pain or abnormal lower extremity swelling.  Labs from today are notable for potassium 4.4, creatinine 1.18, NT proBNP 7600.    Assessment and Plan/Recommendations:    # HFrEF, LVEF 20-25%, longstanding severe cardiomyopathy.  NYHA III.  Weight down to 117 pounds, despite worsening dyspnea and symptoms of orthopnea/PND, elevated NT proBNP.  Overall clinical  presentation is concerning for end-stage heart failure.  He has declined CRT-D, CRT-D previously, he would like to avoid invasive procedures.  # Coronary artery disease with remote history of PCI, status post repeat coronary angiography 02/2020 with ROSENDO to mid LAD in-stent restenosis.  # Sporadic follow up with cardiology, he has missed clinic appointments and has not undergone a cardiac MRI that was ordered over a year ago, likely due to memory impairment.  # Hypertension, stable.  # Dyslipidemia.  LDL at goal of less than 70.   # Chronic LBBB    -Reviewed prior hospitalization, as well as concerning nature of trajectory of his health.  Patient's daughter voiced similar concerns.  Revisited option of referral to home hospice, patient is not interested in this at this time.  - Will increase furosemide to 20 mg daily  - BMP in 2 weeks  - Continue metoprolol succinate, aspirin, clopidogrel, atorvastatin  - Follow-up with JACOB Ayoub in about 1 month or so, follow-up with me per her discretion    Thank you for allowing our team to participate in the care of Deshawn Burrows.  Please do not hesitate to call or page me with any questions or concerns.    Sincerely,     Kwame Blanton MD, [facility]  Cardiology  Text Page   January 20, 2023    Voice recognition software utilized.     Total time spent on this encounter: 35 minutes, providing care in this encounter including, but not limited to, reviewing prior medical records, laboratory data, imaging studies, diagnostic studies, procedure notes, formulating an assessment and plan, recommendations, discussion and counseling with patient face to face, dictation.    Past Medical History:     Past Medical History:   Diagnosis Date     Acute systolic heart failure (H) 2/25/2020    Added automatically from request for surgery [id number]     CAD (coronary artery disease) 2004    2 cardiac stents      Dyspnea on exertion 2/25/2020    Added automatically from request  for surgery [id number]     Essential hypertension with goal blood pressure less than 140/90 8/10/2016     Hyperlipidemia LDL goal <100 8/10/2016     Ischemic cardiomyopathy 2/25/2020    Added automatically from request for surgery [id number]     LBBB (left bundle branch block) 2/25/2020        Past Surgical History:   Past Surgical History:   Procedure Laterality Date     CV CORONARY ANGIOGRAM N/A 2/28/2020    Procedure: CV CORONARY ANGIOGRAM;  Surgeon: Andres Mann MD;  Location: [facility]     CV LEFT HEART CATH N/A 2/28/2020    Procedure: Left Heart Cath;  Surgeon: Andres Mann MD;  Location: [facility]     CV LEFT VENTRICULOGRAM N/A 2/28/2020    Procedure: Left Ventriculogram;  Surgeon: Andres Mann MD;  Location: [facility]     CV PCI STENT DRUG ELUTING N/A 2/28/2020    Procedure: Percutaneous Coronary Intervention Stent Drug Eluting;  Surgeon: Andres Mann MD;  Location: [facility]     CYSTOSCOPY N/A 3/1/2020    Procedure: CYSTOSCOPY;  Surgeon: Rios Gomez MD;  Location: [facility] OR     HEART CATH DRUG ELUTING STENT PLACEMENT  02/28/2020     HERNIA REPAIR  2007    right inguinal hernia repair     STENT, CORONARY, ANTIONE  2004    2 stents       Medications (outpatient):  Current Outpatient Medications   Medication Sig Dispense Refill     aspirin 81 MG EC tablet Take 81 mg by mouth daily       atorvastatin (LIPITOR) 20 MG tablet Take 1 tablet (20 mg) by mouth daily 90 tablet 3     clopidogrel (PLAVIX) 75 MG tablet TAKE 1 TABLET EVERY DAY 90 tablet 1     furosemide (LASIX) 20 MG tablet Take 1 tablet (20 mg) by mouth daily 90 tablet 3     metoprolol succinate ER (TOPROL XL) 50 MG 24 hr tablet Take 1 tablet (50 mg) by mouth daily Appointment required for further refills 90 tablet 0     multivitamin, therapeutic with minerals (THERA-VIT-M) TABS Take 1 tablet by mouth daily       nitroGLYcerin (NITROSTAT) 0.4 MG sublingual tablet  "Place 1 tablet (0.4 mg) under the tongue every 5 minutes as needed for chest pain 30 tablet 0     tamsulosin (FLOMAX) 0.4 MG capsule Take 1 capsule (0.4 mg) by mouth daily 90 capsule 0       Allergies:  Allergies   Allergen Reactions     Penicillins        Social History:   History   Drug Use No      History   Smoking Status     Former   Smokeless Tobacco     Never     Comment: Quit 30 years ago     Social History    Substance and Sexual Activity      Alcohol use: Yes        Comment: occasional beer       Family History:  Family History   Family history unknown: Yes       Review of Systems:   A complete review of systems was negative except as mentioned in the History of Present Illness.     Objective & Physical Exam:  /78 (BP Location: Right arm, Patient Position: Sitting, Cuff Size: Adult Small)   Pulse 64   Ht 1.854 m (6' 1\")   Wt 53.4 kg (117 lb 11.2 oz)   SpO2 99%   BMI 15.53 kg/m    Wt Readings from Last 2 Encounters:   01/20/23 53.4 kg (117 lb 11.2 oz)   11/23/22 54.7 kg (120 lb 9.6 oz)     Body mass index is 15.53 kg/m .   Body surface area is 1.66 meters squared.    Constitutional: appears stated age, in no apparent distress, cachectic, chronically ill appearing  Head: normocephalic, atraumatic  Neck: supple, trachea midline, no bruit bilaterally   Pulmonary: clear to auscultation bilaterally, no wheezes, no rales, no increased work of breathing  Cardiovascular: JVP normal, regular rate, regular rhythm, normal S1 and S2, no S3, S4, no murmur appreciated, no lower extremity edema  Gastrointestinal: no guarding, non-rigid   Neurologic: awake, alert, moves all extremities  Skin: no jaundice, warm on limited exam  Psychiatric: affect is normal, answers questions appropriately, oriented to self and place    Data reviewed:  Lab Results   Component Value Date    WBC 11.4 (H) 11/08/2022    WBC 6.7 03/04/2020    RBC 3.87 (L) 11/08/2022    RBC 4.09 (L) 03/04/2020    HGB 12.4 (L) 11/08/2022    HGB 13.0 (L) " 03/04/2020    HCT 38.3 (L) 11/08/2022    HCT 40.0 03/04/2020    MCV 99 11/08/2022    MCV 98 03/04/2020    MCH 32.0 11/08/2022    MCH 31.8 03/04/2020    MCHC 32.4 11/08/2022    MCHC 32.5 03/04/2020    RDW 14.2 11/08/2022    RDW 12.8 03/04/2020     11/08/2022     03/04/2020     Sodium   Date Value Ref Range Status   01/20/2023 143 136 - 145 mmol/L Final   03/06/2020 138 133 - 144 mmol/L Final     Potassium   Date Value Ref Range Status   01/20/2023 4.4 3.4 - 5.3 mmol/L Final   06/11/2022 4.2 3.4 - 5.3 mmol/L Final   03/06/2020 3.9 3.4 - 5.3 mmol/L Final     Chloride   Date Value Ref Range Status   01/20/2023 104 98 - 107 mmol/L Final   06/11/2022 104 94 - 109 mmol/L Final   03/06/2020 105 94 - 109 mmol/L Final     Carbon Dioxide   Date Value Ref Range Status   03/06/2020 29 20 - 32 mmol/L Final     Carbon Dioxide (CO2)   Date Value Ref Range Status   01/20/2023 26 22 - 29 mmol/L Final   06/11/2022 30 20 - 32 mmol/L Final     Anion Gap   Date Value Ref Range Status   01/20/2023 13 7 - 15 mmol/L Final   06/11/2022 7 3 - 14 mmol/L Final   03/06/2020 4 3 - 14 mmol/L Final     Glucose   Date Value Ref Range Status   01/20/2023 94 70 - 99 mg/dL Final   06/11/2022 99 70 - 99 mg/dL Final   03/06/2020 126 (H) 70 - 99 mg/dL Final     Urea Nitrogen   Date Value Ref Range Status   01/20/2023 45.9 (H) 8.0 - 23.0 mg/dL Final   06/11/2022 30 7 - 30 mg/dL Final   03/06/2020 38 (H) 7 - 30 mg/dL Final     Creatinine   Date Value Ref Range Status   01/20/2023 1.18 (H) 0.67 - 1.17 mg/dL Final   03/06/2020 1.11 0.66 - 1.25 mg/dL Final     GFR Estimate   Date Value Ref Range Status   01/20/2023 59 (L) >60 mL/min/1.73m2 Final     Comment:     Effective December 21, 2021 eGFRcr in adults is calculated using the 2021 CKD-EPI creatinine equation which includes age and gender (Jackelyn de al., [place], DOI: 10.1056/INKIbe0666671)   03/06/2020 59 (L) >60 mL/min/[1.73_m2] Final     Comment:     Non  GFR Calc  Starting  2018, serum creatinine based estimated GFR (eGFR) will be   calculated using the Chronic Kidney Disease Epidemiology Collaboration   (CKD-EPI) equation.       Calcium   Date Value Ref Range Status   2023 10.1 8.8 - 10.2 mg/dL Final   2020 9.1 8.5 - 10.1 mg/dL Final     Bilirubin Total   Date Value Ref Range Status   2022 1.3 (H) <=1.2 mg/dL Final   2020 0.6 0.2 - 1.3 mg/dL Final     Alkaline Phosphatase   Date Value Ref Range Status   2022 86 40 - 129 U/L Final   2020 76 40 - 150 U/L Final     ALT   Date Value Ref Range Status   2022 51 (H) 10 - 50 U/L Final   2020 49 0 - 70 U/L Final     AST   Date Value Ref Range Status   2022 40 10 - 50 U/L Final   2020 39 0 - 45 U/L Final     Recent Labs   Lab Test 22  1208 20  1641   CHOL 112 89   HDL 45 34*   LDL 45 48   TRIG 111 36      No results found for: A1C     Recent Results (from the past 4320 hour(s))   Echocardiogram Complete   Result Value    LVEF  20-25%    Narrative    [id number]  [id number]  [id number]  [id number]^JEFF^SHARA^CHETNA     [facility]  Echocardiography Laboratory  [address] [place] [address]     Name: JAYDE RAMOS  MRN: [id number]  : 1933  Study Date: 2022 12:45 PM  Age: 89 yrs  Gender: Male  Patient Location: [facility]  Reason For Study: Ischemic cardiomyopathy, Nonrheumatic mitral valve  regurgitation  Ordering Physician: SHARA AGUILAR  Referring Physician: LORY Otero  Performed By: Reji Felix RDCS     BSA: 1.8 m2  Height: 73 in  Weight: 131 lb  HR: 78  BP: 145/75 mmHg  ______________________________________________________________________________  Procedure  Complete Echo Adult.  ______________________________________________________________________________  Interpretation Summary     1. The left ventricle is moderately dilated. There is normal left ventricular  wall thickness. Left ventricular systolic function is severely reduced.  The  visual ejection fraction is 20-25%. Left ventricular diastolic function is  abnormal. Septal motion is consistent with conduction abnormality.  2. The right ventricle is normal size. The right ventricular systolic function  is normal.  3. The left atrium is mild to moderately dilated.  4. Moderate mitral regurgitation.  5. No pericardial effusion.  6. In comparison to the previous report dated 12/09/2021, the findings are  similar.  ______________________________________________________________________________  Left Ventricle  The left ventricle is moderately dilated. There is normal left ventricular  wall thickness. Left ventricular systolic function is severely reduced. The  visual ejection fraction is 20-25%. Left ventricular diastolic function is  abnormal. Septal motion is consistent with conduction abnormality.     Right Ventricle  The right ventricle is normal size. The right ventricular systolic function is  normal.     Atria  The left atrium is mild to moderately dilated. Right atrial size is normal.  There is no color Doppler evidence of an atrial shunt.     Mitral Valve  There is moderate (2+) mitral regurgitation.     Tricuspid Valve  There is trace tricuspid regurgitation. Right ventricular systolic pressure  could not be approximated due to inadequate tricuspid regurgitation.     Aortic Valve  There is mild trileaflet aortic sclerosis. There is mild (1+) aortic  regurgitation. No aortic stenosis is present.     Pulmonic Valve  There is no pulmonic valvular stenosis.     Vessels  The aortic root is normal size. The inferior vena cava is normal.     Pericardium  There is no pericardial effusion.     Rhythm  The rhythm was sinus with wide QRS.  ______________________________________________________________________________  MMode/2D Measurements & Calculations  IVSd: 0.97 cm     LVIDd: 6.7 cm  LVIDs: 5.6 cm  LVPWd: 0.75 cm  FS: 17.0 %  LV mass(C)d: 246.7 grams  LV mass(C)dI: 137.2 grams/m2  Ao root  diam: 3.6 cm  LA dimension: 3.8 cm  LA/Ao: 1.1  LVOT diam: 2.4 cm  LVOT area: 4.6 cm2  LA Volume Index (BP): 39.6 ml/m2  RWT: 0.22     Doppler Measurements & Calculations  MV E max ori: 79.9 cm/sec  MV A max ori: 105.8 cm/sec  MV E/A: 0.76     MV dec slope: 520.6 cm/sec2  MV dec time: 0.15 sec  AI P1/2t: 476.6 msec  LV V1 max P.4 mmHg  LV V1 max: 105.3 cm/sec  LV V1 VTI: 20.7 cm  SV(LVOT): 94.4 ml  SI(LVOT): 52.5 ml/m2  E/E': 17.8  Peak E' Ori: 4.5 cm/sec     ______________________________________________________________________________  Report approved by: Nam Grover 2022 02:25 PM

## 2023-01-20 NOTE — LETTER
1/20/2023    Colt Treviño MD  [address] [facility] [address]    RE: Deshawn Burrows       Dear Colleague,     I had the pleasure of seeing Deshawn Burrows in the [facility].      Cardiology Clinic Progress Note:    January 20, 2023   Patient Name: Deshawn Burrows  Patient MRN: [id number]     Consult indication: CHF    HPI:    I had the opportunity to see patient Deshawn Burrows in cardiology clinic for a follow up visit. Patient is followed by our colleague Colt Treviño MD with Primary Care.     As you know, Mr. 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      The patient has a remote history of coronary artery disease with stenting in the 1990s in [place].  He was admitted to [facility] 02/2020 for chest pain and dyspnea.  He was seen in consultation by my colleague, Dr. Palmer.  TTE was done which demonstrated LVEF 24% with mildly reduced RV function.  He was also noted to have moderate mitral regurgitation.  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  LVEF was 21%.        He underwent coronary angiography on 02/28/2020 that demonstrated a mid LAD in-stent restenosis that was stented at that time.  The degree of cardiac dysfunction was thought to be out of proportion to the burden of coronary artery disease and it was recommended that a cardiac MRI be considered for further  evaluation of his cardiomyopathy.     He was seen in Cardiology Clinic with my colleague, Aric Alexander NP, on 03/06/2020.  At that time, he had been doing well and no changes were warranted to his cardiac regimen.  It was recommended the patient undergo a cardiac MRI and followup with Dr. Palmer for reassessment.      Unfortunately, it seems that the patient had been lost to followup as he did not undergo the cardiac MRI that was ordered nor the visit with Dr. Palmer as had been recommended.        I had seen him in follow-up in 4/8/2021.  At that time, we discussed options for further evaluation management.  We reiterated our recommendation for reassessment with a cardiac MRI.  He also recommended close follow-up with cardiology in 1 month.  For unclear reasons, these were not scheduled either.      I had seen him on 11/1/2022.  At that time, he was accompanied by his daughter, who is visiting from [place].  She has been here now for almost a year.  We discussed option of CRT, seem to be a suboptimal candidate for CRT-D given advanced age, cognitive decline.  Patient and daughter would like to avoid invasive procedures.  We also referred him to Palliative care.    Unfortunately following this he was hospitalized 11/7/2022 to 11/9/2022 with decompensated heart failure.  He was seen by Palliative care while inpatient, and was changed to DNR/DNI, however was still interested in restorative cares.  He has since been followed by my colleague JACOB Ayoub.    Unfortunately, patient continues to have dyspnea on exertion, though weight has been decreasing.  Patient is accompanied by his daughter today again, who reports that patient has a very poor appetite, and generally does not eat much at all.  Patient also has been experiencing symptoms consistent with orthopnea/PND, though no chest pain or abnormal lower extremity swelling.  Labs from today are notable for potassium 4.4, creatinine 1.18, NT proBNP 7600.    Assessment  and Plan/Recommendations:    # HFrEF, LVEF 20-25%, longstanding severe cardiomyopathy.  NYHA III.  Weight down to 117 pounds, despite worsening dyspnea and symptoms of orthopnea/PND, elevated NT proBNP.  Overall clinical presentation is concerning for end-stage heart failure.  He has declined CRT-D, CRT-D previously, he would like to avoid invasive procedures.  # Coronary artery disease with remote history of PCI, status post repeat coronary angiography 02/2020 with ROSENDO to mid LAD in-stent restenosis.  # Sporadic follow up with cardiology, he has missed clinic appointments and has not undergone a cardiac MRI that was ordered over a year ago, likely due to memory impairment.  # Hypertension, stable.  # Dyslipidemia.  LDL at goal of less than 70.   # Chronic LBBB    -Reviewed prior hospitalization, as well as concerning nature of trajectory of his health.  Patient's daughter voiced similar concerns.  Revisited option of referral to home hospice, patient is not interested in this at this time.  - Will increase furosemide to 20 mg daily  - BMP in 2 weeks  - Continue metoprolol succinate, aspirin, clopidogrel, atorvastatin  - Follow-up with JACOB Ayoub in about 1 month or so, follow-up with me per her discretion    Thank you for allowing our team to participate in the care of Deshawn Burrows.  Please do not hesitate to call or page me with any questions or concerns.    Sincerely,     Kwame Blanton MD, [facility]  Cardiology  Text Page   January 20, 2023    Voice recognition software utilized.     Total time spent on this encounter: 35 minutes, providing care in this encounter including, but not limited to, reviewing prior medical records, laboratory data, imaging studies, diagnostic studies, procedure notes, formulating an assessment and plan, recommendations, discussion and counseling with patient face to face, dictation.    Past Medical History:     Past Medical History:   Diagnosis Date     Acute  systolic heart failure (H) 2/25/2020    Added automatically from request for surgery [id number]     CAD (coronary artery disease) 2004    2 cardiac stents      Dyspnea on exertion 2/25/2020    Added automatically from request for surgery [id number]     Essential hypertension with goal blood pressure less than 140/90 8/10/2016     Hyperlipidemia LDL goal <100 8/10/2016     Ischemic cardiomyopathy 2/25/2020    Added automatically from request for surgery [id number]     LBBB (left bundle branch block) 2/25/2020        Past Surgical History:   Past Surgical History:   Procedure Laterality Date     CV CORONARY ANGIOGRAM N/A 2/28/2020    Procedure: CV CORONARY ANGIOGRAM;  Surgeon: Andres Mann MD;  Location: [facility]     CV LEFT HEART CATH N/A 2/28/2020    Procedure: Left Heart Cath;  Surgeon: Andres Mann MD;  Location: [facility]     CV LEFT VENTRICULOGRAM N/A 2/28/2020    Procedure: Left Ventriculogram;  Surgeon: Andres Mann MD;  Location: [facility]     CV PCI STENT DRUG ELUTING N/A 2/28/2020    Procedure: Percutaneous Coronary Intervention Stent Drug Eluting;  Surgeon: Andres Mann MD;  Location:  HEART CARDIAC CATH LAB     CYSTOSCOPY N/A 3/1/2020    Procedure: CYSTOSCOPY;  Surgeon: Rios Gomez MD;  Location: [facility] OR     HEART CATH DRUG ELUTING STENT PLACEMENT  02/28/2020     HERNIA REPAIR  2007    right inguinal hernia repair     STENT, CORONARY, ANTIONE  2004    2 stents       Medications (outpatient):  Current Outpatient Medications   Medication Sig Dispense Refill     aspirin 81 MG EC tablet Take 81 mg by mouth daily       atorvastatin (LIPITOR) 20 MG tablet Take 1 tablet (20 mg) by mouth daily 90 tablet 3     clopidogrel (PLAVIX) 75 MG tablet TAKE 1 TABLET EVERY DAY 90 tablet 1     furosemide (LASIX) 20 MG tablet Take 1 tablet (20 mg) by mouth daily 90 tablet 3     metoprolol succinate ER (TOPROL XL) 50 MG 24 hr tablet Take 1 tablet  "(50 mg) by mouth daily Appointment required for further refills 90 tablet 0     multivitamin, therapeutic with minerals (THERA-VIT-M) TABS Take 1 tablet by mouth daily       nitroGLYcerin (NITROSTAT) 0.4 MG sublingual tablet Place 1 tablet (0.4 mg) under the tongue every 5 minutes as needed for chest pain 30 tablet 0     tamsulosin (FLOMAX) 0.4 MG capsule Take 1 capsule (0.4 mg) by mouth daily 90 capsule 0       Allergies:  Allergies   Allergen Reactions     Penicillins        Social History:   History   Drug Use No      History   Smoking Status     Former   Smokeless Tobacco     Never     Comment: Quit 30 years ago     Social History    Substance and Sexual Activity      Alcohol use: Yes        Comment: occasional beer       Family History:  Family History   Family history unknown: Yes       Review of Systems:   A complete review of systems was negative except as mentioned in the History of Present Illness.     Objective & Physical Exam:  /78 (BP Location: Right arm, Patient Position: Sitting, Cuff Size: Adult Small)   Pulse 64   Ht 1.854 m (6' 1\")   Wt 53.4 kg (117 lb 11.2 oz)   SpO2 99%   BMI 15.53 kg/m    Wt Readings from Last 2 Encounters:   01/20/23 53.4 kg (117 lb 11.2 oz)   11/23/22 54.7 kg (120 lb 9.6 oz)     Body mass index is 15.53 kg/m .   Body surface area is 1.66 meters squared.    Constitutional: appears stated age, in no apparent distress, cachectic, chronically ill appearing  Head: normocephalic, atraumatic  Neck: supple, trachea midline, no bruit bilaterally   Pulmonary: clear to auscultation bilaterally, no wheezes, no rales, no increased work of breathing  Cardiovascular: JVP normal, regular rate, regular rhythm, normal S1 and S2, no S3, S4, no murmur appreciated, no lower extremity edema  Gastrointestinal: no guarding, non-rigid   Neurologic: awake, alert, moves all extremities  Skin: no jaundice, warm on limited exam  Psychiatric: affect is normal, answers questions appropriately, " oriented to self and place    Data reviewed:  Lab Results   Component Value Date    WBC 11.4 (H) 11/08/2022    WBC 6.7 03/04/2020    RBC 3.87 (L) 11/08/2022    RBC 4.09 (L) 03/04/2020    HGB 12.4 (L) 11/08/2022    HGB 13.0 (L) 03/04/2020    HCT 38.3 (L) 11/08/2022    HCT 40.0 03/04/2020    MCV 99 11/08/2022    MCV 98 03/04/2020    MCH 32.0 11/08/2022    MCH 31.8 03/04/2020    MCHC 32.4 11/08/2022    MCHC 32.5 03/04/2020    RDW 14.2 11/08/2022    RDW 12.8 03/04/2020     11/08/2022     03/04/2020     Sodium   Date Value Ref Range Status   01/20/2023 143 136 - 145 mmol/L Final   03/06/2020 138 133 - 144 mmol/L Final     Potassium   Date Value Ref Range Status   01/20/2023 4.4 3.4 - 5.3 mmol/L Final   06/11/2022 4.2 3.4 - 5.3 mmol/L Final   03/06/2020 3.9 3.4 - 5.3 mmol/L Final     Chloride   Date Value Ref Range Status   01/20/2023 104 98 - 107 mmol/L Final   06/11/2022 104 94 - 109 mmol/L Final   03/06/2020 105 94 - 109 mmol/L Final     Carbon Dioxide   Date Value Ref Range Status   03/06/2020 29 20 - 32 mmol/L Final     Carbon Dioxide (CO2)   Date Value Ref Range Status   01/20/2023 26 22 - 29 mmol/L Final   06/11/2022 30 20 - 32 mmol/L Final     Anion Gap   Date Value Ref Range Status   01/20/2023 13 7 - 15 mmol/L Final   06/11/2022 7 3 - 14 mmol/L Final   03/06/2020 4 3 - 14 mmol/L Final     Glucose   Date Value Ref Range Status   01/20/2023 94 70 - 99 mg/dL Final   06/11/2022 99 70 - 99 mg/dL Final   03/06/2020 126 (H) 70 - 99 mg/dL Final     Urea Nitrogen   Date Value Ref Range Status   01/20/2023 45.9 (H) 8.0 - 23.0 mg/dL Final   06/11/2022 30 7 - 30 mg/dL Final   03/06/2020 38 (H) 7 - 30 mg/dL Final     Creatinine   Date Value Ref Range Status   01/20/2023 1.18 (H) 0.67 - 1.17 mg/dL Final   03/06/2020 1.11 0.66 - 1.25 mg/dL Final     GFR Estimate   Date Value Ref Range Status   01/20/2023 59 (L) >60 mL/min/1.73m2 Final     Comment:     Effective December 21, 2021 eGFRcr in adults is calculated  using the  CKD-EPI creatinine equation which includes age and gender (Jackelyn et al., NEJ, DOI: 10.1056/PYHAwj5963314)   2020 59 (L) >60 mL/min/[1.73_m2] Final     Comment:     Non  GFR Calc  Starting 2018, serum creatinine based estimated GFR (eGFR) will be   calculated using the Chronic Kidney Disease Epidemiology Collaboration   (CKD-EPI) equation.       Calcium   Date Value Ref Range Status   2023 10.1 8.8 - 10.2 mg/dL Final   2020 9.1 8.5 - 10.1 mg/dL Final     Bilirubin Total   Date Value Ref Range Status   2022 1.3 (H) <=1.2 mg/dL Final   2020 0.6 0.2 - 1.3 mg/dL Final     Alkaline Phosphatase   Date Value Ref Range Status   2022 86 40 - 129 U/L Final   2020 76 40 - 150 U/L Final     ALT   Date Value Ref Range Status   2022 51 (H) 10 - 50 U/L Final   2020 49 0 - 70 U/L Final     AST   Date Value Ref Range Status   2022 40 10 - 50 U/L Final   2020 39 0 - 45 U/L Final     Recent Labs   Lab Test 22  1208 20  1641   CHOL 112 89   HDL 45 34*   LDL 45 48   TRIG 111 36      No results found for: A1C     Recent Results (from the past 4320 hour(s))   Echocardiogram Complete   Result Value    LVEF  20-25%    Narrative    [id number]  [id number]  [id number]  [id number]^JEFF^SHARA^CHETNA     [facility]  Echocardiography Laboratory  [address] [place] [address]     Name: JAYDE RAMOS  MRN: [id number]  : 1933  Study Date: 2022 12:45 PM  Age: 89 yrs  Gender: Male  Patient Location: [facility]  Reason For Study: Ischemic cardiomyopathy, Nonrheumatic mitral valve  regurgitation  Ordering Physician: SHARA AUGILAR  Referring Physician: LORY Otero  Performed By: Reji Felix RDCS     BSA: 1.8 m2  Height: 73 in  Weight: 131 lb  HR: 78  BP: 145/75 mmHg  ______________________________________________________________________________  Procedure  Complete Echo  Adult.  ______________________________________________________________________________  Interpretation Summary     1. The left ventricle is moderately dilated. There is normal left ventricular  wall thickness. Left ventricular systolic function is severely reduced. The  visual ejection fraction is 20-25%. Left ventricular diastolic function is  abnormal. Septal motion is consistent with conduction abnormality.  2. The right ventricle is normal size. The right ventricular systolic function  is normal.  3. The left atrium is mild to moderately dilated.  4. Moderate mitral regurgitation.  5. No pericardial effusion.  6. In comparison to the previous report dated 12/09/2021, the findings are  similar.  ______________________________________________________________________________  Left Ventricle  The left ventricle is moderately dilated. There is normal left ventricular  wall thickness. Left ventricular systolic function is severely reduced. The  visual ejection fraction is 20-25%. Left ventricular diastolic function is  abnormal. Septal motion is consistent with conduction abnormality.     Right Ventricle  The right ventricle is normal size. The right ventricular systolic function is  normal.     Atria  The left atrium is mild to moderately dilated. Right atrial size is normal.  There is no color Doppler evidence of an atrial shunt.     Mitral Valve  There is moderate (2+) mitral regurgitation.     Tricuspid Valve  There is trace tricuspid regurgitation. Right ventricular systolic pressure  could not be approximated due to inadequate tricuspid regurgitation.     Aortic Valve  There is mild trileaflet aortic sclerosis. There is mild (1+) aortic  regurgitation. No aortic stenosis is present.     Pulmonic Valve  There is no pulmonic valvular stenosis.     Vessels  The aortic root is normal size. The inferior vena cava is normal.     Pericardium  There is no pericardial effusion.     Rhythm  The rhythm was sinus with wide  QRS.  ______________________________________________________________________________  MMode/2D Measurements & Calculations  IVSd: 0.97 cm     LVIDd: 6.7 cm  LVIDs: 5.6 cm  LVPWd: 0.75 cm  FS: 17.0 %  LV mass(C)d: 246.7 grams  LV mass(C)dI: 137.2 grams/m2  Ao root diam: 3.6 cm  LA dimension: 3.8 cm  LA/Ao: 1.1  LVOT diam: 2.4 cm  LVOT area: 4.6 cm2  LA Volume Index (BP): 39.6 ml/m2  RWT: 0.22     Doppler Measurements & Calculations  MV E max ori: 79.9 cm/sec  MV A max ori: 105.8 cm/sec  MV E/A: 0.76     MV dec slope: 520.6 cm/sec2  MV dec time: 0.15 sec  AI P1/2t: 476.6 msec  LV V1 max P.4 mmHg  LV V1 max: 105.3 cm/sec  LV V1 VTI: 20.7 cm  SV(LVOT): 94.4 ml  SI(LVOT): 52.5 ml/m2  E/E': 17.8  Peak E' Ori: 4.5 cm/sec     ______________________________________________________________________________  Report approved by: Nam Grover 2022 02:25 PM                  Thank you for allowing me to participate in the care of your patient.      Sincerely,     Kwame Blanton MD     [facility]  cc:   Kwame Blanton MD  [address]  [place] [address]

## 2023-01-20 NOTE — PATIENT INSTRUCTIONS
January 20, 2023    Thank you for allowing our Cardiology team to participate in your care.     Please note the following changes to your heart treatment plan:     Medication changes:   - start furosemide 20mg daily     Tests to be done:  - NON-fasting labs in 2 weeks    Follow up:  - Follow up in 1 month with Gabby JAMES, or sooner as needed.      For scheduling, please call [phone].    Please contact our team at [phone] (Gabby HEATH) or [phone] for any questions or concerns.     If you are having a medical emergency, please call [phone].     Sincerely,    Kwame Blanton MD, FACC  Cardiology    [facility] - [facility] and [facility] - [facility] - [place]

## 2023-01-23 NOTE — TELEPHONE ENCOUNTER
Home Health Care * plan of care Order # [id number]  Cert period 11- ti 1-    Forms in  mail box for review and signature.

## 2023-02-10 NOTE — TELEPHONE ENCOUNTER
[facility] Call Center    Phone Message    May a detailed message be left on voicemail: yes     Reason for Call: Other: Andra called regarding the health of her father stating she believes he is at the end of his life and doesn't know what steps need to be taken regarding his care. Please call Andra back ASAP. Andra states she left a voicemail yesterday and has not gotten a response. Thank you!     Action Taken: Other: Cardiology    Travel Screening: Not Applicable       Thank you!  [facility]

## 2023-02-13 NOTE — TELEPHONE ENCOUNTER
Call placed to pt's daughter. Daughter was calling to update care team that pt at end of life and cancel upcoming OV. Pt passed away 2023.   Will update  Pt notification.   JASON Sheth RN, BSN.

## 2023-03-05 NOTE — PROGRESS NOTES
HISTORY OF PRESENT ILLNESS:    Deshawn Burrows is a 89 year old male who is seen as self referral for left hip pain. Patient is accompanied with his daughter today. She reports patient had a fall in the parking lot yesterday.     Present symptoms: Abrasions to the left knee and shin. Patient denies pain in the left hip today, but did express discomfort with sitting and positional changes.   Treatments tried to this point: none   Orthopedic PMH: no prior surgeries.      Past Medical History:   Diagnosis Date     Acute systolic heart failure (H) 2/25/2020    Added automatically from request for surgery [id number]     CAD (coronary artery disease) 2004    2 cardiac stents      Dyspnea on exertion 2/25/2020    Added automatically from request for surgery [id number]     Essential hypertension with goal blood pressure less than 140/90 8/10/2016     Hyperlipidemia LDL goal <100 8/10/2016     Ischemic cardiomyopathy 2/25/2020    Added automatically from request for surgery [id number]     LBBB (left bundle branch block) 2/25/2020       Past Surgical History:   Procedure Laterality Date     CV CORONARY ANGIOGRAM N/A 2/28/2020    Procedure: CV CORONARY ANGIOGRAM;  Surgeon: Andres Mann MD;  [facility]:  [facility]     CV LEFT HEART CATH N/A 2/28/2020    Procedure: Left Heart Cath;  Surgeon: Andres Mann MD;  Location:  [facility]     CV LEFT VENTRICULOGRAM N/A 2/28/2020    Procedure: Left Ventriculogram;  Surgeon: Andres Mann MD;  Location:  [facility]     CV PCI STENT DRUG ELUTING N/A 2/28/2020    Procedure: Percutaneous Coronary Intervention Stent Drug Eluting;  Surgeon: Andres Mann MD;  Location:  [facility]     CYSTOSCOPY N/A 3/1/2020    Procedure: CYSTOSCOPY;  Surgeon: Rios Gomez MD;  Location: [facility]     HEART CATH DRUG ELUTING STENT PLACEMENT  02/28/2020     HERNIA REPAIR  2007    right inguinal hernia repair     STENT,  CORONARY, ANTIONE  2004    2 stents       Family History   Family history unknown: Yes       Social History     Socioeconomic History     Marital status:      Spouse name: Not on file     Number of children: Not on file     Years of education: Not on file     Highest education level: Not on file   Occupational History     Not on file   Tobacco Use     Smoking status: Former Smoker     Smokeless tobacco: Never Used     Tobacco comment: Quit 30 years ago   Vaping Use     Vaping Use: Never used   Substance and Sexual Activity     Alcohol use: Yes     Alcohol/week: 0.0 standard drinks     Comment: Moderate     Drug use: No     Sexual activity: Not Currently   Other Topics Concern     Parent/sibling w/ CABG, MI or angioplasty before 65F 55M? Not Asked   Social History Narrative     Not on file     Social Determinants of Health     Financial Resource Strain: Not on file   Food Insecurity: Not on file   Transportation Needs: Not on file   Physical Activity: Not on file   Stress: Not on file   Social Connections: Not on file   Intimate Partner Violence: Not on file   Housing Stability: Not on file       Current Outpatient Medications   Medication Sig Dispense Refill     aspirin 81 MG EC tablet Take 81 mg by mouth daily       atorvastatin (LIPITOR) 20 MG tablet Take 1 tablet (20 mg) by mouth daily 90 tablet 0     clopidogrel (PLAVIX) 75 MG tablet TAKE 1 TABLET EVERY DAY 90 tablet 0     furosemide (LASIX) 20 MG tablet Take 1 tablet (20 mg) by mouth daily 45 tablet 0     lisinopril (ZESTRIL) 5 MG tablet TAKE 1 TABLET EVERY DAY 90 tablet 0     metoprolol succinate ER (TOPROL-XL) 50 MG 24 hr tablet Take 1 tablet (50 mg) by mouth daily 90 tablet 3     multivitamin, therapeutic with minerals (THERA-VIT-M) TABS Take 1 tablet by mouth daily       nitroGLYcerin (NITROSTAT) 0.4 MG sublingual tablet Place 1 tablet (0.4 mg) under the tongue every 5 minutes as needed for chest pain 30 tablet 0       Allergies   Allergen Reactions      Penicillins        REVIEW OF SYSTEMS:  CONSTITUTIONAL:  NEGATIVE for fever, chills, change in weight  INTEGUMENTARY/SKIN:  NEGATIVE for worrisome rashes, moles or lesions  EYES:  NEGATIVE for vision changes or irritation  ENT/MOUTH:  NEGATIVE for ear, mouth and throat problems  RESP:  NEGATIVE for significant cough or SOB  BREAST:  NEGATIVE for masses, tenderness or discharge  CV:  NEGATIVE for chest pain, palpitations or peripheral edema  GI:  NEGATIVE for nausea, abdominal pain, heartburn, or change in bowel habits  :  Negative   MUSCULOSKELETAL:  See HPI above  NEURO:  NEGATIVE for weakness, dizziness or paresthesias  ENDOCRINE:  NEGATIVE for temperature intolerance, skin/hair changes  HEME/ALLERGY/IMMUNE:  NEGATIVE for bleeding problems  PSYCHIATRIC:  NEGATIVE for changes in mood or affect      PHYSICAL EXAM:  There were no vitals taken for this visit.  There is no height or weight on file to calculate BMI.   GENERAL APPEARANCE: healthy, alert and no distress   HEENT: No apparent thyroid megaly. Clear sclera with normal ocular movement  RESPIRATORY: No labored breathing  SKIN: no suspicious lesions or rashes  NEURO: Normal strength and tone, mentation intact and speech normal  VASCULAR: Good pulses, and capillary refill   LYMPH: no lymphadenopathy   PSYCH:  mentation appears normal and affect normal/bright    MUSCULOSKELETAL:  Not in acute distress  He is able to get up from sitting with minimal assistance with his hands  Walk with minimal limp    Left hip range of motion is somewhat limited but no significant pain with internal and external rotation  No palpable pain in the greater trochanter region as well as pelvic rim  He has a flexion contracture of the knee, left    2 areas of mild superficial abrasions around the knee    Diffuse minimal to mild swelling of the left ankle is noted particular    In terms of motor strength, he has weak dorsiflexion strength of the left ankle where his right ankle  dorsiflexion is full  He has grade 3 out of 5 strength on the left ankle so that when he walks he barely clears his toes but left foot does not scrape against the floor.    Plantarflexion, inversion and eversion strength is otherwise intact.       ASSESSMENT:  Abrasion, left knee, superficial  Left hip pain mostly from mild contusion with underlying mild hip DJD  Chronic mild foot drop, left  Nonspecific ankle sprain    PLAN:  The x-ray images of the pelvis, left hip as well as left ankle were visualized.  He has more noticeable DJD changes of the right hip compared to left.  His clinical examination is relatively benign for both the left hip and the left ankle although we did find dorsiflexion weakness of the left ankle which was felt to be an incidental finding.    This might be due to old disc herniation at L4-5 level but he does remember having had any issues in the back.  At the same time, he does have some memory loss issue according to his daughter.    I advised patient's daughter to watch for possible further progression of foot drop on the left which may necessitate using orthotics to support the ankle if that happens.    Topical wound care for the abrasion with a bacitracin and Band-Aid.    Otherwise, follow-up as needed..    Imaging Interpretation:    Chronic bilateral hip DJD, right worse than left  No acute findings of the left ankle.    Rudy Lorenzo MD  Department of Orthopedic Surgery        Disclaimer: This note consists of symbols derived from keyboarding, dictation and/or voice recognition software. As a result, there may be errors in the script that have gone undetected. Please consider this when interpreting information found in this chart.     Left arm;

## 2024-01-22 NOTE — TELEPHONE ENCOUNTER
IP F/U    Date: 2-29-20  Diagnosis: pneumonia of R lung d/t infectious organism, unspecified part of lung, abnormal cardivascular stress test  Is patient active in care coordination? No  Was patient in TCU? No    Per chart, patient was admitted to hospital 3-1-20 and currently in-patient.      
Yes

## (undated) DEVICE — WIRE GUIDE 0.035"X145CM BENTSON TSFB-35-145-BH

## (undated) DEVICE — PREP TECHNI-CARE CHLOROXYLENOL 3% 4OZ BOTTLE C222-4ZWO

## (undated) DEVICE — LINEN FULL SHEET 5511

## (undated) DEVICE — INTRO GLIDESHEATH SLENDER 6FR 10X45CM 60-1060

## (undated) DEVICE — WIRE GUIDE 0.035"X260CM SAFE-T-J EXCHANGE G00517

## (undated) DEVICE — TUBING IRRIG TUR Y TYPE 96" LF 6543-01

## (undated) DEVICE — MANIFOLD KIT ANGIO AUTOMATED 014613

## (undated) DEVICE — KIT LG BORE TOUHY ACCESS PLUS MAP152

## (undated) DEVICE — PACK CYSTO CUSTOM RIDGES

## (undated) DEVICE — GLOVE PROTEXIS W/NEU-THERA 8.0  2D73TE80

## (undated) DEVICE — CATH FOLEY 18FR 5ML LATEX 0165SI18

## (undated) DEVICE — SOL WATER IRRIG 3000ML BAG 2B7117

## (undated) DEVICE — RAD INFLATOR BASIC COMPAK  IN4130

## (undated) DEVICE — LINEN HALF SHEET 5512

## (undated) DEVICE — GUIDEWIRE VASC 0.014INX190CM J TIP CGRXT190HJ

## (undated) DEVICE — CATH ANGIO JUDKINS R4 6FRX100CM INFINITI 534621T

## (undated) DEVICE — BAG CLEAR TRASH 1.3M 39X33" P4040C

## (undated) DEVICE — SLEEVE TR BAND RADIAL COMPRESSION DEVICE 24CM TRB24-REG

## (undated) DEVICE — SOL WATER IRRIG 1000ML BOTTLE 2F7114

## (undated) DEVICE — BAG URINARY DRAIN 4000ML LF 153509

## (undated) DEVICE — CATH ANGIO INFINITI PIGTAIL 145 6 SH 6FRX110CM  534-652S

## (undated) DEVICE — CATH BALLOON EMERGE 2.5X15MM H7493918915250

## (undated) DEVICE — KIT HAND CONTROL ANGIOTOUCH ACIST 65CM AT-P65

## (undated) DEVICE — CATH ANGIO JUDKINS JL4 6FRX100CM INFINITI 534620T

## (undated) DEVICE — CATH LAUNCHER 6FR LA6EBU375

## (undated) DEVICE — DEFIB PRO-PADZ LVP LQD GEL ADULT 8900-2105-01

## (undated) DEVICE — SYR 10ML SLIP TIP W/O NDL 303134

## (undated) DEVICE — WIRE GLIDE 0.035"X150CM VASC GR3506

## (undated) RX ORDER — LIDOCAINE HYDROCHLORIDE 20 MG/ML
JELLY TOPICAL
Status: DISPENSED
Start: 2020-03-01

## (undated) RX ORDER — ONDANSETRON 2 MG/ML
INJECTION INTRAMUSCULAR; INTRAVENOUS
Status: DISPENSED
Start: 2020-03-01

## (undated) RX ORDER — FUROSEMIDE 10 MG/ML
INJECTION INTRAMUSCULAR; INTRAVENOUS
Status: DISPENSED
Start: 2020-03-01

## (undated) RX ORDER — FENTANYL CITRATE 50 UG/ML
INJECTION, SOLUTION INTRAMUSCULAR; INTRAVENOUS
Status: DISPENSED
Start: 2020-03-01

## (undated) RX ORDER — PROPOFOL 10 MG/ML
INJECTION, EMULSION INTRAVENOUS
Status: DISPENSED
Start: 2020-03-01

## (undated) RX ORDER — GLYCOPYRROLATE 0.2 MG/ML
INJECTION INTRAMUSCULAR; INTRAVENOUS
Status: DISPENSED
Start: 2020-03-01

## (undated) RX ORDER — DEXAMETHASONE SODIUM PHOSPHATE 4 MG/ML
INJECTION, SOLUTION INTRA-ARTICULAR; INTRALESIONAL; INTRAMUSCULAR; INTRAVENOUS; SOFT TISSUE
Status: DISPENSED
Start: 2020-03-01

## (undated) RX ORDER — LIDOCAINE HYDROCHLORIDE 10 MG/ML
INJECTION, SOLUTION EPIDURAL; INFILTRATION; INTRACAUDAL; PERINEURAL
Status: DISPENSED
Start: 2020-03-01